# Patient Record
Sex: MALE | Race: WHITE | URBAN - METROPOLITAN AREA
[De-identification: names, ages, dates, MRNs, and addresses within clinical notes are randomized per-mention and may not be internally consistent; named-entity substitution may affect disease eponyms.]

---

## 2019-12-26 ENCOUNTER — EMERGENCY (EMERGENCY)
Facility: HOSPITAL | Age: 84
LOS: 0 days | Discharge: HOME | End: 2019-12-27
Attending: EMERGENCY MEDICINE
Payer: MEDICARE

## 2019-12-26 VITALS
HEART RATE: 90 BPM | RESPIRATION RATE: 17 BRPM | HEIGHT: 69 IN | TEMPERATURE: 98 F | OXYGEN SATURATION: 98 % | SYSTOLIC BLOOD PRESSURE: 170 MMHG | DIASTOLIC BLOOD PRESSURE: 91 MMHG | WEIGHT: 134.92 LBS

## 2019-12-26 DIAGNOSIS — I25.2 OLD MYOCARDIAL INFARCTION: ICD-10-CM

## 2019-12-26 DIAGNOSIS — R73.03 PREDIABETES: ICD-10-CM

## 2019-12-26 DIAGNOSIS — I25.10 ATHEROSCLEROTIC HEART DISEASE OF NATIVE CORONARY ARTERY WITHOUT ANGINA PECTORIS: ICD-10-CM

## 2019-12-26 DIAGNOSIS — I10 ESSENTIAL (PRIMARY) HYPERTENSION: ICD-10-CM

## 2019-12-26 DIAGNOSIS — N17.9 ACUTE KIDNEY FAILURE, UNSPECIFIED: ICD-10-CM

## 2019-12-26 DIAGNOSIS — E86.0 DEHYDRATION: ICD-10-CM

## 2019-12-26 DIAGNOSIS — R90.82 WHITE MATTER DISEASE, UNSPECIFIED: ICD-10-CM

## 2019-12-26 DIAGNOSIS — I11.9 HYPERTENSIVE HEART DISEASE WITHOUT HEART FAILURE: ICD-10-CM

## 2019-12-26 DIAGNOSIS — R07.89 OTHER CHEST PAIN: ICD-10-CM

## 2019-12-26 DIAGNOSIS — Z95.5 PRESENCE OF CORONARY ANGIOPLASTY IMPLANT AND GRAFT: ICD-10-CM

## 2019-12-26 DIAGNOSIS — K21.9 GASTRO-ESOPHAGEAL REFLUX DISEASE WITHOUT ESOPHAGITIS: ICD-10-CM

## 2019-12-26 DIAGNOSIS — E78.5 HYPERLIPIDEMIA, UNSPECIFIED: ICD-10-CM

## 2019-12-26 DIAGNOSIS — I44.0 ATRIOVENTRICULAR BLOCK, FIRST DEGREE: ICD-10-CM

## 2019-12-26 DIAGNOSIS — N40.0 BENIGN PROSTATIC HYPERPLASIA WITHOUT LOWER URINARY TRACT SYMPTOMS: ICD-10-CM

## 2019-12-26 DIAGNOSIS — R07.9 CHEST PAIN, UNSPECIFIED: ICD-10-CM

## 2019-12-26 DIAGNOSIS — Z79.82 LONG TERM (CURRENT) USE OF ASPIRIN: ICD-10-CM

## 2019-12-26 DIAGNOSIS — I45.10 UNSPECIFIED RIGHT BUNDLE-BRANCH BLOCK: ICD-10-CM

## 2019-12-26 LAB
ALBUMIN SERPL ELPH-MCNC: 3.2 G/DL — LOW (ref 3.3–5)
ALP SERPL-CCNC: 115 U/L — SIGNIFICANT CHANGE UP (ref 40–120)
ALT FLD-CCNC: 20 U/L — SIGNIFICANT CHANGE UP (ref 12–78)
ANION GAP SERPL CALC-SCNC: 9 MMOL/L — SIGNIFICANT CHANGE UP (ref 5–17)
APPEARANCE UR: CLEAR — SIGNIFICANT CHANGE UP
APTT BLD: 28 SEC — SIGNIFICANT CHANGE UP (ref 27.5–36.3)
AST SERPL-CCNC: 27 U/L — SIGNIFICANT CHANGE UP (ref 15–37)
BASOPHILS # BLD AUTO: 0.05 K/UL — SIGNIFICANT CHANGE UP (ref 0–0.2)
BASOPHILS NFR BLD AUTO: 0.4 % — SIGNIFICANT CHANGE UP (ref 0–2)
BILIRUB SERPL-MCNC: 1.4 MG/DL — HIGH (ref 0.2–1.2)
BILIRUB UR-MCNC: NEGATIVE — SIGNIFICANT CHANGE UP
BUN SERPL-MCNC: 15 MG/DL — SIGNIFICANT CHANGE UP (ref 7–23)
CALCIUM SERPL-MCNC: 8.4 MG/DL — LOW (ref 8.5–10.1)
CHLORIDE SERPL-SCNC: 108 MMOL/L — SIGNIFICANT CHANGE UP (ref 96–108)
CO2 SERPL-SCNC: 25 MMOL/L — SIGNIFICANT CHANGE UP (ref 22–31)
COLOR SPEC: YELLOW — SIGNIFICANT CHANGE UP
CREAT SERPL-MCNC: 1.09 MG/DL — SIGNIFICANT CHANGE UP (ref 0.5–1.3)
DIFF PNL FLD: ABNORMAL
EOSINOPHIL # BLD AUTO: 0.16 K/UL — SIGNIFICANT CHANGE UP (ref 0–0.5)
EOSINOPHIL NFR BLD AUTO: 1.4 % — SIGNIFICANT CHANGE UP (ref 0–6)
GLUCOSE SERPL-MCNC: 133 MG/DL — HIGH (ref 70–99)
GLUCOSE UR QL: NEGATIVE MG/DL — SIGNIFICANT CHANGE UP
HCT VFR BLD CALC: 38.6 % — LOW (ref 39–50)
HGB BLD-MCNC: 12.9 G/DL — LOW (ref 13–17)
IMM GRANULOCYTES NFR BLD AUTO: 0.5 % — SIGNIFICANT CHANGE UP (ref 0–1.5)
INR BLD: 1.03 RATIO — SIGNIFICANT CHANGE UP (ref 0.88–1.16)
KETONES UR-MCNC: ABNORMAL
LEUKOCYTE ESTERASE UR-ACNC: NEGATIVE — SIGNIFICANT CHANGE UP
LIDOCAIN IGE QN: 107 U/L — SIGNIFICANT CHANGE UP (ref 73–393)
LYMPHOCYTES # BLD AUTO: 1.35 K/UL — SIGNIFICANT CHANGE UP (ref 1–3.3)
LYMPHOCYTES # BLD AUTO: 12 % — LOW (ref 13–44)
MAGNESIUM SERPL-MCNC: 1.5 MG/DL — LOW (ref 1.6–2.6)
MCHC RBC-ENTMCNC: 31.6 PG — SIGNIFICANT CHANGE UP (ref 27–34)
MCHC RBC-ENTMCNC: 33.4 GM/DL — SIGNIFICANT CHANGE UP (ref 32–36)
MCV RBC AUTO: 94.6 FL — SIGNIFICANT CHANGE UP (ref 80–100)
MONOCYTES # BLD AUTO: 0.88 K/UL — SIGNIFICANT CHANGE UP (ref 0–0.9)
MONOCYTES NFR BLD AUTO: 7.8 % — SIGNIFICANT CHANGE UP (ref 2–14)
NEUTROPHILS # BLD AUTO: 8.72 K/UL — HIGH (ref 1.8–7.4)
NEUTROPHILS NFR BLD AUTO: 77.9 % — HIGH (ref 43–77)
NITRITE UR-MCNC: NEGATIVE — SIGNIFICANT CHANGE UP
NT-PROBNP SERPL-SCNC: 1139 PG/ML — HIGH (ref 0–450)
PH UR: 8 — SIGNIFICANT CHANGE UP (ref 5–8)
PLATELET # BLD AUTO: 210 K/UL — SIGNIFICANT CHANGE UP (ref 150–400)
POTASSIUM SERPL-MCNC: 4.2 MMOL/L — SIGNIFICANT CHANGE UP (ref 3.5–5.3)
POTASSIUM SERPL-SCNC: 4.2 MMOL/L — SIGNIFICANT CHANGE UP (ref 3.5–5.3)
PROT SERPL-MCNC: 6.7 GM/DL — SIGNIFICANT CHANGE UP (ref 6–8.3)
PROT UR-MCNC: NEGATIVE MG/DL — SIGNIFICANT CHANGE UP
PROTHROM AB SERPL-ACNC: 11.5 SEC — SIGNIFICANT CHANGE UP (ref 10–12.9)
RBC # BLD: 4.08 M/UL — LOW (ref 4.2–5.8)
RBC # FLD: 13.7 % — SIGNIFICANT CHANGE UP (ref 10.3–14.5)
SODIUM SERPL-SCNC: 142 MMOL/L — SIGNIFICANT CHANGE UP (ref 135–145)
SP GR SPEC: 1.01 — SIGNIFICANT CHANGE UP (ref 1.01–1.02)
TROPONIN I SERPL-MCNC: <0.015 NG/ML — SIGNIFICANT CHANGE UP (ref 0.01–0.04)
UROBILINOGEN FLD QL: NEGATIVE MG/DL — SIGNIFICANT CHANGE UP
WBC # BLD: 11.22 K/UL — HIGH (ref 3.8–10.5)
WBC # FLD AUTO: 11.22 K/UL — HIGH (ref 3.8–10.5)

## 2019-12-26 PROCEDURE — 80048 BASIC METABOLIC PNL TOTAL CA: CPT

## 2019-12-26 PROCEDURE — 71045 X-RAY EXAM CHEST 1 VIEW: CPT

## 2019-12-26 PROCEDURE — 96372 THER/PROPH/DIAG INJ SC/IM: CPT | Mod: XU

## 2019-12-26 PROCEDURE — 87086 URINE CULTURE/COLONY COUNT: CPT

## 2019-12-26 PROCEDURE — 96374 THER/PROPH/DIAG INJ IV PUSH: CPT

## 2019-12-26 PROCEDURE — 93306 TTE W/DOPPLER COMPLETE: CPT

## 2019-12-26 PROCEDURE — 85610 PROTHROMBIN TIME: CPT

## 2019-12-26 PROCEDURE — 93005 ELECTROCARDIOGRAM TRACING: CPT

## 2019-12-26 PROCEDURE — 99285 EMERGENCY DEPT VISIT HI MDM: CPT

## 2019-12-26 PROCEDURE — 80053 COMPREHEN METABOLIC PANEL: CPT

## 2019-12-26 PROCEDURE — 84484 ASSAY OF TROPONIN QUANT: CPT | Mod: 91

## 2019-12-26 PROCEDURE — G0378: CPT

## 2019-12-26 PROCEDURE — 97116 GAIT TRAINING THERAPY: CPT | Mod: GP

## 2019-12-26 PROCEDURE — 83690 ASSAY OF LIPASE: CPT

## 2019-12-26 PROCEDURE — 97163 PT EVAL HIGH COMPLEX 45 MIN: CPT | Mod: GP

## 2019-12-26 PROCEDURE — 81001 URINALYSIS AUTO W/SCOPE: CPT

## 2019-12-26 PROCEDURE — 36415 COLL VENOUS BLD VENIPUNCTURE: CPT

## 2019-12-26 PROCEDURE — 83036 HEMOGLOBIN GLYCOSYLATED A1C: CPT

## 2019-12-26 PROCEDURE — 83735 ASSAY OF MAGNESIUM: CPT

## 2019-12-26 PROCEDURE — 80061 LIPID PANEL: CPT

## 2019-12-26 PROCEDURE — 85025 COMPLETE CBC W/AUTO DIFF WBC: CPT

## 2019-12-26 PROCEDURE — 70450 CT HEAD/BRAIN W/O DYE: CPT

## 2019-12-26 PROCEDURE — 71045 X-RAY EXAM CHEST 1 VIEW: CPT | Mod: 26

## 2019-12-26 PROCEDURE — 99285 EMERGENCY DEPT VISIT HI MDM: CPT | Mod: 25

## 2019-12-26 PROCEDURE — 70450 CT HEAD/BRAIN W/O DYE: CPT | Mod: 26

## 2019-12-26 PROCEDURE — 93010 ELECTROCARDIOGRAM REPORT: CPT

## 2019-12-26 PROCEDURE — 85730 THROMBOPLASTIN TIME PARTIAL: CPT

## 2019-12-26 PROCEDURE — 83880 ASSAY OF NATRIURETIC PEPTIDE: CPT

## 2019-12-26 RX ORDER — MORPHINE SULFATE 50 MG/1
2 CAPSULE, EXTENDED RELEASE ORAL EVERY 4 HOURS
Refills: 0 | Status: DISCONTINUED | OUTPATIENT
Start: 2019-12-26 | End: 2019-12-27

## 2019-12-26 RX ORDER — DEXTROSE 50 % IN WATER 50 %
12.5 SYRINGE (ML) INTRAVENOUS ONCE
Refills: 0 | Status: DISCONTINUED | OUTPATIENT
Start: 2019-12-26 | End: 2019-12-27

## 2019-12-26 RX ORDER — ACETAMINOPHEN 500 MG
650 TABLET ORAL EVERY 4 HOURS
Refills: 0 | Status: DISCONTINUED | OUTPATIENT
Start: 2019-12-26 | End: 2019-12-27

## 2019-12-26 RX ORDER — ONDANSETRON 8 MG/1
4 TABLET, FILM COATED ORAL EVERY 6 HOURS
Refills: 0 | Status: DISCONTINUED | OUTPATIENT
Start: 2019-12-26 | End: 2019-12-27

## 2019-12-26 RX ORDER — METOPROLOL TARTRATE 50 MG
25 TABLET ORAL
Refills: 0 | Status: DISCONTINUED | OUTPATIENT
Start: 2019-12-26 | End: 2019-12-27

## 2019-12-26 RX ORDER — TEMAZEPAM 15 MG/1
15 CAPSULE ORAL AT BEDTIME
Refills: 0 | Status: DISCONTINUED | OUTPATIENT
Start: 2019-12-26 | End: 2019-12-27

## 2019-12-26 RX ORDER — DEXTROSE 50 % IN WATER 50 %
15 SYRINGE (ML) INTRAVENOUS ONCE
Refills: 0 | Status: DISCONTINUED | OUTPATIENT
Start: 2019-12-26 | End: 2019-12-27

## 2019-12-26 RX ORDER — TAMSULOSIN HYDROCHLORIDE 0.4 MG/1
0.8 CAPSULE ORAL AT BEDTIME
Refills: 0 | Status: DISCONTINUED | OUTPATIENT
Start: 2019-12-26 | End: 2019-12-27

## 2019-12-26 RX ORDER — SODIUM CHLORIDE 9 MG/ML
1000 INJECTION, SOLUTION INTRAVENOUS
Refills: 0 | Status: DISCONTINUED | OUTPATIENT
Start: 2019-12-26 | End: 2019-12-27

## 2019-12-26 RX ORDER — SIMVASTATIN 20 MG/1
40 TABLET, FILM COATED ORAL AT BEDTIME
Refills: 0 | Status: DISCONTINUED | OUTPATIENT
Start: 2019-12-26 | End: 2019-12-27

## 2019-12-26 RX ORDER — FINASTERIDE 5 MG/1
5 TABLET, FILM COATED ORAL DAILY
Refills: 0 | Status: DISCONTINUED | OUTPATIENT
Start: 2019-12-26 | End: 2019-12-27

## 2019-12-26 RX ORDER — AMLODIPINE BESYLATE 2.5 MG/1
5 TABLET ORAL ONCE
Refills: 0 | Status: COMPLETED | OUTPATIENT
Start: 2019-12-26 | End: 2019-12-26

## 2019-12-26 RX ORDER — GLUCAGON INJECTION, SOLUTION 0.5 MG/.1ML
1 INJECTION, SOLUTION SUBCUTANEOUS ONCE
Refills: 0 | Status: DISCONTINUED | OUTPATIENT
Start: 2019-12-26 | End: 2019-12-27

## 2019-12-26 RX ORDER — MAGNESIUM SULFATE 500 MG/ML
1 VIAL (ML) INJECTION ONCE
Refills: 0 | Status: COMPLETED | OUTPATIENT
Start: 2019-12-26 | End: 2019-12-26

## 2019-12-26 RX ORDER — DEXTROSE 50 % IN WATER 50 %
25 SYRINGE (ML) INTRAVENOUS ONCE
Refills: 0 | Status: DISCONTINUED | OUTPATIENT
Start: 2019-12-26 | End: 2019-12-27

## 2019-12-26 RX ORDER — QUETIAPINE FUMARATE 200 MG/1
50 TABLET, FILM COATED ORAL AT BEDTIME
Refills: 0 | Status: DISCONTINUED | OUTPATIENT
Start: 2019-12-26 | End: 2019-12-27

## 2019-12-26 RX ORDER — HYDROXYZINE HCL 10 MG
25 TABLET ORAL
Refills: 0 | Status: DISCONTINUED | OUTPATIENT
Start: 2019-12-26 | End: 2019-12-27

## 2019-12-26 RX ORDER — HEPARIN SODIUM 5000 [USP'U]/ML
5000 INJECTION INTRAVENOUS; SUBCUTANEOUS
Refills: 0 | Status: DISCONTINUED | OUTPATIENT
Start: 2019-12-26 | End: 2019-12-27

## 2019-12-26 RX ORDER — ASPIRIN/CALCIUM CARB/MAGNESIUM 324 MG
81 TABLET ORAL DAILY
Refills: 0 | Status: DISCONTINUED | OUTPATIENT
Start: 2019-12-26 | End: 2019-12-27

## 2019-12-26 RX ORDER — PANTOPRAZOLE SODIUM 20 MG/1
40 TABLET, DELAYED RELEASE ORAL
Refills: 0 | Status: DISCONTINUED | OUTPATIENT
Start: 2019-12-26 | End: 2019-12-27

## 2019-12-26 RX ORDER — GABAPENTIN 400 MG/1
800 CAPSULE ORAL THREE TIMES A DAY
Refills: 0 | Status: DISCONTINUED | OUTPATIENT
Start: 2019-12-26 | End: 2019-12-27

## 2019-12-26 RX ORDER — AMLODIPINE BESYLATE 2.5 MG/1
5 TABLET ORAL DAILY
Refills: 0 | Status: DISCONTINUED | OUTPATIENT
Start: 2019-12-26 | End: 2019-12-27

## 2019-12-26 RX ADMIN — SIMVASTATIN 40 MILLIGRAM(S): 20 TABLET, FILM COATED ORAL at 22:39

## 2019-12-26 RX ADMIN — TEMAZEPAM 15 MILLIGRAM(S): 15 CAPSULE ORAL at 22:39

## 2019-12-26 RX ADMIN — QUETIAPINE FUMARATE 50 MILLIGRAM(S): 200 TABLET, FILM COATED ORAL at 21:42

## 2019-12-26 RX ADMIN — Medication 1 TABLET(S): at 17:44

## 2019-12-26 RX ADMIN — FINASTERIDE 5 MILLIGRAM(S): 5 TABLET, FILM COATED ORAL at 17:44

## 2019-12-26 RX ADMIN — AMLODIPINE BESYLATE 5 MILLIGRAM(S): 2.5 TABLET ORAL at 17:44

## 2019-12-26 RX ADMIN — GABAPENTIN 800 MILLIGRAM(S): 400 CAPSULE ORAL at 14:02

## 2019-12-26 RX ADMIN — GABAPENTIN 800 MILLIGRAM(S): 400 CAPSULE ORAL at 21:43

## 2019-12-26 RX ADMIN — Medication 25 MILLIGRAM(S): at 14:03

## 2019-12-26 RX ADMIN — HEPARIN SODIUM 5000 UNIT(S): 5000 INJECTION INTRAVENOUS; SUBCUTANEOUS at 17:44

## 2019-12-26 RX ADMIN — Medication 100 GRAM(S): at 16:04

## 2019-12-26 RX ADMIN — TAMSULOSIN HYDROCHLORIDE 0.8 MILLIGRAM(S): 0.4 CAPSULE ORAL at 21:43

## 2019-12-26 NOTE — ED PROVIDER NOTE - PROGRESS NOTE DETAILS
javed: Discussed need to admit with patient & discussed risk and benefits.  Patient agreed to admission.  Discussed case w/ admitting doctor - agreed to admit to their service. will place bridge orders. Accepting physician APPROVED PT TO MOVE   prior to inpatient team evaluation

## 2019-12-26 NOTE — H&P ADULT - HISTORY OF PRESENT ILLNESS
89 y/o male with PMHx of CAD s/p stenting in the past, HLD, BPH, DM, HTN, and GERD who presents with a chief complaint of chest pain.  Patient notes sx started when he woke up this morning.  He got OOB, went to the bathroom, and CP started.  CP felt like pressure 3-4/10.  Patient also had an episode of nausea with vomiting at home with the CP this morning.  He denies SOB or palpitations.  Chest pressure was substernal and did not radiate.  Patient presented to  ER due to pressure like pain and hx of cardiac disease in the past.  Patient denies episodes of CP in the past.  Patient given nitro and ASA and CP improved.  Still has some discomfort now but improved.  In ER, EKG with new RBBB compared to 2013, 1st trop negative.      PAST MEDICAL & SURGICAL HISTORY:    as above    FAMILY HISTORY:  noncontributory to this hospitalization    Social History:  lives at home alone.  no tobacco/ etoh/ drug use.  .      Allergies:  unknown    Home Medications:  alfuzosin 10 mg oral tablet, extended release: 1 tab(s) orally once a day with food (26 Dec 2019 12:01)  Aspir 81 oral delayed release tablet: 1 tab(s) orally once a day (26 Dec 2019 12:01)  finasteride 5 mg oral tablet: 1 tab(s) orally once a day (26 Dec 2019 12:01)  gabapentin 800 mg oral tablet: 1 tab(s) orally 3 times a day (26 Dec 2019 12:01)  hydrOXYzine hydrochloride 25 mg oral tablet: 1 tab(s) orally 2 times a day, As Needed for itch (26 Dec 2019 12:01)  meloxicam 15 mg oral tablet: 1 tab(s) orally once a day (26 Dec 2019 12:01)  Multiple Vitamins oral tablet: 1 tab(s) orally once a day (26 Dec 2019 12:01)  omeprazole 20 mg oral delayed release capsule: 1 cap(s) orally once a day (26 Dec 2019 12:01)  QUEtiapine 50 mg oral tablet: 1 tab(s) orally once a day (at bedtime) (26 Dec 2019 12:01)  simvastatin 40 mg oral tablet: 1 tab(s) orally once a day (at bedtime) (26 Dec 2019 12:01)  temazepam 15 mg oral capsule: 1-2 cap(s) orally once a day (at bedtime) as needed for sleep (26 Dec 2019 12:01)

## 2019-12-26 NOTE — ED ADULT NURSE REASSESSMENT NOTE - NS ED NURSE REASSESS COMMENT FT1
Pt IV infiltrated in left AC Dr. Estrada made aware. Pt denies any pain or discomfort at site. MD stated to monitor site. MD stated to hold norvasc at this time due to pt's blood pressure. Safety and comfort measures in place. Hourly rounding will be done on my time. Will continue to monitor.

## 2019-12-26 NOTE — ED PROVIDER NOTE - PHYSICAL EXAMINATION
*GEN: No acute distress, well appearing; A+O x3   *HEAD: Normocephalic, Atraumatic  *EYES/NOSE: PERRL & EOMI b/l  *THROAT: airway patent, moist mucous membranes  *NECK: Neck supple, no masses  *PULMONARY: CTA b/l, symmetric breath sounds.   *CARDIAC: s1s2, regular rhythm, no Murmur  *ABDOMEN:  Non Tender, Non Distended, soft, no guarding, no rebound, no masses   *BACK: no CVA tenderness, Normal  spine   *EXTREMITIES: symmetric pulses, 2+ dp & radial pulses, capillary refill < 2 seconds, no cyanosis, no edema   *SKIN: no rash or bruising   *NEUROLOGIC: alert, CN 2-12 intact, moves all 4 extremities, full active & passive ROM in all extremities,   *PSYCH: insight and judgment nl, memory nl, affect nl, thought nl

## 2019-12-26 NOTE — ED ADULT NURSE NOTE - GASTROINTESTINAL WDL
Abdomen soft, nontender, nondistended, bowel sounds present in all 4 quadrants.
bilateral upper extremity ROM was WFL (within functional limits)/bilateral lower extremity ROM was WFL (within functional limits)

## 2019-12-26 NOTE — H&P ADULT - NSHPLABSRESULTS_GEN_ALL_CORE
12.9   1122 )-----------( 210      ( 26 Dec 2019 08:28 )             38.6         142  |  108  |  15  ----------------------------<  133<H>  4.2   |  25  |  1.09    Ca    8.4<L>      26 Dec 2019 08:28  Mg     1.5         TPro  6.7  /  Alb  3.2<L>  /  TBili  1.4<H>  /  DBili  x   /  AST  27  /  ALT  20  /  AlkPhos  115      CAPILLARY BLOOD GLUCOSE        PT/INR - ( 26 Dec 2019 08:28 )   PT: 11.5 sec;   INR: 1.03 ratio         PTT - ( 26 Dec 2019 08:28 )  PTT:28.0 sec  Urinalysis Basic - ( 26 Dec 2019 13:49 )    Color: Yellow / Appearance: Clear / S.010 / pH: x  Gluc: x / Ketone: Small  / Bili: Negative / Urobili: Negative mg/dL   Blood: x / Protein: Negative mg/dL / Nitrite: Negative   Leuk Esterase: Negative / RBC: x / WBC x   Sq Epi: x / Non Sq Epi: x / Bacteria: x

## 2019-12-26 NOTE — H&P ADULT - ASSESSMENT
89 y/o male with PMHx of CAD s/p stenting in the past, HLD, BPH, DM, HTN, and GERD who presents with a chief complaint of chest pain.  In ER, EKG with new RBBB compared to 2013, 1st trop negative.      #CP r/o ACS:    Admit to OBS/ Tele.    Follow enzymes x3.    Cont asa/ statin.    Start BB with elevated BP.    Morphine PRN pain.    Cardio consult-- Dr. Basurto.    Check ECHO-- none on file.      #CAD/ PCI:    Cont home meds.  ASA/ statin.    As above.    Cardio eval.    Check Echo.      #HTN:    BP elevated in 's systolic.  Does not appear to be on BP meds outpatient.    Start metoprolol 25 BID.    Cardio f/u.    Check ECHO.      #HLD:    Check labs.  Cont statin.      #? DM:    DM as per notes.  Glucose 133.    Check BGMs/ A1C.      #GERD:    Cont PPI.      #BPH with urinary retention:    Pt with distention on exam.  Bladder scan 275.    Cont to monitor and straight cath for >400.    Cont home meds.      #Leukocytosis:    Check UA/ Urine cx to r/o infection.    CXR negative.      #Unsteady Gait:    PT eval.  Patient lives alone.      #DVT Proph:  Heparin SQ  IMPROVE VTE Individual Risk Assessment    RISK                                                                Points    [  ] Previous VTE                                                  3    [  ] Thrombophilia                                               2    [  ] Lower limb paralysis                                      2        (unable to hold up >15 seconds)      [  ] Current Cancer                                              2         (within 6 months)    [  ] Immobilization > 24 hrs                                1    [  ] ICU/CCU stay > 24 hours                              1    [  ] Age > 60                                                      1    IMPROVE VTE Score ____1__

## 2019-12-26 NOTE — ED ADULT NURSE REASSESSMENT NOTE - NS ED NURSE REASSESS COMMENT FT1
Updated Dr. Estrada regarding patient's blood pressure. MD stated that he is allowed to move to assigned unit and that she will place new orders. Will follow orders as prescribed.

## 2019-12-26 NOTE — ED ADULT TRIAGE NOTE - CHIEF COMPLAINT QUOTE
PT BIBEMS for eval of chest pain, pt states he woke up with non radiating SSCP. EMS initiated IVL, gave NTG SL and patient states he took 4 baby ASA PTA.

## 2019-12-26 NOTE — ED PROVIDER NOTE - OBJECTIVE STATEMENT
Pertinent HPI/PMH/PSH/FHx/SHx and Review of Systems contained within  HPI: 87 yo M p/w CC of CP x this morning. Patient reports that he  was walking to the bathroom this morning when he suddenly started to have non radiating CP, and shortly after had one episode of nausea, vomiting. Patient hs not had CP like this in the past, and came to the ED for further evaluation. EMS initiated IVL, gave NTG SL and patient states he took 4 baby ASA PTA. Pt reports the CP self resolved since coming to the ED. Reports these symptoms do not feel like his GERD.   PMH/PSH relevant for: CAD, s/p stents with Dr. Basurto , HTN, HLD, DM, GERD     ROS negative for: fever, SOB, Nausea, vomiting, diarrhea, abdominal pain, dysuria, diaphoresis  FamilyHx and SocialHx not otherwise contributory

## 2019-12-26 NOTE — ED PROVIDER NOTE - NS ED ROS FT
Review of Systems:  	•	CONSTITUTIONAL: no fever  	•	SKIN: no rash  	•	RESPIRATORY: no shortness of breath  	•	CARDIAC: chest pain, no palpitations  	•	GI:  no abd pain,  nausea,  vomiting, no diarrhea  	•	GENITO-URINARY:  no dysuria; no hematuria    	•	MUSCULOSKELETAL:  no back pain  	•	NEUROLOGIC: no weakness  	•	ALLERGY: no rhinitis  	•	PSYSCHIATRIC: no anxiety

## 2019-12-26 NOTE — ED PROVIDER NOTE - CLINICAL SUMMARY MEDICAL DECISION MAKING FREE TEXT BOX
Patient presents to the ED with exertional CP with associated N/V. Patient presents to the ED with exertional CP with associated N/V. Symptoms got better with ASA and NTG. EKG with new RBBB. Heart score of 6, needs admission.

## 2019-12-26 NOTE — ED ADULT NURSE NOTE - OBJECTIVE STATEMENT
pt presents to Ed via EMS, pt alert and orientedx4, VSS afebrule, pt c/o chest pain,  nausea and vomitingx1, pt deneis SOB dizziness or weakness, neuros itnact, pt denies abdominal pain and diarrhea, pt deneis fevers. will continue to monitor

## 2019-12-26 NOTE — H&P ADULT - NSHPREVIEWOFSYSTEMS_GEN_ALL_CORE
ROS:  General:  No fevers, chills, or unexplained weight loss  Skin: No rash or bothersome skin lesions  Musculoskeletal: No arthalgias, myalgias or joint swelling  Eyes: No visual changes or eye pain  Ears: No hearing loss , otorrhea or ear pain  Nose, Mouth, Throat: No nasal congestion, rhinorrhea, oral lesions, postnasal drip or sore throat  Cardio: CP as per HPI.    Respiratory: No cough, shortness of breath or wheezing   GI: No diarrhea, constipation, blood in stools, abdominal pain, vomiting or heartburn  : urinary frequency.    Neurologic: No headaches, parasthesias, confusion, dysarthria or gait instability  Psychiatric:  No anxiety or depression  Lymphatic:  No easy bruising, easy bleeding or swollen glands  Allergic: No itching, sneezing , watery eyes, clear rhinorrhea or recurrent infections

## 2019-12-26 NOTE — ED ADULT NURSE NOTE - NSIMPLEMENTINTERV_GEN_ALL_ED
Implemented All Fall Risk Interventions:  [place] to call system. Call bell, personal items and telephone within reach. Instruct patient to call for assistance. Room bathroom lighting operational. Non-slip footwear when patient is off stretcher. Physically safe environment: no spills, clutter or unnecessary equipment. Stretcher in lowest position, wheels locked, appropriate side rails in place. Provide visual cue, wrist band, yellow gown, etc. Monitor gait and stability. Monitor for mental status changes and reorient to person, place, and time. Review medications for side effects contributing to fall risk. Reinforce activity limits and safety measures with patient and family.

## 2019-12-27 ENCOUNTER — TRANSCRIPTION ENCOUNTER (OUTPATIENT)
Age: 84
End: 2019-12-27

## 2019-12-27 VITALS — DIASTOLIC BLOOD PRESSURE: 57 MMHG | HEART RATE: 63 BPM | SYSTOLIC BLOOD PRESSURE: 132 MMHG

## 2019-12-27 LAB
ADD ON TEST-SPECIMEN IN LAB: SIGNIFICANT CHANGE UP
ANION GAP SERPL CALC-SCNC: 6 MMOL/L — SIGNIFICANT CHANGE UP (ref 5–17)
BASOPHILS # BLD AUTO: 0.03 K/UL — SIGNIFICANT CHANGE UP (ref 0–0.2)
BASOPHILS NFR BLD AUTO: 0.3 % — SIGNIFICANT CHANGE UP (ref 0–2)
BUN SERPL-MCNC: 26 MG/DL — HIGH (ref 7–23)
CALCIUM SERPL-MCNC: 8.6 MG/DL — SIGNIFICANT CHANGE UP (ref 8.5–10.1)
CHLORIDE SERPL-SCNC: 104 MMOL/L — SIGNIFICANT CHANGE UP (ref 96–108)
CO2 SERPL-SCNC: 28 MMOL/L — SIGNIFICANT CHANGE UP (ref 22–31)
CREAT SERPL-MCNC: 1.31 MG/DL — HIGH (ref 0.5–1.3)
CULTURE RESULTS: NO GROWTH — SIGNIFICANT CHANGE UP
EOSINOPHIL # BLD AUTO: 0.15 K/UL — SIGNIFICANT CHANGE UP (ref 0–0.5)
EOSINOPHIL NFR BLD AUTO: 1.7 % — SIGNIFICANT CHANGE UP (ref 0–6)
GLUCOSE SERPL-MCNC: 121 MG/DL — HIGH (ref 70–99)
HBA1C BLD-MCNC: 6.3 % — HIGH (ref 4–5.6)
HCT VFR BLD CALC: 38.2 % — LOW (ref 39–50)
HGB BLD-MCNC: 12.4 G/DL — LOW (ref 13–17)
IMM GRANULOCYTES NFR BLD AUTO: 0.3 % — SIGNIFICANT CHANGE UP (ref 0–1.5)
LYMPHOCYTES # BLD AUTO: 2.19 K/UL — SIGNIFICANT CHANGE UP (ref 1–3.3)
LYMPHOCYTES # BLD AUTO: 24.3 % — SIGNIFICANT CHANGE UP (ref 13–44)
MCHC RBC-ENTMCNC: 30.8 PG — SIGNIFICANT CHANGE UP (ref 27–34)
MCHC RBC-ENTMCNC: 32.5 GM/DL — SIGNIFICANT CHANGE UP (ref 32–36)
MCV RBC AUTO: 95 FL — SIGNIFICANT CHANGE UP (ref 80–100)
MONOCYTES # BLD AUTO: 0.95 K/UL — HIGH (ref 0–0.9)
MONOCYTES NFR BLD AUTO: 10.5 % — SIGNIFICANT CHANGE UP (ref 2–14)
NEUTROPHILS # BLD AUTO: 5.68 K/UL — SIGNIFICANT CHANGE UP (ref 1.8–7.4)
NEUTROPHILS NFR BLD AUTO: 62.9 % — SIGNIFICANT CHANGE UP (ref 43–77)
PLATELET # BLD AUTO: 214 K/UL — SIGNIFICANT CHANGE UP (ref 150–400)
POTASSIUM SERPL-MCNC: 3.7 MMOL/L — SIGNIFICANT CHANGE UP (ref 3.5–5.3)
POTASSIUM SERPL-SCNC: 3.7 MMOL/L — SIGNIFICANT CHANGE UP (ref 3.5–5.3)
RBC # BLD: 4.02 M/UL — LOW (ref 4.2–5.8)
RBC # FLD: 14 % — SIGNIFICANT CHANGE UP (ref 10.3–14.5)
SODIUM SERPL-SCNC: 138 MMOL/L — SIGNIFICANT CHANGE UP (ref 135–145)
SPECIMEN SOURCE: SIGNIFICANT CHANGE UP
WBC # BLD: 9.03 K/UL — SIGNIFICANT CHANGE UP (ref 3.8–10.5)
WBC # FLD AUTO: 9.03 K/UL — SIGNIFICANT CHANGE UP (ref 3.8–10.5)

## 2019-12-27 PROCEDURE — 93306 TTE W/DOPPLER COMPLETE: CPT | Mod: 26

## 2019-12-27 RX ORDER — ACETAMINOPHEN 500 MG
2 TABLET ORAL
Qty: 40 | Refills: 0
Start: 2019-12-27 | End: 2019-12-31

## 2019-12-27 RX ORDER — SODIUM CHLORIDE 9 MG/ML
1000 INJECTION INTRAMUSCULAR; INTRAVENOUS; SUBCUTANEOUS
Refills: 0 | Status: DISCONTINUED | OUTPATIENT
Start: 2019-12-27 | End: 2019-12-27

## 2019-12-27 RX ORDER — AMLODIPINE BESYLATE 2.5 MG/1
1 TABLET ORAL
Qty: 30 | Refills: 0
Start: 2019-12-27 | End: 2020-01-25

## 2019-12-27 RX ORDER — MELOXICAM 15 MG/1
1 TABLET ORAL
Qty: 0 | Refills: 0 | DISCHARGE

## 2019-12-27 RX ORDER — METOPROLOL TARTRATE 50 MG
1 TABLET ORAL
Qty: 60 | Refills: 0
Start: 2019-12-27 | End: 2020-01-25

## 2019-12-27 RX ORDER — AMLODIPINE BESYLATE 2.5 MG/1
1 TABLET ORAL
Qty: 0 | Refills: 0 | DISCHARGE
Start: 2019-12-27

## 2019-12-27 RX ADMIN — Medication 25 MILLIGRAM(S): at 05:44

## 2019-12-27 RX ADMIN — AMLODIPINE BESYLATE 5 MILLIGRAM(S): 2.5 TABLET ORAL at 05:43

## 2019-12-27 RX ADMIN — Medication 25 MILLIGRAM(S): at 17:28

## 2019-12-27 RX ADMIN — PANTOPRAZOLE SODIUM 40 MILLIGRAM(S): 20 TABLET, DELAYED RELEASE ORAL at 05:44

## 2019-12-27 RX ADMIN — Medication 81 MILLIGRAM(S): at 11:53

## 2019-12-27 RX ADMIN — GABAPENTIN 800 MILLIGRAM(S): 400 CAPSULE ORAL at 05:43

## 2019-12-27 RX ADMIN — GABAPENTIN 800 MILLIGRAM(S): 400 CAPSULE ORAL at 14:41

## 2019-12-27 RX ADMIN — FINASTERIDE 5 MILLIGRAM(S): 5 TABLET, FILM COATED ORAL at 11:52

## 2019-12-27 RX ADMIN — SODIUM CHLORIDE 75 MILLILITER(S): 9 INJECTION INTRAMUSCULAR; INTRAVENOUS; SUBCUTANEOUS at 14:33

## 2019-12-27 RX ADMIN — HEPARIN SODIUM 5000 UNIT(S): 5000 INJECTION INTRAVENOUS; SUBCUTANEOUS at 05:44

## 2019-12-27 RX ADMIN — Medication 1 TABLET(S): at 11:53

## 2019-12-27 NOTE — DISCHARGE NOTE PROVIDER - NSDCCPCAREPLAN_GEN_ALL_CORE_FT
PRINCIPAL DISCHARGE DIAGNOSIS  Diagnosis: Chest pain with moderate risk for cardiac etiology  Assessment and Plan of Treatment: PLEASE FOLLOW UP WITH DR ALMARAZ IN 1 WEEK FOR OUTPATIENT STRESS TESTING.YOU WERE STARTED ON 2 NEW BLOOD PRESSURE  MEDICATIONS AS YOUR BLOO PRESSURE WAS HIGH NOW OPTIMISED  .MEDICATIONS  HAVE BEEN PRESCRIBED TO YOUR PHARMACY      SECONDARY DISCHARGE DIAGNOSES  Diagnosis: Acute kidney injury  Assessment and Plan of Treatment: PLEASE FOLLOW UP WITH DR PEREZ 12/30  TO CHECK KIDNEY FUNCTION STABILITY .YOUR SERUM CR AT TIME OF DISCHARGE WAS 1.3 , AVOID MELOXICAM OR ANY OTHER NONSTEROIDAL ANTIINFLAMMATORY MEDICATIONS   MAY USE TYLENOL INSTEAD FOR MUSCLE PAIN

## 2019-12-27 NOTE — DISCHARGE NOTE NURSING/CASE MANAGEMENT/SOCIAL WORK - PATIENT PORTAL LINK FT
You can access the FollowMyHealth Patient Portal offered by [facility] by registering at the following website: [email or web address]. By joining [facility]’s FollowMyHealth portal, you will also be able to view your health information using other applications (apps) compatible with our system.

## 2019-12-27 NOTE — DISCHARGE NOTE PROVIDER - HOSPITAL COURSE
87 y/o male with PMHx of CAD s/p stenting in the past, HLD, BPH, DM, HTN, and GERD who was admittedwith a chief complaint of nonradiating substernal chest pain with 1 epsiode of nausea and nonbloody vomiting  AND ELEVATED 'S.    Patient given nitro and ASA and CP resolved .  In ER, EKG with new RBBB compared to 2013, 1st trop negative.             #ATYPICAL CHEST PAIN/N/V RESOLVED    #HTN WITH ELEVATED 'S NOW OPTIMISED      TROPONIN X3 NEGATIVE    ACS RULED OUT    ECHO NORMAL LVEF, WITH MODERATE TO SEVERE LVH    TELE NO ARRHYTHMIA    EVALUATED BY CARDIOLOGY WHO CLEARED PATIENT FOR DISCHARGE FROM CARDIAC STANDPOINT FOR OUTPATIENT STRESS TESTING     Cont asa/ statin.      STARTED ON METOPROLOL AND NORVASC FOR BP OPTIMISATION     PLAN FOR OUTPATIENT STRESS TESTING , F/U CARDIOLOGY IN 1 WEEK OR AS SCHEDULED         #M[place] SERUM CR INCREASED FROM 1 TO 1.3     SUSPECT DUE TO NSAID USE AND PRERENAL AZOTEMIA FROM MILD DEHYDRATION     RECEIVED IVF , BUT INSISTS THAT HE DOES NOT WANT TO HAVE REPEAT BLOOD CHECK IN PATIENT  AFTER COMPLETING IVF     REPEAT BLADDER SCAN -47CC, NO EVIDENCE OF ACUTE URINARY RETENTION    PLAN IS TO F/U WITH DR DEJA PEREZ 12/30 ,PATIENT HAS APPOINTMENT    I CALLED OFFICE AND SPOKE WITH  WHO CONFIRMED THAT PATIENT HAS APPOINTMENT AND LEFT MESSAGE FOR DR PEREZ FOR REPEAT BMP12/30  CHECK TO RESOLUTION OF MÓNICA    I ADVISED PATIENT TO AVOID HIS NASAIDS AND ADVISED TYLENOL PRN INSTEAD FOR MUSCULOSKELETAL PAIN            #HX CAD/ PCI:      Cont home meds.  ASA/ statin.    ADDED BB AND NORVASC                #PRE DIABETES HBA1C 6.3    FOLLOW UP WITH DR DEJA PEREZ FOR FOLLOW UP     ADVISED CARB CONSISTENT DIET         #HX GERD:  STABLE     Cont PPI.          #BPH HX     ACUTE URINARY RETENTION RULED OUT    Cont home meds.          #Leukocytosis 11 AFEBRILE :  LIKELY STRESS RELATED, REPEAT WBC 9 NORMALISED     UA NEGATIVE FOR UTI,     CXR negative FOR ACUTE PATHOLOGY         #Unsteady Gait:          PT WAS EVALUATED BY PT  AND SW WHO RECOMMENDED HOME CARE        #DVT Proph:  Heparin SQ    IMPROVE VTE Individual Risk Assessment         General:     NAD    	Skin: no rash or prominent lesions    	Neck: Supple    	Heart: RRR,    	Lungs: CTA bilaterally, no wheezes, rhonchi, rales.  Breathing unlabored.     	Chest wall: Normal insp     	Abdomen:  SOFT NT    	Extremities: No edema.     Neurologic: CHRONICALLY UNSTEADY GAIT, NO ACUTE FOCAL NEURO DEFICITS          DISCHARGE TIME SPENT 55MINS    I DISCUSSED WITH PATIENT ,HE WOULD NOT LIKE ME TO INFORM HIS SON AS HIS SON IS NOT INVOLVED IN HIS CARE AS PER  PATIENT     I DISCUSSED WITH CARDIO AND SOCIAL WORK AND RN TEAM     I CALLED OFFICE AND SPOKE WITH  WHO CONFIRMED THAT PATIENT HAS APPOINTMENT AND LEFT MESSAGE FOR DR PEREZ FOR REPEAT BMP12/30  CHECK TO RESOLUTION OF MÓNICA

## 2019-12-27 NOTE — CONSULT NOTE ADULT - SUBJECTIVE AND OBJECTIVE BOX
This is an elderly male with a known history of CAD status post PCI, who presents for chest discomfort which appears to be atypical. Symptoms lasted minutes without any recurrence.    Cardiac enzymes have been negative. Echocardiogram showed preserved LVEF with moderate to severe LVH. Valvular function was grossly preserved.   IVF for Acute Kidney Injury  From a  cardiac standpoint, he can be discharged with outpatient stress test.     Full note to follow

## 2019-12-27 NOTE — DISCHARGE NOTE PROVIDER - NSDCMRMEDTOKEN_GEN_ALL_CORE_FT
acetaminophen 325 mg oral tablet: 2 tab(s) orally every 6 hours, As Needed -Mild Pain (1 - 3)   alfuzosin 10 mg oral tablet, extended release: 1 tab(s) orally once a day with food  amLODIPine 5 mg oral tablet: 1 tab(s) orally once a day  Aspir 81 oral delayed release tablet: 1 tab(s) orally once a day  finasteride 5 mg oral tablet: 1 tab(s) orally once a day  gabapentin 800 mg oral tablet: 1 tab(s) orally 3 times a day  hydrOXYzine hydrochloride 25 mg oral tablet: 1 tab(s) orally 2 times a day, As Needed for itch  metoprolol tartrate 25 mg oral tablet: 1 tab(s) orally 2 times a day  Multiple Vitamins oral tablet: 1 tab(s) orally once a day  omeprazole 20 mg oral delayed release capsule: 1 cap(s) orally once a day  QUEtiapine 50 mg oral tablet: 1 tab(s) orally once a day (at bedtime)  simvastatin 40 mg oral tablet: 1 tab(s) orally once a day (at bedtime)  temazepam 15 mg oral capsule: 1-2 cap(s) orally once a day (at bedtime) as needed for sleep

## 2019-12-27 NOTE — CONSULT NOTE ADULT - SUBJECTIVE AND OBJECTIVE BOX
Patient is a 88y old  Male who presents with a chief complaint of chest pain (26 Dec 2019 13:59)    ________________________________  AZion LEVY is a 88y year old Male with a past medical history of CAD s/p stenting in the past, HLD, BPH, DM, HTN, and GERD who presents with a chief complaint of chest pain.  Patient notes sx started when he woke up this morning.  He got OOB, went to the bathroom, and CP started.  CP felt like pressure 3-4/10.  Patient also had an episode of nausea with vomiting at home with the CP this morning.  He denies SOB or palpitations.  Chest pressure was substernal and did not radiate.  Patient presented to  ER due to pressure like pain and hx of cardiac disease in the past.  Patient denies episodes of CP in the past.  Patient given nitro and ASA and CP improved.  Still has some discomfort now but improved.  In ER, EKG with new RBBB compared to  trop negative.      PAST MEDICAL & SURGICAL HISTORY:    as above    FAMILY HISTORY:  noncontributory to this hospitalization    Social History:  lives at home alone.  no tobacco/ etoh/ drug use.  .      Allergies:  unknown    Home Medications:  alfuzosin 10 mg oral tablet, extended release: 1 tab(s) orally once a day with food (26 Dec 2019 12:01)  Aspir 81 oral delayed release tablet: 1 tab(s) orally once a day (26 Dec 2019 12:01)  finasteride 5 mg oral tablet: 1 tab(s) orally once a day (26 Dec 2019 12:01)  gabapentin 800 mg oral tablet: 1 tab(s) orally 3 times a day (26 Dec 2019 12:01)  hydrOXYzine hydrochloride 25 mg oral tablet: 1 tab(s) orally 2 times a day, As Needed for itch (26 Dec 2019 12:01)  meloxicam 15 mg oral tablet: 1 tab(s) orally once a day (26 Dec 2019 12:01)  Multiple Vitamins oral tablet: 1 tab(s) orally once a day (26 Dec 2019 12:01)  omeprazole 20 mg oral delayed release capsule: 1 cap(s) orally once a day (26 Dec 2019 12:01)  QUEtiapine 50 mg oral tablet: 1 tab(s) orally once a day (at bedtime) (26 Dec 2019 12:01)  simvastatin 40 mg oral tablet: 1 tab(s) orally once a day (at bedtime) (26 Dec 2019 12:01)  temazepam 15 mg oral capsule: 1-2 cap(s) orally once a day (at bedtime) as needed for sleep (26 Dec 2019 12:01) (26 Dec 2019 13:59)      PREVIOUS CARDIAC WORKUP:    Echocardiogram:  Stress Test:  Cardiac Catheterization:  ________________________________  Review of systems: A 10 point review of system has been performed, and is negative except for what has been mentioned in the above history of present illness.     PAST MEDICAL & SURGICAL HISTORY:    FAMILY HISTORY:     The patient denies any history of premature CAD or sudden cardiac death.    SOCIAL HISTORY: The patient denies any history of tobacco abuse, alcohol abuse or illicit drug use.     Home Medications:  alfuzosin 10 mg oral tablet, extended release: 1 tab(s) orally once a day with food (26 Dec 2019 12:01)  Aspir 81 oral delayed release tablet: 1 tab(s) orally once a day (26 Dec 2019 12:01)  finasteride 5 mg oral tablet: 1 tab(s) orally once a day (26 Dec 2019 12:01)  gabapentin 800 mg oral tablet: 1 tab(s) orally 3 times a day (26 Dec 2019 12:01)  hydrOXYzine hydrochloride 25 mg oral tablet: 1 tab(s) orally 2 times a day, As Needed for itch (26 Dec 2019 12:01)  meloxicam 15 mg oral tablet: 1 tab(s) orally once a day (26 Dec 2019 12:01)  Multiple Vitamins oral tablet: 1 tab(s) orally once a day (26 Dec 2019 12:01)  omeprazole 20 mg oral delayed release capsule: 1 cap(s) orally once a day (26 Dec 2019 12:01)  QUEtiapine 50 mg oral tablet: 1 tab(s) orally once a day (at bedtime) (26 Dec 2019 12:01)  simvastatin 40 mg oral tablet: 1 tab(s) orally once a day (at bedtime) (26 Dec 2019 12:01)  temazepam 15 mg oral capsule: 1-2 cap(s) orally once a day (at bedtime) as needed for sleep (26 Dec 2019 12:01)    MEDICATIONS  (STANDING):  amLODIPine   Tablet 5 milliGRAM(s) Oral daily  aspirin enteric coated 81 milliGRAM(s) Oral daily  dextrose 5%. 1000 milliLiter(s) (50 mL/Hr) IV Continuous <Continuous>  dextrose 50% Injectable 12.5 Gram(s) IV Push once  dextrose 50% Injectable 25 Gram(s) IV Push once  dextrose 50% Injectable 25 Gram(s) IV Push once  finasteride 5 milliGRAM(s) Oral daily  gabapentin 800 milliGRAM(s) Oral three times a day  heparin  Injectable 5000 Unit(s) SubCutaneous two times a day  metoprolol tartrate 25 milliGRAM(s) Oral two times a day  multivitamin 1 Tablet(s) Oral daily  pantoprazole    Tablet 40 milliGRAM(s) Oral before breakfast  QUEtiapine 50 milliGRAM(s) Oral at bedtime  simvastatin 40 milliGRAM(s) Oral at bedtime  tamsulosin 0.8 milliGRAM(s) Oral at bedtime    MEDICATIONS  (PRN):  acetaminophen   Tablet .. 650 milliGRAM(s) Oral every 4 hours PRN Mild Pain (1 - 3)  aluminum hydroxide/magnesium hydroxide/simethicone Suspension 30 milliLiter(s) Oral every 4 hours PRN Dyspepsia  dextrose 40% Gel 15 Gram(s) Oral once PRN Blood Glucose LESS THAN 70 milliGRAM(s)/deciliter  glucagon  Injectable 1 milliGRAM(s) IntraMuscular once PRN Glucose LESS THAN 70 milligrams/deciliter  hydrOXYzine  Oral Tab/Cap - Peds 25 milliGRAM(s) Oral two times a day PRN Itching  morphine  - Injectable 2 milliGRAM(s) IV Push every 4 hours PRN Moderate Pain (4 - 6)  ondansetron Injectable 4 milliGRAM(s) IV Push every 6 hours PRN Nausea  temazepam 15 milliGRAM(s) Oral at bedtime PRN Insomnia    Vital Signs Last 24 Hrs  T(C): 36.8 (27 Dec 2019 05:42), Max: 37.4 (26 Dec 2019 17:20)  T(F): 98.3 (27 Dec 2019 05:42), Max: 99.3 (26 Dec 2019 17:20)  HR: 64 (27 Dec 2019 05:42) (62 - 98)  BP: 143/68 (27 Dec 2019 05:42) (143/68 - 205/73)  BP(mean): 112 (26 Dec 2019 11:49) (112 - 112)  RR: 16 (27 Dec 2019 05:42) (16 - 17)  SpO2: 95% (27 Dec 2019 05:42) (95% - 100%)  I&O's Summary    26 Dec 2019 07:01  -  27 Dec 2019 07:00  --------------------------------------------------------  IN: 0 mL / OUT: 465 mL / NET: -465 mL      ________________________________  .pex  ________________________________  TELEMETRY:    ECG:    LABS:                        12.9   11. )-----------( 210      ( 26 Dec 2019 08:28 )             38.6                 142  |  108  |  15  ----------------------------<  133<H>  4.2   |  25  |  1.09    Ca    8.4<L>      26 Dec 2019 08:28  Mg     1.5         TPro  6.7  /  Alb  3.2<L>  /  TBili  1.4<H>  /  DBili  x   /  AST  27  /  ALT  20  /  AlkPhos  115        LIVER FUNCTIONS - ( 26 Dec 2019 08:28 )  Alb: 3.2 g/dL / Pro: 6.7 gm/dL / ALK PHOS: 115 U/L / ALT: 20 U/L / AST: 27 U/L / GGT: x         PT/INR - ( 26 Dec 2019 08:28 )   PT: 11.5 sec;   INR: 1.03 ratio         PTT - ( 26 Dec 2019 08:28 )  PTT:28.0 sec     @ 17:28  Trop-I  <0.015  CK      --  CK-MB   --     @ 14:31  Trop-I  <0.015  CK      --  CK-MB   --     @ 08:28  Trop-I  <0.015  CK      --  CK-MB   --  Urinalysis Basic - ( 26 Dec 2019 13:49 )    Color: Yellow / Appearance: Clear / S.010 / pH: x  Gluc: x / Ketone: Small  / Bili: Negative / Urobili: Negative mg/dL   Blood: x / Protein: Negative mg/dL / Nitrite: Negative   Leuk Esterase: Negative / RBC: 3-5 /HPF / WBC 0-2   Sq Epi: x / Non Sq Epi: Occasional / Bacteria: Few      Pro BNP  1139  @ 08:28  D Dimer  --  @ 08:28    PT/INR - ( 26 Dec 2019 08:28 )   PT: 11.5 sec;   INR: 1.03 ratio         PTT - ( 26 Dec 2019 08:28 )  PTT:28.0 sec  Urinalysis Basic - ( 26 Dec 2019 13:49 )    Color: Yellow / Appearance: Clear / S.010 / pH: x  Gluc: x / Ketone: Small  / Bili: Negative / Urobili: Negative mg/dL   Blood: x / Protein: Negative mg/dL / Nitrite: Negative   Leuk Esterase: Negative / RBC: 3-5 /HPF / WBC 0-2   Sq Epi: x / Non Sq Epi: Occasional / Bacteria: Few        ________________________________    RADIOLOGY & ADDITIONAL STUDIES:  CT HEAD  Mild periventricular white matter ischemia. Old lacunar   infarctions are seen within the RIGHT corona radiata. Atrophy most   prominent within the temporal lobes and cerebellum.     CXR:   No radiologically active cardiopulmonary process seen.         ________________________________    ASSESSMENT:  Chest pain    PLAN:      __________________________________________________________________________  Thank you for allowing me to participate in the care of your patient. Please contact me should any questions arise.    RICHIE Cleary DO, FACC   Patient is a 88y old  Male who presents with a chief complaint of chest pain (26 Dec 2019 13:59)    ________________________________  AZion LEVY is a 88y year old Male with a past medical history of CAD s/p stenting in the past, HLD, BPH, DM, HTN, and GERD who presented for chest pain, that occurred after having benefits. Pain was described as pressure-like in nature, mild severity with 3 out of 10. He did have some nausea with vomiting. No diaphoresis. No shortness of breath. No exertional chest pain. Symptoms lasted only minutes.     Cardiac enzymes have been negative.   Prelim read of echo cardiac showed preserved LVEF with left ventricular hypertrophy.      He has not had any recurrent chest pain.    PAST MEDICAL & SURGICAL HISTORY:    as above    FAMILY HISTORY:  noncontributory to this hospitalization    Social History:  lives at home alone.  no tobacco/ etoh/ drug use.  .      Allergies:  unknown    Home Medications:  alfuzosin 10 mg oral tablet, extended release: 1 tab(s) orally once a day with food (26 Dec 2019 12:01)  Aspir 81 oral delayed release tablet: 1 tab(s) orally once a day (26 Dec 2019 12:01)  finasteride 5 mg oral tablet: 1 tab(s) orally once a day (26 Dec 2019 12:01)  gabapentin 800 mg oral tablet: 1 tab(s) orally 3 times a day (26 Dec 2019 12:01)  hydrOXYzine hydrochloride 25 mg oral tablet: 1 tab(s) orally 2 times a day, As Needed for itch (26 Dec 2019 12:01)  meloxicam 15 mg oral tablet: 1 tab(s) orally once a day (26 Dec 2019 12:01)  Multiple Vitamins oral tablet: 1 tab(s) orally once a day (26 Dec 2019 12:01)  omeprazole 20 mg oral delayed release capsule: 1 cap(s) orally once a day (26 Dec 2019 12:01)  QUEtiapine 50 mg oral tablet: 1 tab(s) orally once a day (at bedtime) (26 Dec 2019 12:01)  simvastatin 40 mg oral tablet: 1 tab(s) orally once a day (at bedtime) (26 Dec 2019 12:01)  temazepam 15 mg oral capsule: 1-2 cap(s) orally once a day (at bedtime) as needed for sleep (26 Dec 2019 12:01) (26 Dec 2019 13:59)      PREVIOUS CARDIAC WORKUP:    Echocardiogram: Prelim  - LVH - w/ preserved LVEF     ________________________________  Review of systems: A 10 point review of system has been performed, and is negative except for what has been mentioned in the above history of present illness.           Home Medications:  alfuzosin 10 mg oral tablet, extended release: 1 tab(s) orally once a day with food (26 Dec 2019 12:01)  Aspir 81 oral delayed release tablet: 1 tab(s) orally once a day (26 Dec 2019 12:01)  finasteride 5 mg oral tablet: 1 tab(s) orally once a day (26 Dec 2019 12:01)  gabapentin 800 mg oral tablet: 1 tab(s) orally 3 times a day (26 Dec 2019 12:01)  hydrOXYzine hydrochloride 25 mg oral tablet: 1 tab(s) orally 2 times a day, As Needed for itch (26 Dec 2019 12:01)  meloxicam 15 mg oral tablet: 1 tab(s) orally once a day (26 Dec 2019 12:01)  Multiple Vitamins oral tablet: 1 tab(s) orally once a day (26 Dec 2019 12:01)  omeprazole 20 mg oral delayed release capsule: 1 cap(s) orally once a day (26 Dec 2019 12:01)  QUEtiapine 50 mg oral tablet: 1 tab(s) orally once a day (at bedtime) (26 Dec 2019 12:01)  simvastatin 40 mg oral tablet: 1 tab(s) orally once a day (at bedtime) (26 Dec 2019 12:01)  temazepam 15 mg oral capsule: 1-2 cap(s) orally once a day (at bedtime) as needed for sleep (26 Dec 2019 12:01)    MEDICATIONS  (STANDING):  amLODIPine   Tablet 5 milliGRAM(s) Oral daily  aspirin enteric coated 81 milliGRAM(s) Oral daily  dextrose 5%. 1000 milliLiter(s) (50 mL/Hr) IV Continuous <Continuous>  dextrose 50% Injectable 12.5 Gram(s) IV Push once  dextrose 50% Injectable 25 Gram(s) IV Push once  dextrose 50% Injectable 25 Gram(s) IV Push once  finasteride 5 milliGRAM(s) Oral daily  gabapentin 800 milliGRAM(s) Oral three times a day  heparin  Injectable 5000 Unit(s) SubCutaneous two times a day  metoprolol tartrate 25 milliGRAM(s) Oral two times a day  multivitamin 1 Tablet(s) Oral daily  pantoprazole    Tablet 40 milliGRAM(s) Oral before breakfast  QUEtiapine 50 milliGRAM(s) Oral at bedtime  simvastatin 40 milliGRAM(s) Oral at bedtime  tamsulosin 0.8 milliGRAM(s) Oral at bedtime    MEDICATIONS  (PRN):  acetaminophen   Tablet .. 650 milliGRAM(s) Oral every 4 hours PRN Mild Pain (1 - 3)  aluminum hydroxide/magnesium hydroxide/simethicone Suspension 30 milliLiter(s) Oral every 4 hours PRN Dyspepsia  dextrose 40% Gel 15 Gram(s) Oral once PRN Blood Glucose LESS THAN 70 milliGRAM(s)/deciliter  glucagon  Injectable 1 milliGRAM(s) IntraMuscular once PRN Glucose LESS THAN 70 milligrams/deciliter  hydrOXYzine  Oral Tab/Cap - Peds 25 milliGRAM(s) Oral two times a day PRN Itching  morphine  - Injectable 2 milliGRAM(s) IV Push every 4 hours PRN Moderate Pain (4 - 6)  ondansetron Injectable 4 milliGRAM(s) IV Push every 6 hours PRN Nausea  temazepam 15 milliGRAM(s) Oral at bedtime PRN Insomnia    Vital Signs Last 24 Hrs  T(C): 36.8 (27 Dec 2019 05:42), Max: 37.4 (26 Dec 2019 17:20)  T(F): 98.3 (27 Dec 2019 05:42), Max: 99.3 (26 Dec 2019 17:20)  HR: 64 (27 Dec 2019 05:42) (62 - 98)  BP: 143/68 (27 Dec 2019 05:42) (143/68 - 205/73)  BP(mean): 112 (26 Dec 2019 11:49) (112 - 112)  RR: 16 (27 Dec 2019 05:42) (16 - 17)  SpO2: 95% (27 Dec 2019 05:42) (95% - 100%)  I&O's Summary    26 Dec 2019 07:01  -  27 Dec 2019 07:00  --------------------------------------------------------  IN: 0 mL / OUT: 465 mL / NET: -465 mL      ________________________________  PHYSICAL EXAM:  GENERAL APPEARANCE:  No acute distress  HEAD: normocephalic, atraumatic  NECK: supple, no jugular venous distention, no carotid bruit    HEART: regular rate and rhythm, S1, S2 normal, 1/6 systolic murmur    CHEST:  No anterior chest wall tenderness    LUNGS:  Clear to auscultation, without any wheezing, rhonchi or rales    ABDOMEN soft, nontender, nondistended, with positive bowel sounds appreciated  EXTREMITIES: no clubbing, cyanosis, or edema.   NEURO:  Alert and oriented x3  PSYC:  Normal affect  SKIN:  Dry   ________________________________  TELEMETRY: Sinus Rhythm w/ PVC    ECG: Sinus Rhythm w/ 1st degree AV block at 264 ms, old septal infarct and RBBB at 136 ms    LABS:                        12.9   11.22 )-----------( 210      ( 26 Dec 2019 08:28 )             38.6                 142  |  108  |  15  ----------------------------<  133<H>  4.2   |  25  |  1.09    Ca    8.4<L>      26 Dec 2019 08:28  Mg     1.5         TPro  6.7  /  Alb  3.2<L>  /  TBili  1.4<H>  /  DBili  x   /  AST  27  /  ALT  20  /  AlkPhos  115        LIVER FUNCTIONS - ( 26 Dec 2019 08:28 )  Alb: 3.2 g/dL / Pro: 6.7 gm/dL / ALK PHOS: 115 U/L / ALT: 20 U/L / AST: 27 U/L / GGT: x         PT/INR - ( 26 Dec 2019 08:28 )   PT: 11.5 sec;   INR: 1.03 ratio         PTT - ( 26 Dec 2019 08:28 )  PTT:28.0 sec     @ 17:28  Trop-I  <0.015  CK      --  CK-MB   --     @ 14:31  Trop-I  <0.015  CK      --  CK-MB   --     @ 08:28  Trop-I  <0.015  CK      --  CK-MB   --  Urinalysis Basic - ( 26 Dec 2019 13:49 )    Color: Yellow / Appearance: Clear / S.010 / pH: x  Gluc: x / Ketone: Small  / Bili: Negative / Urobili: Negative mg/dL   Blood: x / Protein: Negative mg/dL / Nitrite: Negative   Leuk Esterase: Negative / RBC: 3-5 /HPF / WBC 0-2   Sq Epi: x / Non Sq Epi: Occasional / Bacteria: Few      Pro BNP  1139  @ 08:28  D Dimer  --  @ 08:28    PT/INR - ( 26 Dec 2019 08:28 )   PT: 11.5 sec;   INR: 1.03 ratio         PTT - ( 26 Dec 2019 08:28 )  PTT:28.0 sec  Urinalysis Basic - ( 26 Dec 2019 13:49 )    Color: Yellow / Appearance: Clear / S.010 / pH: x  Gluc: x / Ketone: Small  / Bili: Negative / Urobili: Negative mg/dL   Blood: x / Protein: Negative mg/dL / Nitrite: Negative   Leuk Esterase: Negative / RBC: 3-5 /HPF / WBC 0-2   Sq Epi: x / Non Sq Epi: Occasional / Bacteria: Few        ________________________________    RADIOLOGY & ADDITIONAL STUDIES:  CT HEAD  Mild periventricular white matter ischemia. Old lacunar   infarctions are seen within the RIGHT corona radiata. Atrophy most   prominent within the temporal lobes and cerebellum.     CXR:   No radiologically active cardiopulmonary process seen.         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  He presents today for chest discomfort which appears to be atypical. Left ventricular ejection fraction is preserved without wall motion abnormalities on echocardiogram today. Given no evidence of acute coronary syndrome, with resolved chest pain, recommend outpatient ischemic evaluation.  Continue antiplatelet therapy. Continue with statin.  IV hydration for renal insufficiency.  __________________________________________________________________________  Thank you for allowing me to participate in the care of your patient. Please contact me should any questions arise.    RICHIE Cleary DO, FACC  [phone]

## 2019-12-27 NOTE — PHYSICAL THERAPY INITIAL EVALUATION ADULT - MODALITIES TREATMENT COMMENTS
pt left seated in chair post Eval; chair alarm donned; HM in place; callbell in reach; pt instructed not to get up alone; call nursing for assist; carito well; denied pain; RN [place] made aware pt OOB

## 2019-12-27 NOTE — DISCHARGE NOTE PROVIDER - CARE PROVIDER_API CALL
Razia Basurto)  Cardiology; Interventional Cardiology  [address] [facility], [place] [address]  Phone: [phone]  Fax: [phone]  Follow Up Time:     Chris Berger)  Medicine  [address] [place] [address]  Phone: [phone]  Fax: [phone]  Follow Up Time:

## 2020-05-01 ENCOUNTER — EMERGENCY (EMERGENCY)
Facility: HOSPITAL | Age: 85
LOS: 0 days | Discharge: HOME | End: 2020-05-02
Attending: EMERGENCY MEDICINE
Payer: COMMERCIAL

## 2020-05-01 VITALS
DIASTOLIC BLOOD PRESSURE: 86 MMHG | RESPIRATION RATE: 16 BRPM | SYSTOLIC BLOOD PRESSURE: 168 MMHG | HEART RATE: 103 BPM | WEIGHT: 134.92 LBS | HEIGHT: 69 IN | OXYGEN SATURATION: 97 % | TEMPERATURE: 100 F

## 2020-05-01 DIAGNOSIS — M25.561 PAIN IN RIGHT KNEE: ICD-10-CM

## 2020-05-01 DIAGNOSIS — M19.90 UNSPECIFIED OSTEOARTHRITIS, UNSPECIFIED SITE: ICD-10-CM

## 2020-05-01 LAB
ALBUMIN SERPL ELPH-MCNC: 3.6 G/DL — SIGNIFICANT CHANGE UP (ref 3.3–5)
ALP SERPL-CCNC: 118 U/L — SIGNIFICANT CHANGE UP (ref 40–120)
ALT FLD-CCNC: 20 U/L — SIGNIFICANT CHANGE UP (ref 12–78)
ANION GAP SERPL CALC-SCNC: 6 MMOL/L — SIGNIFICANT CHANGE UP (ref 5–17)
AST SERPL-CCNC: 22 U/L — SIGNIFICANT CHANGE UP (ref 15–37)
BASOPHILS # BLD AUTO: 0.04 K/UL — SIGNIFICANT CHANGE UP (ref 0–0.2)
BASOPHILS NFR BLD AUTO: 0.4 % — SIGNIFICANT CHANGE UP (ref 0–2)
BILIRUB SERPL-MCNC: 1 MG/DL — SIGNIFICANT CHANGE UP (ref 0.2–1.2)
BUN SERPL-MCNC: 14 MG/DL — SIGNIFICANT CHANGE UP (ref 7–23)
CALCIUM SERPL-MCNC: 8.8 MG/DL — SIGNIFICANT CHANGE UP (ref 8.5–10.1)
CHLORIDE SERPL-SCNC: 108 MMOL/L — SIGNIFICANT CHANGE UP (ref 96–108)
CO2 SERPL-SCNC: 27 MMOL/L — SIGNIFICANT CHANGE UP (ref 22–31)
CREAT SERPL-MCNC: 1.11 MG/DL — SIGNIFICANT CHANGE UP (ref 0.5–1.3)
EOSINOPHIL # BLD AUTO: 0.08 K/UL — SIGNIFICANT CHANGE UP (ref 0–0.5)
EOSINOPHIL NFR BLD AUTO: 0.7 % — SIGNIFICANT CHANGE UP (ref 0–6)
GLUCOSE SERPL-MCNC: 127 MG/DL — HIGH (ref 70–99)
HCT VFR BLD CALC: 38.8 % — LOW (ref 39–50)
HGB BLD-MCNC: 13 G/DL — SIGNIFICANT CHANGE UP (ref 13–17)
IMM GRANULOCYTES NFR BLD AUTO: 0.3 % — SIGNIFICANT CHANGE UP (ref 0–1.5)
LACTATE SERPL-SCNC: 1.2 MMOL/L — SIGNIFICANT CHANGE UP (ref 0.7–2)
LYMPHOCYTES # BLD AUTO: 1.11 K/UL — SIGNIFICANT CHANGE UP (ref 1–3.3)
LYMPHOCYTES # BLD AUTO: 9.8 % — LOW (ref 13–44)
MCHC RBC-ENTMCNC: 31.5 PG — SIGNIFICANT CHANGE UP (ref 27–34)
MCHC RBC-ENTMCNC: 33.5 GM/DL — SIGNIFICANT CHANGE UP (ref 32–36)
MCV RBC AUTO: 93.9 FL — SIGNIFICANT CHANGE UP (ref 80–100)
MONOCYTES # BLD AUTO: 1.06 K/UL — HIGH (ref 0–0.9)
MONOCYTES NFR BLD AUTO: 9.4 % — SIGNIFICANT CHANGE UP (ref 2–14)
NEUTROPHILS # BLD AUTO: 9.01 K/UL — HIGH (ref 1.8–7.4)
NEUTROPHILS NFR BLD AUTO: 79.4 % — HIGH (ref 43–77)
PLATELET # BLD AUTO: 235 K/UL — SIGNIFICANT CHANGE UP (ref 150–400)
POTASSIUM SERPL-MCNC: 3.9 MMOL/L — SIGNIFICANT CHANGE UP (ref 3.5–5.3)
POTASSIUM SERPL-SCNC: 3.9 MMOL/L — SIGNIFICANT CHANGE UP (ref 3.5–5.3)
PROT SERPL-MCNC: 7.5 GM/DL — SIGNIFICANT CHANGE UP (ref 6–8.3)
RBC # BLD: 4.13 M/UL — LOW (ref 4.2–5.8)
RBC # FLD: 13.2 % — SIGNIFICANT CHANGE UP (ref 10.3–14.5)
SARS-COV-2 RNA SPEC QL NAA+PROBE: SIGNIFICANT CHANGE UP
SODIUM SERPL-SCNC: 141 MMOL/L — SIGNIFICANT CHANGE UP (ref 135–145)
WBC # BLD: 11.33 K/UL — HIGH (ref 3.8–10.5)
WBC # FLD AUTO: 11.33 K/UL — HIGH (ref 3.8–10.5)

## 2020-05-01 PROCEDURE — 93971 EXTREMITY STUDY: CPT | Mod: 26,RT

## 2020-05-01 PROCEDURE — 73562 X-RAY EXAM OF KNEE 3: CPT | Mod: 26,RT

## 2020-05-01 PROCEDURE — 80053 COMPREHEN METABOLIC PANEL: CPT

## 2020-05-01 PROCEDURE — 81003 URINALYSIS AUTO W/O SCOPE: CPT

## 2020-05-01 PROCEDURE — 73562 X-RAY EXAM OF KNEE 3: CPT | Mod: RT

## 2020-05-01 PROCEDURE — 93005 ELECTROCARDIOGRAM TRACING: CPT

## 2020-05-01 PROCEDURE — 87086 URINE CULTURE/COLONY COUNT: CPT

## 2020-05-01 PROCEDURE — 99284 EMERGENCY DEPT VISIT MOD MDM: CPT | Mod: 25

## 2020-05-01 PROCEDURE — 93010 ELECTROCARDIOGRAM REPORT: CPT

## 2020-05-01 PROCEDURE — 71045 X-RAY EXAM CHEST 1 VIEW: CPT | Mod: 26

## 2020-05-01 PROCEDURE — 99284 EMERGENCY DEPT VISIT MOD MDM: CPT

## 2020-05-01 PROCEDURE — 71045 X-RAY EXAM CHEST 1 VIEW: CPT

## 2020-05-01 PROCEDURE — 87635 SARS-COV-2 COVID-19 AMP PRB: CPT

## 2020-05-01 PROCEDURE — 87040 BLOOD CULTURE FOR BACTERIA: CPT

## 2020-05-01 PROCEDURE — 83605 ASSAY OF LACTIC ACID: CPT

## 2020-05-01 PROCEDURE — 93971 EXTREMITY STUDY: CPT | Mod: RT

## 2020-05-01 PROCEDURE — 36415 COLL VENOUS BLD VENIPUNCTURE: CPT

## 2020-05-01 PROCEDURE — 85025 COMPLETE CBC W/AUTO DIFF WBC: CPT

## 2020-05-01 RX ORDER — ACETAMINOPHEN 500 MG
650 TABLET ORAL ONCE
Refills: 0 | Status: COMPLETED | OUTPATIENT
Start: 2020-05-01 | End: 2020-05-01

## 2020-05-01 RX ADMIN — Medication 650 MILLIGRAM(S): at 22:30

## 2020-05-01 NOTE — ED PROVIDER NOTE - NSFOLLOWUPINSTRUCTIONS_ED_ALL_ED_FT
1. return for worsening symptoms or anything concerning to you  2. take all home meds as prescribed  3. follow up with your pmd call to make an appointment  4. Take Tylenol 650 mg every 6 hours as needed for pain.  5. follow up with ortho see attached    Sprain    WHAT YOU NEED TO KNOW:    A sprain happens when a ligament is stretched or torn. Ligaments are tough tissues that connect bones. Ligaments support your joints and keep your bones in place. They allow you to lift, lower, or rotate your arms and legs. A sprain may involve one or more ligaments.     DISCHARGE INSTRUCTIONS:    Return to the emergency department if:     You have numbness or tingling below the injury, such as in your fingers or toes.      The skin over your sprained area is blue or pale.       Your pain has increased or returned, even after you take pain medicine.    Contact your healthcare provider if:     Your symptoms do not better.      Your swelling has increased or returned.      Your joint becomes more weak or unstable.      You have questions or concerns about your condition or care.    Medicines:     Prescription pain medicine may be given. Ask how to take this medicine safely.      Acetaminophen decreases pain and fever. It is available without a doctor's order. Ask how much to take and how often to take it. Follow directions. Acetaminophen can cause liver damage if not taken correctly.      NSAIDs, such as ibuprofen, help decrease swelling, pain, and fever. This medicine is available with or without a doctor's order. NSAIDs can cause stomach bleeding or kidney problems in certain people. If you take blood thinner medicine, always ask your healthcare provider if NSAIDs are safe for you. Always read the medicine label and follow directions.      Take your medicine as directed. Contact your healthcare provider if you think your medicine is not helping or if you have side effects. Tell him or her if you are allergic to any medicine. Keep a list of the medicines, vitamins, and herbs you take. Include the amounts, and when and why you take them. Bring the list or the pill bottles to follow-up visits. Carry your medicine list with you in case of an emergency.    Support devices: Support services, such as an elastic bandage, splint, brace, or cast may be needed. These devices limit your movement and protect your joint. You may need to use crutches if the sprain is in your leg. This will help decrease your pain as you move around.     Self-care:     Rest your joint so that it can heal. Return to normal activities as directed.      Apply ice on your injury for 15 to 20 minutes every hour or as directed. Use an ice pack, or put crushed ice in a plastic bag. Cover it with a towel. Ice helps prevent tissue damage and decreases swelling and pain.      Compress the injured area as directed. Ask your healthcare provider if you should wrap an elastic bandage around your injured ligament. An elastic bandage provides support and helps decrease swelling and movement so your joint can heal.       Elevate the injured area above the level of your heart as often as you can. This will help decrease swelling and pain. Prop the injured area on pillows or blankets to keep it elevated comfortably.     Physical therapy: A physical therapist teaches you exercises to help improve movement and strength, and to decrease pain.     Prevent another sprain: Regular exercise can strengthen your muscles and help prevent another injury. Do the following before you begin or return to regular exercise or sports training:     Ask your healthcare provider about the activities you can do. Find out how long your ligament needs to heal. Do not do any physical activity until your healthcare provider says it is okay. If you start activity too soon, you may develop a more serious injury.       Always warm up and stretch before your regular exercise, sport, or physical activity.       Take it slow. Slowly increase how often and how long you exercise or train. Sudden increases in how often you train may cause you to overstretch or tear your ligament.       Use the right equipment. Always wear shoes that fit well and are made for the activity that you are doing. You may also use ankle supports, elbow and knee pads, or braces.     Follow up with your healthcare provider as directed: Write down your questions so you remember to ask them during your visits.

## 2020-05-01 NOTE — ED PROVIDER NOTE - OBJECTIVE STATEMENT
88 y/o male with a PMHx of arthritis presents to the ED c/o sudden onset of R knee pain tonight. Pt walks with a cane. No medications taken at home. Denies any trauma. 89 y/o male with a PMHx of arthritis presents to the ED c/o sudden onset of R knee pain tonight.  Denies redness, or swelling.  Denies fever, chills, nausea, vomiting, abdominal pain, CP, SOB, cough, or any other symptoms.  Denies previous occurrence.  Pain is achy, nonradiating. Pt walks with a cane. No medications taken at home. Denies any trauma.
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
## 2020-05-01 NOTE — ED PROVIDER NOTE - MUSCULOSKELETAL, MLM
Spine appears normal, range of motion is not limited. +Mild right medial knee TTP, no erythema, no swelling, no deformity.

## 2020-05-01 NOTE — ED PROVIDER NOTE - PATIENT PORTAL LINK FT
You can access the FollowMyHealth Patient Portal offered by [facility] by registering at the following website: [email or web address]. By joining [employer]’s FollowMyHealth portal, you will also be able to view your health information using other applications (apps) compatible with our system. You can access the FollowMyHealth Patient Portal offered by [facility] by registering at the following website: [email or web address]. By joining "[employer]’s FollowMyHealth portal, you will also be able to view your health information using other applications (apps) compatible with our system.

## 2020-05-01 NOTE — ED PROVIDER NOTE - CARE PROVIDER_API CALL
Jeancarlos Krishnan (MD)  Orthopaedic Surgery  [address] [place] [address] [place] [address]  Phone: [phone]  Fax: [phone]  Follow Up Time: 4-6 Days

## 2020-05-01 NOTE — ED ADULT NURSE NOTE - OBJECTIVE STATEMENT
Pt presents to ED with sudden persistent right knee pain.  PMH of arthritis.  Pt states he lives alone.  Denies and fall or trauma to knee.

## 2020-05-01 NOTE — ED ADULT NURSE NOTE - NSIMPLEMENTINTERV_GEN_ALL_ED
Implemented All Universal Safety Interventions:  [place] to call system. Call bell, personal items and telephone within reach. Instruct patient to call for assistance. Room bathroom lighting operational. Non-slip footwear when patient is off stretcher. Physically safe environment: no spills, clutter or unnecessary equipment. Stretcher in lowest position, wheels locked, appropriate side rails in place. Implemented All Fall with Harm Risk Interventions:  [place] to call system. Call bell, personal items and telephone within reach. Instruct patient to call for assistance. Room bathroom lighting operational. Non-slip footwear when patient is off stretcher. Physically safe environment: no spills, clutter or unnecessary equipment. Stretcher in lowest position, wheels locked, appropriate side rails in place. Provide visual cue, wrist band, yellow gown, etc. Monitor gait and stability. Monitor for mental status changes and reorient to person, place, and time. Review medications for side effects contributing to fall risk. Reinforce activity limits and safety measures with patient and family. Provide visual clues: red socks.

## 2020-05-02 VITALS
OXYGEN SATURATION: 98 % | SYSTOLIC BLOOD PRESSURE: 122 MMHG | HEART RATE: 82 BPM | RESPIRATION RATE: 18 BRPM | DIASTOLIC BLOOD PRESSURE: 71 MMHG | TEMPERATURE: 98 F

## 2020-05-02 LAB
APPEARANCE UR: CLEAR — SIGNIFICANT CHANGE UP
BILIRUB UR-MCNC: NEGATIVE — SIGNIFICANT CHANGE UP
COLOR SPEC: YELLOW — SIGNIFICANT CHANGE UP
DIFF PNL FLD: NEGATIVE — SIGNIFICANT CHANGE UP
GLUCOSE UR QL: NEGATIVE MG/DL — SIGNIFICANT CHANGE UP
KETONES UR-MCNC: NEGATIVE — SIGNIFICANT CHANGE UP
LEUKOCYTE ESTERASE UR-ACNC: NEGATIVE — SIGNIFICANT CHANGE UP
NITRITE UR-MCNC: NEGATIVE — SIGNIFICANT CHANGE UP
PH UR: 5 — SIGNIFICANT CHANGE UP (ref 5–8)
PROT UR-MCNC: NEGATIVE MG/DL — SIGNIFICANT CHANGE UP
SP GR SPEC: 1.02 — SIGNIFICANT CHANGE UP (ref 1.01–1.02)
UROBILINOGEN FLD QL: NEGATIVE MG/DL — SIGNIFICANT CHANGE UP

## 2020-05-02 RX ORDER — QUETIAPINE FUMARATE 200 MG/1
50 TABLET, FILM COATED ORAL ONCE
Refills: 0 | Status: COMPLETED | OUTPATIENT
Start: 2020-05-02 | End: 2020-05-02

## 2020-05-02 RX ORDER — ACETAMINOPHEN 500 MG
650 TABLET ORAL ONCE
Refills: 0 | Status: COMPLETED | OUTPATIENT
Start: 2020-05-02 | End: 2020-05-02

## 2020-05-02 RX ORDER — LIDOCAINE 4 G/100G
1 CREAM TOPICAL ONCE
Refills: 0 | Status: COMPLETED | OUTPATIENT
Start: 2020-05-02 | End: 2020-05-02

## 2020-05-02 RX ADMIN — QUETIAPINE FUMARATE 50 MILLIGRAM(S): 200 TABLET, FILM COATED ORAL at 02:41

## 2020-05-02 RX ADMIN — LIDOCAINE 1 PATCH: 4 CREAM TOPICAL at 07:38

## 2020-05-02 RX ADMIN — Medication 650 MILLIGRAM(S): at 05:56

## 2020-05-02 NOTE — ED ADULT NURSE REASSESSMENT NOTE - NS ED NURSE REASSESS COMMENT FT1
Patient still presenting with unsteady gait.  Patient lives alone and has a fall within the last 6 months.  Awaiting Social work consult in AM

## 2020-05-02 NOTE — DISCHARGE NOTE NURSING/CASE MANAGEMENT/SOCIAL WORK - PATIENT PORTAL LINK FT
You can access the FollowMyHealth Patient Portal offered by [facility] by registering at the following website: [email or web address]. By joining "[employer]’s FollowMyHealth portal, you will also be able to view your health information using other applications (apps) compatible with our system.

## 2020-05-02 NOTE — ED ADULT NURSE REASSESSMENT NOTE - NS ED NURSE REASSESS COMMENT FT1
Received report from JOIE Dawson RN @ 1837. Patient A&0x3, complaining of lower back pain, MD Gaytan made aware, Lido patch ordered and placed. SW at bedside, patient TBD, will continue to monitor

## 2020-05-03 LAB
CULTURE RESULTS: SIGNIFICANT CHANGE UP
SPECIMEN SOURCE: SIGNIFICANT CHANGE UP

## 2020-05-07 LAB
CULTURE RESULTS: SIGNIFICANT CHANGE UP
SPECIMEN SOURCE: SIGNIFICANT CHANGE UP

## 2020-06-13 ENCOUNTER — EMERGENCY (EMERGENCY)
Facility: HOSPITAL | Age: 85
LOS: 0 days | Discharge: HOME | End: 2020-06-13
Attending: EMERGENCY MEDICINE
Payer: MEDICARE

## 2020-06-13 VITALS
OXYGEN SATURATION: 96 % | RESPIRATION RATE: 16 BRPM | SYSTOLIC BLOOD PRESSURE: 157 MMHG | DIASTOLIC BLOOD PRESSURE: 82 MMHG | HEART RATE: 73 BPM

## 2020-06-13 VITALS
OXYGEN SATURATION: 97 % | DIASTOLIC BLOOD PRESSURE: 86 MMHG | SYSTOLIC BLOOD PRESSURE: 135 MMHG | WEIGHT: 132.06 LBS | HEIGHT: 69 IN | TEMPERATURE: 99 F | RESPIRATION RATE: 16 BRPM | HEART RATE: 95 BPM

## 2020-06-13 DIAGNOSIS — Z79.2 LONG TERM (CURRENT) USE OF ANTIBIOTICS: ICD-10-CM
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
## 2020-06-13 DIAGNOSIS — Z23 ENCOUNTER FOR IMMUNIZATION: ICD-10-CM

## 2020-06-13 DIAGNOSIS — Z79.1 LONG TERM (CURRENT) USE OF NON-STEROIDAL ANTI-INFLAMMATORIES (NSAID): ICD-10-CM

## 2020-06-13 DIAGNOSIS — Z79.891 LONG TERM (CURRENT) USE OF OPIATE ANALGESIC: ICD-10-CM

## 2020-06-13 DIAGNOSIS — Z79.82 LONG TERM (CURRENT) USE OF ASPIRIN: ICD-10-CM

## 2020-06-13 DIAGNOSIS — E78.5 HYPERLIPIDEMIA, UNSPECIFIED: ICD-10-CM

## 2020-06-13 DIAGNOSIS — Z79.84 LONG TERM (CURRENT) USE OF ORAL HYPOGLYCEMIC DRUGS: ICD-10-CM
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
## 2020-06-13 DIAGNOSIS — I25.10 ATHEROSCLEROTIC HEART DISEASE OF NATIVE CORONARY ARTERY WITHOUT ANGINA PECTORIS: ICD-10-CM

## 2020-06-13 DIAGNOSIS — I44.0 ATRIOVENTRICULAR BLOCK, FIRST DEGREE: ICD-10-CM
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
## 2020-06-13 DIAGNOSIS — Z95.5 PRESENCE OF CORONARY ANGIOPLASTY IMPLANT AND GRAFT: ICD-10-CM

## 2020-06-13 DIAGNOSIS — E11.9 TYPE 2 DIABETES MELLITUS WITHOUT COMPLICATIONS: ICD-10-CM

## 2020-06-13 DIAGNOSIS — S20.219A CONTUSION OF UNSPECIFIED FRONT WALL OF THORAX, INITIAL ENCOUNTER: ICD-10-CM

## 2020-06-13 DIAGNOSIS — I51.7 CARDIOMEGALY: ICD-10-CM

## 2020-06-13 DIAGNOSIS — S22.22XA FRACTURE OF BODY OF STERNUM, INITIAL ENCOUNTER FOR CLOSED FRACTURE: ICD-10-CM
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
## 2020-06-13 DIAGNOSIS — Z79.899 OTHER LONG TERM (CURRENT) DRUG THERAPY: ICD-10-CM

## 2020-06-13 DIAGNOSIS — K21.9 GASTRO-ESOPHAGEAL REFLUX DISEASE WITHOUT ESOPHAGITIS: ICD-10-CM

## 2020-06-13 DIAGNOSIS — I10 ESSENTIAL (PRIMARY) HYPERTENSION: ICD-10-CM

## 2020-06-13 DIAGNOSIS — Z79.02 LONG TERM (CURRENT) USE OF ANTITHROMBOTICS/ANTIPLATELETS: ICD-10-CM

## 2020-06-13 DIAGNOSIS — R07.9 CHEST PAIN, UNSPECIFIED: ICD-10-CM

## 2020-06-13 DIAGNOSIS — I45.10 UNSPECIFIED RIGHT BUNDLE-BRANCH BLOCK: ICD-10-CM

## 2020-06-13 DIAGNOSIS — N40.0 BENIGN PROSTATIC HYPERPLASIA WITHOUT LOWER URINARY TRACT SYMPTOMS: ICD-10-CM

## 2020-06-13 DIAGNOSIS — Z87.81 PERSONAL HISTORY OF (HEALED) TRAUMATIC FRACTURE: ICD-10-CM

## 2020-06-13 DIAGNOSIS — Z79.83 LONG TERM (CURRENT) USE OF BISPHOSPHONATES: ICD-10-CM

## 2020-06-13 LAB
ALBUMIN SERPL ELPH-MCNC: 3.2 G/DL — LOW (ref 3.3–5)
ALP SERPL-CCNC: 105 U/L — SIGNIFICANT CHANGE UP (ref 40–120)
ALT FLD-CCNC: 19 U/L — SIGNIFICANT CHANGE UP (ref 12–78)
ANION GAP SERPL CALC-SCNC: 6 MMOL/L — SIGNIFICANT CHANGE UP (ref 5–17)
AST SERPL-CCNC: 24 U/L — SIGNIFICANT CHANGE UP (ref 15–37)
BASOPHILS # BLD AUTO: 0.02 K/UL — SIGNIFICANT CHANGE UP (ref 0–0.2)
BASOPHILS NFR BLD AUTO: 0.2 % — SIGNIFICANT CHANGE UP (ref 0–2)
BILIRUB SERPL-MCNC: 1.8 MG/DL — HIGH (ref 0.2–1.2)
BUN SERPL-MCNC: 12 MG/DL — SIGNIFICANT CHANGE UP (ref 7–23)
CALCIUM SERPL-MCNC: 8.3 MG/DL — LOW (ref 8.5–10.1)
CHLORIDE SERPL-SCNC: 107 MMOL/L — SIGNIFICANT CHANGE UP (ref 96–108)
CO2 SERPL-SCNC: 26 MMOL/L — SIGNIFICANT CHANGE UP (ref 22–31)
CREAT SERPL-MCNC: 0.95 MG/DL — SIGNIFICANT CHANGE UP (ref 0.5–1.3)
EOSINOPHIL # BLD AUTO: 0.01 K/UL — SIGNIFICANT CHANGE UP (ref 0–0.5)
EOSINOPHIL NFR BLD AUTO: 0.1 % — SIGNIFICANT CHANGE UP (ref 0–6)
GLUCOSE SERPL-MCNC: 120 MG/DL — HIGH (ref 70–99)
HCT VFR BLD CALC: 36.3 % — LOW (ref 39–50)
HGB BLD-MCNC: 12.6 G/DL — LOW (ref 13–17)
IMM GRANULOCYTES NFR BLD AUTO: 0.3 % — SIGNIFICANT CHANGE UP (ref 0–1.5)
LYMPHOCYTES # BLD AUTO: 0.95 K/UL — LOW (ref 1–3.3)
LYMPHOCYTES # BLD AUTO: 10 % — LOW (ref 13–44)
MCHC RBC-ENTMCNC: 32.4 PG — SIGNIFICANT CHANGE UP (ref 27–34)
MCHC RBC-ENTMCNC: 34.7 GM/DL — SIGNIFICANT CHANGE UP (ref 32–36)
MCV RBC AUTO: 93.3 FL — SIGNIFICANT CHANGE UP (ref 80–100)
MONOCYTES # BLD AUTO: 0.94 K/UL — HIGH (ref 0–0.9)
MONOCYTES NFR BLD AUTO: 9.9 % — SIGNIFICANT CHANGE UP (ref 2–14)
NEUTROPHILS # BLD AUTO: 7.54 K/UL — HIGH (ref 1.8–7.4)
NEUTROPHILS NFR BLD AUTO: 79.5 % — HIGH (ref 43–77)
PLATELET # BLD AUTO: 207 K/UL — SIGNIFICANT CHANGE UP (ref 150–400)
POTASSIUM SERPL-MCNC: 3.8 MMOL/L — SIGNIFICANT CHANGE UP (ref 3.5–5.3)
POTASSIUM SERPL-SCNC: 3.8 MMOL/L — SIGNIFICANT CHANGE UP (ref 3.5–5.3)
PROT SERPL-MCNC: 6.6 GM/DL — SIGNIFICANT CHANGE UP (ref 6–8.3)
RBC # BLD: 3.89 M/UL — LOW (ref 4.2–5.8)
RBC # FLD: 13.5 % — SIGNIFICANT CHANGE UP (ref 10.3–14.5)
SODIUM SERPL-SCNC: 139 MMOL/L — SIGNIFICANT CHANGE UP (ref 135–145)
TROPONIN I SERPL-MCNC: <0.015 NG/ML — SIGNIFICANT CHANGE UP (ref 0.01–0.04)
WBC # BLD: 9.49 K/UL — SIGNIFICANT CHANGE UP (ref 3.8–10.5)
WBC # FLD AUTO: 9.49 K/UL — SIGNIFICANT CHANGE UP (ref 3.8–10.5)

## 2020-06-13 PROCEDURE — 71045 X-RAY EXAM CHEST 1 VIEW: CPT

## 2020-06-13 PROCEDURE — 84484 ASSAY OF TROPONIN QUANT: CPT

## 2020-06-13 PROCEDURE — 71250 CT THORAX DX C-: CPT | Mod: 26

## 2020-06-13 PROCEDURE — 93010 ELECTROCARDIOGRAM REPORT: CPT

## 2020-06-13 PROCEDURE — 99285 EMERGENCY DEPT VISIT HI MDM: CPT

## 2020-06-13 PROCEDURE — 90715 TDAP VACCINE 7 YRS/> IM: CPT

## 2020-06-13 PROCEDURE — 80053 COMPREHEN METABOLIC PANEL: CPT

## 2020-06-13 PROCEDURE — 90471 IMMUNIZATION ADMIN: CPT | Mod: XU

## 2020-06-13 PROCEDURE — 71045 X-RAY EXAM CHEST 1 VIEW: CPT | Mod: 26

## 2020-06-13 PROCEDURE — 85025 COMPLETE CBC W/AUTO DIFF WBC: CPT

## 2020-06-13 PROCEDURE — 99284 EMERGENCY DEPT VISIT MOD MDM: CPT | Mod: 25

## 2020-06-13 PROCEDURE — 93005 ELECTROCARDIOGRAM TRACING: CPT

## 2020-06-13 PROCEDURE — 71250 CT THORAX DX C-: CPT

## 2020-06-13 PROCEDURE — 36415 COLL VENOUS BLD VENIPUNCTURE: CPT

## 2020-06-13 RX ORDER — ACETAMINOPHEN 500 MG
650 TABLET ORAL ONCE
Refills: 0 | Status: COMPLETED | OUTPATIENT
Start: 2020-06-13 | End: 2020-06-13

## 2020-06-13 RX ORDER — TETANUS TOXOID, REDUCED DIPHTHERIA TOXOID AND ACELLULAR PERTUSSIS VACCINE, ADSORBED 5; 2.5; 8; 8; 2.5 [IU]/.5ML; [IU]/.5ML; UG/.5ML; UG/.5ML; UG/.5ML
0.5 SUSPENSION INTRAMUSCULAR ONCE
Refills: 0 | Status: COMPLETED | OUTPATIENT
Start: 2020-06-13 | End: 2020-06-13

## 2020-06-13 RX ADMIN — TETANUS TOXOID, REDUCED DIPHTHERIA TOXOID AND ACELLULAR PERTUSSIS VACCINE, ADSORBED 0.5 MILLILITER(S): 5; 2.5; 8; 8; 2.5 SUSPENSION INTRAMUSCULAR at 12:12

## 2020-06-13 RX ADMIN — Medication 650 MILLIGRAM(S): at 12:12
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
## 2020-06-13 NOTE — ED PROVIDER NOTE - PMH
BPH (benign prostatic hyperplasia)    CAD (coronary artery disease)    DM (diabetes mellitus)    GERD (gastroesophageal reflux disease)    HLD (hyperlipidemia)    HTN (hypertension)

## 2020-06-13 NOTE — ED PROVIDER NOTE - NS_ ATTENDINGSCRIBEDETAILS _ED_A_ED_FT
I, Fermin Matihs MD,  performed the initial face to face bedside interview with this patient regarding history of present illness, review of symptoms and relevant past medical, social and family history.  I completed an independent physical examination.  I was the initial provider who evaluated this patient.  The history, relevant review of systems, past medical and surgical history, medical decision making, and physical examination was documented by the scribe in my presence and I attest to the accuracy of the documentation.
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
## 2020-06-13 NOTE — ED ADULT NURSE NOTE - NSIMPLEMENTINTERV_GEN_ALL_ED
Implemented All Universal Safety Interventions:  [place] to call system. Call bell, personal items and telephone within reach. Instruct patient to call for assistance. Room bathroom lighting operational. Non-slip footwear when patient is off stretcher. Physically safe environment: no spills, clutter or unnecessary equipment. Stretcher in lowest position, wheels locked, appropriate side rails in place.
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
## 2020-06-13 NOTE — ED PROVIDER NOTE - NSFOLLOWUPINSTRUCTIONS_ED_ALL_ED_FT
1. return for worsening symptoms or anything concerning to you  2. take all home meds as prescribed  3. follow up with your pmd call to make an appointment  4. Take Tylenol 650 mg every 6 hours as needed for pain.  5. avoid NSAIDS  6. follow up with your PMD    Fracture    A fracture is a break in one of your bones. This can occur from a variety of injuries, especially traumatic ones. Symptoms include pain, bruising, or swelling. Do not use the injured limb. If a fracture is in one of the bones below your waist, do not put weight on that limb unless instructed to do so by your healthcare provider. Crutches or a cane may have been provided. A splint or cast may have been applied by your health care provider. Make sure to keep it dry and follow up with an orthopedist as instructed.    SEEK IMMEDIATE MEDICAL CARE IF YOU HAVE ANY OF THE FOLLOWING SYMPTOMS: numbness, tingling, increasing pain, or weakness in any part of the injured limb.
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
## 2020-10-04 ENCOUNTER — INPATIENT (INPATIENT)
Facility: HOSPITAL | Age: 85
LOS: 11 days | Discharge: NURSING, REHAB OR LONG-TERM CARE | DRG: 25 | End: 2020-10-16
Attending: PHYSICIAN ASSISTANT | Admitting: SURGERY
Payer: MEDICARE

## 2020-10-04 VITALS
HEIGHT: 69 IN | OXYGEN SATURATION: 98 % | RESPIRATION RATE: 18 BRPM | DIASTOLIC BLOOD PRESSURE: 58 MMHG | TEMPERATURE: 98 F | SYSTOLIC BLOOD PRESSURE: 124 MMHG | WEIGHT: 119.93 LBS | HEART RATE: 75 BPM

## 2020-10-04 DIAGNOSIS — S06.5X9A TRAUMATIC SUBDURAL HEMORRHAGE WITH LOSS OF CONSCIOUSNESS OF UNSPECIFIED DURATION, INITIAL ENCOUNTER: ICD-10-CM

## 2020-10-04 LAB
ALBUMIN SERPL ELPH-MCNC: 3 G/DL — LOW (ref 3.3–5)
ALP SERPL-CCNC: 96 U/L — SIGNIFICANT CHANGE UP (ref 40–120)
ALT FLD-CCNC: 22 U/L — SIGNIFICANT CHANGE UP (ref 12–78)
ANION GAP SERPL CALC-SCNC: 5 MMOL/L — SIGNIFICANT CHANGE UP (ref 5–17)
AST SERPL-CCNC: 27 U/L — SIGNIFICANT CHANGE UP (ref 15–37)
BASOPHILS # BLD AUTO: 0.02 K/UL — SIGNIFICANT CHANGE UP (ref 0–0.2)
BASOPHILS NFR BLD AUTO: 0.2 % — SIGNIFICANT CHANGE UP (ref 0–2)
BILIRUB SERPL-MCNC: 0.7 MG/DL — SIGNIFICANT CHANGE UP (ref 0.2–1.2)
BUN SERPL-MCNC: 16 MG/DL — SIGNIFICANT CHANGE UP (ref 7–23)
CALCIUM SERPL-MCNC: 8.7 MG/DL — SIGNIFICANT CHANGE UP (ref 8.5–10.1)
CHLORIDE SERPL-SCNC: 108 MMOL/L — SIGNIFICANT CHANGE UP (ref 96–108)
CK SERPL-CCNC: 135 U/L — SIGNIFICANT CHANGE UP (ref 26–308)
CO2 SERPL-SCNC: 26 MMOL/L — SIGNIFICANT CHANGE UP (ref 22–31)
CREAT SERPL-MCNC: 0.91 MG/DL — SIGNIFICANT CHANGE UP (ref 0.5–1.3)
EOSINOPHIL # BLD AUTO: 0 K/UL — SIGNIFICANT CHANGE UP (ref 0–0.5)
EOSINOPHIL NFR BLD AUTO: 0 % — SIGNIFICANT CHANGE UP (ref 0–6)
GLUCOSE SERPL-MCNC: 118 MG/DL — HIGH (ref 70–99)
HCT VFR BLD CALC: 29.8 % — LOW (ref 39–50)
HGB BLD-MCNC: 10.1 G/DL — LOW (ref 13–17)
IMM GRANULOCYTES NFR BLD AUTO: 0.4 % — SIGNIFICANT CHANGE UP (ref 0–1.5)
LACTATE SERPL-SCNC: 1 MMOL/L — SIGNIFICANT CHANGE UP (ref 0.7–2)
LYMPHOCYTES # BLD AUTO: 0.88 K/UL — LOW (ref 1–3.3)
LYMPHOCYTES # BLD AUTO: 10.3 % — LOW (ref 13–44)
MAGNESIUM SERPL-MCNC: 1.5 MG/DL — LOW (ref 1.6–2.6)
MCHC RBC-ENTMCNC: 32.4 PG — SIGNIFICANT CHANGE UP (ref 27–34)
MCHC RBC-ENTMCNC: 33.9 GM/DL — SIGNIFICANT CHANGE UP (ref 32–36)
MCV RBC AUTO: 95.5 FL — SIGNIFICANT CHANGE UP (ref 80–100)
MONOCYTES # BLD AUTO: 0.57 K/UL — SIGNIFICANT CHANGE UP (ref 0–0.9)
MONOCYTES NFR BLD AUTO: 6.7 % — SIGNIFICANT CHANGE UP (ref 2–14)
NEUTROPHILS # BLD AUTO: 7.06 K/UL — SIGNIFICANT CHANGE UP (ref 1.8–7.4)
NEUTROPHILS NFR BLD AUTO: 82.4 % — HIGH (ref 43–77)
PLATELET # BLD AUTO: 275 K/UL — SIGNIFICANT CHANGE UP (ref 150–400)
POTASSIUM SERPL-MCNC: 4 MMOL/L — SIGNIFICANT CHANGE UP (ref 3.5–5.3)
POTASSIUM SERPL-SCNC: 4 MMOL/L — SIGNIFICANT CHANGE UP (ref 3.5–5.3)
PROT SERPL-MCNC: 6 GM/DL — SIGNIFICANT CHANGE UP (ref 6–8.3)
RBC # BLD: 3.12 M/UL — LOW (ref 4.2–5.8)
RBC # FLD: 13.4 % — SIGNIFICANT CHANGE UP (ref 10.3–14.5)
SARS-COV-2 RNA SPEC QL NAA+PROBE: SIGNIFICANT CHANGE UP
SODIUM SERPL-SCNC: 139 MMOL/L — SIGNIFICANT CHANGE UP (ref 135–145)
TROPONIN I SERPL-MCNC: 0.08 NG/ML — HIGH (ref 0.01–0.04)
TSH SERPL-MCNC: 0.89 UU/ML — SIGNIFICANT CHANGE UP (ref 0.34–4.82)
WBC # BLD: 8.56 K/UL — SIGNIFICANT CHANGE UP (ref 3.8–10.5)
WBC # FLD AUTO: 8.56 K/UL — SIGNIFICANT CHANGE UP (ref 3.8–10.5)

## 2020-10-04 PROCEDURE — 97162 PT EVAL MOD COMPLEX 30 MIN: CPT | Mod: GP

## 2020-10-04 PROCEDURE — 93010 ELECTROCARDIOGRAM REPORT: CPT

## 2020-10-04 PROCEDURE — 92610 EVALUATE SWALLOWING FUNCTION: CPT | Mod: GN

## 2020-10-04 PROCEDURE — 83036 HEMOGLOBIN GLYCOSYLATED A1C: CPT

## 2020-10-04 PROCEDURE — P9037: CPT

## 2020-10-04 PROCEDURE — 81001 URINALYSIS AUTO W/SCOPE: CPT

## 2020-10-04 PROCEDURE — 87040 BLOOD CULTURE FOR BACTERIA: CPT

## 2020-10-04 PROCEDURE — 84484 ASSAY OF TROPONIN QUANT: CPT

## 2020-10-04 PROCEDURE — 72125 CT NECK SPINE W/O DYE: CPT | Mod: 26

## 2020-10-04 PROCEDURE — C1889: CPT

## 2020-10-04 PROCEDURE — P9016: CPT

## 2020-10-04 PROCEDURE — 70450 CT HEAD/BRAIN W/O DYE: CPT | Mod: 26

## 2020-10-04 PROCEDURE — 99223 1ST HOSP IP/OBS HIGH 75: CPT

## 2020-10-04 PROCEDURE — 99222 1ST HOSP IP/OBS MODERATE 55: CPT

## 2020-10-04 PROCEDURE — 36415 COLL VENOUS BLD VENIPUNCTURE: CPT

## 2020-10-04 PROCEDURE — C1729: CPT

## 2020-10-04 PROCEDURE — 85027 COMPLETE CBC AUTOMATED: CPT

## 2020-10-04 PROCEDURE — 83605 ASSAY OF LACTIC ACID: CPT

## 2020-10-04 PROCEDURE — 88305 TISSUE EXAM BY PATHOLOGIST: CPT

## 2020-10-04 PROCEDURE — 84100 ASSAY OF PHOSPHORUS: CPT

## 2020-10-04 PROCEDURE — 92526 ORAL FUNCTION THERAPY: CPT | Mod: GN

## 2020-10-04 PROCEDURE — 83735 ASSAY OF MAGNESIUM: CPT

## 2020-10-04 PROCEDURE — 87086 URINE CULTURE/COLONY COUNT: CPT

## 2020-10-04 PROCEDURE — 97530 THERAPEUTIC ACTIVITIES: CPT | Mod: GP

## 2020-10-04 PROCEDURE — 76376 3D RENDER W/INTRP POSTPROCES: CPT | Mod: 26

## 2020-10-04 PROCEDURE — 85610 PROTHROMBIN TIME: CPT

## 2020-10-04 PROCEDURE — 97116 GAIT TRAINING THERAPY: CPT | Mod: GP

## 2020-10-04 PROCEDURE — 80048 BASIC METABOLIC PNL TOTAL CA: CPT

## 2020-10-04 PROCEDURE — 80053 COMPREHEN METABOLIC PANEL: CPT

## 2020-10-04 PROCEDURE — 70486 CT MAXILLOFACIAL W/O DYE: CPT | Mod: 26

## 2020-10-04 PROCEDURE — 82962 GLUCOSE BLOOD TEST: CPT

## 2020-10-04 PROCEDURE — U0003: CPT

## 2020-10-04 PROCEDURE — 90662 IIV NO PRSV INCREASED AG IM: CPT

## 2020-10-04 PROCEDURE — 86923 COMPATIBILITY TEST ELECTRIC: CPT

## 2020-10-04 PROCEDURE — 85730 THROMBOPLASTIN TIME PARTIAL: CPT

## 2020-10-04 PROCEDURE — 71250 CT THORAX DX C-: CPT | Mod: 26

## 2020-10-04 PROCEDURE — 85025 COMPLETE CBC W/AUTO DIFF WBC: CPT

## 2020-10-04 PROCEDURE — C9113: CPT

## 2020-10-04 PROCEDURE — 70450 CT HEAD/BRAIN W/O DYE: CPT

## 2020-10-04 PROCEDURE — 36430 TRANSFUSION BLD/BLD COMPNT: CPT

## 2020-10-04 PROCEDURE — C1713: CPT

## 2020-10-04 PROCEDURE — 93970 EXTREMITY STUDY: CPT

## 2020-10-04 PROCEDURE — 74176 CT ABD & PELVIS W/O CONTRAST: CPT | Mod: 26

## 2020-10-04 PROCEDURE — 71045 X-RAY EXAM CHEST 1 VIEW: CPT

## 2020-10-04 RX ORDER — SODIUM CHLORIDE 9 MG/ML
1500 INJECTION INTRAMUSCULAR; INTRAVENOUS; SUBCUTANEOUS ONCE
Refills: 0 | Status: COMPLETED | OUTPATIENT
Start: 2020-10-04 | End: 2020-10-04

## 2020-10-04 RX ORDER — DEXTROSE 50 % IN WATER 50 %
15 SYRINGE (ML) INTRAVENOUS ONCE
Refills: 0 | Status: DISCONTINUED | OUTPATIENT
Start: 2020-10-04 | End: 2020-10-16

## 2020-10-04 RX ORDER — METOPROLOL TARTRATE 50 MG
2.5 TABLET ORAL EVERY 6 HOURS
Refills: 0 | Status: DISCONTINUED | OUTPATIENT
Start: 2020-10-04 | End: 2020-10-05

## 2020-10-04 RX ORDER — DEXTROSE 50 % IN WATER 50 %
25 SYRINGE (ML) INTRAVENOUS ONCE
Refills: 0 | Status: DISCONTINUED | OUTPATIENT
Start: 2020-10-04 | End: 2020-10-16

## 2020-10-04 RX ORDER — AZITHROMYCIN 500 MG/1
500 TABLET, FILM COATED ORAL ONCE
Refills: 0 | Status: COMPLETED | OUTPATIENT
Start: 2020-10-04 | End: 2020-10-04

## 2020-10-04 RX ORDER — SODIUM CHLORIDE 9 MG/ML
1000 INJECTION, SOLUTION INTRAVENOUS
Refills: 0 | Status: DISCONTINUED | OUTPATIENT
Start: 2020-10-04 | End: 2020-10-16

## 2020-10-04 RX ORDER — AMLODIPINE BESYLATE 2.5 MG/1
5 TABLET ORAL DAILY
Refills: 0 | Status: DISCONTINUED | OUTPATIENT
Start: 2020-10-04 | End: 2020-10-08

## 2020-10-04 RX ORDER — QUETIAPINE FUMARATE 200 MG/1
50 TABLET, FILM COATED ORAL AT BEDTIME
Refills: 0 | Status: DISCONTINUED | OUTPATIENT
Start: 2020-10-04 | End: 2020-10-16

## 2020-10-04 RX ORDER — CEFTRIAXONE 500 MG/1
1000 INJECTION, POWDER, FOR SOLUTION INTRAMUSCULAR; INTRAVENOUS ONCE
Refills: 0 | Status: COMPLETED | OUTPATIENT
Start: 2020-10-04 | End: 2020-10-04

## 2020-10-04 RX ORDER — FINASTERIDE 5 MG/1
5 TABLET, FILM COATED ORAL DAILY
Refills: 0 | Status: DISCONTINUED | OUTPATIENT
Start: 2020-10-04 | End: 2020-10-16

## 2020-10-04 RX ORDER — LEVETIRACETAM 250 MG/1
1000 TABLET, FILM COATED ORAL ONCE
Refills: 0 | Status: COMPLETED | OUTPATIENT
Start: 2020-10-04 | End: 2020-10-04

## 2020-10-04 RX ORDER — GLUCAGON INJECTION, SOLUTION 0.5 MG/.1ML
1 INJECTION, SOLUTION SUBCUTANEOUS ONCE
Refills: 0 | Status: DISCONTINUED | OUTPATIENT
Start: 2020-10-04 | End: 2020-10-16

## 2020-10-04 RX ORDER — INSULIN LISPRO 100/ML
VIAL (ML) SUBCUTANEOUS EVERY 6 HOURS
Refills: 0 | Status: DISCONTINUED | OUTPATIENT
Start: 2020-10-04 | End: 2020-10-05

## 2020-10-04 RX ORDER — DEXTROSE 50 % IN WATER 50 %
12.5 SYRINGE (ML) INTRAVENOUS ONCE
Refills: 0 | Status: DISCONTINUED | OUTPATIENT
Start: 2020-10-04 | End: 2020-10-16

## 2020-10-04 RX ORDER — ONDANSETRON 8 MG/1
4 TABLET, FILM COATED ORAL EVERY 6 HOURS
Refills: 0 | Status: DISCONTINUED | OUTPATIENT
Start: 2020-10-04 | End: 2020-10-16

## 2020-10-04 RX ORDER — SIMVASTATIN 20 MG/1
40 TABLET, FILM COATED ORAL AT BEDTIME
Refills: 0 | Status: DISCONTINUED | OUTPATIENT
Start: 2020-10-04 | End: 2020-10-16

## 2020-10-04 RX ORDER — ACETAMINOPHEN 500 MG
650 TABLET ORAL EVERY 6 HOURS
Refills: 0 | Status: DISCONTINUED | OUTPATIENT
Start: 2020-10-04 | End: 2020-10-14

## 2020-10-04 RX ORDER — PANTOPRAZOLE SODIUM 20 MG/1
40 TABLET, DELAYED RELEASE ORAL DAILY
Refills: 0 | Status: DISCONTINUED | OUTPATIENT
Start: 2020-10-04 | End: 2020-10-07

## 2020-10-04 RX ORDER — GABAPENTIN 400 MG/1
300 CAPSULE ORAL THREE TIMES A DAY
Refills: 0 | Status: DISCONTINUED | OUTPATIENT
Start: 2020-10-04 | End: 2020-10-16

## 2020-10-04 RX ORDER — HYDROMORPHONE HYDROCHLORIDE 2 MG/ML
1 INJECTION INTRAMUSCULAR; INTRAVENOUS; SUBCUTANEOUS EVERY 4 HOURS
Refills: 0 | Status: DISCONTINUED | OUTPATIENT
Start: 2020-10-04 | End: 2020-10-04

## 2020-10-04 RX ORDER — DESMOPRESSIN ACETATE 0.1 MG/1
21 TABLET ORAL ONCE
Refills: 0 | Status: COMPLETED | OUTPATIENT
Start: 2020-10-04 | End: 2020-10-04

## 2020-10-04 RX ORDER — MAGNESIUM SULFATE 500 MG/ML
2 VIAL (ML) INJECTION ONCE
Refills: 0 | Status: COMPLETED | OUTPATIENT
Start: 2020-10-04 | End: 2020-10-04

## 2020-10-04 RX ADMIN — DESMOPRESSIN ACETATE 21 MICROGRAM(S): 0.1 TABLET ORAL at 21:46

## 2020-10-04 RX ADMIN — DESMOPRESSIN ACETATE 221 MICROGRAM(S): 0.1 TABLET ORAL at 21:26

## 2020-10-04 RX ADMIN — LEVETIRACETAM 400 MILLIGRAM(S): 250 TABLET, FILM COATED ORAL at 20:46

## 2020-10-04 RX ADMIN — CEFTRIAXONE 1000 MILLIGRAM(S): 500 INJECTION, POWDER, FOR SOLUTION INTRAMUSCULAR; INTRAVENOUS at 20:46

## 2020-10-04 RX ADMIN — LEVETIRACETAM 1000 MILLIGRAM(S): 250 TABLET, FILM COATED ORAL at 21:00

## 2020-10-04 RX ADMIN — SODIUM CHLORIDE 1500 MILLILITER(S): 9 INJECTION INTRAMUSCULAR; INTRAVENOUS; SUBCUTANEOUS at 20:47

## 2020-10-04 RX ADMIN — AZITHROMYCIN 500 MILLIGRAM(S): 500 TABLET, FILM COATED ORAL at 21:52

## 2020-10-04 RX ADMIN — AZITHROMYCIN 255 MILLIGRAM(S): 500 TABLET, FILM COATED ORAL at 20:46

## 2020-10-04 RX ADMIN — Medication 50 GRAM(S): at 23:19

## 2020-10-04 RX ADMIN — SODIUM CHLORIDE 1500 MILLILITER(S): 9 INJECTION INTRAMUSCULAR; INTRAVENOUS; SUBCUTANEOUS at 21:52
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
## 2020-10-04 NOTE — H&P ADULT - NSHPLABSRESULTS_GEN_ALL_CORE
Labs:    CARDIAC MARKERS ( 04 Oct 2020 19:25 )  0.078 ng/mL / x     / 135 U/L / x     / x                       10.1   8.56  )-----------( 275      ( 04 Oct 2020 19:25 )             29.8     CBC Full  -  ( 04 Oct 2020 19:25 )  WBC Count : 8.56 K/uL  RBC Count : 3.12 M/uL  Hemoglobin : 10.1 g/dL  Hematocrit : 29.8 %  Platelet Count - Automated : 275 K/uL  Mean Cell Volume : 95.5 fl  Mean Cell Hemoglobin : 32.4 pg  Mean Cell Hemoglobin Concentration : 33.9 gm/dL  Auto Neutrophil # : 7.06 K/uL  Auto Lymphocyte # : 0.88 K/uL  Auto Monocyte # : 0.57 K/uL  Auto Eosinophil # : 0.00 K/uL  Auto Basophil # : 0.02 K/uL  Auto Neutrophil % : 82.4 %  Auto Lymphocyte % : 10.3 %  Auto Monocyte % : 6.7 %  Auto Eosinophil % : 0.0 %  Auto Basophil % : 0.2 %  139  |  108  |  16  ----------------------------<  118<H>  4.0   |  26  |  0.91  Ca    8.7      04 Oct 2020 19:25  Mg     1.5     10-04  TPro  6.0  /  Alb  3.0<L>  /  TBili  0.7  /  DBili  x   /  AST  27  /  ALT  22  /  AlkPhos  96  10-04    LIVER FUNCTIONS - ( 04 Oct 2020 19:25 )  Alb: 3.0 g/dL / Pro: 6.0 gm/dL / ALK PHOS: 96 U/L / ALT: 22 U/L / AST: 27 U/L / GGT: x           Radiology:    EXAM:  CT MAXILLOFACIAL                        EXAM:  CT 3D RECONSTRUCT HELDER CALLAHANJUHI                        EXAM:  CT BRAIN                        EXAM:  CT CERVICAL SPINE                      PROCEDURE DATE:  10/04/2020    IMPRESSION:  1. Acute subdural hematoma over the right convexity.  2. No cervical fracture or subluxation. Degenerative changes.  3. No facial fracture or dislocation.    EXAM:  CT ABDOMEN AND PELVIS                        EXAM:  CT CHEST                        PROCEDURE DATE:  10/04/2020    INTERPRETATION:  CLINICAL INFORMATION: Status post fall 3 times today.  IMPRESSION:  1. No acute intrathoracic, intra-abdominal or intrapelvic trauma.  2. Lateral right fifth rib fracture deformity possibly subacute in age.  3. Patchy airspace opacities in the left lower lobe likely infectious in etiology.

## 2020-10-04 NOTE — CONSULT NOTE ADULT - SUBJECTIVE AND OBJECTIVE BOX
Patient is a 89y old  Male who presents with a chief complaint of mechanical fall earlier today. States he was on his way to the bathroom when he fell and after he fell was unsteady and fell 2 more times. Sustained right SDH. Denies LOC, vomiting or headache. Denies focal weakness or numbness. Troponin slightly elevated and CT C/A/P shows questionable right rib fracture as well. Lives alone has a neighbor that checks on him regularly. Takes asa 81mg daily.     PAST MEDICAL & SURGICAL HISTORY:  GERD (gastroesophageal reflux disease)    BPH (benign prostatic hyperplasia)    DM (diabetes mellitus)    HLD (hyperlipidemia)    HTN (hypertension)    CAD (coronary artery disease)    No significant past surgical history    SOCIAL HISTORY: lives alone  Tobacco Use: denies   EtOH use: denies   Substance: denies     Allergies    No Known Allergies    REVIEW OF SYSTEMS: negative except for as above.     ALLERY AND IMMUNOLOGIC: No hives or eczema    HOME MEDICATIONS:  alfuzosin 10 mg oral tablet, extended release: 1 tab(s) orally once a day with food (26 Dec 2019 12:01)  Aspirin 81 oral delayed release tablet: 1 tab(s) orally once a day (26 Dec 2019 12:01)  finasteride 5 mg oral tablet: 1 tab(s) orally once a day (26 Dec 2019 12:01)  gabapentin 800 mg oral tablet: 1 tab(s) orally 3 times a day (26 Dec 2019 12:01)  hydrOXYzine hydrochloride 25 mg oral tablet: 1 tab(s) orally 2 times a day, As Needed for itch (26 Dec 2019 12:01)  Multiple Vitamins oral tablet: 1 tab(s) orally once a day (26 Dec 2019 12:01)  omeprazole 20 mg oral delayed release capsule: 1 cap(s) orally once a day (26 Dec 2019 12:01)  QUEtiapine 50 mg oral tablet: 1 tab(s) orally once a day (at bedtime) (26 Dec 2019 12:01)  simvastatin 40 mg oral tablet: 1 tab(s) orally once a day (at bedtime) (26 Dec 2019 12:01)  temazepam 15 mg oral capsule: 1-2 cap(s) orally once a day (at bedtime) as needed for sleep (26 Dec 2019 12:01)      Neuro:  acetaminophen   Tablet .. 650 milliGRAM(s) Oral every 6 hours PRN  HYDROmorphone  Injectable 1 milliGRAM(s) IV Push every 4 hours PRN  ondansetron Injectable 4 milliGRAM(s) IV Push every 6 hours PRN    OTHER:  desmopressin IVPB 21 MICROGram(s) IV Intermittent Once    IVF:      Vital Signs Last 24 Hrs  T(C): 36.9 (04 Oct 2020 19:06), Max: 36.9 (04 Oct 2020 19:06)  T(F): 98.4 (04 Oct 2020 19:06), Max: 98.4 (04 Oct 2020 19:06)  HR: 69 (04 Oct 2020 20:54) (69 - 75)  BP: 141/66 (04 Oct 2020 20:54) (124/58 - 141/66)  BP(mean): --  RR: 18 (04 Oct 2020 20:54) (16 - 18)  SpO2: 98% (04 Oct 2020 20:54) (98% - 100%)      PHYSICAL EXAM:  GENERAL: NAD, well-groomed, frail appearing   HEAD:  bruising to right face. normocephalic   EYES: Conjunctiva and sclera clear; corneal reflex intact  ENMT: No tonsillar erythema, exudates, or enlargement; moist mucous membranes, + dentures, no lesions  NECK: Supple, no JVD, dormal thyroid, no pain on ROM   DELVIS COMA SCORE: E- 4V-5 M-6 = 15       E: 4= opens eyes spontaneously 3= to voice 2= to noxious 1= no opening       V: 5= oriented 4= confused 3= inappropriate words 2= incomprehensible sounds 1= nonverbal 1T= intubated       M: 6= follows commands 5= localizes 4= withdraws 3= flexor posturing 2= extensor posturing 1= no movement    Neuro: A&Ox3, PERRL, EOMI speech clear and fluent. follows commands. tongue midline. Face symmetrical.   B/L UE 4+/5 B/L LE 4/5. sensation grossly intact to light touch. no clonus no lopez's.   Gait not assessed.     CHEST/LUNG: Clear to auscultation bilaterally; no rales, rhonchi, wheezing, or rubs  HEART: +S1/+S2; Regular rate and rhythm; no murmurs, rubs, or gallops  ABDOMEN: Soft, nontender, nondistended; bowel sounds present all four quadrants  EXTREMITIES:  2+ peripheral pulses, no clubbing, cyanosis, or edema  LYMPH: No lymphadenopathy noted  SKIN: Warm, dry; no rashes or lesions    LABS:                        10.1   8.56  )-----------( 275      ( 04 Oct 2020 19:25 )             29.8     10-04    139  |  108  |  16  ----------------------------<  118<H>  4.0   |  26  |  0.91    Ca    8.7      04 Oct 2020 19:25  Mg     1.5     10-04    TPro  6.0  /  Alb  3.0<L>  /  TBili  0.7  /  DBili  x   /  AST  27  /  ALT  22  /  AlkPhos  96  10-04  Troponin I, Serum: 0.078: Test Name:trop Called by:magnolia Called to:rn s cilus   Read back 2 Pt IDs:y Read back values:y Date/Tm:10/04/20 20:13   High Sensitivity Troponin and new reference range effective 7/6/2016 ng/mL (10.04.20 @ 19:25)         RADIOLOGY & ADDITIONAL STUDIES:  < from: CT Chest No Cont (10.04.20 @ 19:54) >  IMPRESSION:  1. No acute intrathoracic, intra-abdominal or intrapelvic trauma.  2. Lateral right fifth rib fracture deformity possibly subacute in age.  3. Patchy airspace opacities in the left lower lobe likely infectious in etiology.    < end of copied text >  < from: CT Head No Cont (10.04.20 @ 19:52) >  IMPRESSION:  1. Acute subdural hematoma over the right convexity.  2. No cervical fracture or subluxation. Degenerative changes.  3. No facial fracture or dislocation.    Findings discussed with Dr. Gaytan by myself at 10/4/2020 7:49 PM.      < end of copied text >        CAPRINI SCORE [CLOT]:  Patient has an estimated Caprini score of greater than 5.  However, the patient's unique clinical situation will be addressed in an individual manner to determine appropriate anticoagulation treatment, if any.

## 2020-10-04 NOTE — H&P ADULT - NSICDXPASTMEDICALHX_GEN_ALL_CORE_FT
PAST MEDICAL HISTORY:  BPH (benign prostatic hyperplasia)     CAD (coronary artery disease)     DM (diabetes mellitus)     GERD (gastroesophageal reflux disease)     HLD (hyperlipidemia)     HTN (hypertension)

## 2020-10-04 NOTE — H&P ADULT - NSHPPHYSICALEXAM_GEN_ALL_CORE
GCS of 15  Airway is patent  Breathing is symmetric and unlabored  Neuro: CNII-XII grossly intact  Psych: normal affect  HEENT: Normocephalic, + right periorbital ecchymosis, QASIM, EOM wnl, no otorrhea or hemotympanum b/l, no epistaxis or d/c b/l nares, no craniofacial bony pathology or tenderness b/l  Neck: Soft and supple, nontender to exam. No crepitus, no ecchymosis, no hematoma, to exam, no JVD, no tracheal deviation  Cspine/thoracolumbrosacral spine: no gross bony pathology or tenderness to exam  Cardiovascular: S1S2 Present  Chest: no gross left rib pathology or tenderness to exam. + right lateral 5th rib region tenderness to exam. No sternal pathology or tenderness to exam. No crepitus, no ecchymosis, no hematoma. No penetrating thorcoabdominal trauma  Respiratory: Rate is 18; Respiratory Effort normal; no wheezes, rales or rhonchi to exam  ABD: bowel sounds (+), soft, nontender, non distended, no rebound, no guarding, no rigidity, no skin changes to exam. No pelvic instability to exam, no skin changes  Genitourinary: No scrotal/perineal/perirectal hematoma/ecchymosis/tenderness to exam  External genitalia: normal, no blood at urethral meatus  Musculoskeletal: Pt has palpable b/l radial, femoral, dorsalis pedis pulses. All digits are warm and well perfused. No gross long bone pathology or tenderness to exam. Pt demonstrates grossly intact sensoromotor function. Pt has good capillary refill to digits, no calf edema or tenderness to exam.  Skin: no lesions or rashes to exam  Vital Signs Last 24 Hrs  T(C): 37 (04 Oct 2020 21:28), Max: 37 (04 Oct 2020 21:28)  T(F): 98.6 (04 Oct 2020 21:28), Max: 98.6 (04 Oct 2020 21:28)  HR: 85 (04 Oct 2020 22:45) (69 - 85)  BP: 119/60 (04 Oct 2020 22:45) (103/61 - 141/66)  BP(mean): 74 (04 Oct 2020 22:45) (74 - 74)  RR: 12 (04 Oct 2020 22:45) (12 - 18)  SpO2: 100% (04 Oct 2020 22:45) (98% - 100%)

## 2020-10-04 NOTE — ED PROVIDER NOTE - OBJECTIVE STATEMENT
89 y/o male with pmhx of GERD, BPH, DM, HLD, HTN, CAD presents to the ED for AMS. Pt lives at home alone. Pts neighbor takes care of him and called the ambulance due to him not being the same. Pt states that he fell three times this morning. Denies any pain at this time. 89 y/o male with pmhx of GERD, BPH, DM, HLD, HTN, CAD presents to the ED for AMS. Pt lives at home alone. Pts neighbor who delivers food to the patient and called the ambulance due to him not being "himself" this evening; Pt states that he fell three times this morning. Denies any pain at this time.

## 2020-10-04 NOTE — CONSULT NOTE ADULT - ASSESSMENT
89 year old male s/p mechanical fall with mildly elevated troponins, possible right 5th rib fracture, acute Right subdural hematoma, and possible left LL PNA.     discussed case and radiographic findings with Dr. Arcos  Patient does not have shift at this time and does not require emergency neurosurgical intervention  Recommend reversal of ASA if ok with cardiology or critical care- DDAVP 0.4mcg/kg x1 and 1 unit of platelets.   recommend Keppra load with 1000mg x1 and then 500mg bid.   recommend repeat CT head in am may be done sooner if any neurological decline  recommend ICU/trauma eval for management of patient in the ICU  recommend serial troponin   recommend neuro check q1h.   will follow with you.   discussed with patient who understands plan at this time.

## 2020-10-04 NOTE — ED PROVIDER NOTE - PROGRESS NOTE DETAILS
Scribe Jaclyn Casale for attending Dr Gaytan: spoke to neurosx, team will come to see pt. Scribe Jaclyn Casale for attending Dr Gaytan: pt with mildly elevated troponin, asa held in setting of subdural hematoma. Scribe Jaclyn Casale for attending Dr Gaytan: neurosx team recommends DDAVP, Keppra, platelets, admission to ICU. Scribe Jaclyn Casale for attending Dr Gaytan: contacted trauma team, will consult pt. Scribe Jaclyn Casale for attending Dr Gaytan: spoke to teleICU, will come to lenore page. Scribe Jaclyn Casale for attending Dr Gaytan: contacted trauma team, will admit pt

## 2020-10-04 NOTE — CONSULT NOTE ADULT - ASSESSMENT
Impression:  1. acute R SDH  2. hypomagnesemia  3. elevated troponin  4. CAD sp PCI  5. BPH  6. diabetes mellitus  7. GERD    Plan:  Neuro - avoid neurosuppressants, [id number] neuro checks, repeat CT in am or sooner if changes in neuro status, DDAVP/plts x 1 given for ASA use, neurosx following, no acute surgical                intervention at this time.    CV -  hemodynamics stable, no CP or anginal equivalents, EKG without ischemic changes, Echo 12/19 LVH, EF nml - does not appear trop elevation is ACS related          no APT given acute bleed          cont outpt statin and BB          check Lipid panel          serial trop          repeat 2D echo    Pulm - stable, on RA, sats >90%    GI -  cont outpt PPI, diet in am following repeat CT head, carb/cardiac diet    Renal - Cr normal, strict I/Os, trend BMP, replete Mg with 2gm IV x 1 now    Heme -  no pharmacologic DVT PPx in face of acute SDCH,  SCD's only    ID - afebrile, no signs of active infection, monitor off abx    Endo -  Aggressive glycemic control to maintain euglycemia 120- 180mg/dl, start ISS q 6hours while NPO, check A1c.    Case and plan discussed with eICU attending. Impression:  1. acute R SDH  2. hypomagnesemia  3. elevated troponin  4. CAD sp PCI  5. BPH  6. diabetes mellitus  7. GERD    Plan:  Neuro - avoid neurosuppressants, [id number] neuro checks, repeat CT in am or sooner if changes in neuro status, DDAVP/plts x 1 given for ASA use, neurosx following, no acute surgical                intervention at this time.    CV -  (hemodynamics stable, no CP or anginal equivalents, EKG without new changes, Echo 12/19 LVH, EF nml )- does not appear trop elevation is ACS related          no APT given acute bleed          cont outpt statin and BB          check Lipid panel          serial trop          repeat 2D echo    Pulm - stable, on RA, sats >90%    GI -  cont outpt PPI, diet in am following repeat CT head, carb/cardiac diet    Renal - Cr normal, strict I/Os, trend BMP, replete Mg with 2gm IV x 1 now    Heme -  no pharmacologic DVT PPx in face of acute SDCH,  SCD's only    ID - afebrile, no signs of active infection, monitor off abx    Endo -  Aggressive glycemic control to maintain euglycemia 120- 180mg/dl, start ISS q 6hours while NPO, check A1c.    Case and plan discussed with eICU attending.

## 2020-10-04 NOTE — CONSULT NOTE ADULT - ATTENDING COMMENTS
NS attending:    Patient seen and evaluated at bedside  Agree with PA note  89 year old male found down with rib fractures and R acute 1.3 cm SDH without evidence of midline shift  Patient is currently GCS 15 with spontaneous eye opening, fluent speech and does not have pronator drift  No indication for NS intervention at this time  Will followup repeat CTH  Keppra for seizure prophylaxis  Understand if ASA cannot be reversed due to elevated troponins, this unfortunately slightly increases bleed chance of progression

## 2020-10-04 NOTE — H&P ADULT - HISTORY OF PRESENT ILLNESS
Trauma Consult    Pt s/p multiple falls 10/4/20. Pt initially presented with AMS. At time of exam pt is AAO x 3    Pt seen and examined at bedside Pt in no acute distress. Pt denied c/o fever, chills, chest pain, SOB, abd pain, N/V/D, extremity pain or dysfunction, hemoptysis, hematemesis, hematuria, hematochexia, headache, diplopia, vertigo, dizzyness.

## 2020-10-04 NOTE — ED PROVIDER NOTE - SKIN, MLM
Skin normal color for race, warm, dry and intact. No evidence of rash. +old ecchymosis below the R eye.
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
## 2020-10-04 NOTE — ED PROVIDER NOTE - CONSTITUTIONAL, MLM
normal... Well appearing, awake, not alert, oriented to person, place, time/situation and in no apparent distress.

## 2020-10-05 PROBLEM — I25.10 ATHEROSCLEROTIC HEART DISEASE OF NATIVE CORONARY ARTERY WITHOUT ANGINA PECTORIS: Chronic | Status: ACTIVE | Noted: 2020-06-13

## 2020-10-05 PROBLEM — E11.9 TYPE 2 DIABETES MELLITUS WITHOUT COMPLICATIONS: Chronic | Status: ACTIVE | Noted: 2020-06-13

## 2020-10-05 PROBLEM — E78.5 HYPERLIPIDEMIA, UNSPECIFIED: Chronic | Status: ACTIVE | Noted: 2020-06-13

## 2020-10-05 PROBLEM — I10 ESSENTIAL (PRIMARY) HYPERTENSION: Chronic | Status: ACTIVE | Noted: 2020-06-13

## 2020-10-05 PROBLEM — N40.0 BENIGN PROSTATIC HYPERPLASIA WITHOUT LOWER URINARY TRACT SYMPTOMS: Chronic | Status: ACTIVE | Noted: 2020-06-13

## 2020-10-05 PROBLEM — K21.9 GASTRO-ESOPHAGEAL REFLUX DISEASE WITHOUT ESOPHAGITIS: Chronic | Status: ACTIVE | Noted: 2020-06-13

## 2020-10-05 LAB
A1C WITH ESTIMATED AVERAGE GLUCOSE RESULT: 5.7 % — HIGH (ref 4–5.6)
ANION GAP SERPL CALC-SCNC: 5 MMOL/L — SIGNIFICANT CHANGE UP (ref 5–17)
APPEARANCE UR: CLEAR — SIGNIFICANT CHANGE UP
BILIRUB UR-MCNC: NEGATIVE — SIGNIFICANT CHANGE UP
BUN SERPL-MCNC: 11 MG/DL — SIGNIFICANT CHANGE UP (ref 7–23)
CALCIUM SERPL-MCNC: 8.2 MG/DL — LOW (ref 8.5–10.1)
CHLORIDE SERPL-SCNC: 107 MMOL/L — SIGNIFICANT CHANGE UP (ref 96–108)
CO2 SERPL-SCNC: 28 MMOL/L — SIGNIFICANT CHANGE UP (ref 22–31)
COLOR SPEC: YELLOW — SIGNIFICANT CHANGE UP
CREAT SERPL-MCNC: 0.65 MG/DL — SIGNIFICANT CHANGE UP (ref 0.5–1.3)
DIFF PNL FLD: NEGATIVE — SIGNIFICANT CHANGE UP
ESTIMATED AVERAGE GLUCOSE: 117 MG/DL — HIGH (ref 68–114)
GLUCOSE SERPL-MCNC: 84 MG/DL — SIGNIFICANT CHANGE UP (ref 70–99)
GLUCOSE UR QL: NEGATIVE MG/DL — SIGNIFICANT CHANGE UP
HCT VFR BLD CALC: 28 % — LOW (ref 39–50)
HGB BLD-MCNC: 9 G/DL — LOW (ref 13–17)
KETONES UR-MCNC: ABNORMAL
LEUKOCYTE ESTERASE UR-ACNC: ABNORMAL
MCHC RBC-ENTMCNC: 31.5 PG — SIGNIFICANT CHANGE UP (ref 27–34)
MCHC RBC-ENTMCNC: 32.1 GM/DL — SIGNIFICANT CHANGE UP (ref 32–36)
MCV RBC AUTO: 97.9 FL — SIGNIFICANT CHANGE UP (ref 80–100)
NITRITE UR-MCNC: NEGATIVE — SIGNIFICANT CHANGE UP
PH UR: 5 — SIGNIFICANT CHANGE UP (ref 5–8)
PLATELET # BLD AUTO: 242 K/UL — SIGNIFICANT CHANGE UP (ref 150–400)
POTASSIUM SERPL-MCNC: 3.8 MMOL/L — SIGNIFICANT CHANGE UP (ref 3.5–5.3)
POTASSIUM SERPL-SCNC: 3.8 MMOL/L — SIGNIFICANT CHANGE UP (ref 3.5–5.3)
PROT UR-MCNC: NEGATIVE MG/DL — SIGNIFICANT CHANGE UP
RBC # BLD: 2.86 M/UL — LOW (ref 4.2–5.8)
RBC # FLD: 13.4 % — SIGNIFICANT CHANGE UP (ref 10.3–14.5)
SARS-COV-2 IGG SERPL QL IA: NEGATIVE — SIGNIFICANT CHANGE UP
SARS-COV-2 IGM SERPL IA-ACNC: <0.1 INDEX — SIGNIFICANT CHANGE UP
SODIUM SERPL-SCNC: 140 MMOL/L — SIGNIFICANT CHANGE UP (ref 135–145)
SP GR SPEC: 1.02 — SIGNIFICANT CHANGE UP (ref 1.01–1.02)
TROPONIN I SERPL-MCNC: 0.03 NG/ML — SIGNIFICANT CHANGE UP (ref 0.01–0.04)
TROPONIN I SERPL-MCNC: 0.04 NG/ML — SIGNIFICANT CHANGE UP (ref 0.01–0.04)
TROPONIN I SERPL-MCNC: 0.06 NG/ML — HIGH (ref 0.01–0.04)
UROBILINOGEN FLD QL: NEGATIVE MG/DL — SIGNIFICANT CHANGE UP
WBC # BLD: 7.01 K/UL — SIGNIFICANT CHANGE UP (ref 3.8–10.5)
WBC # FLD AUTO: 7.01 K/UL — SIGNIFICANT CHANGE UP (ref 3.8–10.5)

## 2020-10-05 PROCEDURE — 71045 X-RAY EXAM CHEST 1 VIEW: CPT | Mod: 26

## 2020-10-05 PROCEDURE — 99233 SBSQ HOSP IP/OBS HIGH 50: CPT

## 2020-10-05 PROCEDURE — 70450 CT HEAD/BRAIN W/O DYE: CPT | Mod: 26

## 2020-10-05 RX ORDER — METOPROLOL TARTRATE 50 MG
25 TABLET ORAL
Refills: 0 | Status: DISCONTINUED | OUTPATIENT
Start: 2020-10-05 | End: 2020-10-07

## 2020-10-05 RX ORDER — INSULIN LISPRO 100/ML
VIAL (ML) SUBCUTANEOUS
Refills: 0 | Status: DISCONTINUED | OUTPATIENT
Start: 2020-10-05 | End: 2020-10-16

## 2020-10-05 RX ORDER — INFLUENZA VIRUS VACCINE 15; 15; 15; 15 UG/.5ML; UG/.5ML; UG/.5ML; UG/.5ML
0.5 SUSPENSION INTRAMUSCULAR ONCE
Refills: 0 | Status: COMPLETED | OUTPATIENT
Start: 2020-10-05 | End: 2020-10-05

## 2020-10-05 RX ADMIN — GABAPENTIN 300 MILLIGRAM(S): 400 CAPSULE ORAL at 06:34

## 2020-10-05 RX ADMIN — GABAPENTIN 300 MILLIGRAM(S): 400 CAPSULE ORAL at 13:54

## 2020-10-05 RX ADMIN — SIMVASTATIN 40 MILLIGRAM(S): 20 TABLET, FILM COATED ORAL at 21:18

## 2020-10-05 RX ADMIN — GABAPENTIN 300 MILLIGRAM(S): 400 CAPSULE ORAL at 21:18

## 2020-10-05 RX ADMIN — Medication 650 MILLIGRAM(S): at 21:18

## 2020-10-05 RX ADMIN — FINASTERIDE 5 MILLIGRAM(S): 5 TABLET, FILM COATED ORAL at 09:46

## 2020-10-05 RX ADMIN — Medication 25 MILLIGRAM(S): at 21:18

## 2020-10-05 RX ADMIN — AMLODIPINE BESYLATE 5 MILLIGRAM(S): 2.5 TABLET ORAL at 09:46

## 2020-10-05 RX ADMIN — Medication 1 TABLET(S): at 09:46

## 2020-10-05 RX ADMIN — Medication 2.5 MILLIGRAM(S): at 17:32

## 2020-10-05 RX ADMIN — PANTOPRAZOLE SODIUM 40 MILLIGRAM(S): 20 TABLET, DELAYED RELEASE ORAL at 11:53

## 2020-10-05 RX ADMIN — QUETIAPINE FUMARATE 50 MILLIGRAM(S): 200 TABLET, FILM COATED ORAL at 21:18

## 2020-10-05 NOTE — PROVIDER CONTACT NOTE (CHANGE IN STATUS NOTIFICATION) - SITUATION
Patient with right side subdural; no motor or speech deficits. Patient recently downgraded to med/surg floor.

## 2020-10-05 NOTE — PROGRESS NOTE ADULT - ASSESSMENT
89 year old man s/p fall at home, Right sided SDH stable on repeat CT head    Neurosurgery recommendations appreciated    Change neuro checks to Q4 hours  Venodynes for DVT PPX  SQ can be started 48 hours post trauma - as per NSGY  Physical Therapy  Advance diet and activity as tolerated     Will discuss with Zino Castillo    89 year old man s/p fall at home, Right sided SDH stable on repeat CT head    Neurosurgery recommendations appreciated    Change neuro checks to Q4 hours  Venodynes for DVT PPX  SQ can be started 48 hours post trauma - as per NSGY  Physical Therapy  Advance diet and activity as tolerated   Transfer to floor  PT

## 2020-10-05 NOTE — PROGRESS NOTE ADULT - ASSESSMENT
89 year old man s/p fall at home, Right sided SDH stable on repeat CT head    no acute neurosurgical intervention   Change neuro checks to Q4 hours  Venodynes for DVT PPX  SQ can be started 48 hours post trauma  Physical Therapy  Advance diet and activity as tolerated     Discussed with Dr. Arcos

## 2020-10-05 NOTE — PHYSICAL THERAPY INITIAL EVALUATION ADULT - AMBULATION SKILLS, REHAB EVAL
independent/has cane/standard walker at home but was not using it, says he looked too old if he use AD

## 2020-10-05 NOTE — PROVIDER CONTACT NOTE (CHANGE IN STATUS NOTIFICATION) - ASSESSMENT
Patient complaining of new onset of right sided occiptal/parietal headache (3-5 out of 10) without any other accompanying symptoms.

## 2020-10-05 NOTE — PROGRESS NOTE ADULT - SUBJECTIVE AND OBJECTIVE BOX
Pt seen and examined, no events overnight, pt awake alert, oriented X3, denies fever chills h/a, chest pain, sob, abdominal pain, n/v/d    Vital Signs Last 24 Hrs  T(C): 36.7 (05 Oct 2020 14:00), Max: 37.3 (04 Oct 2020 22:45)  T(F): 98 (05 Oct 2020 14:00), Max: 99.1 (04 Oct 2020 22:45)  HR: 60 (05 Oct 2020 17:00) (51 - 85)  BP: 120/51 (05 Oct 2020 17:00) (99/43 - 141/66)  BP(mean): 68 (05 Oct 2020 17:00) (56 - 74)  RR: 13 (05 Oct 2020 17:00) (6 - 19)  SpO2: 99% (05 Oct 2020 17:00) (95% - 100%)    PHYSICAL EXAM:  Constitutional: awake and alert, resting comfortably in bed   HEENT: PERRLA, EOMI  Neck: Supple  Respiratory: Breath sounds are clear bilaterally  Cardiovascular: S1 and S2, regular rhythm  Gastrointestinal: soft, nontender  Extremities:  no edema  Vascular: Carotid Bruit - no  Musculoskeletal: no joint swelling/tenderness, no abnormal movements  Skin: ecchymosis to right side of face                             9.0    7.01  )-----------( 242      ( 05 Oct 2020 06:20 )             28.0   10-05    140  |  107  |  11  ----------------------------<  84  3.8   |  28  |  0.65    Ca    8.2<L>      05 Oct 2020 06:20  Mg     1.5     10-04    TPro  6.0  /  Alb  3.0<L>  /  TBili  0.7  /  DBili  x   /  AST  27  /  ALT  22  /  AlkPhos  96  10-04

## 2020-10-06 ENCOUNTER — RESULT REVIEW (OUTPATIENT)
Age: 85
End: 2020-10-06

## 2020-10-06 DIAGNOSIS — S06.5X9A TRAUMATIC SUBDURAL HEMORRHAGE WITH LOSS OF CONSCIOUSNESS OF UNSPECIFIED DURATION, INITIAL ENCOUNTER: ICD-10-CM

## 2020-10-06 LAB
ANION GAP SERPL CALC-SCNC: 9 MMOL/L — SIGNIFICANT CHANGE UP (ref 5–17)
BASOPHILS # BLD AUTO: 0.02 K/UL — SIGNIFICANT CHANGE UP (ref 0–0.2)
BASOPHILS NFR BLD AUTO: 0.2 % — SIGNIFICANT CHANGE UP (ref 0–2)
BUN SERPL-MCNC: 13 MG/DL — SIGNIFICANT CHANGE UP (ref 7–23)
CALCIUM SERPL-MCNC: 8 MG/DL — LOW (ref 8.5–10.1)
CHLORIDE SERPL-SCNC: 104 MMOL/L — SIGNIFICANT CHANGE UP (ref 96–108)
CO2 SERPL-SCNC: 24 MMOL/L — SIGNIFICANT CHANGE UP (ref 22–31)
CREAT SERPL-MCNC: 0.74 MG/DL — SIGNIFICANT CHANGE UP (ref 0.5–1.3)
CULTURE RESULTS: NO GROWTH — SIGNIFICANT CHANGE UP
EOSINOPHIL # BLD AUTO: 0 K/UL — SIGNIFICANT CHANGE UP (ref 0–0.5)
EOSINOPHIL NFR BLD AUTO: 0 % — SIGNIFICANT CHANGE UP (ref 0–6)
GLUCOSE SERPL-MCNC: 146 MG/DL — HIGH (ref 70–99)
HCT VFR BLD CALC: 30.5 % — LOW (ref 39–50)
HGB BLD-MCNC: 10.4 G/DL — LOW (ref 13–17)
IMM GRANULOCYTES NFR BLD AUTO: 0.6 % — SIGNIFICANT CHANGE UP (ref 0–1.5)
INR BLD: 1.19 RATIO — HIGH (ref 0.88–1.16)
INR BLD: 1.19 RATIO — HIGH (ref 0.88–1.16)
LYMPHOCYTES # BLD AUTO: 0.76 K/UL — LOW (ref 1–3.3)
LYMPHOCYTES # BLD AUTO: 7 % — LOW (ref 13–44)
MCHC RBC-ENTMCNC: 32.2 PG — SIGNIFICANT CHANGE UP (ref 27–34)
MCHC RBC-ENTMCNC: 34.1 GM/DL — SIGNIFICANT CHANGE UP (ref 32–36)
MCV RBC AUTO: 94.4 FL — SIGNIFICANT CHANGE UP (ref 80–100)
MONOCYTES # BLD AUTO: 0.41 K/UL — SIGNIFICANT CHANGE UP (ref 0–0.9)
MONOCYTES NFR BLD AUTO: 3.8 % — SIGNIFICANT CHANGE UP (ref 2–14)
NEUTROPHILS # BLD AUTO: 9.59 K/UL — HIGH (ref 1.8–7.4)
NEUTROPHILS NFR BLD AUTO: 88.4 % — HIGH (ref 43–77)
PLATELET # BLD AUTO: 287 K/UL — SIGNIFICANT CHANGE UP (ref 150–400)
POTASSIUM SERPL-MCNC: 3.9 MMOL/L — SIGNIFICANT CHANGE UP (ref 3.5–5.3)
POTASSIUM SERPL-SCNC: 3.9 MMOL/L — SIGNIFICANT CHANGE UP (ref 3.5–5.3)
PROTHROM AB SERPL-ACNC: 13.8 SEC — HIGH (ref 10.6–13.6)
PROTHROM AB SERPL-ACNC: 13.8 SEC — HIGH (ref 10.6–13.6)
RBC # BLD: 3.23 M/UL — LOW (ref 4.2–5.8)
RBC # FLD: 13.7 % — SIGNIFICANT CHANGE UP (ref 10.3–14.5)
SODIUM SERPL-SCNC: 137 MMOL/L — SIGNIFICANT CHANGE UP (ref 135–145)
SPECIMEN SOURCE: SIGNIFICANT CHANGE UP
WBC # BLD: 10.85 K/UL — HIGH (ref 3.8–10.5)
WBC # FLD AUTO: 10.85 K/UL — HIGH (ref 3.8–10.5)

## 2020-10-06 PROCEDURE — 88305 TISSUE EXAM BY PATHOLOGIST: CPT | Mod: 26

## 2020-10-06 PROCEDURE — 70450 CT HEAD/BRAIN W/O DYE: CPT | Mod: 26,76

## 2020-10-06 PROCEDURE — 99291 CRITICAL CARE FIRST HOUR: CPT

## 2020-10-06 RX ORDER — LEVETIRACETAM 250 MG/1
750 TABLET, FILM COATED ORAL EVERY 12 HOURS
Refills: 0 | Status: DISCONTINUED | OUTPATIENT
Start: 2020-10-06 | End: 2020-10-06

## 2020-10-06 RX ORDER — LEVETIRACETAM 250 MG/1
750 TABLET, FILM COATED ORAL EVERY 12 HOURS
Refills: 0 | Status: DISCONTINUED | OUTPATIENT
Start: 2020-10-06 | End: 2020-10-07

## 2020-10-06 RX ORDER — NICARDIPINE HYDROCHLORIDE 30 MG/1
5 CAPSULE, EXTENDED RELEASE ORAL
Qty: 40 | Refills: 0 | Status: DISCONTINUED | OUTPATIENT
Start: 2020-10-06 | End: 2020-10-08

## 2020-10-06 RX ORDER — LABETALOL HCL 100 MG
10 TABLET ORAL ONCE
Refills: 0 | Status: COMPLETED | OUTPATIENT
Start: 2020-10-06 | End: 2020-10-06

## 2020-10-06 RX ORDER — ONDANSETRON 8 MG/1
4 TABLET, FILM COATED ORAL ONCE
Refills: 0 | Status: DISCONTINUED | OUTPATIENT
Start: 2020-10-06 | End: 2020-10-06

## 2020-10-06 RX ORDER — SODIUM CHLORIDE 9 MG/ML
1000 INJECTION, SOLUTION INTRAVENOUS
Refills: 0 | Status: DISCONTINUED | OUTPATIENT
Start: 2020-10-06 | End: 2020-10-06

## 2020-10-06 RX ORDER — FENTANYL CITRATE 50 UG/ML
50 INJECTION INTRAVENOUS
Refills: 0 | Status: DISCONTINUED | OUTPATIENT
Start: 2020-10-06 | End: 2020-10-06

## 2020-10-06 RX ORDER — LEVETIRACETAM 250 MG/1
500 TABLET, FILM COATED ORAL
Refills: 0 | Status: DISCONTINUED | OUTPATIENT
Start: 2020-10-06 | End: 2020-10-06

## 2020-10-06 RX ORDER — FENTANYL CITRATE 50 UG/ML
25 INJECTION INTRAVENOUS
Refills: 0 | Status: DISCONTINUED | OUTPATIENT
Start: 2020-10-06 | End: 2020-10-06

## 2020-10-06 RX ORDER — MEPERIDINE HYDROCHLORIDE 50 MG/ML
12.5 INJECTION INTRAMUSCULAR; INTRAVENOUS; SUBCUTANEOUS
Refills: 0 | Status: DISCONTINUED | OUTPATIENT
Start: 2020-10-06 | End: 2020-10-06

## 2020-10-06 RX ADMIN — SODIUM CHLORIDE 75 MILLILITER(S): 9 INJECTION, SOLUTION INTRAVENOUS at 14:13

## 2020-10-06 RX ADMIN — Medication 1: at 16:46

## 2020-10-06 RX ADMIN — FENTANYL CITRATE 25 MICROGRAM(S): 50 INJECTION INTRAVENOUS at 14:02

## 2020-10-06 RX ADMIN — FENTANYL CITRATE 25 MICROGRAM(S): 50 INJECTION INTRAVENOUS at 14:15

## 2020-10-06 RX ADMIN — Medication 10 MILLIGRAM(S): at 14:18

## 2020-10-06 RX ADMIN — LEVETIRACETAM 400 MILLIGRAM(S): 250 TABLET, FILM COATED ORAL at 19:49

## 2020-10-06 RX ADMIN — NICARDIPINE HYDROCHLORIDE 25 MG/HR: 30 CAPSULE, EXTENDED RELEASE ORAL at 15:34

## 2020-10-06 NOTE — PROGRESS NOTE ADULT - SUBJECTIVE AND OBJECTIVE BOX
Pt admitted under trauma service    Hospital [facility] 2  ICU # 1    CC:  Fall    HPI:    88 y/o male on ASA sustained mechanical fall found to have R SDH.  Treated conservatively.  Noted to have change in MS today.  HCT revealed expansion of R acute SDH--went to OR for evacuation.  Extubated.  Received 1u PC and plats.  Pt seen and examined in ICU.      10/6:  Case d/w NSGY attending.  Hypertensive.  Post op under anesthetic.   Son Caleb at bedside    PMH:  As above.     PSH:  As above.     FH: Non Contributory other than those listed in HPI    Social History:  Unobtainable due to clinical condition     MEDICATIONS  (STANDING):  amLODIPine   Tablet 5 milliGRAM(s) Oral daily  dextrose 5%. 1000 milliLiter(s) (50 mL/Hr) IV Continuous <Continuous>  dextrose 50% Injectable 12.5 Gram(s) IV Push once  dextrose 50% Injectable 25 Gram(s) IV Push once  dextrose 50% Injectable 25 Gram(s) IV Push once  finasteride 5 milliGRAM(s) Oral daily  gabapentin 300 milliGRAM(s) Oral three times a day  influenza   Vaccine 0.5 milliLiter(s) IntraMuscular once  insulin lispro (HumaLOG) corrective regimen sliding scale   SubCutaneous Before meals and at bedtime  levETIRAcetam 750 milliGRAM(s) Oral every 12 hours  metoprolol tartrate 25 milliGRAM(s) Oral two times a day  multivitamin 1 Tablet(s) Oral daily  niCARdipine Infusion 5 mG/Hr (25 mL/Hr) IV Continuous <Continuous>  pantoprazole  Injectable 40 milliGRAM(s) IV Push daily  QUEtiapine 50 milliGRAM(s) Oral at bedtime  simvastatin 40 milliGRAM(s) Oral at bedtime    MEDICATIONS  (PRN):  acetaminophen   Tablet .. 650 milliGRAM(s) Oral every 6 hours PRN Temp greater or equal to 38C (100.4F), Mild Pain (1 - 3)  dextrose 40% Gel 15 Gram(s) Oral once PRN Blood Glucose LESS THAN 70 milliGRAM(s)/deciliter  glucagon  Injectable 1 milliGRAM(s) IntraMuscular once PRN Glucose LESS THAN 70 milligrams/deciliter  HYDROmorphone  Injectable 1 milliGRAM(s) IV Push every 4 hours PRN Severe Pain (7 - 10)  ondansetron Injectable 4 milliGRAM(s) IV Push every 6 hours PRN Nausea      Allergies: NKDA    ROS:  SEE BELOW      Weight (kg): 52.6 (10- @ 08:00)    ICU Vital Signs Last 24 Hrs  T(C): 37.6 (06 Oct 2020 14:42), Max: 37.6 (06 Oct 2020 14:42)  T(F): 99.6 (06 Oct 2020 14:42), Max: 99.6 (06 Oct 2020 14:42)  HR: 63 (06 Oct 2020 14:42) (60 - 82)  BP: 132/68 (06 Oct 2020 14:42) (99/45 - 149/70)  BP(mean): 88 (05 Oct 2020 21:00) (58 - 88)  ABP: 163/61 (06 Oct 2020 14:42) (149/57 - 171/65)  ABP(mean): 91 (06 Oct 2020 13:45) (91 - 97)  RR: 12 (06 Oct 2020 14:42) (7 - 24)  SpO2: 100% (06 Oct 2020 14:42) (94% - 100%)          I&O's Summary    05 Oct 2020 07:01  -  06 Oct 2020 07:00  --------------------------------------------------------  IN: 450 mL / OUT: 700 mL / NET: -250 mL    06 Oct 2020 07:01  -  06 Oct 2020 15:24  --------------------------------------------------------  IN: 1908 mL / OUT: 710 mL / NET: 1198 mL        Physical Exam:  SEE BELOW                          10.4   10.85 )-----------( 287      ( 06 Oct 2020 13:15 )             30.5       10-06    137  |  104  |  13  ----------------------------<  146<H>  3.9   |  24  |  0.74    Ca    8.0<L>      06 Oct 2020 13:15  Mg     1.5     10-04    TPro  6.0  /  Alb  3.0<L>  /  TBili  0.7  /  DBili  x   /  AST  27  /  ALT  22  /  AlkPhos  96  10-04      CARDIAC MARKERS ( 05 Oct 2020 19:44 )  0.028 ng/mL / x     / x     / x     / x      CARDIAC MARKERS ( 05 Oct 2020 12:11 )  0.040 ng/mL / x     / x     / x     / x      CARDIAC MARKERS ( 05 Oct 2020 06:20 )  0.044 ng/mL / x     / x     / x     / x      CARDIAC MARKERS ( 05 Oct 2020 02:21 )  0.062 ng/mL / x     / x     / x     / x      CARDIAC MARKERS ( 04 Oct 2020 19:25 )  0.078 ng/mL / x     / 135 U/L / x     / x                Urinalysis Basic - ( 05 Oct 2020 01:40 )    Color: Yellow / Appearance: Clear / S.025 / pH: x  Gluc: x / Ketone: Trace  / Bili: Negative / Urobili: Negative mg/dL   Blood: x / Protein: Negative mg/dL / Nitrite: Negative   Leuk Esterase: Trace / RBC: 0-2 /HPF / WBC 3-5   Sq Epi: x / Non Sq Epi: Few / Bacteria: Occasional        DVT Prophylaxis:                                                            Contraindication:     Advanced Directives:    Discussed with:    Visit Information:  Time spent excluding procedure:      ** Time is exclusive of billed procedures and/or teaching and/or routine family updates.

## 2020-10-06 NOTE — PROGRESS NOTE ADULT - ASSESSMENT
IMP:    88 y/o male with traumatic acute RIGHT SDH underwent Crani for evacuation  Hypertensive emergency     High risk for acute decompensation and deterioration including death     Suggest:    Start Nicardipine gtt to keep SBP < 140  Neuro checks  Cont with AED (3) for total of 7 days  HOB > 30  Pain control  IS  Further imaging as per NSGY  DVT prophy--SCD

## 2020-10-06 NOTE — CDI QUERY NOTE - NSCDIOTHERTXTBX2_GEN_ALL_CORE_FT
Documentation on chart of Right subdural hematoma.     CT Brain 10-5-2020  IMPRESSION:   no interval change in the size of the small RIGHT acute subdural hemorrhage with mass effect on the RIGHT hemisphere but no midline shift. Tiny punctate focus of intracranial hemorrhage is seen at the inferior RIGHT frontal lobe. Mild periventricular white matter ischemia is noted with old lacunar infarctions in the BILATERAL basal ganglia. Global atrophy.    Please clarify a diagnosis based on the above clinical criteria:  A) Brain compression  B) No documentation of Brain compression  C) Unable to determine  D) other ( Please specify condition)

## 2020-10-06 NOTE — BRIEF OPERATIVE NOTE - NSICDXBRIEFPROCEDURE_GEN_ALL_CORE_FT
PROCEDURES:  Craniotomy, emergent, for subdural hematoma evacuation 06-Oct-2020 13:11:56  Eriberto Badillo

## 2020-10-06 NOTE — CDI QUERY NOTE - NSCDIOTHERTXTBX_GEN_ALL_CORE_HH
Documentation on chart of Right sided SDH.    States he was on his way to the bathroom when he fell and after he fell was unsteady and fell 2 more times    Please clarify of this was traumatic versus non traumatic:  A)  Traumatic Right Sided Subdural Hematoma present on admission   B) Non Traumatic Right Sided Subdural Hematoma present on admission  C) Unable to determine  D) other ( Please specify condition)

## 2020-10-07 LAB
ALBUMIN SERPL ELPH-MCNC: 2.8 G/DL — LOW (ref 3.3–5)
ALP SERPL-CCNC: 100 U/L — SIGNIFICANT CHANGE UP (ref 40–120)
ALT FLD-CCNC: 17 U/L — SIGNIFICANT CHANGE UP (ref 12–78)
ANION GAP SERPL CALC-SCNC: 4 MMOL/L — LOW (ref 5–17)
ANION GAP SERPL CALC-SCNC: 6 MMOL/L — SIGNIFICANT CHANGE UP (ref 5–17)
APTT BLD: 27.9 SEC — SIGNIFICANT CHANGE UP (ref 27.5–35.5)
AST SERPL-CCNC: 23 U/L — SIGNIFICANT CHANGE UP (ref 15–37)
BASOPHILS # BLD AUTO: 0 K/UL — SIGNIFICANT CHANGE UP (ref 0–0.2)
BASOPHILS NFR BLD AUTO: 0 % — SIGNIFICANT CHANGE UP (ref 0–2)
BILIRUB SERPL-MCNC: 1.3 MG/DL — HIGH (ref 0.2–1.2)
BUN SERPL-MCNC: 18 MG/DL — SIGNIFICANT CHANGE UP (ref 7–23)
BUN SERPL-MCNC: 19 MG/DL — SIGNIFICANT CHANGE UP (ref 7–23)
CALCIUM SERPL-MCNC: 8.2 MG/DL — LOW (ref 8.5–10.1)
CALCIUM SERPL-MCNC: 8.3 MG/DL — LOW (ref 8.5–10.1)
CHLORIDE SERPL-SCNC: 105 MMOL/L — SIGNIFICANT CHANGE UP (ref 96–108)
CHLORIDE SERPL-SCNC: 107 MMOL/L — SIGNIFICANT CHANGE UP (ref 96–108)
CO2 SERPL-SCNC: 25 MMOL/L — SIGNIFICANT CHANGE UP (ref 22–31)
CO2 SERPL-SCNC: 27 MMOL/L — SIGNIFICANT CHANGE UP (ref 22–31)
CREAT SERPL-MCNC: 0.64 MG/DL — SIGNIFICANT CHANGE UP (ref 0.5–1.3)
CREAT SERPL-MCNC: 0.69 MG/DL — SIGNIFICANT CHANGE UP (ref 0.5–1.3)
EOSINOPHIL # BLD AUTO: 0 K/UL — SIGNIFICANT CHANGE UP (ref 0–0.5)
EOSINOPHIL NFR BLD AUTO: 0 % — SIGNIFICANT CHANGE UP (ref 0–6)
GLUCOSE SERPL-MCNC: 142 MG/DL — HIGH (ref 70–99)
GLUCOSE SERPL-MCNC: 176 MG/DL — HIGH (ref 70–99)
HCT VFR BLD CALC: 31.2 % — LOW (ref 39–50)
HCT VFR BLD CALC: 32.1 % — LOW (ref 39–50)
HGB BLD-MCNC: 10.6 G/DL — LOW (ref 13–17)
HGB BLD-MCNC: 10.9 G/DL — LOW (ref 13–17)
INR BLD: 1.22 RATIO — HIGH (ref 0.88–1.16)
INR BLD: 1.25 RATIO — HIGH (ref 0.88–1.16)
LYMPHOCYTES # BLD AUTO: 0.51 K/UL — LOW (ref 1–3.3)
LYMPHOCYTES # BLD AUTO: 3 % — LOW (ref 13–44)
MCHC RBC-ENTMCNC: 31.8 PG — SIGNIFICANT CHANGE UP (ref 27–34)
MCHC RBC-ENTMCNC: 32 PG — SIGNIFICANT CHANGE UP (ref 27–34)
MCHC RBC-ENTMCNC: 34 GM/DL — SIGNIFICANT CHANGE UP (ref 32–36)
MCHC RBC-ENTMCNC: 34 GM/DL — SIGNIFICANT CHANGE UP (ref 32–36)
MCV RBC AUTO: 93.6 FL — SIGNIFICANT CHANGE UP (ref 80–100)
MCV RBC AUTO: 94.3 FL — SIGNIFICANT CHANGE UP (ref 80–100)
MONOCYTES # BLD AUTO: 1.54 K/UL — HIGH (ref 0–0.9)
MONOCYTES NFR BLD AUTO: 9 % — SIGNIFICANT CHANGE UP (ref 2–14)
NEUTROPHILS # BLD AUTO: 15.07 K/UL — HIGH (ref 1.8–7.4)
NEUTROPHILS NFR BLD AUTO: 88 % — HIGH (ref 43–77)
NRBC # BLD: SIGNIFICANT CHANGE UP /100 WBCS (ref 0–0)
PLATELET # BLD AUTO: 268 K/UL — SIGNIFICANT CHANGE UP (ref 150–400)
PLATELET # BLD AUTO: 314 K/UL — SIGNIFICANT CHANGE UP (ref 150–400)
POTASSIUM SERPL-MCNC: 3.8 MMOL/L — SIGNIFICANT CHANGE UP (ref 3.5–5.3)
POTASSIUM SERPL-MCNC: 3.8 MMOL/L — SIGNIFICANT CHANGE UP (ref 3.5–5.3)
POTASSIUM SERPL-SCNC: 3.8 MMOL/L — SIGNIFICANT CHANGE UP (ref 3.5–5.3)
POTASSIUM SERPL-SCNC: 3.8 MMOL/L — SIGNIFICANT CHANGE UP (ref 3.5–5.3)
PROT SERPL-MCNC: 6.3 GM/DL — SIGNIFICANT CHANGE UP (ref 6–8.3)
PROTHROM AB SERPL-ACNC: 14 SEC — HIGH (ref 10.6–13.6)
PROTHROM AB SERPL-ACNC: 14.3 SEC — HIGH (ref 10.6–13.6)
RBC # BLD: 3.31 M/UL — LOW (ref 4.2–5.8)
RBC # BLD: 3.43 M/UL — LOW (ref 4.2–5.8)
RBC # FLD: 13.9 % — SIGNIFICANT CHANGE UP (ref 10.3–14.5)
RBC # FLD: 14.1 % — SIGNIFICANT CHANGE UP (ref 10.3–14.5)
SODIUM SERPL-SCNC: 136 MMOL/L — SIGNIFICANT CHANGE UP (ref 135–145)
SODIUM SERPL-SCNC: 138 MMOL/L — SIGNIFICANT CHANGE UP (ref 135–145)
WBC # BLD: 14.81 K/UL — HIGH (ref 3.8–10.5)
WBC # BLD: 17.13 K/UL — HIGH (ref 3.8–10.5)
WBC # FLD AUTO: 14.81 K/UL — HIGH (ref 3.8–10.5)
WBC # FLD AUTO: 17.13 K/UL — HIGH (ref 3.8–10.5)

## 2020-10-07 PROCEDURE — 99291 CRITICAL CARE FIRST HOUR: CPT

## 2020-10-07 PROCEDURE — 99233 SBSQ HOSP IP/OBS HIGH 50: CPT

## 2020-10-07 PROCEDURE — 70450 CT HEAD/BRAIN W/O DYE: CPT | Mod: 26

## 2020-10-07 PROCEDURE — 70450 CT HEAD/BRAIN W/O DYE: CPT | Mod: 26,77

## 2020-10-07 RX ORDER — CEFAZOLIN SODIUM 1 G
VIAL (EA) INJECTION
Refills: 0 | Status: DISCONTINUED | OUTPATIENT
Start: 2020-10-07 | End: 2020-10-07

## 2020-10-07 RX ORDER — ACETAMINOPHEN 500 MG
1000 TABLET ORAL ONCE
Refills: 0 | Status: COMPLETED | OUTPATIENT
Start: 2020-10-07 | End: 2020-10-07

## 2020-10-07 RX ORDER — ACETAMINOPHEN 500 MG
1000 TABLET ORAL ONCE
Refills: 0 | Status: DISCONTINUED | OUTPATIENT
Start: 2020-10-07 | End: 2020-10-16

## 2020-10-07 RX ORDER — METOPROLOL TARTRATE 50 MG
5 TABLET ORAL EVERY 6 HOURS
Refills: 0 | Status: DISCONTINUED | OUTPATIENT
Start: 2020-10-07 | End: 2020-10-07

## 2020-10-07 RX ORDER — CEFAZOLIN SODIUM 1 G
VIAL (EA) INJECTION
Refills: 0 | Status: COMPLETED | OUTPATIENT
Start: 2020-10-07 | End: 2020-10-08

## 2020-10-07 RX ORDER — SODIUM CHLORIDE 9 MG/ML
1 INJECTION INTRAMUSCULAR; INTRAVENOUS; SUBCUTANEOUS EVERY 8 HOURS
Refills: 0 | Status: DISCONTINUED | OUTPATIENT
Start: 2020-10-07 | End: 2020-10-16

## 2020-10-07 RX ORDER — METOCLOPRAMIDE HCL 10 MG
5 TABLET ORAL EVERY 6 HOURS
Refills: 0 | Status: DISCONTINUED | OUTPATIENT
Start: 2020-10-07 | End: 2020-10-08

## 2020-10-07 RX ORDER — LEVETIRACETAM 250 MG/1
750 TABLET, FILM COATED ORAL
Refills: 0 | Status: DISCONTINUED | OUTPATIENT
Start: 2020-10-07 | End: 2020-10-10

## 2020-10-07 RX ORDER — CEFAZOLIN SODIUM 1 G
1000 VIAL (EA) INJECTION EVERY 8 HOURS
Refills: 0 | Status: COMPLETED | OUTPATIENT
Start: 2020-10-07 | End: 2020-10-08

## 2020-10-07 RX ORDER — SODIUM CHLORIDE 9 MG/ML
1000 INJECTION INTRAMUSCULAR; INTRAVENOUS; SUBCUTANEOUS
Refills: 0 | Status: DISCONTINUED | OUTPATIENT
Start: 2020-10-07 | End: 2020-10-08

## 2020-10-07 RX ORDER — METOPROLOL TARTRATE 50 MG
25 TABLET ORAL
Refills: 0 | Status: DISCONTINUED | OUTPATIENT
Start: 2020-10-07 | End: 2020-10-16

## 2020-10-07 RX ORDER — CEFAZOLIN SODIUM 1 G
1000 VIAL (EA) INJECTION ONCE
Refills: 0 | Status: COMPLETED | OUTPATIENT
Start: 2020-10-07 | End: 2020-10-07

## 2020-10-07 RX ADMIN — SODIUM CHLORIDE 1 GRAM(S): 9 INJECTION INTRAMUSCULAR; INTRAVENOUS; SUBCUTANEOUS at 21:01

## 2020-10-07 RX ADMIN — SODIUM CHLORIDE 75 MILLILITER(S): 9 INJECTION INTRAMUSCULAR; INTRAVENOUS; SUBCUTANEOUS at 10:38

## 2020-10-07 RX ADMIN — Medication 5 MILLIGRAM(S): at 13:04

## 2020-10-07 RX ADMIN — Medication 1 TABLET(S): at 10:38

## 2020-10-07 RX ADMIN — Medication 1000 MILLIGRAM(S): at 21:25

## 2020-10-07 RX ADMIN — LEVETIRACETAM 750 MILLIGRAM(S): 250 TABLET, FILM COATED ORAL at 21:00

## 2020-10-07 RX ADMIN — NICARDIPINE HYDROCHLORIDE 25 MG/HR: 30 CAPSULE, EXTENDED RELEASE ORAL at 03:41

## 2020-10-07 RX ADMIN — Medication 650 MILLIGRAM(S): at 05:15

## 2020-10-07 RX ADMIN — QUETIAPINE FUMARATE 50 MILLIGRAM(S): 200 TABLET, FILM COATED ORAL at 21:01

## 2020-10-07 RX ADMIN — SODIUM CHLORIDE 75 MILLILITER(S): 9 INJECTION INTRAMUSCULAR; INTRAVENOUS; SUBCUTANEOUS at 21:32

## 2020-10-07 RX ADMIN — GABAPENTIN 300 MILLIGRAM(S): 400 CAPSULE ORAL at 21:01

## 2020-10-07 RX ADMIN — Medication 1000 MILLIGRAM(S): at 13:04

## 2020-10-07 RX ADMIN — Medication 1: at 21:32

## 2020-10-07 RX ADMIN — Medication 5 MILLIGRAM(S): at 18:13

## 2020-10-07 RX ADMIN — Medication 650 MILLIGRAM(S): at 04:30

## 2020-10-07 RX ADMIN — SIMVASTATIN 40 MILLIGRAM(S): 20 TABLET, FILM COATED ORAL at 21:00

## 2020-10-07 RX ADMIN — Medication 1000 MILLIGRAM(S): at 07:56

## 2020-10-07 RX ADMIN — Medication 1: at 08:03

## 2020-10-07 RX ADMIN — PANTOPRAZOLE SODIUM 40 MILLIGRAM(S): 20 TABLET, DELAYED RELEASE ORAL at 10:37

## 2020-10-07 RX ADMIN — Medication 25 MILLIGRAM(S): at 10:37

## 2020-10-07 RX ADMIN — Medication 400 MILLIGRAM(S): at 12:01

## 2020-10-07 RX ADMIN — Medication 1000 MILLIGRAM(S): at 15:19

## 2020-10-07 RX ADMIN — Medication 25 MILLIGRAM(S): at 21:00

## 2020-10-07 RX ADMIN — LEVETIRACETAM 400 MILLIGRAM(S): 250 TABLET, FILM COATED ORAL at 10:37

## 2020-10-07 RX ADMIN — FINASTERIDE 5 MILLIGRAM(S): 5 TABLET, FILM COATED ORAL at 10:37

## 2020-10-07 NOTE — SWALLOW BEDSIDE ASSESSMENT ADULT - ORAL PHASE
minimal oral management observed, especially of puree and of soft solid.  Solft solid was manually removed form the lips sulci and from the anterior mouth.  Pt managed thin liquid better, likely because of its intrinsic flow

## 2020-10-07 NOTE — SWALLOW BEDSIDE ASSESSMENT ADULT - SWALLOW EVAL: RECOMMENDED DIET
puree and thin liquid (Dysphagia 1.) pt was constantly saying "bread" during the session, but nothing else. Possibly actually asking for bread, but possible language disruption cannot yet be determined.

## 2020-10-07 NOTE — PROGRESS NOTE ADULT - ASSESSMENT
89 year old man s/p fall at home had acute change in exam yesterday and was taken to the OR for evacuation of SDH     POD #1 s/p Craniotomy and evacuation of SDH  -Advance Diet and activity as tolerated  -Start NS at 75 cc/hr and salt table to avoid drop in sodium   -repeat labs and CT head later today   -Subdural drain pulled back 1cm, tubing flushed in to the bed, continues to have minimal output  -continue Keppra  -Venodynes for DVT PPX  -IV Tylenol for pain     Discussed with Dr. Dyson and Dr. Arcos

## 2020-10-07 NOTE — CDI QUERY NOTE - NSCDIOTHERTXTBX3_GEN_ALL_CORE_FT
Documentation on chart of Notably thin.    BMI 17.6    Albumin 3.0    Please clarify a diagnosis:  A) Severe protein Calorie Malnutrition  B) Moderate protein Calorie Malnutrition  C) Unable to determine  D) Other ( Please specify condition)

## 2020-10-07 NOTE — SWALLOW BEDSIDE ASSESSMENT ADULT - MUCOSAL QUALITY
oral cavity not visualized as pt did not open mouth to command, resisted passive observation, and mouth opening for oral accpetance of bolus was extremely limited.

## 2020-10-07 NOTE — SWALLOW BEDSIDE ASSESSMENT ADULT - NS SPL SWALLOW CLINIC TRIAL FT
pt has sufficient oral-pharyngeal function to start carefully on PUREE (which may need to be thinned down) and thin liquids.  He will be a challenge to feed because of his confused mental status at this time.  encourage cup holding and spoon holding with hand over hand presentation. tell pt what is presented.  feed from and approach from LEFT side (pt has left neglect).  Do not expect pt to follow oral motor commands at this time (e.g. "open your mouth).  use the spoon to facilitate mouth opening.   Service will follow for tolerance and upgrade and also for speech-language as feasible.  Disc with Nsg.

## 2020-10-07 NOTE — SWALLOW BEDSIDE ASSESSMENT ADULT - SWALLOW EVAL: RECOMMENDED FEEDING/EATING TECHNIQUES
maintain upright posture during/after eating for 30 mins/alternate food with liquid/feed from LEFT side to counteract left neglect as possible.   Se ebelow in Comments for further feeding techniques/allow for swallow between intakes/check mouth frequently for oral residue/pocketing/crush medication (when feasible)/position upright (90 degrees)

## 2020-10-07 NOTE — SWALLOW BEDSIDE ASSESSMENT ADULT - PHARYNGEAL PHASE
Latent onset of pharyngeal swallow, with reduced laryngeal lift.  No overt s/s aspiration on minimal intake of bolus consistencies.

## 2020-10-07 NOTE — SWALLOW BEDSIDE ASSESSMENT ADULT - MODE OF PRESENTATION
cup/pt was determined to hold cup and spoon, but unable to feed himself smoothly or without past-pointing or reduced acceptance.. mrined to hold cup and spoon/spoon/self fed/fed by clinician

## 2020-10-07 NOTE — PROGRESS NOTE ADULT - SUBJECTIVE AND OBJECTIVE BOX
Pt admitted under trauma service    Hospital # 3  ICU # 2    CC:  Fall    HPI:    89 y/o male on ASA sustained mechanical fall found to have R SDH.  Treated conservatively.  Noted to have change in MS today.  HCT revealed expansion of R acute SDH--went to OR for evacuation.  Extubated.  Received 1u PC and plats.  Pt seen and examined in ICU.      10/6:  Case d/w NSGY attending.  Hypertensive.  Post op under anesthetic.   Son Caleb at bedside  10/7:  Case d/w NSGY at bedside.  Awake.  non verbal.  Does not follow commands.  On Cardene gtt at 2.5      PMH:  As above.     PSH:  As above.     FH: Non Contributory other than those listed in HPI    Social History:  Unobtainable due to clinical condition     MEDICATIONS  (STANDING):  amLODIPine   Tablet 5 milliGRAM(s) Oral daily  ceFAZolin  Injectable.      ceFAZolin  Injectable. 1000 milliGRAM(s) IV Push every 8 hours  dextrose 5%. 1000 milliLiter(s) (50 mL/Hr) IV Continuous <Continuous>  dextrose 50% Injectable 12.5 Gram(s) IV Push once  dextrose 50% Injectable 25 Gram(s) IV Push once  dextrose 50% Injectable 25 Gram(s) IV Push once  finasteride 5 milliGRAM(s) Oral daily  gabapentin 300 milliGRAM(s) Oral three times a day  influenza   Vaccine 0.5 milliLiter(s) IntraMuscular once  insulin lispro (HumaLOG) corrective regimen sliding scale   SubCutaneous Before meals and at bedtime  levETIRAcetam  IVPB 750 milliGRAM(s) IV Intermittent every 12 hours  metoclopramide Injectable 5 milliGRAM(s) IV Push every 6 hours  metoprolol tartrate 25 milliGRAM(s) Oral two times a day  multivitamin 1 Tablet(s) Oral daily  niCARdipine Infusion 5 mG/Hr (25 mL/Hr) IV Continuous <Continuous>  pantoprazole  Injectable 40 milliGRAM(s) IV Push daily  QUEtiapine 50 milliGRAM(s) Oral at bedtime  simvastatin 40 milliGRAM(s) Oral at bedtime  sodium chloride 1 Gram(s) Oral every 8 hours  sodium chloride 0.9%. 1000 milliLiter(s) (75 mL/Hr) IV Continuous <Continuous>    MEDICATIONS  (PRN):  acetaminophen   Tablet .. 650 milliGRAM(s) Oral every 6 hours PRN Temp greater or equal to 38C (100.4F), Mild Pain (1 - 3)  dextrose 40% Gel 15 Gram(s) Oral once PRN Blood Glucose LESS THAN 70 milliGRAM(s)/deciliter  glucagon  Injectable 1 milliGRAM(s) IntraMuscular once PRN Glucose LESS THAN 70 milligrams/deciliter  HYDROmorphone  Injectable 1 milliGRAM(s) IV Push every 4 hours PRN Severe Pain (7 - 10)  ondansetron Injectable 4 milliGRAM(s) IV Push every 6 hours PRN Nausea      Allergies: NKDA    ROS:  SEE BELOW        ICU Vital Signs Last 24 Hrs  T(C): 37.8 (07 Oct 2020 08:00), Max: 38.3 (07 Oct 2020 04:30)  T(F): 100 (07 Oct 2020 08:00), Max: 100.9 (07 Oct 2020 04:30)  HR: 79 (07 Oct 2020 10:00) (62 - 123)  BP: 123/56 (07 Oct 2020 10:00) (116/51 - 147/66)  BP(mean): 71 (07 Oct 2020 10:00) (67 - 81)  ABP: 144/56 (07 Oct 2020 10:00) (117/53 - 171/65)  ABP(mean): 85 (07 Oct 2020 10:00) (72 - 105)  RR: 13 (07 Oct 2020 10:00) (7 - 20)  SpO2: 100% (07 Oct 2020 10:00) (96% - 100%)          I&O's Summary    06 Oct 2020 07:01  -  07 Oct 2020 07:00  --------------------------------------------------------  IN: 2214.1 mL / OUT: 1605 mL / NET: 609.1 mL    07 Oct 2020 07:01  -  07 Oct 2020 10:41  --------------------------------------------------------  IN: 0 mL / OUT: 130 mL / NET: -130 mL        Physical Exam:  SEE BELOW                          10.9   17.13 )-----------( 314      ( 07 Oct 2020 06:40 )             32.1       10-07    136  |  105  |  18  ----------------------------<  176<H>  3.8   |  25  |  0.69    Ca    8.3<L>      07 Oct 2020 06:40    TPro  6.3  /  Alb  2.8<L>  /  TBili  1.3<H>  /  DBili  x   /  AST  23  /  ALT  17  /  AlkPhos  100  10-07      CARDIAC MARKERS ( 05 Oct 2020 19:44 )  0.028 ng/mL / x     / x     / x     / x      CARDIAC MARKERS ( 05 Oct 2020 12:11 )  0.040 ng/mL / x     / x     / x     / x                    DVT Prophylaxis:                                                            Contraindication:     Advanced Directives:    Discussed with:    Visit Information:  Time spent excluding procedure:  30 cc mins    ** Time is exclusive of billed procedures and/or teaching and/or routine family updates.

## 2020-10-07 NOTE — SWALLOW BEDSIDE ASSESSMENT ADULT - ORAL PREPARATORY PHASE
No anticipatory mouth opening and poor awareness of spoon or cup at lip even when he was presenting the utensil  himself.  Minimal mouth opening with no true acceptance of spoon into his mouth, just sucking minimal bolus from tip of spoon, or minimal liquid form cup. NO mouth opening for bolus of soft solid (bread).

## 2020-10-07 NOTE — PROGRESS NOTE ADULT - ASSESSMENT
89 Y old  male S/P craniotomy for SDH, drain in place, neurologically stable    Multimodal pain control  Neuro checks Q  1  Cervical collar: cleared   Incentive spirometry  F/U CXR  Vitals, IS/OS Q 4  Diet:   (X ) as tolerated ( ) NPO after Speech and swallow.  DVT/GI prophylaxis  Local wound care  Activity:   Ambulate when cleared by neurosurgery   PT  Hold A/C  Resume other home med  Antibitoics: Per NSX  Neurosurgery following : CTH and SQ heparin per  NSX  Stable from traum stand point  ICU care appreciated   SW for eventual D/C planning

## 2020-10-07 NOTE — SWALLOW BEDSIDE ASSESSMENT ADULT - SWALLOW EVAL: DIAGNOSIS
moderate oral-pharyngeal dysphagia, pertly presently 2/2 to reduced mental status and confusion.  unclear level of po intake PTH as pt lives on his own.  pt is clearly cachectic. Suspicion for malnourishment with following weakness and falls.

## 2020-10-07 NOTE — PROGRESS NOTE ADULT - ASSESSMENT
IMP:    89 y/o male with traumatic acute RIGHT SDH underwent Crani for evacuation  POD # 1  Hypertensive emergency     High risk for acute decompensation and deterioration including death     Suggest:    Actively titrate Nicardipine gtt to keep SBP < 140.  Cont with PO BBx and Norvasc  Neuro checks  Cont with AED (4) for total of 7 days  Speech eval   NPO  HOB > 30  Pain control  IS  Further imaging as per NSGY  DVT prophy--SCD    ICU care-- d/w ICU staff on multi disciplinary rounds

## 2020-10-07 NOTE — PROGRESS NOTE ADULT - SUBJECTIVE AND OBJECTIVE BOX
Patient is a 89y old  Male who presents with a chief complaint of multiple falls, SDH (07 Oct 2020 11:21)    HPI:  Trauma Consult  Pt s/p multiple falls 10/4/20. Pt initially presented with AMS. At time of exam pt is AAO x 3  Pt seen and examined at bedside Pt in no acute distress. Pt denied c/o fever, chills, chest pain, SOB, abd pain, N/V/D, extremity pain or dysfunction, hemoptysis, hematemesis, hematuria, hematochexia, headache, diplopia, vertigo, dizzyness.  (04 Oct 2020 20:58)  10/7: Pt seen and examined, Nuro checks stable. Follow some commands, moves spontaneously. HD stable.  ROS:.  [] A ten-point review of systems was otherwise negative except as noted.  Systemic:	[ ] Fever	[ ] Chills	[ ] Night sweats    [ ] Fatigue	[ ] Other  [] Cardiovascular:  [] Pulmonary:  [] Renal/Urologic:  [] Gastrointestinal: abdominal pain, vomiting  [] Metabolic:  [] Neurologic:  [] Hematologic:  [] ENT:  [] Ophthalmologic:  [] Musculoskeletal:    [X ] Due to altered mental status/intubation, subjective information were not able to be obtained from the patient. History was obtained, to the extent possible, from review of the chart and collateral sources of information.( TBI)    PAST MEDICAL & SURGICAL HISTORY:  GERD (gastroesophageal reflux disease)    BPH (benign prostatic hyperplasia)    DM (diabetes mellitus)    HLD (hyperlipidemia)    HTN (hypertension)    CAD (coronary artery disease)    No significant past surgical history      FAMILY HISTORY:  No pertinent family history in first degree relatives      NC  Social history:     Alcohol: Denied  Smoking: Denied  Drug Use: Denied        Vital Signs Last 24 Hrs  T(C): 36.7 (07 Oct 2020 12:00), Max: 38.3 (07 Oct 2020 04:30)  T(F): 98.1 (07 Oct 2020 12:00), Max: 100.9 (07 Oct 2020 04:30)  HR: 59 (07 Oct 2020 16:00) (59 - 123)  BP: 125/58 (07 Oct 2020 16:00) (115/60 - 134/59)  BP(mean): 74 (07 Oct 2020 16:00) (67 - 81)  RR: 10 (07 Oct 2020 16:00) (9 - 20)  SpO2: 94% (07 Oct 2020 16:00) (94% - 100%)  PHYSICAL EXAM:  Constitutional: NAD, GCS: 13/15  AOX2  Eyes:  WNL  ENMT:  Dressing in place, SD drain in place.   Neck:  WNL, non tender  Back: Non tender  Respiratory: CTABL  Cardiovascular:  S1+S2+0  Gastrointestinal: Soft, ND , NT  Genitourinary:  WNL  Extremities: NV intact  Vascular:  Intact  Neurological: No focal neurological deficit,  CN, motor and sensory system grossly intact.  Skin: WNL  Musculoskeletal: WNL  Psychiatric: Grossly WNL      Lab                        10.9   17.13 )-----------( 314      ( 07 Oct 2020 06:40 )             32.1       10-07    136  |  105  |  18  ----------------------------<  176<H>  3.8   |  25  |  0.69    Ca    8.3<L>      07 Oct 2020 06:40    TPro  6.3  /  Alb  2.8<L>  /  TBili  1.3<H>  /  DBili  x   /  AST  23  /  ALT  17  /  AlkPhos  100  10-07      PT/INR - ( 07 Oct 2020 06:40 )   PT: 14.3 sec;   INR: 1.25 ratio       Radiology:    < from: CT Head No Cont (10.07.20 @ 15:03) >  IMPRESSION:    Compared to previous examination,    Redemonstrated is the right holohemispheric extra-axial fluid collection with subdural and possible epidural component stable in thickness measuring 2.1 cm. Stable leftward shift at the level of septum pellucidum of 4 mm.    Stable hypodense and probably chronic subdural hematoma overlying the left holohemispheric convexity.    < from: Xray Chest 1 View- PORTABLE-Routine (Xray Chest 1 View- PORTABLE-Routine in AM.) (10.05.20 @ 10:47) >  FINDINGS: Heart size appears within normal limits. Note is made of a coronary arterial stent. No hilar or superior mediastinal abnormalities are identified. There is no evidence for focal infiltrate, lobar consolidation or pulmonary edema. No pleural effusion or pneumothorax is demonstrated. No mediastinal shift is noted. CT described right fifth rib fracture is not demonstrated with certainty on this radiographic evaluation.    IMPRESSION: As above.    < end of copied text >

## 2020-10-07 NOTE — PROGRESS NOTE ADULT - ASSESSMENT
Will follow closely in ICU and repeat CTH today at around 4PM to ensure no expansion of residual  Continue Keppra  Neurochecks  Drain to be pulled back ~1-2 cm to assist with evacuation of residual

## 2020-10-07 NOTE — SWALLOW BEDSIDE ASSESSMENT ADULT - SLP GENERAL OBSERVATIONS
pt seated upright in bed:  in complete head wrap. eyes closed and initially not responsive to voice or to touch, even sternal rub.  Cachectic with prominent laryngeal structures, clavicles and ribs and ribs.  was eventually aroused by placement of upper dentures by Nsg with clinician's help.  Able to maintain wakefulness, but non-communicative; not answering questions, not following commands.

## 2020-10-07 NOTE — PROGRESS NOTE ADULT - SUBJECTIVE AND OBJECTIVE BOX
HPI: HPI:  Patient is a 89y old  Male who presents with a chief complaint of mechanical fall earlier today. States he was on his way to the bathroom when he fell and after he fell was unsteady and fell 2 more times. Sustained right SDH. Denies LOC, vomiting or headache. Denies focal weakness or numbness. Troponin slightly elevated and CT C/A/P shows questionable right rib fracture as well. Lives alone has a neighbor that checks on him regularly. Takes asa 81mg daily.     10/5- Pt seen and examined, no events overnight, pt awake alert, oriented X3, denies headache/nausea/vomiting/visual changes, no events overnight.  Repeat CT head today.    10/6- Pt had acute change in mental status, pt taken to the OR for right craniotomy and evacuation of SDH     10/7- POD #1 s/p Craniotomy and evacuation of SDH, right sided subdural drain left in place, pt seen with both Dr. Dyson and Dr. Arcos this am.  Pt attendings reviewed the CT done at 6:30am and were not concerned about epidural bleeding. No events overnight   Subdural drain output: 490 since surgery     Vital Signs Last 24 Hrs  T(C): 37.8 (07 Oct 2020 08:00), Max: 38.3 (07 Oct 2020 04:30)  T(F): 100 (07 Oct 2020 08:00), Max: 100.9 (07 Oct 2020 04:30)  HR: 76 (07 Oct 2020 11:00) (62 - 123)  BP: 115/60 (07 Oct 2020 11:00) (115/60 - 147/66)  BP(mean): 72 (07 Oct 2020 11:00) (67 - 81)  RR: 11 (07 Oct 2020 11:00) (7 - 20)  SpO2: 100% (07 Oct 2020 11:00) (96% - 100%)    MEDICATIONS  (STANDING):  amLODIPine   Tablet 5 milliGRAM(s) Oral daily      ceFAZolin  Injectable. 1000 milliGRAM(s) IV Push every 8 hours  finasteride 5 milliGRAM(s) Oral daily  gabapentin 300 milliGRAM(s) Oral three times a day  influenza   Vaccine 0.5 milliLiter(s) IntraMuscular once  insulin lispro (HumaLOG) corrective regimen sliding scale   SubCutaneous Before meals and at bedtime  levETIRAcetam 750 milliGRAM(s) Oral two times a day  metoclopramide Injectable 5 milliGRAM(s) IV Push every 6 hours  metoprolol tartrate 25 milliGRAM(s) Oral two times a day  multivitamin 1 Tablet(s) Oral daily  niCARdipine Infusion 5 mG/Hr (25 mL/Hr) IV Continuous <Continuous>  QUEtiapine 50 milliGRAM(s) Oral at bedtime  simvastatin 40 milliGRAM(s) Oral at bedtime  sodium chloride 1 Gram(s) Oral every 8 hours  sodium chloride 0.9%. 1000 milliLiter(s) (75 mL/Hr) IV Continuous <Continuous>    MEDICATIONS  (PRN):  acetaminophen   Tablet .. 650 milliGRAM(s) Oral every 6 hours PRN Temp greater or equal to 38C (100.4F), Mild Pain (1 - 3)  acetaminophen  IVPB .. 1000 milliGRAM(s) IV Intermittent once PRN Moderate Pain (4 - 6)  acetaminophen  IVPB .. 1000 milliGRAM(s) IV Intermittent once PRN Moderate Pain (4 - 6)  dextrose 40% Gel 15 Gram(s) Oral once PRN Blood Glucose LESS THAN 70 milliGRAM(s)/deciliter  glucagon  Injectable 1 milliGRAM(s) IntraMuscular once PRN Glucose LESS THAN 70 milligrams/deciliter  HYDROmorphone  Injectable 1 milliGRAM(s) IV Push every 4 hours PRN Severe Pain (7 - 10)  ondansetron Injectable 4 milliGRAM(s) IV Push every 6 hours PRN Nausea    ROS: pt hypophonic, difficult to assess, states he has headache/pain     PHYSICAL EXAM:  Constitutional: lethargic but arousable, cachetic   HEENT: PERRLA, EOMI  Neck: Supple  Respiratory: Breath sounds are clear bilaterally  Cardiovascular: S1 and S2, regular rhythm  Gastrointestinal: soft, nontender  Extremities:  no edema  Musculoskeletal: no joint swelling/tenderness, no abnormal movements  Skin: head wrap in place, no active bleeding     Neurological exam:  HF: lethragic but arousable, followed simple commands intermittently, minimal verbal output, hypophonic   CN: PEARRL, EOMI, VFF, facial sensation normal, no NLFD, tongue midline  Motor: moving all extremities antigravity, slight left arm.leg weakness but could be due to neglect   Sens: Intact to light touch, +left sided neglect   Reflexes: Symmetric and normal, downgoing toes b/l  Coord:  unable to assess   Gait/Balance: unable to assess    LABS:                         10.9   17.13 )-----------( 314      ( 07 Oct 2020 06:40 )             32.1     10-07    136  |  105  |  18  ----------------------------<  176<H>  3.8   |  25  |  0.69    Ca    8.3<L>      07 Oct 2020 06:40    TPro  6.3  /  Alb  2.8<L>  /  TBili  1.3<H>  /  DBili  x   /  AST  23  /  ALT  17  /  AlkPhos  100  10-07    LIVER FUNCTIONS - ( 07 Oct 2020 06:40 )  Alb: 2.8 g/dL / Pro: 6.3 gm/dL / ALK PHOS: 100 U/L / ALT: 17 U/L / AST: 23 U/L / GGT: x           RADIOLOGY:    < from: CT Head No Cont (10.07.20 @ 06:45) >  IMPRESSION:  Redemonstration right frontotemporal parietal craniotomy, right subdural drainage catheter, right holohemispheric extra-axial collection with subdural component,    possible epidural component, increased in attenuation now 2.1 cm,  4 mm leftward shift, with interventricular hemorrhage in occipital horns.    Redemonstration left 1.2 cm holohemispheric subdural, increased pneumocephalus, volume loss, and microvascular disease.   If symptoms persist consider follow-up head CT or MR if no contraindications

## 2020-10-07 NOTE — PROGRESS NOTE ADULT - SUBJECTIVE AND OBJECTIVE BOX
NS attending:    All events noted. Patient taken to OR yesterday for urgent evacuation of right SDH following slight worsening in mental status to GCS 13 and increase in size of SDH to 1.7 cm. Case completely uncomplicated. Overnight scans showed good evacuation of inferior and temporal portion of right SDH with residual hematoma superiorly, not causing cistern effacement or temporal/uncal displacement. Currently exam is significant for mild left-sided neglect although patient has full motor power and will move left arm and left leg with repeated prompting. Eyes open spontaneously, not lethargic, headwrap c/d/i.     Repeat CTH this AM significant for partial resolution of pneumocephalus and no clinically significant midline shift. Understand radiologist concern regarding possible high epidural component to residual, patient has well-placed circumferential dural tacking sutures to contain any collection.    Will follow closely in ICU and repeat CTH today at around 4PM to ensure no expansion of residual  Continue Keppra  Neurochecks  Drain to be pulled back ~1-2 cm to assist with evacuation of residual NS attending:    All events noted. Patient taken to OR yesterday for urgent evacuation of right SDH following slight worsening in mental status to GCS 13 and increase in size of SDH to 1.7 cm. Case completely uncomplicated. Overnight scans showed good evacuation of inferior and temporal portion of right SDH with residual hematoma superiorly, not causing cistern effacement or temporal/uncal displacement. Currently exam is significant for mild left-sided neglect although patient has full motor power and will move left arm and left leg with repeated prompting. Eyes open spontaneously, not lethargic, headwrap c/d/i.     Repeat CTH this AM significant for partial resolution of pneumocephalus and no clinically significant midline shift. Understand radiologist concern regarding possible high epidural component to residual, patient has well-placed circumferential dural tacking sutures to contain any collection.

## 2020-10-08 LAB
ANION GAP SERPL CALC-SCNC: 7 MMOL/L — SIGNIFICANT CHANGE UP (ref 5–17)
APTT BLD: 28.4 SEC — SIGNIFICANT CHANGE UP (ref 27.5–35.5)
BUN SERPL-MCNC: 16 MG/DL — SIGNIFICANT CHANGE UP (ref 7–23)
CALCIUM SERPL-MCNC: 8.3 MG/DL — LOW (ref 8.5–10.1)
CHLORIDE SERPL-SCNC: 108 MMOL/L — SIGNIFICANT CHANGE UP (ref 96–108)
CO2 SERPL-SCNC: 26 MMOL/L — SIGNIFICANT CHANGE UP (ref 22–31)
CREAT SERPL-MCNC: 0.6 MG/DL — SIGNIFICANT CHANGE UP (ref 0.5–1.3)
GLUCOSE SERPL-MCNC: 102 MG/DL — HIGH (ref 70–99)
HCT VFR BLD CALC: 32.8 % — LOW (ref 39–50)
HGB BLD-MCNC: 10.9 G/DL — LOW (ref 13–17)
INR BLD: 1.16 RATIO — SIGNIFICANT CHANGE UP (ref 0.88–1.16)
MCHC RBC-ENTMCNC: 31.8 PG — SIGNIFICANT CHANGE UP (ref 27–34)
MCHC RBC-ENTMCNC: 33.2 GM/DL — SIGNIFICANT CHANGE UP (ref 32–36)
MCV RBC AUTO: 95.6 FL — SIGNIFICANT CHANGE UP (ref 80–100)
PLATELET # BLD AUTO: 282 K/UL — SIGNIFICANT CHANGE UP (ref 150–400)
POTASSIUM SERPL-MCNC: 3.7 MMOL/L — SIGNIFICANT CHANGE UP (ref 3.5–5.3)
POTASSIUM SERPL-SCNC: 3.7 MMOL/L — SIGNIFICANT CHANGE UP (ref 3.5–5.3)
PROTHROM AB SERPL-ACNC: 13.5 SEC — SIGNIFICANT CHANGE UP (ref 10.6–13.6)
RBC # BLD: 3.43 M/UL — LOW (ref 4.2–5.8)
RBC # FLD: 14.1 % — SIGNIFICANT CHANGE UP (ref 10.3–14.5)
SODIUM SERPL-SCNC: 141 MMOL/L — SIGNIFICANT CHANGE UP (ref 135–145)
SURGICAL PATHOLOGY STUDY: SIGNIFICANT CHANGE UP
WBC # BLD: 13.37 K/UL — HIGH (ref 3.8–10.5)
WBC # FLD AUTO: 13.37 K/UL — HIGH (ref 3.8–10.5)

## 2020-10-08 PROCEDURE — 70450 CT HEAD/BRAIN W/O DYE: CPT | Mod: 26

## 2020-10-08 PROCEDURE — 99232 SBSQ HOSP IP/OBS MODERATE 35: CPT

## 2020-10-08 RX ORDER — SODIUM,POTASSIUM PHOSPHATES 278-250MG
1 POWDER IN PACKET (EA) ORAL THREE TIMES A DAY
Refills: 0 | Status: COMPLETED | OUTPATIENT
Start: 2020-10-08 | End: 2020-10-09

## 2020-10-08 RX ORDER — GABAPENTIN 400 MG/1
1 CAPSULE ORAL
Qty: 0 | Refills: 0 | DISCHARGE

## 2020-10-08 RX ORDER — OMEPRAZOLE 10 MG/1
1 CAPSULE, DELAYED RELEASE ORAL
Qty: 0 | Refills: 0 | DISCHARGE

## 2020-10-08 RX ORDER — QUETIAPINE FUMARATE 200 MG/1
1 TABLET, FILM COATED ORAL
Qty: 0 | Refills: 0 | DISCHARGE

## 2020-10-08 RX ORDER — SIMVASTATIN 20 MG/1
1 TABLET, FILM COATED ORAL
Qty: 0 | Refills: 0 | DISCHARGE

## 2020-10-08 RX ORDER — AMLODIPINE BESYLATE 2.5 MG/1
10 TABLET ORAL DAILY
Refills: 0 | Status: DISCONTINUED | OUTPATIENT
Start: 2020-10-08 | End: 2020-10-16

## 2020-10-08 RX ADMIN — Medication 1 TABLET(S): at 15:08

## 2020-10-08 RX ADMIN — FINASTERIDE 5 MILLIGRAM(S): 5 TABLET, FILM COATED ORAL at 10:50

## 2020-10-08 RX ADMIN — Medication 25 MILLIGRAM(S): at 21:49

## 2020-10-08 RX ADMIN — LEVETIRACETAM 750 MILLIGRAM(S): 250 TABLET, FILM COATED ORAL at 21:49

## 2020-10-08 RX ADMIN — GABAPENTIN 300 MILLIGRAM(S): 400 CAPSULE ORAL at 21:49

## 2020-10-08 RX ADMIN — Medication 650 MILLIGRAM(S): at 23:20

## 2020-10-08 RX ADMIN — AMLODIPINE BESYLATE 10 MILLIGRAM(S): 2.5 TABLET ORAL at 10:50

## 2020-10-08 RX ADMIN — SODIUM CHLORIDE 1 GRAM(S): 9 INJECTION INTRAMUSCULAR; INTRAVENOUS; SUBCUTANEOUS at 05:58

## 2020-10-08 RX ADMIN — Medication 1 TABLET(S): at 21:49

## 2020-10-08 RX ADMIN — Medication 1: at 16:45

## 2020-10-08 RX ADMIN — GABAPENTIN 300 MILLIGRAM(S): 400 CAPSULE ORAL at 13:24

## 2020-10-08 RX ADMIN — QUETIAPINE FUMARATE 50 MILLIGRAM(S): 200 TABLET, FILM COATED ORAL at 21:49

## 2020-10-08 RX ADMIN — Medication 5 MILLIGRAM(S): at 05:58

## 2020-10-08 RX ADMIN — Medication 5 MILLIGRAM(S): at 00:45

## 2020-10-08 RX ADMIN — GABAPENTIN 300 MILLIGRAM(S): 400 CAPSULE ORAL at 05:58

## 2020-10-08 RX ADMIN — SIMVASTATIN 40 MILLIGRAM(S): 20 TABLET, FILM COATED ORAL at 21:49

## 2020-10-08 RX ADMIN — LEVETIRACETAM 750 MILLIGRAM(S): 250 TABLET, FILM COATED ORAL at 10:50

## 2020-10-08 RX ADMIN — SODIUM CHLORIDE 1 GRAM(S): 9 INJECTION INTRAMUSCULAR; INTRAVENOUS; SUBCUTANEOUS at 21:49

## 2020-10-08 RX ADMIN — Medication 1 TABLET(S): at 10:50

## 2020-10-08 RX ADMIN — Medication 25 MILLIGRAM(S): at 10:50

## 2020-10-08 RX ADMIN — SODIUM CHLORIDE 1 GRAM(S): 9 INJECTION INTRAMUSCULAR; INTRAVENOUS; SUBCUTANEOUS at 13:24

## 2020-10-08 RX ADMIN — Medication 1000 MILLIGRAM(S): at 05:58

## 2020-10-08 NOTE — PROGRESS NOTE ADULT - SUBJECTIVE AND OBJECTIVE BOX
Hospital # 4  ICU # 3    CC:  Fall    HPI:    89 y/o male on ASA sustained mechanical fall found to have R SDH.  Treated conservatively.  Noted to have change in MS today.  HCT revealed expansion of R acute SDH--went to OR for evacuation.  Extubated.  Received 1u PC and plats.  Pt seen and examined in ICU.      10/6:  Case d/w NSGY attending.  Hypertensive.  Post op under anesthetic.   Son Caleb at bedside  10/7:  Case d/w NSGY at bedside.  Awake.  non verbal.  Does not follow commands.  On Cardene gtt at 2.5    10/8:  Case d/w NSGY at bedside.  Drain out.  Awake.  Moves all extreme on command.  Off Nimodipine gtt.      PMH:  As above.     PSH:  As above.     FH: Non Contributory other than those listed in HPI    Social History:  NC    MEDICATIONS  (STANDING):  amLODIPine   Tablet 5 milliGRAM(s) Oral daily  dextrose 5%. 1000 milliLiter(s) (50 mL/Hr) IV Continuous <Continuous>  dextrose 50% Injectable 12.5 Gram(s) IV Push once  dextrose 50% Injectable 25 Gram(s) IV Push once  dextrose 50% Injectable 25 Gram(s) IV Push once  finasteride 5 milliGRAM(s) Oral daily  gabapentin 300 milliGRAM(s) Oral three times a day  influenza   Vaccine 0.5 milliLiter(s) IntraMuscular once  insulin lispro (HumaLOG) corrective regimen sliding scale   SubCutaneous Before meals and at bedtime  levETIRAcetam 750 milliGRAM(s) Oral two times a day  metoprolol tartrate 25 milliGRAM(s) Oral two times a day  multivitamin 1 Tablet(s) Oral daily  niCARdipine Infusion 5 mG/Hr (25 mL/Hr) IV Continuous <Continuous>  QUEtiapine 50 milliGRAM(s) Oral at bedtime  simvastatin 40 milliGRAM(s) Oral at bedtime  sodium chloride 1 Gram(s) Oral every 8 hours  sodium chloride 0.9%. 1000 milliLiter(s) (75 mL/Hr) IV Continuous <Continuous>    MEDICATIONS  (PRN):  acetaminophen   Tablet .. 650 milliGRAM(s) Oral every 6 hours PRN Temp greater or equal to 38C (100.4F), Mild Pain (1 - 3)  acetaminophen  IVPB .. 1000 milliGRAM(s) IV Intermittent once PRN Moderate Pain (4 - 6)  dextrose 40% Gel 15 Gram(s) Oral once PRN Blood Glucose LESS THAN 70 milliGRAM(s)/deciliter  glucagon  Injectable 1 milliGRAM(s) IntraMuscular once PRN Glucose LESS THAN 70 milligrams/deciliter  HYDROmorphone  Injectable 1 milliGRAM(s) IV Push every 4 hours PRN Severe Pain (7 - 10)  ondansetron Injectable 4 milliGRAM(s) IV Push every 6 hours PRN Nausea      Allergies: NKDA    ROS:  SEE BELOW        ICU Vital Signs Last 24 Hrs  T(C): 36.9 (08 Oct 2020 08:00), Max: 37.4 (07 Oct 2020 21:00)  T(F): 98.5 (08 Oct 2020 08:00), Max: 99.3 (07 Oct 2020 21:00)  HR: 59 (08 Oct 2020 09:00) (56 - 86)  BP: 125/61 (08 Oct 2020 09:00) (110/52 - 173/61)  BP(mean): 77 (08 Oct 2020 09:00) (66 - 103)  ABP: 118/113 (07 Oct 2020 15:30) (118/113 - 149/58)  ABP(mean): 116 (07 Oct 2020 15:30) (74 - 116)  RR: 10 (08 Oct 2020 09:00) (9 - 16)  SpO2: 96% (08 Oct 2020 09:00) (94% - 100%)          I&O's Summary    07 Oct 2020 07:01  -  08 Oct 2020 07:00  --------------------------------------------------------  IN: 1769 mL / OUT: 865 mL / NET: 904 mL        Physical Exam:  SEE BELOW                          10.9   13.37 )-----------( 282      ( 08 Oct 2020 06:34 )             32.8       10-08    141  |  108  |  16  ----------------------------<  102<H>  3.7   |  26  |  0.60    Ca    8.3<L>      08 Oct 2020 06:34    TPro  6.3  /  Alb  2.8<L>  /  TBili  1.3<H>  /  DBili  x   /  AST  23  /  ALT  17  /  AlkPhos  100  10-07                    DVT Prophylaxis:                                                            Contraindication:     Advanced Directives:    Discussed with:    Visit Information:  Time spent excluding procedure:      ** Time is exclusive of billed procedures and/or teaching and/or routine family updates.

## 2020-10-08 NOTE — PROGRESS NOTE ADULT - ASSESSMENT
89 year old man s/p fall at home had acute change in exam yesterday and was taken to the OR for evacuation of SDH     POD #2 s/p Craniotomy and evacuation of SDH    -Dr. Obrien has reviewed the CT, relatively sable  -Advance diet and activity as tolerated  -Sodium stable, continue IV fluids and salt tabs at this time  -Neuro checks Q2 hours  -Physical Therapy   -Downgrade to SDU   -continued plan/management as per trauma  -will continue to follow     Discussed with Dr. Obrien

## 2020-10-08 NOTE — PROGRESS NOTE ADULT - SUBJECTIVE AND OBJECTIVE BOX
Brightly alert  comfortable  af  VSS  fluently conversant  responds appropriately to queries  moves all extremities well  drain o/p zero  drain pulled this am  NA+ stabilized at 141

## 2020-10-08 NOTE — PROGRESS NOTE ADULT - SUBJECTIVE AND OBJECTIVE BOX
HPI: Patient is a 89y old  Male who presents with a chief complaint of mechanical fall earlier today. States he was on his way to the bathroom when he fell and after he fell was unsteady and fell 2 more times. Sustained right SDH. Denies LOC, vomiting or headache. Denies focal weakness or numbness. Troponin slightly elevated and CT C/A/P shows questionable right rib fracture as well. Lives alone has a neighbor that checks on him regularly. Takes asa 81mg daily.     10/5- Pt seen and examined, no events overnight, pt awake alert, oriented X3, denies headache/nausea/vomiting/visual changes, no events overnight.  Repeat CT head today.    10/6- Pt had acute change in mental status, pt taken to the OR for right craniotomy and evacuation of SDH     10/7- POD #1 s/p Craniotomy and evacuation of SDH, right sided subdural drain left in place, pt seen with both Dr. Dyson and Dr. Arcos this am.  Pt attendings reviewed the CT done at 6:30am and were not concerned about epidural bleeding. No events overnight. Subdural drain output: 490 since surgery.    10/8- POD #2, pt seen and examined with Dr. Obrien, pt awake and alert, brighter than yesterday, minimal drain output, drain pulled by Dr. Obrien. Pt on Dysphagia diet and tolerating PO, afebrile.     Vital Signs Last 24 Hrs  T(C): 36.9 (08 Oct 2020 08:00), Max: 37.4 (07 Oct 2020 21:00)  T(F): 98.5 (08 Oct 2020 08:00), Max: 99.3 (07 Oct 2020 21:00)  HR: 73 (08 Oct 2020 10:00) (56 - 86)  BP: 155/76 (08 Oct 2020 10:00) (110/52 - 173/61)  BP(mean): 94 (08 Oct 2020 10:00) (66 - 103)  RR: 14 (08 Oct 2020 10:00) (9 - 16)  SpO2: 100% (08 Oct 2020 10:00) (94% - 100%)    MEDICATIONS  (STANDING):  amLODIPine   Tablet 10 milliGRAM(s) Oral daily  gabapentin 300 milliGRAM(s) Oral three times a day  influenza   Vaccine 0.5 milliLiter(s) IntraMuscular once  insulin lispro (HumaLOG) corrective regimen sliding scale SubCutaneous Before meals and at bedtime  levETIRAcetam 750 milliGRAM(s) Oral two times a day  metoprolol tartrate 25 milliGRAM(s) Oral two times a day  multivitamin 1 Tablet(s) Oral daily  QUEtiapine 50 milliGRAM(s) Oral at bedtime  simvastatin 40 milliGRAM(s) Oral at bedtime  sodium chloride 1 Gram(s) Oral every 8 hours    MEDICATIONS  (PRN):  acetaminophen   Tablet .. 650 milliGRAM(s) Oral every 6 hours PRN Temp greater or equal to 38C (100.4F), Mild Pain (1 - 3)  acetaminophen  IVPB .. 1000 milliGRAM(s) IV Intermittent once PRN Moderate Pain (4 - 6)  dextrose 40% Gel 15 Gram(s) Oral once PRN Blood Glucose LESS THAN 70 milliGRAM(s)/deciliter  glucagon  Injectable 1 milliGRAM(s) IntraMuscular once PRN Glucose LESS THAN 70 milligrams/deciliter  HYDROmorphone  Injectable 1 milliGRAM(s) IV Push every 4 hours PRN Severe Pain (7 - 10)  ondansetron Injectable 4 milliGRAM(s) IV Push every 6 hours PRN Nausea    ROS: pertinent positives in HPI, all other ROS were reviewed and are negative     PHYSICAL EXAM:  Constitutional: lethargic but arousable, more alert than yesterday, cachetic   HEENT: PERRLA, EOMI  Neck: Supple  Respiratory: Breath sounds are clear bilaterally  Cardiovascular: S1 and S2, regular rhythm  Gastrointestinal: soft, nontender  Extremities:  no edema  Musculoskeletal: no joint swelling/tenderness, no abnormal movements  Skin: head wrap in place, no active bleeding     Neurological exam:  HF: lethragic but arousable, followed simple commands, minimal verbal output, hypophonic   CN: PEARRL, EOMI, VFF, facial sensation normal, no NLFD, tongue midline  Motor: moving all extremities antigravity, left side slightly weaker than right   Sens: Intact to light touch   Reflexes: Symmetric and normal, downgoing toes b/l  Coord: finger to nose intact   Gait/Balance: unable to assess    LABS:                         10.9   13.37 )-----------( 282      ( 08 Oct 2020 06:34 )             32.8     10-08    141  |  108  |  16  ----------------------------<  102<H>  3.7   |  26  |  0.60    Ca    8.3<L>      08 Oct 2020 06:34  Phos  2.1     10-08  Mg     1.7     10-08    TPro  6.3  /  Alb  2.8<L>  /  TBili  1.3<H>  /  DBili  x   /  AST  23  /  ALT  17  /  AlkPhos  100  10-07    LIVER FUNCTIONS - ( 07 Oct 2020 06:40 )  Alb: 2.8 g/dL / Pro: 6.3 gm/dL / ALK PHOS: 100 U/L / ALT: 17 U/L / AST: 23 U/L / GGT: x           RADIOLOGY:  < from: CT Head No Cont (10.08.20 @ 09:44) >  The brain demonstrates interval removal of the RIGHT subdural drain. The RIGHT sided subdural hematoma has minimally increased in size now measuring 2.3 cm in maximum thickness. There is mass effect on the RIGHT hemisphere with 6 mm subfalcine herniation to the LEFT. Mild pneumocephalus. 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   No acute cerebral cortical infarct is seen.  No intracranial hemorrhage is found.  No mass effect is found in the brain.  The ventricles, sulci and basal cisterns moderate atrophy.  The orbits are unremarkable.  The paranasal sinuses are clear.  The nasal cavity appears intact.  The nasopharynx is symmetric.  The central skull base, petrous temporal bones and calvarium demonstrates RIGHT frontoparietal craniotomy.    IMPRESSION:   Interval removal of the RIGHT subdural drain.   The RIGHT sided subdural hematoma has minimally increased in size now measuring 2.3 cm in maximum thickness.   There is mass effect on the RIGHT hemisphere with 6 mm subfalcine herniation to the LEFT.   Mild pneumocephalus. 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

## 2020-10-08 NOTE — PROGRESS NOTE ADULT - ASSESSMENT
IMP:    89 y/o male with traumatic acute RIGHT SDH underwent Crani for evacuation  POD # 2  Hypertensive emergency--resolved     Suggest:    Cont with PO BBx and Norvasc--increase norvasc to 10 for better BP control  Neuro checks  Cont with AED (5) for total of 7 days  FS with insulin coverage--keep FS < 180  PO diet  HOB > 30  Pain control  IS  Further imaging as per NSGY  DVT prophy--SCD    ICU care-- d/w ICU staff on multi disciplinary rounds and NSGY at bedside

## 2020-10-09 LAB
ANION GAP SERPL CALC-SCNC: 7 MMOL/L — SIGNIFICANT CHANGE UP (ref 5–17)
BUN SERPL-MCNC: 15 MG/DL — SIGNIFICANT CHANGE UP (ref 7–23)
CALCIUM SERPL-MCNC: 8.7 MG/DL — SIGNIFICANT CHANGE UP (ref 8.5–10.1)
CHLORIDE SERPL-SCNC: 106 MMOL/L — SIGNIFICANT CHANGE UP (ref 96–108)
CO2 SERPL-SCNC: 28 MMOL/L — SIGNIFICANT CHANGE UP (ref 22–31)
CREAT SERPL-MCNC: 0.61 MG/DL — SIGNIFICANT CHANGE UP (ref 0.5–1.3)
GLUCOSE SERPL-MCNC: 128 MG/DL — HIGH (ref 70–99)
HCT VFR BLD CALC: 37.1 % — LOW (ref 39–50)
HGB BLD-MCNC: 11.9 G/DL — LOW (ref 13–17)
MCHC RBC-ENTMCNC: 31.7 PG — SIGNIFICANT CHANGE UP (ref 27–34)
MCHC RBC-ENTMCNC: 32.1 GM/DL — SIGNIFICANT CHANGE UP (ref 32–36)
MCV RBC AUTO: 98.9 FL — SIGNIFICANT CHANGE UP (ref 80–100)
PLATELET # BLD AUTO: 329 K/UL — SIGNIFICANT CHANGE UP (ref 150–400)
POTASSIUM SERPL-MCNC: 3.8 MMOL/L — SIGNIFICANT CHANGE UP (ref 3.5–5.3)
POTASSIUM SERPL-SCNC: 3.8 MMOL/L — SIGNIFICANT CHANGE UP (ref 3.5–5.3)
RBC # BLD: 3.75 M/UL — LOW (ref 4.2–5.8)
RBC # FLD: 14.2 % — SIGNIFICANT CHANGE UP (ref 10.3–14.5)
SODIUM SERPL-SCNC: 141 MMOL/L — SIGNIFICANT CHANGE UP (ref 135–145)
WBC # BLD: 14.97 K/UL — HIGH (ref 3.8–10.5)
WBC # FLD AUTO: 14.97 K/UL — HIGH (ref 3.8–10.5)

## 2020-10-09 PROCEDURE — 70450 CT HEAD/BRAIN W/O DYE: CPT | Mod: 26

## 2020-10-09 PROCEDURE — 99233 SBSQ HOSP IP/OBS HIGH 50: CPT

## 2020-10-09 PROCEDURE — 99232 SBSQ HOSP IP/OBS MODERATE 35: CPT

## 2020-10-09 RX ADMIN — SODIUM CHLORIDE 1 GRAM(S): 9 INJECTION INTRAMUSCULAR; INTRAVENOUS; SUBCUTANEOUS at 13:33

## 2020-10-09 RX ADMIN — Medication 1 TABLET(S): at 09:15

## 2020-10-09 RX ADMIN — LEVETIRACETAM 750 MILLIGRAM(S): 250 TABLET, FILM COATED ORAL at 21:03

## 2020-10-09 RX ADMIN — Medication 1: at 08:33

## 2020-10-09 RX ADMIN — GABAPENTIN 300 MILLIGRAM(S): 400 CAPSULE ORAL at 13:33

## 2020-10-09 RX ADMIN — Medication 650 MILLIGRAM(S): at 00:20

## 2020-10-09 RX ADMIN — SODIUM CHLORIDE 1 GRAM(S): 9 INJECTION INTRAMUSCULAR; INTRAVENOUS; SUBCUTANEOUS at 21:02

## 2020-10-09 RX ADMIN — Medication 650 MILLIGRAM(S): at 14:57

## 2020-10-09 RX ADMIN — LEVETIRACETAM 750 MILLIGRAM(S): 250 TABLET, FILM COATED ORAL at 09:15

## 2020-10-09 RX ADMIN — SODIUM CHLORIDE 1 GRAM(S): 9 INJECTION INTRAMUSCULAR; INTRAVENOUS; SUBCUTANEOUS at 05:52

## 2020-10-09 RX ADMIN — GABAPENTIN 300 MILLIGRAM(S): 400 CAPSULE ORAL at 05:52

## 2020-10-09 RX ADMIN — Medication 25 MILLIGRAM(S): at 21:03

## 2020-10-09 RX ADMIN — FINASTERIDE 5 MILLIGRAM(S): 5 TABLET, FILM COATED ORAL at 09:15

## 2020-10-09 RX ADMIN — QUETIAPINE FUMARATE 50 MILLIGRAM(S): 200 TABLET, FILM COATED ORAL at 21:02

## 2020-10-09 RX ADMIN — SIMVASTATIN 40 MILLIGRAM(S): 20 TABLET, FILM COATED ORAL at 21:02

## 2020-10-09 RX ADMIN — Medication 25 MILLIGRAM(S): at 09:15

## 2020-10-09 RX ADMIN — Medication 1 TABLET(S): at 05:52

## 2020-10-09 RX ADMIN — AMLODIPINE BESYLATE 10 MILLIGRAM(S): 2.5 TABLET ORAL at 09:15

## 2020-10-09 RX ADMIN — GABAPENTIN 300 MILLIGRAM(S): 400 CAPSULE ORAL at 21:03

## 2020-10-09 RX ADMIN — Medication 3: at 16:41

## 2020-10-09 NOTE — PROGRESS NOTE ADULT - ASSESSMENT
IMP:    89 y/o male with traumatic acute RIGHT SDH underwent Crani for evacuation  POD # 3  Hypertensive emergency--resolved     Stable for Tx out of ICU    Suggest:    Cont with PO BBx and Norvasc--increased norvasc to 10 for better BP control on 10/8.  May increase BBx as needed to 50 bid   Neuro checks  Cont with AED (6) for total of 7 days  FS with insulin coverage--keep FS < 180  PO diet  HOB > 30  Pain control  IS  Further imaging as per NSGY  DVT prophy--SCD    Stable for tx-- d/w ICU staff on multi disciplinary rounds   IMP:    88 y/o male with traumatic acute RIGHT SDH underwent Crani for evacuation  POD # 3  Hypertensive emergency--resolved   Severe Prot Rafa malnutrition     Stable for Tx out of ICU    Suggest:    Cont with PO BBx and Norvasc--increased norvasc to 10 for better BP control on 10/8.  May increase BBx as needed to 50 bid   Neuro checks  Cont with AED (6) for total of 7 days  FS with insulin coverage--keep FS < 180  PO diet  HOB > 30  Pain control  IS  Further imaging as per NSGY  DVT prophy--SCD    Stable for tx-- d/w ICU staff on multi disciplinary rounds

## 2020-10-09 NOTE — DIETITIAN INITIAL EVALUATION ADULT. - OTHER INFO
88 y/o male on ASA sustained mechanical fall found to have R SDH.  Treated conservatively.  Noted to have change in MS today.  HCT revealed expansion of R acute SDH--went to OR for evacuation.  Extubated.  S/P SLP evaluation with recommendations of pureed consistency and thin liquids. Aspiration protocol maintained. No bowel movement documented since admission x 6 days. No bowel meds ordered currently. Last hospital weight indicates significant weight loss x 4 months (16#/12%). 89 y/o male on ASA sustained mechanical fall found to have R SDH.  Treated conservatively.  Noted to have change in MS today.  HCT revealed expansion of R acute SDH--went to OR for evacuation.  Extubated.  S/P SLP evaluation with recommendations of pureed consistency and thin liquids. Aspiration protocol maintained. No bowel movement documented since admission x 6 days. No bowel meds ordered currently. Last hospital weight indicates significant weight loss x 4 months (16#/12%). Due to poor nutritional status/intake, advancing age, and A1C 5.7% recommend regular diet with no therapeutic restrictions. 88 y/o male on ASA sustained mechanical fall found to have R SDH.  Treated conservatively.  Noted to have change in MS today.  HCT revealed expansion of R acute SDH--went to OR for evacuation.  Extubated.  S/P SLP evaluation with recommendations of pureed consistency and thin liquids. Aspiration protocol maintained. No bowel movement documented since admission x 6 days. No bowel meds ordered currently. Last hospital weight indicates significant weight loss x 4 months (16#/12%). Due to poor nutritional status/intake, advancing age, and A1C 5.7% recommend regular diet with no therapeutic restrictions. Pt may benefit from additional po supplements with additional protein/calories. Pt appears extremely thin and laura. PO intake remains poor however fluctuating intake with assistance during meals. Will continue to monitor and follow up prn. See below for recommendations.

## 2020-10-09 NOTE — DIETITIAN INITIAL EVALUATION ADULT. - PERTINENT MEDS FT
MEDICATIONS  (STANDING):  amLODIPine   Tablet 10 milliGRAM(s) Oral daily  dextrose 5%. 1000 milliLiter(s) (50 mL/Hr) IV Continuous <Continuous>  dextrose 50% Injectable 12.5 Gram(s) IV Push once  dextrose 50% Injectable 25 Gram(s) IV Push once  dextrose 50% Injectable 25 Gram(s) IV Push once  finasteride 5 milliGRAM(s) Oral daily  gabapentin 300 milliGRAM(s) Oral three times a day  influenza   Vaccine 0.5 milliLiter(s) IntraMuscular once  insulin lispro (HumaLOG) corrective regimen sliding scale   SubCutaneous Before meals and at bedtime  levETIRAcetam 750 milliGRAM(s) Oral two times a day  metoprolol tartrate 25 milliGRAM(s) Oral two times a day  multivitamin 1 Tablet(s) Oral daily  QUEtiapine 50 milliGRAM(s) Oral at bedtime  simvastatin 40 milliGRAM(s) Oral at bedtime  sodium chloride 1 Gram(s) Oral every 8 hours    MEDICATIONS  (PRN):  acetaminophen   Tablet .. 650 milliGRAM(s) Oral every 6 hours PRN Temp greater or equal to 38C (100.4F), Mild Pain (1 - 3)  acetaminophen  IVPB .. 1000 milliGRAM(s) IV Intermittent once PRN Moderate Pain (4 - 6)  dextrose 40% Gel 15 Gram(s) Oral once PRN Blood Glucose LESS THAN 70 milliGRAM(s)/deciliter  glucagon  Injectable 1 milliGRAM(s) IntraMuscular once PRN Glucose LESS THAN 70 milligrams/deciliter  HYDROmorphone  Injectable 1 milliGRAM(s) IV Push every 4 hours PRN Severe Pain (7 - 10)  ondansetron Injectable 4 milliGRAM(s) IV Push every 6 hours PRN Nausea

## 2020-10-09 NOTE — DIETITIAN INITIAL EVALUATION ADULT. - SIGNS/SYMPTOMS
poor po intake, significant wt loss, severe muscle/fat wasting, multiple skin B/D stge 1-DTI, BMI 17

## 2020-10-09 NOTE — PROGRESS NOTE ADULT - SUBJECTIVE AND OBJECTIVE BOX
POD # 3 S/P right craniotomy for evac SDH  Brightly alert  communicating appropriately  incision clean  moves all well  carito po  step down pending  needs disposition planning

## 2020-10-09 NOTE — CHART NOTE - TREATMENT: THE FOLLOWING DIET HAS BEEN RECOMMENDED
Diet, Pureed:   Prosource Gelatein Plus     Qty per Day:  3  Supplement Feeding Modality:  Oral  Glucerna Shake Cans or Servings Per Day:  1       Frequency:  Three Times a day (10-09-20 @ 10:55) [Pending Verification By Attending]        Diet, Dysphagia 1 Pureed-Thin Liquids:   Consistent Carbohydrate {Evening Snack} (CSTCHOSN)  Prosource Gelatein 20 Sugar Free     Qty per Day:  3  Supplement Feeding Modality:  Oral (10-07-20 @ 13:24) [Active]    Malnutrition severe protein/calorie malnutrition in context of acute illness.   Etiology inadequate po intake due to subdural hematoma (intercranial hemorrhage.   Signs/Symptoms poor po intake, significant wt loss, severe muscle/fat wasting, multiple skin B/D stge 1-DTI, BMI 17.   Goal/Expected Outcome Optimal po intake to meet >50% ENN and tolerance of po supplements.      · Additional Recommendations  1) Liberalize diet to regular pureed w/ thin liquids and assist with meals PRN (no restriction needed 2/2 poor nutritional status and intake).   2) Maintain aspiration precautions, back of bed >35 degrees.   3) Suggest add Vit C 500 mg BID, add Zinc Sulfate 220 mg x 10 days to promote wound healing.   4) monitor lytes/labs/hydration replete as needed.   5) Glucerna 8oz po TID and Gelatein plus jello po TID (60gm protein).

## 2020-10-09 NOTE — PROGRESS NOTE ADULT - ASSESSMENT
89 Y old  male S/P craniotomy for SDH, drain in place, neurologically stable, CTH stable     Multimodal pain control  Neuro checks Q  4  Cervical collar: cleared   Incentive spirometry  Vitals, IS/OS Q 4  Diet:   (X ) as tolerated ( ) NPO  DVT/GI prophylaxis  Local wound care  Activity:   Ambulate when cleared by neurosurgery   PT  Hold A/C  Resume other home med  Antibitoics: Per NSX  Neurosurgery following : CTH and SQ heparin per  NSX  Stable from traum stand point  TX to floor.  SW for  D/C planning     89 Y old  male S/P craniotomy for SDH, drain in place, neurologically stable, CTH stable. Rt 5 th rib fracture.    Multimodal pain control  Neuro checks Q  4  Cervical collar: cleared   Incentive spirometry  Vitals, IS/OS Q 4  Diet:   (X ) as tolerated ( ) NPO  DVT/GI prophylaxis  Local wound care  Activity:   Ambulate when cleared by neurosurgery   PT  Hold A/C  Resume other home med  Antibitoics: Per NSX  Neurosurgery following : CTH and SQ heparin per  NSX  Stable from traum stand point  TX to floor.  SW for  D/C planning

## 2020-10-09 NOTE — DIETITIAN INITIAL EVALUATION ADULT. - PHYSICAL APPEARANCE
underweight/other (specify) NFPE:  Skin: wound right groin, thoracic spine DTI, Sacrum stage 1 with no edema documented. Tushar score= 14 (high risk for further skin breakdown) NFPE: Severe muscle wasting: Temporal, clavicle, scapula, and deltoids/  Severe fat loss: ocular, buccal, ribs, triceps.  Skin: wound right groin, thoracic spine DTI, Sacrum stage 1 with no edema documented. Tushar score= 14 (high risk for further skin breakdown)

## 2020-10-09 NOTE — PROGRESS NOTE ADULT - SUBJECTIVE AND OBJECTIVE BOX
Patient is a 89y old  Male who presents with a chief complaint of multiple falls, SDH (09 Oct 2020 10:34)    HPI:  Trauma Consult    Pt s/p multiple falls 10/4/20. Pt initially presented with AMS. At time of exam pt is AAO x 3  Pt seen and examined at bedside Pt in no acute distress. Pt denied c/o fever, chills, chest pain, SOB, abd pain, N/V/D, extremity pain or dysfunction, hemoptysis, hematemesis, hematuria, hematochexia, headache, diplopia, vertigo, dizzyness.  (04 Oct 2020 20:58)  10/9: Pt seen and examined, pain controlled, post op headache stable.  Neurochecks stable, no sensory motor compliant. No SOB, no chest pain. No abdominal pain. No bony pelvis pain. Moves all extremities, no focal neurological complaint. No fever. T head stable.   ROS:.  [] A ten-point review of systems was otherwise negative except as noted.  Systemic:	[ ] Fever	[ ] Chills	[ ] Night sweats    [ ] Fatigue	[ ] Other  [] Cardiovascular:  [] Pulmonary:  [] Renal/Urologic:  [] Gastrointestinal: abdominal pain, vomiting  [] Metabolic:  [] Neurologic:  [] Hematologic:  [] ENT:  [] Ophthalmologic:  [] Musculoskeletal:    [X ] Due to altered mental status/intubation, subjective information were not able to be obtained from the patient. History was obtained, to the extent possible, from review of the chart and collateral sources of information.( TBI)    PAST MEDICAL & SURGICAL HISTORY:  GERD (gastroesophageal reflux disease)    BPH (benign prostatic hyperplasia)    DM (diabetes mellitus)    HLD (hyperlipidemia)    HTN (hypertension)    CAD (coronary artery disease)    No significant past surgical history      FAMILY HISTORY:  No pertinent family history in first degree relatives      NC  Social history:     Alcohol: Denied  Smoking: Denied  Drug Use: Denied        Vital Signs Last 24 Hrs  T(C): 38 (09 Oct 2020 14:51), Max: 38.4 (08 Oct 2020 22:00)  T(F): 100.4 (09 Oct 2020 14:51), Max: 101.1 (08 Oct 2020 22:00)  HR: 75 (09 Oct 2020 14:51) (56 - 91)  BP: 138/65 (09 Oct 2020 14:51) (116/58 - 162/71)  BP(mean): 95 (09 Oct 2020 14:05) (71 - 96)  RR: 18 (09 Oct 2020 14:51) (5 - 22)  SpO2: 97% (09 Oct 2020 14:51) (90% - 99%)  PHYSICAL EXAM:  Constitutional: NAD, GCS: 15/15  AOX3  Eyes:  WNL  ENMT:  scalp incision CDI.  Neck:  WNL, non tender  Back: Non tender  Respiratory: CTABL  Cardiovascular:  S1+S2+0  Gastrointestinal: Soft, ND , NT  Genitourinary:  WNL  Extremities: NV intact  Vascular:  Intact  Neurological: No focal neurological deficit,  CN, motor and sensory system grossly intact.  Skin: WNL  Musculoskeletal: WNL  Psychiatric: Grossly WNL      Labs:                          11.9   14.97 )-----------( 329      ( 09 Oct 2020 06:29 )             37.1       10-09    141  |  106  |  15  ----------------------------<  128<H>  3.8   |  28  |  0.61    Ca    8.7      09 Oct 2020 06:29  Phos  2.1     10-08  Mg     1.7     10-08        PT/INR - ( 08 Oct 2020 06:34 )   PT: 13.5 sec;   INR: 1.16 ratio         PTT - ( 08 Oct 2020 06:34 )  PTT:28.4 sec    Radiology:    < from: CT Head No Cont (10.09.20 @ 11:12) >    IMPRESSION:    No significant interval change since prior exam of 10/8/2020.  Reidentified is a right frontotemporal craniotomy for evacuation of a subdural hemorrhage. There is a persistent right holohemispheric subdural hematoma measuring up to 2.1 cm in greatest width along the right lateral frontoparietal lobes, not significantly changed since prior exam. There is mass effect on the underlying parenchyma and mass effect upon the right lateral ventricle not significantly changed since prior exam. Minimal right to left midline shift of approximately 6 mm is unchanged.        < end of copied text >

## 2020-10-09 NOTE — DIETITIAN INITIAL EVALUATION ADULT. - ADD RECOMMEND
1) Liberalize diet to regular and assist with meals PRN (no restriction needed 2/2 poor nutritional status and intake). 2) Maintain aspiration precautions, back of bed >35 degrees. 3) Suggest add Vit C 500 mg BID, add Zinc Sulfate 220 mg x 10 days to promote wound healing. 4) monitor lytes/labs/hydration replete as needed. 5) Glucerna 8oz po TID and Gelatein plus jello po TID (60gm protein). 1) Liberalize diet to regular pureed w/ thin liquids and assist with meals PRN (no restriction needed 2/2 poor nutritional status and intake). 2) Maintain aspiration precautions, back of bed >35 degrees. 3) Suggest add Vit C 500 mg BID, add Zinc Sulfate 220 mg x 10 days to promote wound healing. 4) monitor lytes/labs/hydration replete as needed. 5) Glucerna 8oz po TID and Gelatein plus jello po TID (60gm protein).

## 2020-10-09 NOTE — PROGRESS NOTE ADULT - SUBJECTIVE AND OBJECTIVE BOX
Pt admitted under Trauma Service     Hospital # 5  ICU # 4    CC:  Fall    HPI:    89 y/o male on ASA sustained mechanical fall found to have R SDH.  Treated conservatively.  Noted to have change in MS today.  HCT revealed expansion of R acute SDH--went to OR for evacuation.  Extubated.  Received 1u PC and plats.  Pt seen and examined in ICU.      10/6:  Case d/w NSGY attending.  Hypertensive.  Post op under anesthetic.   Son Caleb at bedside  10/7:  Case d/w NSGY at bedside.  Awake.  non verbal.  Does not follow commands.  On Cardene gtt at 2.5    10/8:  Case d/w NSGY at bedside.  Drain out.  Awake.  Moves all extreme on command.  Off Nimodipine gtt  10/9:  No events overnight.  Little confused ON. Awake and eating this morning.  Drain out.  No complaints     PMH:  As above.     PSH:  As above.     FH: Non Contributory other than those listed in HPI    Social History:  NC    MEDICATIONS  (STANDING):  amLODIPine   Tablet 10 milliGRAM(s) Oral daily  dextrose 5%. 1000 milliLiter(s) (50 mL/Hr) IV Continuous <Continuous>  dextrose 50% Injectable 12.5 Gram(s) IV Push once  dextrose 50% Injectable 25 Gram(s) IV Push once  dextrose 50% Injectable 25 Gram(s) IV Push once  finasteride 5 milliGRAM(s) Oral daily  gabapentin 300 milliGRAM(s) Oral three times a day  influenza   Vaccine 0.5 milliLiter(s) IntraMuscular once  insulin lispro (HumaLOG) corrective regimen sliding scale   SubCutaneous Before meals and at bedtime  levETIRAcetam 750 milliGRAM(s) Oral two times a day  metoprolol tartrate 25 milliGRAM(s) Oral two times a day  multivitamin 1 Tablet(s) Oral daily  QUEtiapine 50 milliGRAM(s) Oral at bedtime  simvastatin 40 milliGRAM(s) Oral at bedtime  sodium chloride 1 Gram(s) Oral every 8 hours    MEDICATIONS  (PRN):  acetaminophen   Tablet .. 650 milliGRAM(s) Oral every 6 hours PRN Temp greater or equal to 38C (100.4F), Mild Pain (1 - 3)  acetaminophen  IVPB .. 1000 milliGRAM(s) IV Intermittent once PRN Moderate Pain (4 - 6)  dextrose 40% Gel 15 Gram(s) Oral once PRN Blood Glucose LESS THAN 70 milliGRAM(s)/deciliter  glucagon  Injectable 1 milliGRAM(s) IntraMuscular once PRN Glucose LESS THAN 70 milligrams/deciliter  HYDROmorphone  Injectable 1 milliGRAM(s) IV Push every 4 hours PRN Severe Pain (7 - 10)  ondansetron Injectable 4 milliGRAM(s) IV Push every 6 hours PRN Nausea      Allergies: NKDA    ROS:  SEE BELOW        ICU Vital Signs Last 24 Hrs  T(C): 36.7 (09 Oct 2020 08:00), Max: 38.4 (08 Oct 2020 22:00)  T(F): 98 (09 Oct 2020 08:00), Max: 101.1 (08 Oct 2020 22:00)  HR: 74 (09 Oct 2020 09:00) (56 - 91)  BP: 158/68 (09 Oct 2020 09:00) (118/54 - 162/71)  BP(mean): 88 (09 Oct 2020 09:00) (69 - 96)  ABP: --  ABP(mean): --  RR: 17 (09 Oct 2020 09:00) (12 - 21)  SpO2: 95% (09 Oct 2020 06:00) (93% - 99%)          I&O's Summary    08 Oct 2020 07:01  -  09 Oct 2020 07:00  --------------------------------------------------------  IN: 752 mL / OUT: 950 mL / NET: -198 mL        Physical Exam:  SEE BELOW                          11.9   14.97 )-----------( 329      ( 09 Oct 2020 06:29 )             37.1       10-09    141  |  106  |  15  ----------------------------<  128<H>  3.8   |  28  |  0.61    Ca    8.7      09 Oct 2020 06:29  Phos  2.1     10-08  Mg     1.7     10-08                      DVT Prophylaxis:                                                            Contraindication:     Advanced Directives:    Discussed with:    Visit Information:  Time spent excluding procedure:      ** Time is exclusive of billed procedures and/or teaching and/or routine family updates.

## 2020-10-10 LAB
CULTURE RESULTS: SIGNIFICANT CHANGE UP
CULTURE RESULTS: SIGNIFICANT CHANGE UP
SPECIMEN SOURCE: SIGNIFICANT CHANGE UP
SPECIMEN SOURCE: SIGNIFICANT CHANGE UP

## 2020-10-10 PROCEDURE — 99233 SBSQ HOSP IP/OBS HIGH 50: CPT

## 2020-10-10 PROCEDURE — 99221 1ST HOSP IP/OBS SF/LOW 40: CPT

## 2020-10-10 RX ORDER — TAMSULOSIN HYDROCHLORIDE 0.4 MG/1
0.4 CAPSULE ORAL AT BEDTIME
Refills: 0 | Status: DISCONTINUED | OUTPATIENT
Start: 2020-10-10 | End: 2020-10-16

## 2020-10-10 RX ORDER — INFLUENZA VIRUS VACCINE 15; 15; 15; 15 UG/.5ML; UG/.5ML; UG/.5ML; UG/.5ML
0.7 SUSPENSION INTRAMUSCULAR ONCE
Refills: 0 | Status: COMPLETED | OUTPATIENT
Start: 2020-10-10 | End: 2020-10-16

## 2020-10-10 RX ADMIN — Medication 2: at 20:21

## 2020-10-10 RX ADMIN — SODIUM CHLORIDE 1 GRAM(S): 9 INJECTION INTRAMUSCULAR; INTRAVENOUS; SUBCUTANEOUS at 20:21

## 2020-10-10 RX ADMIN — GABAPENTIN 300 MILLIGRAM(S): 400 CAPSULE ORAL at 13:37

## 2020-10-10 RX ADMIN — Medication 1: at 12:30

## 2020-10-10 RX ADMIN — GABAPENTIN 300 MILLIGRAM(S): 400 CAPSULE ORAL at 20:21

## 2020-10-10 RX ADMIN — GABAPENTIN 300 MILLIGRAM(S): 400 CAPSULE ORAL at 05:39

## 2020-10-10 RX ADMIN — LEVETIRACETAM 750 MILLIGRAM(S): 250 TABLET, FILM COATED ORAL at 09:26

## 2020-10-10 RX ADMIN — TAMSULOSIN HYDROCHLORIDE 0.4 MILLIGRAM(S): 0.4 CAPSULE ORAL at 20:21

## 2020-10-10 RX ADMIN — Medication 25 MILLIGRAM(S): at 09:26

## 2020-10-10 RX ADMIN — Medication 25 MILLIGRAM(S): at 20:21

## 2020-10-10 RX ADMIN — FINASTERIDE 5 MILLIGRAM(S): 5 TABLET, FILM COATED ORAL at 09:26

## 2020-10-10 RX ADMIN — Medication 650 MILLIGRAM(S): at 13:58

## 2020-10-10 RX ADMIN — SIMVASTATIN 40 MILLIGRAM(S): 20 TABLET, FILM COATED ORAL at 20:21

## 2020-10-10 RX ADMIN — SODIUM CHLORIDE 1 GRAM(S): 9 INJECTION INTRAMUSCULAR; INTRAVENOUS; SUBCUTANEOUS at 13:37

## 2020-10-10 RX ADMIN — Medication 2: at 17:24

## 2020-10-10 RX ADMIN — QUETIAPINE FUMARATE 50 MILLIGRAM(S): 200 TABLET, FILM COATED ORAL at 20:21

## 2020-10-10 RX ADMIN — Medication 650 MILLIGRAM(S): at 14:00

## 2020-10-10 RX ADMIN — Medication 1 TABLET(S): at 09:26

## 2020-10-10 RX ADMIN — SODIUM CHLORIDE 1 GRAM(S): 9 INJECTION INTRAMUSCULAR; INTRAVENOUS; SUBCUTANEOUS at 05:40

## 2020-10-10 RX ADMIN — AMLODIPINE BESYLATE 10 MILLIGRAM(S): 2.5 TABLET ORAL at 09:25

## 2020-10-10 NOTE — PROGRESS NOTE ADULT - SUBJECTIVE AND OBJECTIVE BOX
Patient is a 89y old  Male who presents with a chief complaint of multiple falls, SDH (10 Oct 2020 11:34)    HPI:  Trauma Consult    Pt s/p multiple falls 10/4/20. Pt initially presented with AMS. At time of exam pt is AAO x 3    Pt seen and examined at bedside Pt in no acute distress. Pt denied c/o fever, chills, chest pain, SOB, abd pain, N/V/D, extremity pain or dysfunction, hemoptysis, hematemesis, hematuria, hematochexia, headache, diplopia, vertigo, dizzyness.  (04 Oct 2020 20:58)  10/10: Pt seen and examined, pain controlled, No headache, no neck pain. neurochecks stable, no sensory motor compliant. No SOB, no chest pain. No abdominal pain. No bony pelvis pain. Moves all extremities, no focal neurological complaint. No fever. Retention of urine requiring Whitmore placement.  ROS:.  [] A ten-point review of systems was otherwise negative except as noted.  Systemic:	[ ] Fever	[ ] Chills	[ ] Night sweats    [ ] Fatigue	[ ] Other  [] Cardiovascular:  [] Pulmonary:  [] Renal/Urologic:  [] Gastrointestinal: abdominal pain, vomiting  [] Metabolic:  [] Neurologic:  [] Hematologic:  [] ENT:  [] Ophthalmologic:  [] Musculoskeletal:    [ X] Due to altered mental status/intubation, subjective information were not able to be obtained from the patient. History was obtained, to the extent possible, from review of the chart and collateral sources of information.    PAST MEDICAL & SURGICAL HISTORY:  GERD (gastroesophageal reflux disease)    BPH (benign prostatic hyperplasia)    DM (diabetes mellitus)    HLD (hyperlipidemia)    HTN (hypertension)    CAD (coronary artery disease)    No significant past surgical history      FAMILY HISTORY:  No pertinent family history in first degree relatives      NC  Social history:     Alcohol: Denied  Smoking: Denied  Drug Use: Denied        Vital Signs Last 24 Hrs  T(C): 36.6 (10 Oct 2020 19:49), Max: 37.6 (10 Oct 2020 15:34)  T(F): 97.9 (10 Oct 2020 19:49), Max: 99.6 (10 Oct 2020 15:34)  HR: 85 (10 Oct 2020 19:49) (62 - 85)  BP: 135/72 (10 Oct 2020 19:49) (129/72 - 151/65)  BP(mean): 85 (10 Oct 2020 03:18) (83 - 85)  RR: 18 (10 Oct 2020 19:49) (18 - 18)  SpO2: 95% (10 Oct 2020 19:49) (95% - 99%)  PHYSICAL EXAM:  Constitutional: NAD, GCS: 14/15  AOX2  Eyes:  WNL  ENMT:  WNL,Scalp incision CDI.  Neck:  WNL, non tender  Back: Non tender  Respiratory: CTABL  Cardiovascular:  S1+S2+0  Gastrointestinal: Soft, ND , NT  Genitourinary:  WNL  Extremities: NV intact  Vascular:  Intact  Neurological: No focal neurological deficit,  CN, motor and sensory system grossly intact.      Labs:                          11.9   14.97 )-----------( 329      ( 09 Oct 2020 06:29 )             37.1       10-09    141  |  106  |  15  ----------------------------<  128<H>  3.8   |  28  |  0.61    Ca    8.7      09 Oct 2020 06:29            Radiology:    < from: CT Head No Cont (10.09.20 @ 11:12) >    IMPRESSION:    No significant interval change since prior exam of 10/8/2020.  Reidentified is a right frontotemporal craniotomy for evacuation of a subdural hemorrhage. There is a persistent right holohemispheric subdural hematoma measuring up to 2.1 cm in greatest width along the right lateral frontoparietal lobes, not significantly changed since prior exam. There is mass effect on the underlying parenchyma and mass effect upon the right lateral ventricle not significantly changed since prior exam. Minimal right to left midline shift of approximately 6 mm is unchanged.      < end of copied text >

## 2020-10-10 NOTE — CONSULT NOTE ADULT - SUBJECTIVE AND OBJECTIVE BOX
89 y/o male with pmhx of GERD, BPH, DM, HLD, HTN, CAD presents to the ED for AMS. Pt lives at home alone. Pts neighbor who delivers food to the patient and called the ambulance due to him not being "himself" this evening; Pt states that he fell three times this morning. s/p Craniotomy, emergent, for subdural hematoma evacuation 06-Oct-2020. Pt is almost non verbal but per nursing ambulates well and follow commands; he is trying to urinate at this time ; had multiple  straight cath for retention and as per surgery Whitmore will be placed. Pt was adm to ICU, went to OR and now downgraded to med surg. Per staff has a son who is deaf and is visiting    Old record 12/2019 indicates functional, verbal patient - was here for   ROLAND.    PAST MEDICAL HISTORY:  BPH (benign prostatic hyperplasia)   CAD (coronary artery disease)   DM (diabetes mellitus)   GERD (gastroesophageal reflux disease)   HLD (hyperlipidemia)   HTN (hypertension).     PAST SURGICAL HISTORY:  No significant past surgical history.     No pertinent family history in first degree relatives.     Social History:   no reported etoh, tobacco, illicit drug use        Physical Exam:     HEENT: Normocephalic, + right periorbital ecchymosis, QASIM, EOM wnl, s/p SDH evacuation  Neck: Soft and supple, nontender to exam. No crepitus, no ecchymosis, no hematoma, to exam, no JVD, no tracheal deviation  Cardiovascular: S1S2 Present  Chest: no gross left rib pathology or tenderness to exam. + right lateral 5th rib region tenderness to exam. No sternal pathology or tenderness to exam.   Respiratory: Rate is 18; Respiratory Effort normal; no wheezes, rales or rhonchi to exam  ABD: bowel sounds (+), soft, nontender, non distended, no rebound, no guarding, no rigidity, no skin changes to exam. No pelvic instability to exam, no skin changes  Genitourinary: No scrotal/perineal/perirectal hematoma/ecchymosis/tenderness to exam  External genitalia: normal,  Musculoskeletal: Pt has palpable b/l radial, femoral, dorsalis pedis pulses. All digits are warm and well perfused.   Neuro: minimally verbal, moves all extr                          11.9   14.97 )-----------( 329      ( 09 Oct 2020 06:29 )             37.1   10-09    141  |  106  |  15  ----------------------------<  128<H>  3.8   |  28  |  0.61    Ca    8.7      09 Oct 2020 06:29    * s/p fall SDH s/p evacuation  stable  ambulatory  minimally verbal  on AED for 7 days  mild leukocytosis    * Urinary Retention  per nursing  becomes v agitated when he can't urinate  at risk for fall and another head trauma  insert Whitmore  Flomax was added today to Cardura    * HTN  Norvasc, BB    *T2 DM   sliding scale well ctr

## 2020-10-10 NOTE — PROGRESS NOTE ADULT - SUBJECTIVE AND OBJECTIVE BOX
POD#4 s/p craniotomy evacuation hematoma  comfortable  ambulated to bathroom this am  a bit sleepy now  cath placed for retention  will d/c [facility]  labs stable  dispo planning

## 2020-10-10 NOTE — PROGRESS NOTE ADULT - ASSESSMENT
89 Y old  male S/P craniotomy for SDH, drain in place, neurologically stable, CTH stable. Rt 5 th rib fracture. Developed retention of Urine.    Multimodal pain control  Neuro checks Q  4  Cervical collar: cleared   Incentive spirometry  Vitals, IS/OS Q 4  Diet:   (X ) as tolerated ( ) NPO  DVT/GI prophylaxis  Local wound care  Activity:   Ambulate with assitance  PT  Start flomax, TOV on Monday  Resume other home meds  Neurosurgery following : CTH and SQ heparin per  NSX  Stable from traum stand point   for  D/C planning

## 2020-10-11 LAB
BASOPHILS # BLD AUTO: 0.03 K/UL — SIGNIFICANT CHANGE UP (ref 0–0.2)
BASOPHILS NFR BLD AUTO: 0.2 % — SIGNIFICANT CHANGE UP (ref 0–2)
EOSINOPHIL # BLD AUTO: 0 K/UL — SIGNIFICANT CHANGE UP (ref 0–0.5)
EOSINOPHIL NFR BLD AUTO: 0 % — SIGNIFICANT CHANGE UP (ref 0–6)
HCT VFR BLD CALC: 31.6 % — LOW (ref 39–50)
HGB BLD-MCNC: 10.6 G/DL — LOW (ref 13–17)
IMM GRANULOCYTES NFR BLD AUTO: 0.5 % — SIGNIFICANT CHANGE UP (ref 0–1.5)
LACTATE SERPL-SCNC: 2.7 MMOL/L — HIGH (ref 0.7–2)
LYMPHOCYTES # BLD AUTO: 0.49 K/UL — LOW (ref 1–3.3)
LYMPHOCYTES # BLD AUTO: 3.6 % — LOW (ref 13–44)
MCHC RBC-ENTMCNC: 32.1 PG — SIGNIFICANT CHANGE UP (ref 27–34)
MCHC RBC-ENTMCNC: 33.5 GM/DL — SIGNIFICANT CHANGE UP (ref 32–36)
MCV RBC AUTO: 95.8 FL — SIGNIFICANT CHANGE UP (ref 80–100)
MONOCYTES # BLD AUTO: 0.91 K/UL — HIGH (ref 0–0.9)
MONOCYTES NFR BLD AUTO: 6.6 % — SIGNIFICANT CHANGE UP (ref 2–14)
NEUTROPHILS # BLD AUTO: 12.25 K/UL — HIGH (ref 1.8–7.4)
NEUTROPHILS NFR BLD AUTO: 89.1 % — HIGH (ref 43–77)
PLATELET # BLD AUTO: 308 K/UL — SIGNIFICANT CHANGE UP (ref 150–400)
RBC # BLD: 3.3 M/UL — LOW (ref 4.2–5.8)
RBC # FLD: 14 % — SIGNIFICANT CHANGE UP (ref 10.3–14.5)
WBC # BLD: 13.75 K/UL — HIGH (ref 3.8–10.5)
WBC # FLD AUTO: 13.75 K/UL — HIGH (ref 3.8–10.5)

## 2020-10-11 PROCEDURE — 71045 X-RAY EXAM CHEST 1 VIEW: CPT | Mod: 26

## 2020-10-11 PROCEDURE — 99232 SBSQ HOSP IP/OBS MODERATE 35: CPT

## 2020-10-11 PROCEDURE — 99233 SBSQ HOSP IP/OBS HIGH 50: CPT

## 2020-10-11 RX ORDER — ACETAMINOPHEN 500 MG
650 TABLET ORAL ONCE
Refills: 0 | Status: COMPLETED | OUTPATIENT
Start: 2020-10-11 | End: 2020-10-11

## 2020-10-11 RX ORDER — PIPERACILLIN AND TAZOBACTAM 4; .5 G/20ML; G/20ML
3.38 INJECTION, POWDER, LYOPHILIZED, FOR SOLUTION INTRAVENOUS EVERY 8 HOURS
Refills: 0 | Status: DISCONTINUED | OUTPATIENT
Start: 2020-10-11 | End: 2020-10-16

## 2020-10-11 RX ORDER — PIPERACILLIN AND TAZOBACTAM 4; .5 G/20ML; G/20ML
3.38 INJECTION, POWDER, LYOPHILIZED, FOR SOLUTION INTRAVENOUS ONCE
Refills: 0 | Status: COMPLETED | OUTPATIENT
Start: 2020-10-11 | End: 2020-10-11

## 2020-10-11 RX ORDER — SODIUM CHLORIDE 9 MG/ML
500 INJECTION, SOLUTION INTRAVENOUS ONCE
Refills: 0 | Status: COMPLETED | OUTPATIENT
Start: 2020-10-11 | End: 2020-10-11

## 2020-10-11 RX ORDER — SODIUM CHLORIDE 9 MG/ML
1650 INJECTION, SOLUTION INTRAVENOUS ONCE
Refills: 0 | Status: COMPLETED | OUTPATIENT
Start: 2020-10-11 | End: 2020-10-11

## 2020-10-11 RX ADMIN — FINASTERIDE 5 MILLIGRAM(S): 5 TABLET, FILM COATED ORAL at 09:19

## 2020-10-11 RX ADMIN — TAMSULOSIN HYDROCHLORIDE 0.4 MILLIGRAM(S): 0.4 CAPSULE ORAL at 21:13

## 2020-10-11 RX ADMIN — Medication 650 MILLIGRAM(S): at 20:05

## 2020-10-11 RX ADMIN — Medication 25 MILLIGRAM(S): at 21:13

## 2020-10-11 RX ADMIN — Medication 25 MILLIGRAM(S): at 09:20

## 2020-10-11 RX ADMIN — AMLODIPINE BESYLATE 10 MILLIGRAM(S): 2.5 TABLET ORAL at 09:19

## 2020-10-11 RX ADMIN — SODIUM CHLORIDE 1 GRAM(S): 9 INJECTION INTRAMUSCULAR; INTRAVENOUS; SUBCUTANEOUS at 15:04

## 2020-10-11 RX ADMIN — SODIUM CHLORIDE 1 GRAM(S): 9 INJECTION INTRAMUSCULAR; INTRAVENOUS; SUBCUTANEOUS at 21:13

## 2020-10-11 RX ADMIN — PIPERACILLIN AND TAZOBACTAM 200 GRAM(S): 4; .5 INJECTION, POWDER, LYOPHILIZED, FOR SOLUTION INTRAVENOUS at 22:09

## 2020-10-11 RX ADMIN — Medication 2: at 17:13

## 2020-10-11 RX ADMIN — GABAPENTIN 300 MILLIGRAM(S): 400 CAPSULE ORAL at 21:13

## 2020-10-11 RX ADMIN — Medication 2: at 12:38

## 2020-10-11 RX ADMIN — SODIUM CHLORIDE 1650 MILLILITER(S): 9 INJECTION, SOLUTION INTRAVENOUS at 20:10

## 2020-10-11 RX ADMIN — GABAPENTIN 300 MILLIGRAM(S): 400 CAPSULE ORAL at 15:04

## 2020-10-11 RX ADMIN — QUETIAPINE FUMARATE 50 MILLIGRAM(S): 200 TABLET, FILM COATED ORAL at 21:13

## 2020-10-11 RX ADMIN — SIMVASTATIN 40 MILLIGRAM(S): 20 TABLET, FILM COATED ORAL at 21:13

## 2020-10-11 RX ADMIN — SODIUM CHLORIDE 1 GRAM(S): 9 INJECTION INTRAMUSCULAR; INTRAVENOUS; SUBCUTANEOUS at 05:49

## 2020-10-11 RX ADMIN — Medication 650 MILLIGRAM(S): at 19:30

## 2020-10-11 RX ADMIN — GABAPENTIN 300 MILLIGRAM(S): 400 CAPSULE ORAL at 05:50

## 2020-10-11 RX ADMIN — Medication 1 TABLET(S): at 09:20

## 2020-10-11 NOTE — PROVIDER CONTACT NOTE (CHANGE IN STATUS NOTIFICATION) - ACTION/TREATMENT ORDERED:
Tylenol, hyperthermia blanket and new orders pending
Patient may continue to med/surg floor and neurosurgery will continue follow patient there.

## 2020-10-11 NOTE — PROGRESS NOTE ADULT - ASSESSMENT
89M.  admitted 10/04.  s/p fall and SDH.  s/p craniotomy and evacuation.  downgraded to med-surg from ICU.    SDH.  -remains minimally verbal.  -10/05 craniotomy.  -AED DC'd after 7 days.  -NSx following.    urinary retention.  -Whitmore catheter.  -tamsulosin + finasteride.  -TOV before discharge.    debility.  -mobilize/PT.    hx HTN.  -metoprolol tartrate 25mg bid.  -amlodipine 10mg poqd.    hx DM.  -correction insulin coverage.    disposition.  -anticipate transfer to rehab.

## 2020-10-11 NOTE — CHART NOTE - NSCHARTNOTEFT_GEN_A_CORE
88 yo M w PMH of HTN, CAD, DM, HLD, GERD & BPH presented to ED on 10/04/20 for fall, was found to have a SAH and was subsequently admitted.     Called to bedside due to T of 105.   Pt follow and understand commands, however refusing to answer questions. Per nursing staff, pt is minimally verbal.     Vitals  T(F): 99.3 (10-11-20 @ 23:58), Max: 105 (10-11-20 @ 19:00)  HR: 81 (10-11-20 @ 23:58) (68 - 120)  BP: 120/68 (10-11-20 @ 23:58) (120/68 - 149/81)  RR: 18 (10-11-20 @ 23:58) (18 - 19)  SpO2: 93% (10-11-20 @ 23:58) (90% - 97%)    Physical Exam   Gen: NAD, comfortable  HENT: staples oberserved along right side of patient's head.   CV: RRR, nl s1/s2, no M/R/G  Pulm: nl respiratory effort, CTAB, no wheezes/crackles/rhonchi  Back: no scoliosis, lordosis, or kyphosis, no tenderness, no bed sores  Abd: normoactive bowel sounds in all 4 quadrants, soft, nontender, nondistended, no rebound, no guarding, no masses  Extremities: no pedal edema, pedal pulses palpable   Skin: nl warm and dry, no wounds       LABS:  cret                        10.6   13.75 )-----------( 308      ( 11 Oct 2020 20:25 )             31.6     Lactate, Blood (10.11.20 @ 20:25)   Lactate, Blood: 2.7    MEDICATIONS  (STANDING):  amLODIPine   Tablet 10 milliGRAM(s) Oral daily  dextrose 5%. 1000 milliLiter(s) (50 mL/Hr) IV Continuous <Continuous>  dextrose 50% Injectable 12.5 Gram(s) IV Push once  dextrose 50% Injectable 25 Gram(s) IV Push once  dextrose 50% Injectable 25 Gram(s) IV Push once  finasteride 5 milliGRAM(s) Oral daily  gabapentin 300 milliGRAM(s) Oral three times a day  influenza  Vaccine (HIGH DOSE) 0.7 milliLiter(s) IntraMuscular once  insulin lispro (HumaLOG) corrective regimen sliding scale   SubCutaneous Before meals and at bedtime  metoprolol tartrate 25 milliGRAM(s) Oral two times a day  multivitamin 1 Tablet(s) Oral daily  piperacillin/tazobactam IVPB.. 3.375 Gram(s) IV Intermittent every 8 hours  QUEtiapine 50 milliGRAM(s) Oral at bedtime  simvastatin 40 milliGRAM(s) Oral at bedtime  sodium chloride 1 Gram(s) Oral every 8 hours  tamsulosin 0.4 milliGRAM(s) Oral at bedtime    MEDICATIONS  (PRN):  acetaminophen   Tablet .. 650 milliGRAM(s) Oral every 6 hours PRN Temp greater or equal to 38C (100.4F), Mild Pain (1 - 3)  acetaminophen  IVPB .. 1000 milliGRAM(s) IV Intermittent once PRN Moderate Pain (4 - 6)  dextrose 40% Gel 15 Gram(s) Oral once PRN Blood Glucose LESS THAN 70 milliGRAM(s)/deciliter  glucagon  Injectable 1 milliGRAM(s) IntraMuscular once PRN Glucose LESS THAN 70 milligrams/deciliter  HYDROmorphone  Injectable 1 milliGRAM(s) IV Push every 4 hours PRN Severe Pain (7 - 10)  ondansetron Injectable 4 milliGRAM(s) IV Push every 6 hours PRN Nausea    A/P:   88 yo M w PMH of HTN, CAD, DM, HLD, GERD & BPH presented to ED on 10/04/20 for fall, was found to have a SAH and was subsequently admitted.     Called to bedside due to elevated Temp 105. Pt meets sepsis criteria, T 105, tachycardia at 110, leukocytosis of 14 from previous days & possible source of infections include indwelling dawson catheter & aspiration PNA.   -Sepsis work-up ordered  -Completed 30 cc/kg LR bolus  -CBC revealed leukocytosis at 13  -Lactate returned elevated at 2.7  -Blood & Urine Cultures Obtained  -CXR reveals prominent right sided lung markings suggestive of possible infectious process. This with his swallow evaluation revealing aspiration, possible aspiration PNA.  -Indwelling catheter removed  -Started on Zosyn  -F/U Blood and Urine Cultures    -Discussed w Dr. Green

## 2020-10-11 NOTE — PROGRESS NOTE ADULT - ASSESSMENT
89 Y old  male S/P craniotomy for SDH, drain in place, neurologically stable, CTH stable. Rt 5 th rib fracture. Developed retention of Urine.    Multimodal pain control  Neuro checks Q  4  Cervical collar: cleared   Incentive spirometry  Vitals, IS/OS Q 4  Diet:   (X ) as tolerated ( ) NPO  DVT/GI prophylaxis  Local wound care  Activity:   Ambulate with assistance  PT  Start Flomax TOV tonight.  Resume other home meds  Neurosurgery following : CTH and SQ heparin per  NSX  Stable from trauma stand point  SW for  D/C planning

## 2020-10-11 NOTE — SWALLOW BEDSIDE ASSESSMENT ADULT - COMMENTS
order received for Speech-swallow evaluation for this pt. pt was initially seen on oct7, then sen for follow ups on Oct 8,9,10 and now 11.   Please see Flowsheets: Adult Assessment and Intervention for reports.
Patient is a 89y old  Male who presents with a chief complaint of mechanical fall 10/6/20. A sper Neuro note, he stated that he was on his way to the bathroom when he fell and after he fell was unsteady and fell twice more. Sustained right SDH. Denied  LOC, vomiting or headache. Denied focal weakness or numbness. Troponin slightly elevated and CT C/A/P shows questionable right rib fracture as well. Lives alone has a neighbor that checks on him regularly. Takes asa 81mg daily.  Patient taken to OR yesterday for urgent evacuation of right SDH following slight worsening in mental status to GCS 13 and increase in size of SDH to 1.7 cm.  A sper neuro note, case completely uncomplicated. Overnight scans showed good evacuation of inferior and temporal portion of right SDH with residual hematoma superiorly, not causing cistern effacement or temporal/uncal displacement. Currently exam is significant for mild left-sided neglect although patient has full motor power and will move left arm and left leg with repeated prompting. Eyes open spontaneously, not lethargic, headwrap c/d/i.    Service is now asked to evaluate Pt for po intake as pt is now lethargic, and non-communicative.    See hx below: includes GERD, DM,

## 2020-10-11 NOTE — PROGRESS NOTE ADULT - SUBJECTIVE AND OBJECTIVE BOX
CC:  Patient is a 89y old  Male who presents with a chief complaint of multiple falls, SDH (11 Oct 2020 10:02)    SUBJECTIVE:     -awake and alert.  -difficulty speaking.  appears functionally aphasic.    ROS:  all other review of systems are negative unless indicated above.    acetaminophen   Tablet .. 650 milliGRAM(s) Oral every 6 hours PRN  acetaminophen  IVPB .. 1000 milliGRAM(s) IV Intermittent once PRN  amLODIPine   Tablet 10 milliGRAM(s) Oral daily  dextrose 40% Gel 15 Gram(s) Oral once PRN  dextrose 5%. 1000 milliLiter(s) IV Continuous <Continuous>  dextrose 50% Injectable 12.5 Gram(s) IV Push once  dextrose 50% Injectable 25 Gram(s) IV Push once  dextrose 50% Injectable 25 Gram(s) IV Push once  finasteride 5 milliGRAM(s) Oral daily  gabapentin 300 milliGRAM(s) Oral three times a day  glucagon  Injectable 1 milliGRAM(s) IntraMuscular once PRN  HYDROmorphone  Injectable 1 milliGRAM(s) IV Push every 4 hours PRN  influenza  Vaccine (HIGH DOSE) 0.7 milliLiter(s) IntraMuscular once  insulin lispro (HumaLOG) corrective regimen sliding scale   SubCutaneous Before meals and at bedtime  metoprolol tartrate 25 milliGRAM(s) Oral two times a day  multivitamin 1 Tablet(s) Oral daily  ondansetron Injectable 4 milliGRAM(s) IV Push every 6 hours PRN  QUEtiapine 50 milliGRAM(s) Oral at bedtime  simvastatin 40 milliGRAM(s) Oral at bedtime  sodium chloride 1 Gram(s) Oral every 8 hours  tamsulosin 0.4 milliGRAM(s) Oral at bedtime    T(C): 37.3 (10-11-20 @ 08:14), Max: 37.6 (10-10-20 @ 15:34)  HR: 68 (10-11-20 @ 08:14) (68 - 85)  BP: 141/65 (10-11-20 @ 08:14) (129/72 - 141/65)  RR: 18 (10-11-20 @ 08:14) (16 - 18)  SpO2: 97% (10-11-20 @ 08:14) (94% - 98%)    Constitutional: NAD.   HEENT: s/p craniotomy.  PERRL, EOMI, MMM.  Neck: Soft and supple, No carotid bruit, No JVD  Respiratory: Breath sounds are clear bilaterally, No wheezing, rales or rhonchi  Cardiovascular: S1 and S2, regular rate and rhythm, no murmur, rub or gallop.  Gastrointestinal: Bowel Sounds present, soft, nontender, nondistended, no guarding, no rebound, no mass.  Extremities: No peripheral edema  Vascular: 2+ peripheral pulses  Neurological: A/O x 3, no focal deficits  Musculoskeletal: 5/5 strength b/l upper and lower extremities  Skin:  no visible rashes.

## 2020-10-11 NOTE — PROGRESS NOTE ADULT - NUTRITIONAL ASSESSMENT
This patient has been assessed with a concern for Malnutrition and has been determined to have a diagnosis/diagnoses of Severe protein-calorie malnutrition.    This patient is being managed with:   Diet Pureed-  Prosource Gelatein Plus     Qty per Day:  3  Supplement Feeding Modality:  Oral  Glucerna Shake Cans or Servings Per Day:  1       Frequency:  Three Times a day  Entered: Oct  9 2020 10:55AM    

## 2020-10-11 NOTE — PROGRESS NOTE ADULT - SUBJECTIVE AND OBJECTIVE BOX
Patient is a 89y old  Male who presents with a chief complaint of multiple falls, SDH (11 Oct 2020 10:27)    HPI:  Trauma Consult    Pt s/p multiple falls 10/4/20. Pt initially presented with AMS. At time of exam pt is AAO x 3  Pt seen and examined at bedside Pt in no acute distress. Pt denied c/o fever, chills, chest pain, SOB, abd pain, N/V/D, extremity pain or dysfunction, hemoptysis, hematemesis, hematuria, hematochexia, headache, diplopia, vertigo, dizzyness.  (04 Oct 2020 20:58)  10/11: pt seen and examined, doing well, pain controlled MS at base line since surgery, follow commands No fever.   ROS:.  [] A ten-point review of systems was otherwise negative except as noted.  Systemic:	[ ] Fever	[ ] Chills	[ ] Night sweats    [ ] Fatigue	[ ] Other  [] Cardiovascular:  [] Pulmonary:  [] Renal/Urologic:  [] Gastrointestinal: abdominal pain, vomiting  [] Metabolic:  [] Neurologic:  [] Hematologic:  [] ENT:  [] Ophthalmologic:  [] Musculoskeletal:    [ X] Due to altered mental status/intubation, subjective information were not able to be obtained from the patient. History was obtained, to the extent possible, from review of the chart and collateral sources of information.(TBI)    PAST MEDICAL & SURGICAL HISTORY:  GERD (gastroesophageal reflux disease)    BPH (benign prostatic hyperplasia)    DM (diabetes mellitus)    HLD (hyperlipidemia)    HTN (hypertension)    CAD (coronary artery disease)    No significant past surgical history      FAMILY HISTORY:  No pertinent family history in first degree relatives      NC  Social history:     Alcohol: Denied  Smoking: Denied  Drug Use: Denied        Vital Signs Last 24 Hrs  T(C): 37.3 (11 Oct 2020 08:14), Max: 37.6 (10 Oct 2020 15:34)  T(F): 99.2 (11 Oct 2020 08:14), Max: 99.6 (10 Oct 2020 15:34)  HR: 68 (11 Oct 2020 08:14) (68 - 85)  BP: 141/65 (11 Oct 2020 08:14) (129/72 - 141/65)  BP(mean): --  RR: 18 (11 Oct 2020 08:14) (16 - 18)  SpO2: 97% (11 Oct 2020 08:14) (94% - 98%)  PHYSICAL EXAM:  Constitutional: NAD, GCS: 15/15  AOX2  Eyes:  WNL  ENMT:  scalp incision CDI.   Neck:  WNL, non tender  Back: Non tender  Respiratory: CTABL  Cardiovascular:  S1+S2+0  Gastrointestinal: Soft, ND , NT  Genitourinary:  WNL, Whitmore in place  Extremities: NV intact  Vascular:  Intact  Neurological: No focal neurological deficit,  CN, motor and sensory system grossly intact.        Labs:        no new labs            Radiology:    no new imaging

## 2020-10-12 LAB
ANION GAP SERPL CALC-SCNC: 4 MMOL/L — LOW (ref 5–17)
BUN SERPL-MCNC: 28 MG/DL — HIGH (ref 7–23)
CALCIUM SERPL-MCNC: 8 MG/DL — LOW (ref 8.5–10.1)
CHLORIDE SERPL-SCNC: 105 MMOL/L — SIGNIFICANT CHANGE UP (ref 96–108)
CO2 SERPL-SCNC: 30 MMOL/L — SIGNIFICANT CHANGE UP (ref 22–31)
CREAT SERPL-MCNC: 0.78 MG/DL — SIGNIFICANT CHANGE UP (ref 0.5–1.3)
GLUCOSE SERPL-MCNC: 165 MG/DL — HIGH (ref 70–99)
HCT VFR BLD CALC: 28.1 % — LOW (ref 39–50)
HGB BLD-MCNC: 9.5 G/DL — LOW (ref 13–17)
LACTATE SERPL-SCNC: 1.8 MMOL/L — SIGNIFICANT CHANGE UP (ref 0.7–2)
LACTATE SERPL-SCNC: 2.5 MMOL/L — HIGH (ref 0.7–2)
MCHC RBC-ENTMCNC: 31.9 PG — SIGNIFICANT CHANGE UP (ref 27–34)
MCHC RBC-ENTMCNC: 33.8 GM/DL — SIGNIFICANT CHANGE UP (ref 32–36)
MCV RBC AUTO: 94.3 FL — SIGNIFICANT CHANGE UP (ref 80–100)
PLATELET # BLD AUTO: 226 K/UL — SIGNIFICANT CHANGE UP (ref 150–400)
POTASSIUM SERPL-MCNC: 4 MMOL/L — SIGNIFICANT CHANGE UP (ref 3.5–5.3)
POTASSIUM SERPL-SCNC: 4 MMOL/L — SIGNIFICANT CHANGE UP (ref 3.5–5.3)
RBC # BLD: 2.98 M/UL — LOW (ref 4.2–5.8)
RBC # FLD: 14 % — SIGNIFICANT CHANGE UP (ref 10.3–14.5)
SODIUM SERPL-SCNC: 139 MMOL/L — SIGNIFICANT CHANGE UP (ref 135–145)
WBC # BLD: 18.68 K/UL — HIGH (ref 3.8–10.5)
WBC # FLD AUTO: 18.68 K/UL — HIGH (ref 3.8–10.5)

## 2020-10-12 PROCEDURE — 93970 EXTREMITY STUDY: CPT | Mod: 26

## 2020-10-12 PROCEDURE — 99232 SBSQ HOSP IP/OBS MODERATE 35: CPT

## 2020-10-12 PROCEDURE — 99231 SBSQ HOSP IP/OBS SF/LOW 25: CPT

## 2020-10-12 RX ORDER — ACETAMINOPHEN 500 MG
1000 TABLET ORAL EVERY 6 HOURS
Refills: 0 | Status: DISCONTINUED | OUTPATIENT
Start: 2020-10-12 | End: 2020-10-16

## 2020-10-12 RX ADMIN — SIMVASTATIN 40 MILLIGRAM(S): 20 TABLET, FILM COATED ORAL at 21:40

## 2020-10-12 RX ADMIN — Medication 1: at 21:40

## 2020-10-12 RX ADMIN — FINASTERIDE 5 MILLIGRAM(S): 5 TABLET, FILM COATED ORAL at 10:22

## 2020-10-12 RX ADMIN — SODIUM CHLORIDE 500 MILLILITER(S): 9 INJECTION, SOLUTION INTRAVENOUS at 23:55

## 2020-10-12 RX ADMIN — Medication 1: at 08:04

## 2020-10-12 RX ADMIN — Medication 650 MILLIGRAM(S): at 18:42

## 2020-10-12 RX ADMIN — GABAPENTIN 300 MILLIGRAM(S): 400 CAPSULE ORAL at 21:40

## 2020-10-12 RX ADMIN — Medication 650 MILLIGRAM(S): at 01:55

## 2020-10-12 RX ADMIN — TAMSULOSIN HYDROCHLORIDE 0.4 MILLIGRAM(S): 0.4 CAPSULE ORAL at 21:39

## 2020-10-12 RX ADMIN — SODIUM CHLORIDE 1 GRAM(S): 9 INJECTION INTRAMUSCULAR; INTRAVENOUS; SUBCUTANEOUS at 06:00

## 2020-10-12 RX ADMIN — GABAPENTIN 300 MILLIGRAM(S): 400 CAPSULE ORAL at 16:54

## 2020-10-12 RX ADMIN — SODIUM CHLORIDE 1 GRAM(S): 9 INJECTION INTRAMUSCULAR; INTRAVENOUS; SUBCUTANEOUS at 21:40

## 2020-10-12 RX ADMIN — Medication 25 MILLIGRAM(S): at 10:22

## 2020-10-12 RX ADMIN — PIPERACILLIN AND TAZOBACTAM 25 GRAM(S): 4; .5 INJECTION, POWDER, LYOPHILIZED, FOR SOLUTION INTRAVENOUS at 21:41

## 2020-10-12 RX ADMIN — SODIUM CHLORIDE 1 GRAM(S): 9 INJECTION INTRAMUSCULAR; INTRAVENOUS; SUBCUTANEOUS at 13:52

## 2020-10-12 RX ADMIN — Medication 1 TABLET(S): at 10:22

## 2020-10-12 RX ADMIN — Medication 1: at 17:26

## 2020-10-12 RX ADMIN — GABAPENTIN 300 MILLIGRAM(S): 400 CAPSULE ORAL at 06:01

## 2020-10-12 RX ADMIN — PIPERACILLIN AND TAZOBACTAM 25 GRAM(S): 4; .5 INJECTION, POWDER, LYOPHILIZED, FOR SOLUTION INTRAVENOUS at 13:52

## 2020-10-12 RX ADMIN — Medication 650 MILLIGRAM(S): at 03:22

## 2020-10-12 RX ADMIN — PIPERACILLIN AND TAZOBACTAM 25 GRAM(S): 4; .5 INJECTION, POWDER, LYOPHILIZED, FOR SOLUTION INTRAVENOUS at 06:01

## 2020-10-12 RX ADMIN — QUETIAPINE FUMARATE 50 MILLIGRAM(S): 200 TABLET, FILM COATED ORAL at 21:40

## 2020-10-12 RX ADMIN — AMLODIPINE BESYLATE 10 MILLIGRAM(S): 2.5 TABLET ORAL at 10:22

## 2020-10-12 RX ADMIN — Medication 25 MILLIGRAM(S): at 21:39

## 2020-10-12 NOTE — CHART NOTE - NSCHARTNOTEFT_GEN_A_CORE
F/U Sepsis Note:    88 yo M w PMH of HTN, CAD, DM, HLD, GERD & BPH presented to ED on 10/04/20 for fall, was found to have a SAH and was subsequently admitted.     -Fever resolved, latest T of 99. HR 81.   -Repeat lactate returned at 3.4 after fluids  -Ordered 500 cc bolus LR  -Repeat Lactate after fluids completion  -On Zosyn  -F/U Blood and Urine Cultures    -Discussed w Dr. Green.

## 2020-10-12 NOTE — PHYSICAL THERAPY INITIAL EVALUATION ADULT - PLANNED THERAPY INTERVENTIONS, PT EVAL
bed mobility training/balance training/gait training/strengthening/transfer training
transfer training/balance training/bed mobility training/gait training/strengthening

## 2020-10-12 NOTE — PROGRESS NOTE ADULT - ASSESSMENT
fever overnight  being worked up  suspected pneumonia  on empiric abx  obtain lower extremity venous duplex  cultured

## 2020-10-12 NOTE — PROGRESS NOTE ADULT - SUBJECTIVE AND OBJECTIVE BOX
CC:Patient is a 89y old  Male who presents with a chief complaint of multiple falls, SDH (12 Oct 2020 11:44)      Subjective:  Pt seen and examined at bedside with chaperone. Pt is drowsy, responsive, pt in no acute distress. No reported c/o chills, chest pain, SOB, abd pain, N/V/D, extremity pain or dysfunction, hemoptysis, hematemesis, hematuria, hematochexia, headache, diplopia, vertigo, dizzyness.   Pt had fever overnight 10/11/20, pt pending dawson for urinary retention    ROS:  otherwise as abovementioned ROS    Vital Signs Last 24 Hrs  T(C): 37.3 (12 Oct 2020 08:13), Max: 40.6 (11 Oct 2020 19:00)  T(F): 99.1 (12 Oct 2020 08:13), Max: 105 (11 Oct 2020 19:00)  HR: 64 (12 Oct 2020 08:13) (64 - 120)  BP: 124/58 (12 Oct 2020 08:13) (120/68 - 149/81)  BP(mean): --  RR: 17 (12 Oct 2020 08:13) (17 - 19)  SpO2: 93% (12 Oct 2020 08:13) (90% - 94%)    Labs:                                9.5    18.68 )-----------( 226      ( 12 Oct 2020 08:02 )             28.1     CBC Full  -  ( 12 Oct 2020 08:02 )  WBC Count : 18.68 K/uL  RBC Count : 2.98 M/uL  Hemoglobin : 9.5 g/dL  Hematocrit : 28.1 %  Platelet Count - Automated : 226 K/uL  Mean Cell Volume : 94.3 fl  Mean Cell Hemoglobin : 31.9 pg  Mean Cell Hemoglobin Concentration : 33.8 gm/dL  Auto Neutrophil # : x  Auto Lymphocyte # : x  Auto Monocyte # : x  Auto Eosinophil # : x  Auto Basophil # : x  Auto Neutrophil % : x  Auto Lymphocyte % : x  Auto Monocyte % : x  Auto Eosinophil % : x  Auto Basophil % : x    10-12    139  |  105  |  28<H>  ----------------------------<  165<H>  4.0   |  30  |  0.78    Ca    8.0<L>      12 Oct 2020 08:02              Meds:  acetaminophen   Tablet .. 650 milliGRAM(s) Oral every 6 hours PRN  acetaminophen  IVPB .. 1000 milliGRAM(s) IV Intermittent once PRN  amLODIPine   Tablet 10 milliGRAM(s) Oral daily  dextrose 40% Gel 15 Gram(s) Oral once PRN  dextrose 5%. 1000 milliLiter(s) IV Continuous <Continuous>  dextrose 50% Injectable 12.5 Gram(s) IV Push once  dextrose 50% Injectable 25 Gram(s) IV Push once  dextrose 50% Injectable 25 Gram(s) IV Push once  finasteride 5 milliGRAM(s) Oral daily  gabapentin 300 milliGRAM(s) Oral three times a day  glucagon  Injectable 1 milliGRAM(s) IntraMuscular once PRN  influenza  Vaccine (HIGH DOSE) 0.7 milliLiter(s) IntraMuscular once  insulin lispro (HumaLOG) corrective regimen sliding scale   SubCutaneous Before meals and at bedtime  metoprolol tartrate 25 milliGRAM(s) Oral two times a day  multivitamin 1 Tablet(s) Oral daily  ondansetron Injectable 4 milliGRAM(s) IV Push every 6 hours PRN  piperacillin/tazobactam IVPB.. 3.375 Gram(s) IV Intermittent every 8 hours  QUEtiapine 50 milliGRAM(s) Oral at bedtime  simvastatin 40 milliGRAM(s) Oral at bedtime  sodium chloride 1 Gram(s) Oral every 8 hours  tamsulosin 0.4 milliGRAM(s) Oral at bedtime      Radiology:  Pending cxr 10/12/20    < from: CT Head No Cont (10.09.20 @ 11:12) >  EXAM:  CT BRAIN                            PROCEDURE DATE:  10/09/2020          INTERPRETATION:  Exam Date: 10/9/2020 11:12 AM    CT head without IV contrast    CLINICAL INFORMATION:  eval of sdh, s/p drain removal ADMDIAG1: S06.5X9A TRAUM SUBDR HEM W LOC OF UNSP DURATION, INIT/    TECHNIQUE: Contiguous axial sections were obtained through the head.   This scan was performed using automatic exposure control (radiation dose reduction software) to obtain a diagnostic image quality scan with patient dose as low as reasonably achievable.    COMPARISON: CT head 10/8/2020    FINDINGS:    Reidentified is a right frontotemporal craniotomy for evacuation of a subdural hemorrhage. There is a persistent right holohemispheric subdural hematoma measuring up to 2.1 cm in greatest width along the right lateral frontoparietal lobes, not significantly changed since prior exam. There is mass effect on the underlying parenchyma and mass effect upon the right lateral ventricle not significantly changed since prior exam. Minimal right to left midline shift of approximately 6 mm is unchanged. Postsurgical air in the extra-axial space is unchanged.    Skin staples and hematoma within the right lateral scalp is unchanged.    IMPRESSION:    No significant interval change since prior exam of 10/8/2020.  Reidentified is a right frontotemporal craniotomy for evacuation of a subdural hemorrhage. There is a persistent right holohemispheric subdural hematoma measuring up to 2.1 cm in greatest width along the right lateral frontoparietal lobes, not significantly changed since prior exam. There is mass effect on the underlying parenchyma and mass effect upon the right lateral ventricle not significantly changed since prior exam. Minimal right to left midline shift of approximately 6 mm is unchanged.            FRANCIS MONTERROSO M.D., ATTENDING RADIOLOGIST  This document has been electronically signed. Oct  9 2020  2:33PM    < end of copied text >    Physical exam:  Pt is arousable, drowsy  Pt in no acute distress  HEENT: s/p craniotomy  Neck: no JVD, no tracheal deviation, soft and supple  Psych: drowsy  Resp: CTAB  CVS: S1S2(+)  ABD: bowel sounds (+), soft, non distended, no rebound, no guarding, no rigidity, no skin changes to exam. No tenderness to exam  EXT: no calf tenderness or edema to exam b/l, on VTE prophylaxis  Skin: no adverse skin changes to exam    : pending dawson cath for urinary retention

## 2020-10-12 NOTE — PHYSICAL THERAPY INITIAL EVALUATION ADULT - GENERAL OBSERVATIONS, REHAB EVAL
Pt was found lying in bed in ICU, pt is pleasant and willing to participate in PT
Pt was found lying in bed, confused, able to participate in few bed exs

## 2020-10-12 NOTE — PHYSICAL THERAPY INITIAL EVALUATION ADULT - DIAGNOSIS, PT EVAL
S/p Mechanical fall, Right SDH, S/P emergent craniotomy, POD#6, Overnight -on Rectal temp monitors now, minimally verbal

## 2020-10-12 NOTE — PHYSICAL THERAPY INITIAL EVALUATION ADULT - PERTINENT HX OF CURRENT PROBLEM, REHAB EVAL
Patient is a 89y old  Male who presents with a chief complaint of mechanical fall earlier today. States he was on his way to the bathroom when he fell and after he fell was unsteady and fell 2 more times. Sustained right SDH, CT C/A/P shows questionable right rib fracture,
Pt presents with a chief complaint of multiple falls, SDH, Pt had a mechanical fall at home. States he was on his way to the bathroom when he fell and after he fell was unsteady and fell 2 more times. Sustained right SDH,S/P emergent craniotomy with SDH, Rt rib fx

## 2020-10-12 NOTE — PROGRESS NOTE ADULT - SUBJECTIVE AND OBJECTIVE BOX
POD # 6 s/p right craniotomy evacuation SDH.   overnight  WBC 18.7  alert  fluently conversant  moves all well

## 2020-10-12 NOTE — CONSULT NOTE ADULT - ASSESSMENT
89 y/o male with h/o GERD, BPH, DM type 2, HLD, HTN, CAD was admitted on 10/4 for AMS. Pt lives at home alone. Pt's neighbor, who delivers food to the patient, called the ambulance due to him not being "himself" this evening; Pt states that he fell three times. Workup showed a SDH and the patient underwent emergent craniotomy for subdural hematoma evacuation 06-Oct-2020. Postoperative, the patient is almost non verbal but per nursing ambulates well and follow commands; he had difficulty; had multiple straight cath for retention and as per surgery Whitmore will be placed. He received ceftriaxone/ azithromycin, cefazolin, then zosyn.    1. New febrile syndrome. Right pulmonary infiltrate. Probable aspiration pneumonia. SDH s/p craniotomy.  -leukocytosis  -obtain BC x 2  -start zosyn 3.375 gm IV q8h  -reason for abx use and side effects reviewed with patient; monitor BMP   -aspiration precautions  -old chart reviewed to assess prior cultures  -monitor temps  -f/u CBC  -supportive care  2. Other issues:   -care per medicine   88 y/o male with h/o GERD, BPH, DM type 2, HLD, HTN, CAD was admitted on 10/4 for AMS. Pt lives at home alone. Pt's neighbor, who delivers food to the patient, called the ambulance due to him not being "himself" this evening; Pt states that he fell three times. Workup showed a SDH and the patient underwent emergent craniotomy for subdural hematoma evacuation 06-Oct-2020. Postoperative, the patient is almost non verbal but per nursing ambulates well and follow commands; he had difficulty; had multiple straight cath for retention and as per surgery Whitmore will be placed. He received ceftriaxone/ azithromycin, cefazolin, then zosyn.    1. New febrile syndrome. Right pulmonary infiltrate. Probable aspiration pneumonia. SDH s/p craniotomy. BPH.  -at risk for urinary retention  -leukocytosis  -obtain BC x 2  -start zosyn 3.375 gm IV q8h  -reason for abx use and side effects reviewed with patient; monitor BMP   -aspiration precautions  -speech evaluation  -old chart reviewed to assess prior cultures  -monitor temps  -f/u CBC  -supportive care  2. Other issues:   -care per medicine

## 2020-10-12 NOTE — CONSULT NOTE ADULT - SUBJECTIVE AND OBJECTIVE BOX
Patient is a 89y old  Male who presents with a chief complaint of multiple falls, SDH     HPI:  89 y/o male with h/o GERD, BPH, DM type 2, HLD, HTN, CAD was admitted on 10/4 for AMS. Pt lives at home alone. Pt's neighbor, who delivers food to the patient, called the ambulance due to him not being "himself" this evening; Pt states that he fell three times. Workup showed a SDH and the patient underwent emergent craniotomy for subdural hematoma evacuation 06-Oct-2020. Postoperative, the patient is almost non verbal but per nursing ambulates well and follow commands; he had difficulty; had multiple straight cath for retention and as per surgery Whitmore will be placed. He received ceftriaxone/ azithromycin, cefazolin, then zosyn.  Overnight he was noted febrile to 105F    Old record 12/2019 indicates functional, verbal patient.    PAST MEDICAL HISTORY:  BPH (benign prostatic hyperplasia)   CAD (coronary artery disease)   DM (diabetes mellitus)   GERD (gastroesophageal reflux disease)   HLD (hyperlipidemia)   HTN (hypertension).     PAST SURGICAL HISTORY:  No significant past surgical history.     Meds: per reconciliation sheet, noted below  MEDICATIONS  (STANDING):  amLODIPine   Tablet 10 milliGRAM(s) Oral daily  dextrose 5%. 1000 milliLiter(s) (50 mL/Hr) IV Continuous <Continuous>  dextrose 50% Injectable 12.5 Gram(s) IV Push once  dextrose 50% Injectable 25 Gram(s) IV Push once  dextrose 50% Injectable 25 Gram(s) IV Push once  finasteride 5 milliGRAM(s) Oral daily  gabapentin 300 milliGRAM(s) Oral three times a day  influenza  Vaccine (HIGH DOSE) 0.7 milliLiter(s) IntraMuscular once  insulin lispro (HumaLOG) corrective regimen sliding scale   SubCutaneous Before meals and at bedtime  metoprolol tartrate 25 milliGRAM(s) Oral two times a day  multivitamin 1 Tablet(s) Oral daily  piperacillin/tazobactam IVPB.. 3.375 Gram(s) IV Intermittent every 8 hours  QUEtiapine 50 milliGRAM(s) Oral at bedtime  simvastatin 40 milliGRAM(s) Oral at bedtime  sodium chloride 1 Gram(s) Oral every 8 hours  tamsulosin 0.4 milliGRAM(s) Oral at bedtime    MEDICATIONS  (PRN):  acetaminophen   Tablet .. 650 milliGRAM(s) Oral every 6 hours PRN Temp greater or equal to 38C (100.4F), Mild Pain (1 - 3)  acetaminophen  IVPB .. 1000 milliGRAM(s) IV Intermittent once PRN Moderate Pain (4 - 6)  dextrose 40% Gel 15 Gram(s) Oral once PRN Blood Glucose LESS THAN 70 milliGRAM(s)/deciliter  glucagon  Injectable 1 milliGRAM(s) IntraMuscular once PRN Glucose LESS THAN 70 milligrams/deciliter  ondansetron Injectable 4 milliGRAM(s) IV Push every 6 hours PRN Nausea    Allergies    No Known Allergies    Intolerances      Social: no smoking, no alcohol, no illegal drugs; no recent travel, no exposure to TB  FAMILY HISTORY:  No pertinent family history in first degree relatives      no history of premature cardiovascular disease in first degree relatives    ROS: the patient denies fever, no chills, no HA, no seizures, no dizziness, no sore throat, no nasal congestion, no blurry vision, no CP, no palpitations, no SOB, no cough, no abdominal pain, no diarrhea, no N/V, no dysuria, no leg pain, no claudication, no rash, no joint aches, no rectal pain or bleeding, no night sweats  All other systems reviewed and are negative    Vital Signs Last 24 Hrs  T(C): 37.3 (12 Oct 2020 08:13), Max: 40.6 (11 Oct 2020 19:00)  T(F): 99.1 (12 Oct 2020 08:13), Max: 105 (11 Oct 2020 19:00)  HR: 64 (12 Oct 2020 08:13) (64 - 120)  BP: 124/58 (12 Oct 2020 08:13) (120/68 - 149/81)  BP(mean): --  RR: 17 (12 Oct 2020 08:13) (17 - 19)  SpO2: 93% (12 Oct 2020 08:13) (90% - 94%)  Daily     Daily     PE:    Constitutional:  No acute distress  HEENT: NC/AT, EOMI, PERRLA, conjunctivae clear; ears and nose atraumatic; pharynx benign  Neck: supple; thyroid not palpable  Back: no tenderness  Respiratory: respiratory effort normal; clear to auscultation  Cardiovascular: S1S2 regular, no murmurs  Abdomen: soft, not tender, not distended, positive BS; no liver or spleen organomegaly  Genitourinary: no suprapubic tenderness  Lymphatic: no LN palpable  Musculoskeletal: no muscle tenderness, no joint swelling or tenderness  Extremities: no pedal edema  Neurological/ Psychiatric: confused, judgement and insight impaired; moving all extremities  Skin: no rashes; no palpable lesions    Labs: all available labs reviewed                        9.5    18.68 )-----------( 226      ( 12 Oct 2020 08:02 )             28.1     10-12    139  |  105  |  28<H>  ----------------------------<  165<H>  4.0   |  30  |  0.78    Ca    8.0<L>      12 Oct 2020 08:02      Culture - Urine (collected 05 Oct 2020 01:40)  Source: .Urine Clean Catch (Midstream)  Final Report (06 Oct 2020 15:45):    No growth    Culture - Blood (collected 04 Oct 2020 20:42)  Source: .Blood None  Final Report (10 Oct 2020 02:00):    No Growth Final    Culture - Blood (collected 04 Oct 2020 20:42)  Source: .Blood None  Final Report (10 Oct 2020 02:00):    No Growth Final      Radiology: all available radiological tests reviewed    < from: Xray Chest 1 View AP/PA. (10.11.20 @ 20:01) >  The lungs show a subtle asymmetric RIGHT lung base a diffuse airspace disease. Remaining lung parenchyma clear.  The heart and mediastinum are within normal limits.  Visualized osseous structures are intact.    < end of copied text >      Advanced directives addressed: full resuscitation Patient is a 89y old  Male who presents with a chief complaint of multiple falls, SDH     HPI:  88 y/o male with h/o GERD, BPH, DM type 2, HLD, HTN, CAD was admitted on 10/4 for AMS. Pt lives at home alone. Pt's neighbor, who delivers food to the patient, called the ambulance due to him not being "himself" this evening; Pt states that he fell three times. Workup showed a SDH and the patient underwent emergent craniotomy for subdural hematoma evacuation 06-Oct-2020. Postoperative, the patient is almost non verbal but per nursing ambulates well and follow commands; he had difficulty; had multiple straight cath for retention and as per surgery Whitmore will be placed. He received ceftriaxone/ azithromycin, cefazolin, then zosyn.  Overnight he was noted febrile to 105F    Old record 12/2019 indicates functional, verbal patient.    PAST MEDICAL HISTORY:  BPH (benign prostatic hyperplasia)   CAD (coronary artery disease)   DM (diabetes mellitus)   GERD (gastroesophageal reflux disease)   HLD (hyperlipidemia)   HTN (hypertension).     PAST SURGICAL HISTORY:  No significant past surgical history.     Meds: per reconciliation sheet, noted below  MEDICATIONS  (STANDING):  amLODIPine   Tablet 10 milliGRAM(s) Oral daily  dextrose 5%. 1000 milliLiter(s) (50 mL/Hr) IV Continuous <Continuous>  dextrose 50% Injectable 12.5 Gram(s) IV Push once  dextrose 50% Injectable 25 Gram(s) IV Push once  dextrose 50% Injectable 25 Gram(s) IV Push once  finasteride 5 milliGRAM(s) Oral daily  gabapentin 300 milliGRAM(s) Oral three times a day  influenza  Vaccine (HIGH DOSE) 0.7 milliLiter(s) IntraMuscular once  insulin lispro (HumaLOG) corrective regimen sliding scale   SubCutaneous Before meals and at bedtime  metoprolol tartrate 25 milliGRAM(s) Oral two times a day  multivitamin 1 Tablet(s) Oral daily  piperacillin/tazobactam IVPB.. 3.375 Gram(s) IV Intermittent every 8 hours  QUEtiapine 50 milliGRAM(s) Oral at bedtime  simvastatin 40 milliGRAM(s) Oral at bedtime  sodium chloride 1 Gram(s) Oral every 8 hours  tamsulosin 0.4 milliGRAM(s) Oral at bedtime    MEDICATIONS  (PRN):  acetaminophen   Tablet .. 650 milliGRAM(s) Oral every 6 hours PRN Temp greater or equal to 38C (100.4F), Mild Pain (1 - 3)  acetaminophen  IVPB .. 1000 milliGRAM(s) IV Intermittent once PRN Moderate Pain (4 - 6)  dextrose 40% Gel 15 Gram(s) Oral once PRN Blood Glucose LESS THAN 70 milliGRAM(s)/deciliter  glucagon  Injectable 1 milliGRAM(s) IntraMuscular once PRN Glucose LESS THAN 70 milligrams/deciliter  ondansetron Injectable 4 milliGRAM(s) IV Push every 6 hours PRN Nausea    Allergies    No Known Allergies    Intolerances      Social: no smoking, no alcohol, no illegal drugs; no recent travel, no exposure to TB  FAMILY HISTORY:  No pertinent family history in first degree relatives      no history of premature cardiovascular disease in first degree relatives    ROS: the patient is confused; poorly verbal  All other systems reviewed and are negative    Vital Signs Last 24 Hrs  T(C): 37.3 (12 Oct 2020 08:13), Max: 40.6 (11 Oct 2020 19:00)  T(F): 99.1 (12 Oct 2020 08:13), Max: 105 (11 Oct 2020 19:00)  HR: 64 (12 Oct 2020 08:13) (64 - 120)  BP: 124/58 (12 Oct 2020 08:13) (120/68 - 149/81)  BP(mean): --  RR: 17 (12 Oct 2020 08:13) (17 - 19)  SpO2: 93% (12 Oct 2020 08:13) (90% - 94%)  Daily     Daily     PE:    Constitutional:  No acute distress  HEENT: NC/AT, EOMI, PERRLA, conjunctivae clear; ears and nose atraumatic; pharynx benign  Neck: supple; thyroid not palpable  Back: no tenderness  Respiratory: respiratory effort normal; clear to auscultation  Cardiovascular: S1S2 regular, no murmurs  Abdomen: soft, not tender, not distended, positive BS; no liver or spleen organomegaly  Genitourinary: no suprapubic tenderness  Lymphatic: no LN palpable  Musculoskeletal: no muscle tenderness, no joint swelling or tenderness  Extremities: no pedal edema  Neurological/ Psychiatric: confused, judgement and insight impaired; moving all extremities  Skin: no rashes; no palpable lesions    Labs: all available labs reviewed                        9.5    18.68 )-----------( 226      ( 12 Oct 2020 08:02 )             28.1     10-12    139  |  105  |  28<H>  ----------------------------<  165<H>  4.0   |  30  |  0.78    Ca    8.0<L>      12 Oct 2020 08:02      Culture - Urine (collected 05 Oct 2020 01:40)  Source: .Urine Clean Catch (Midstream)  Final Report (06 Oct 2020 15:45):    No growth    Culture - Blood (collected 04 Oct 2020 20:42)  Source: .Blood None  Final Report (10 Oct 2020 02:00):    No Growth Final    Culture - Blood (collected 04 Oct 2020 20:42)  Source: .Blood None  Final Report (10 Oct 2020 02:00):    No Growth Final      Radiology: all available radiological tests reviewed    < from: Xray Chest 1 View AP/PA. (10.11.20 @ 20:01) >  The lungs show a subtle asymmetric RIGHT lung base a diffuse airspace disease. Remaining lung parenchyma clear.  The heart and mediastinum are within normal limits.  Visualized osseous structures are intact.    < end of copied text >      Advanced directives addressed: full resuscitation

## 2020-10-12 NOTE — PHYSICAL THERAPY INITIAL EVALUATION ADULT - LIVES WITH, PROFILE
alone; Lives alone has a neighbor that checks on him regularly., pt has 2 steps to enter house, ranch house/alone
alone/Lives alone has a neighbor that checks on him regularly., pt has 2 steps to enter house, [place] house

## 2020-10-12 NOTE — PROGRESS NOTE ADULT - ASSESSMENT
89M.  admitted 10/04.  s/p fall and SDH.  s/p craniotomy and evacuation.  downgraded to med-surg from ICU.    sepsis.    -r/o aspiration PN.  -10/11 Tm 105F.  -LA normalized.  -BCx and UCx received by lab and are pending.  -CXR (preliminary), possible new RLL infiltrate as compared to 10/05 imaging.  f/u official reading.  -Zosyn started.  -IVFs administered.  -ID consult.    SDH.  -remains minimally verbal.  -10/05 craniotomy.  -AED DC'd after 7 days.  -NSx following.    urinary retention.  -Whitmore catheter resumed.  -tamsulosin + finasteride.  -10/12 TOV, patient continues to retain urine.    debility.  -mobilize/PT.    hx HTN.  -metoprolol tartrate 25mg bid.  -amlodipine 10mg po qd.    hx DM.  -correction insulin coverage.    disposition.  -anticipate transfer to rehab.   89M.  admitted 10/04.  s/p fall and SDH.  s/p craniotomy and evacuation.  downgraded to med-surg from ICU.    SIRS.      -ddx:  sepsis (? aspiration) v. central fever s/p craniotomy.   -10/11 Tm 105F.  -LA normalized.  -BCx and UCx received by lab and are pending.  -CXR (preliminary), possible new RLL infiltrate as compared to 10/05 imaging.  f/u official reading.  -Zosyn started.  -IVFs administered.  -ID consult.    SDH.  -remains minimally verbal.  -10/05 craniotomy.  -AED DC'd after 7 days.  -NSx following.    urinary retention.  -Whitmore catheter resumed.  -tamsulosin + finasteride.  -10/12 TOV, patient continues to retain urine.    debility.  -mobilize/PT.    hx HTN.  -metoprolol tartrate 25mg bid.  -amlodipine 10mg po qd.    hx DM.  -correction insulin coverage.    disposition.  -anticipate transfer to rehab.    communication.  -RN.  -Sx.

## 2020-10-12 NOTE — PHYSICAL THERAPY INITIAL EVALUATION ADULT - CRITERIA FOR SKILLED THERAPEUTIC INTERVENTIONS
predicted duration of therapy intervention/functional limitations in following categories/therapy frequency/anticipated discharge recommendation/impairments found/risk reduction/prevention/rehab potential/anticipated equipment needs at discharge
anticipated discharge recommendation/therapy frequency/predicted duration of therapy intervention/rehab potential/anticipated equipment needs at discharge/impairments found/functional limitations in following categories/risk reduction/prevention

## 2020-10-12 NOTE — PROGRESS NOTE ADULT - SUBJECTIVE AND OBJECTIVE BOX
CC:  Patient is a 89y old  Male who presents with a chief complaint of multiple falls, SDH (11 Oct 2020 12:01)    SUBJECTIVE:     -no new complaints or issues at current time.    ROS:  all other review of systems are negative unless indicated above.    acetaminophen   Tablet .. 650 milliGRAM(s) Oral every 6 hours PRN  acetaminophen  IVPB .. 1000 milliGRAM(s) IV Intermittent once PRN  amLODIPine   Tablet 10 milliGRAM(s) Oral daily  dextrose 40% Gel 15 Gram(s) Oral once PRN  dextrose 5%. 1000 milliLiter(s) IV Continuous <Continuous>  dextrose 50% Injectable 12.5 Gram(s) IV Push once  dextrose 50% Injectable 25 Gram(s) IV Push once  dextrose 50% Injectable 25 Gram(s) IV Push once  finasteride 5 milliGRAM(s) Oral daily  gabapentin 300 milliGRAM(s) Oral three times a day  glucagon  Injectable 1 milliGRAM(s) IntraMuscular once PRN  influenza  Vaccine (HIGH DOSE) 0.7 milliLiter(s) IntraMuscular once  insulin lispro (HumaLOG) corrective regimen sliding scale   SubCutaneous Before meals and at bedtime  metoprolol tartrate 25 milliGRAM(s) Oral two times a day  multivitamin 1 Tablet(s) Oral daily  ondansetron Injectable 4 milliGRAM(s) IV Push every 6 hours PRN  piperacillin/tazobactam IVPB.. 3.375 Gram(s) IV Intermittent every 8 hours  QUEtiapine 50 milliGRAM(s) Oral at bedtime  simvastatin 40 milliGRAM(s) Oral at bedtime  sodium chloride 1 Gram(s) Oral every 8 hours  tamsulosin 0.4 milliGRAM(s) Oral at bedtime    T(C): 37.3 (10-12-20 @ 08:13), Max: 40.6 (10-11-20 @ 19:00)  HR: 64 (10-12-20 @ 08:13) (64 - 120)  BP: 124/58 (10-12-20 @ 08:13) (120/68 - 149/81)  RR: 17 (10-12-20 @ 08:13) (17 - 19)  SpO2: 93% (10-12-20 @ 08:13) (90% - 94%)    Constitutional: NAD.   HEENT: PERRL, EOMI, MMM.  Neck: Soft and supple, No carotid bruit, No JVD  Respiratory: Breath sounds are clear bilaterally, No wheezing, rales or rhonchi  Cardiovascular: S1 and S2, regular rate and rhythm, no murmur, rub or gallop.  Gastrointestinal: Bowel Sounds present, soft, nontender, nondistended, no guarding, no rebound, no mass.  Extremities: No peripheral edema  Vascular: 2+ peripheral pulses  Neurological: A/O x , no focal deficits  Musculoskeletal: 5/5 strength b/l upper and lower extremities  Skin:  no visible rashes.                         9.5    18.68 )-----------( 226      ( 12 Oct 2020 08:02 )             28.1       10-12    139  |  105  |  28<H>  ----------------------------<  165<H>  4.0   |  30  |  0.78    Ca    8.0<L>      12 Oct 2020 08:02

## 2020-10-12 NOTE — PROGRESS NOTE ADULT - ASSESSMENT
A/P:  Fever workup  S/P emergent craniotomy with SDH  Neurosurgery  ID consult pending  F/U labs, radiologic studies  Hospitalist on consult for medical management  Urinary retention, dawson cath placement  Monitor neurological status  Cont current care and meds  Sppech/swallow on consult/evaluation  Aspiration risk precautions  Fall risk precautions

## 2020-10-12 NOTE — PHYSICAL THERAPY INITIAL EVALUATION ADULT - FOLLOWS COMMANDS/ANSWERS QUESTIONS, REHAB EVAL
slight [place]/75% of the time
able to follow single-step instructions/25% of the time/unable to answer questions

## 2020-10-12 NOTE — PHYSICAL THERAPY INITIAL EVALUATION ADULT - LEVEL OF INDEPENDENCE, REHAB EVAL
minimum assist (75% patients effort)
deferred oob due to AMS, LE venous dopplers pending/unable to perform

## 2020-10-12 NOTE — PHYSICAL THERAPY INITIAL EVALUATION ADULT - IMPAIRMENTS FOUND, PT EVAL
cognitive impairment/aerobic capacity/endurance/arousal, attention, and cognition/gross motor/gait, locomotion, and balance/joint integrity and mobility/muscle strength
muscle strength/gross motor/gait, locomotion, and balance

## 2020-10-13 LAB
CULTURE RESULTS: NO GROWTH — SIGNIFICANT CHANGE UP
SPECIMEN SOURCE: SIGNIFICANT CHANGE UP

## 2020-10-13 PROCEDURE — 99232 SBSQ HOSP IP/OBS MODERATE 35: CPT

## 2020-10-13 PROCEDURE — 99231 SBSQ HOSP IP/OBS SF/LOW 25: CPT

## 2020-10-13 RX ADMIN — Medication 1: at 21:31

## 2020-10-13 RX ADMIN — QUETIAPINE FUMARATE 50 MILLIGRAM(S): 200 TABLET, FILM COATED ORAL at 21:30

## 2020-10-13 RX ADMIN — Medication 650 MILLIGRAM(S): at 17:05

## 2020-10-13 RX ADMIN — GABAPENTIN 300 MILLIGRAM(S): 400 CAPSULE ORAL at 21:30

## 2020-10-13 RX ADMIN — Medication 650 MILLIGRAM(S): at 21:30

## 2020-10-13 RX ADMIN — SODIUM CHLORIDE 1 GRAM(S): 9 INJECTION INTRAMUSCULAR; INTRAVENOUS; SUBCUTANEOUS at 21:30

## 2020-10-13 RX ADMIN — PIPERACILLIN AND TAZOBACTAM 25 GRAM(S): 4; .5 INJECTION, POWDER, LYOPHILIZED, FOR SOLUTION INTRAVENOUS at 14:16

## 2020-10-13 RX ADMIN — Medication 25 MILLIGRAM(S): at 12:15

## 2020-10-13 RX ADMIN — Medication 1000 MILLIGRAM(S): at 01:49

## 2020-10-13 RX ADMIN — Medication 400 MILLIGRAM(S): at 00:15

## 2020-10-13 RX ADMIN — GABAPENTIN 300 MILLIGRAM(S): 400 CAPSULE ORAL at 14:16

## 2020-10-13 RX ADMIN — SODIUM CHLORIDE 1 GRAM(S): 9 INJECTION INTRAMUSCULAR; INTRAVENOUS; SUBCUTANEOUS at 14:16

## 2020-10-13 RX ADMIN — Medication 1: at 17:30

## 2020-10-13 RX ADMIN — GABAPENTIN 300 MILLIGRAM(S): 400 CAPSULE ORAL at 06:11

## 2020-10-13 RX ADMIN — PIPERACILLIN AND TAZOBACTAM 25 GRAM(S): 4; .5 INJECTION, POWDER, LYOPHILIZED, FOR SOLUTION INTRAVENOUS at 06:11

## 2020-10-13 RX ADMIN — Medication 25 MILLIGRAM(S): at 21:30

## 2020-10-13 RX ADMIN — Medication 1: at 08:40

## 2020-10-13 RX ADMIN — Medication 1 TABLET(S): at 09:48

## 2020-10-13 RX ADMIN — FINASTERIDE 5 MILLIGRAM(S): 5 TABLET, FILM COATED ORAL at 09:48

## 2020-10-13 RX ADMIN — PIPERACILLIN AND TAZOBACTAM 25 GRAM(S): 4; .5 INJECTION, POWDER, LYOPHILIZED, FOR SOLUTION INTRAVENOUS at 21:31

## 2020-10-13 RX ADMIN — Medication 650 MILLIGRAM(S): at 22:35

## 2020-10-13 RX ADMIN — TAMSULOSIN HYDROCHLORIDE 0.4 MILLIGRAM(S): 0.4 CAPSULE ORAL at 21:30

## 2020-10-13 RX ADMIN — Medication 650 MILLIGRAM(S): at 15:22

## 2020-10-13 RX ADMIN — SIMVASTATIN 40 MILLIGRAM(S): 20 TABLET, FILM COATED ORAL at 21:30

## 2020-10-13 RX ADMIN — SODIUM CHLORIDE 1 GRAM(S): 9 INJECTION INTRAMUSCULAR; INTRAVENOUS; SUBCUTANEOUS at 06:11

## 2020-10-13 NOTE — PROGRESS NOTE ADULT - SUBJECTIVE AND OBJECTIVE BOX
HPI: This is a 89y old  Male who presented with a chief complaint of mechanical fall earlier today. States he was on his way to the bathroom when he fell and after he fell was unsteady and fell 2 more times. Sustained right SDH. Denies LOC, vomiting or headache. Denies focal weakness or numbness. Troponin slightly elevated and CT C/A/P shows questionable right rib fracture as well. Lives alone has a neighbor that checks on him regularly. Takes asa 81mg daily.     10/5- Pt seen and examined, no events overnight, pt awake alert, oriented X3, denies headache/nausea/vomiting/visual changes, no events overnight.  Repeat CT head today.    10/6- Pt had acute change in mental status, pt taken to the OR for right craniotomy and evacuation of SDH     10/7- POD #1 s/p Craniotomy and evacuation of SDH, right sided subdural drain left in place, pt seen with both Dr. Dyson and Dr. Arcos this am.  Pt attendings reviewed the CT done at 6:30am and were not concerned about epidural bleeding. No events overnight. Subdural drain output: 490 since surgery.    10/8- POD #2, pt seen and examined with Dr. Obrien, pt awake and alert, brighter than yesterday, minimal drain output, drain pulled by Dr. Obrien. Pt on Dysphagia diet and tolerating PO, afebrile.     10/13 POD# 3 pt seen and examined earlier this am patient asleep but easily arousable with no complaint febrile overnight    Vital Signs Last 24 Hrs  T(C): 37.8 (13 Oct 2020 14:00), Max: 38.4 (12 Oct 2020 23:42)  T(F): 100.1 (13 Oct 2020 14:00), Max: 101.2 (12 Oct 2020 23:42)  HR: 72 (13 Oct 2020 14:00) (57 - 76)  BP: 135/58 (13 Oct 2020 14:00) (111/52 - 135/58)  BP(mean): --  RR: 17 (13 Oct 2020 08:12) (16 - 23)  SpO2: 100% (13 Oct 2020 08:12) (79% - 100%)    MEDICATIONS  (STANDING):  amLODIPine   Tablet 10 milliGRAM(s) Oral daily  dextrose 5%. 1000 milliLiter(s) (50 mL/Hr) IV Continuous <Continuous>  dextrose 50% Injectable 12.5 Gram(s) IV Push once  dextrose 50% Injectable 25 Gram(s) IV Push once  dextrose 50% Injectable 25 Gram(s) IV Push once  finasteride 5 milliGRAM(s) Oral daily  gabapentin 300 milliGRAM(s) Oral three times a day  influenza  Vaccine (HIGH DOSE) 0.7 milliLiter(s) IntraMuscular once  insulin lispro (HumaLOG) corrective regimen sliding scale   SubCutaneous Before meals and at bedtime  metoprolol tartrate 25 milliGRAM(s) Oral two times a day  multivitamin 1 Tablet(s) Oral daily  piperacillin/tazobactam IVPB.. 3.375 Gram(s) IV Intermittent every 8 hours  QUEtiapine 50 milliGRAM(s) Oral at bedtime  simvastatin 40 milliGRAM(s) Oral at bedtime  sodium chloride 1 Gram(s) Oral every 8 hours  tamsulosin 0.4 milliGRAM(s) Oral at bedtime                          9.5    18.68 )-----------( 226      ( 12 Oct 2020 08:02 )             28.1     Neurological exam:  HF: lethragic but arousable tovoice, keeps eyes closed, followed simple commands with repetative questioning, minimal verbal output but oriented to questions  CN: PERRL, EOMI, VFF, facial sensation normal, no NLFD, tongue midline  Motor: moving all extremities antigravity, with generalized weakness, left side slightly weaker than right   Sens: Intact to light touch   Reflexes: Symmetric and normal, downgoing toes b/l  Coord: unable to complete  Gait/Balance: unable to assess

## 2020-10-13 NOTE — PROGRESS NOTE ADULT - SUBJECTIVE AND OBJECTIVE BOX
CC:Patient is a 89y old  Male who presents with a chief complaint of multiple falls, SDH (13 Oct 2020 08:42)      Subjective:  Pt seen and examined at bedside with chaperone. Pt is drowsy, responsive, pt in no acute distress. No reported c/o chills, chest pain, SOB, abd pain, N/V/D, extremity pain or dysfunction, hemoptysis, hematemesis, hematuria, hematochexia, headache, diplopia, vertigo, dizzyness.   Pt had fever overnight, pt being treated for likely aspiration pneumonia    ROS:  otherwise as abovementioned ROS    Vital Signs Last 24 Hrs  T(C): 37.7 (13 Oct 2020 09:48), Max: 38.4 (12 Oct 2020 23:42)  T(F): 99.8 (13 Oct 2020 09:48), Max: 101.2 (12 Oct 2020 23:42)  HR: 69 (13 Oct 2020 09:48) (57 - 76)  BP: 111/52 (13 Oct 2020 09:48) (111/52 - 135/56)  BP(mean): --  RR: 17 (13 Oct 2020 08:12) (16 - 23)  SpO2: 100% (13 Oct 2020 08:12) (79% - 100%)    Labs:                                9.5    18.68 )-----------( 226      ( 12 Oct 2020 08:02 )             28.1     CBC Full  -  ( 12 Oct 2020 08:02 )  WBC Count : 18.68 K/uL  RBC Count : 2.98 M/uL  Hemoglobin : 9.5 g/dL  Hematocrit : 28.1 %  Platelet Count - Automated : 226 K/uL  Mean Cell Volume : 94.3 fl  Mean Cell Hemoglobin : 31.9 pg  Mean Cell Hemoglobin Concentration : 33.8 gm/dL  Auto Neutrophil # : x  Auto Lymphocyte # : x  Auto Monocyte # : x  Auto Eosinophil # : x  Auto Basophil # : x  Auto Neutrophil % : x  Auto Lymphocyte % : x  Auto Monocyte % : x  Auto Eosinophil % : x  Auto Basophil % : x    10-12    139  |  105  |  28<H>  ----------------------------<  165<H>  4.0   |  30  |  0.78    Ca    8.0<L>      12 Oct 2020 08:02              Meds:  acetaminophen   Tablet .. 650 milliGRAM(s) Oral every 6 hours PRN  acetaminophen  IVPB .. 1000 milliGRAM(s) IV Intermittent once PRN  acetaminophen  IVPB .. 1000 milliGRAM(s) IV Intermittent every 6 hours PRN  amLODIPine   Tablet 10 milliGRAM(s) Oral daily  dextrose 40% Gel 15 Gram(s) Oral once PRN  dextrose 5%. 1000 milliLiter(s) IV Continuous <Continuous>  dextrose 50% Injectable 12.5 Gram(s) IV Push once  dextrose 50% Injectable 25 Gram(s) IV Push once  dextrose 50% Injectable 25 Gram(s) IV Push once  finasteride 5 milliGRAM(s) Oral daily  gabapentin 300 milliGRAM(s) Oral three times a day  glucagon  Injectable 1 milliGRAM(s) IntraMuscular once PRN  influenza  Vaccine (HIGH DOSE) 0.7 milliLiter(s) IntraMuscular once  insulin lispro (HumaLOG) corrective regimen sliding scale   SubCutaneous Before meals and at bedtime  metoprolol tartrate 25 milliGRAM(s) Oral two times a day  multivitamin 1 Tablet(s) Oral daily  ondansetron Injectable 4 milliGRAM(s) IV Push every 6 hours PRN  piperacillin/tazobactam IVPB.. 3.375 Gram(s) IV Intermittent every 8 hours  QUEtiapine 50 milliGRAM(s) Oral at bedtime  simvastatin 40 milliGRAM(s) Oral at bedtime  sodium chloride 1 Gram(s) Oral every 8 hours  tamsulosin 0.4 milliGRAM(s) Oral at bedtime      Radiology:    < from: US Duplex Venous Lower Ext Complete, Bilateral (10.12.20 @ 16:18) >    EXAM:  US DPLX LWR EXT VEINS COMPL BI                            PROCEDURE DATE:  10/12/2020          INTERPRETATION:  CLINICAL INFORMATION: Postop fever    COMPARISON: 5/1/2020    TECHNIQUE: Duplex sonography of the BILATERAL LOWER extremity veinswith color and spectral Doppler, with and without compression.    FINDINGS:    There is normal compressibility of the bilateral common femoral, femoral and popliteal veins.  Doppler examination shows normal spontaneous and phasic flow.    No calf vein thrombosis is detected.    IMPRESSION:  No evidence of deep venous thrombosis in either lower extremity.                GRIFFIN ESPARZA M.D., ATTENDING RADIOLOGIST  This document has been electronically signed. Oct 12 2020  4:23PM    < end of copied text >    Physical exam:  Pt is arousable, drowsy  Pt in no acute distress  HEENT: s/p craniotomy  Neck: no JVD, no tracheal deviation, soft and supple  Psych: drowsy  Resp: CTAB  CVS: S1S2(+)  ABD: bowel sounds (+), soft, non distended, no rebound, no guarding, no rigidity, no skin changes to exam. No tenderness to exam  EXT: no calf tenderness or edema to exam b/l, on VTE prophylaxis  Skin: no adverse skin changes to exam

## 2020-10-13 NOTE — PROGRESS NOTE ADULT - ASSESSMENT
88 y/o male with h/o GERD, BPH, DM type 2, HLD, HTN, CAD was admitted on 10/4 for AMS. Pt lives at home alone. Pt's neighbor, who delivers food to the patient, called the ambulance due to him not being "himself" this evening; Pt states that he fell three times. Workup showed a SDH and the patient underwent emergent craniotomy for subdural hematoma evacuation 06-Oct-2020. Postoperative, the patient is almost non verbal but per nursing ambulates well and follow commands; he had difficulty; had multiple straight cath for retention and as per surgery Whitmore will be placed. He received ceftriaxone/ azithromycin, cefazolin, then zosyn.    1. Febrile syndrome. Right pulmonary infiltrate. Probable aspiration pneumonia. SDH s/p craniotomy. BPH.  -at risk for urinary retention  -leukocytosis  -f/u BC x 2  -on zosyn 3.375 gm IV q8h # 2  -tolerating abx well so far; no side effects noted  -aspiration precautions  -speech evaluation  -continue abx coverage  -monitor temps  -f/u CBC  -supportive care  2. Other issues:   -care per medicine

## 2020-10-13 NOTE — PROGRESS NOTE ADULT - SUBJECTIVE AND OBJECTIVE BOX
Date of service: 10-13-20 @ 08:43    Events noted  Febrile overnight to 101.2F  Lethargic, arousable  Has cough    ROS: denies pina; poorly verbal    MEDICATIONS  (STANDING):  amLODIPine   Tablet 10 milliGRAM(s) Oral daily  dextrose 5%. 1000 milliLiter(s) (50 mL/Hr) IV Continuous <Continuous>  dextrose 50% Injectable 12.5 Gram(s) IV Push once  dextrose 50% Injectable 25 Gram(s) IV Push once  dextrose 50% Injectable 25 Gram(s) IV Push once  finasteride 5 milliGRAM(s) Oral daily  gabapentin 300 milliGRAM(s) Oral three times a day  influenza  Vaccine (HIGH DOSE) 0.7 milliLiter(s) IntraMuscular once  insulin lispro (HumaLOG) corrective regimen sliding scale   SubCutaneous Before meals and at bedtime  metoprolol tartrate 25 milliGRAM(s) Oral two times a day  multivitamin 1 Tablet(s) Oral daily  piperacillin/tazobactam IVPB.. 3.375 Gram(s) IV Intermittent every 8 hours  QUEtiapine 50 milliGRAM(s) Oral at bedtime  simvastatin 40 milliGRAM(s) Oral at bedtime  sodium chloride 1 Gram(s) Oral every 8 hours  tamsulosin 0.4 milliGRAM(s) Oral at bedtime    Vital Signs Last 24 Hrs  T(C): 37.7 (13 Oct 2020 08:12), Max: 38.4 (12 Oct 2020 23:42)  T(F): 99.8 (13 Oct 2020 08:12), Max: 101.2 (12 Oct 2020 23:42)  HR: 73 (13 Oct 2020 08:12) (57 - 76)  BP: 113/56 (13 Oct 2020 08:12) (113/56 - 135/56)  BP(mean): --  RR: 17 (13 Oct 2020 08:12) (16 - 23)  SpO2: 100% (13 Oct 2020 08:12) (79% - 100%)     Physical exam:    Constitutional:  No acute distress  HEENT: NC/AT, EOMI, PERRLA, conjunctivae clear  Neck: supple; thyroid not palpable  Back: no tenderness  Respiratory: respiratory effort normal; crackles at bases  Cardiovascular: S1S2 regular, no murmurs  Abdomen: soft, not tender, not distended, positive BS  Genitourinary: no suprapubic tenderness  Lymphatic: no LN palpable  Musculoskeletal: no muscle tenderness, no joint swelling or tenderness  Extremities: no pedal edema  Neurological/ Psychiatric: confused, moving all extremities  Skin: no rashes; no palpable lesions    Labs: reviewed                        9.5    18.68 )-----------( 226      ( 12 Oct 2020 08:02 )             28.1     10-12    139  |  105  |  28<H>  ----------------------------<  165<H>  4.0   |  30  |  0.78    Ca    8.0<L>      12 Oct 2020 08:02      Culture - Urine (collected 05 Oct 2020 01:40)  Source: .Urine Clean Catch (Midstream)  Final Report (06 Oct 2020 15:45):    No growth    Culture - Blood (collected 04 Oct 2020 20:42)  Source: .Blood None  Final Report (10 Oct 2020 02:00):    No Growth Final    Culture - Blood (collected 04 Oct 2020 20:42)  Source: .Blood None  Final Report (10 Oct 2020 02:00):    No Growth Final      Culture - Blood (collected 11 Oct 2020 20:25)  Source: .Blood None  Preliminary Report (13 Oct 2020 01:03):    No growth to date.    Culture - Blood (collected 11 Oct 2020 20:25)  Source: .Blood None  Preliminary Report (13 Oct 2020 01:03):    No growth to date.    Culture - Urine (collected 05 Oct 2020 01:40)  Source: .Urine Clean Catch (Midstream)  Final Report (06 Oct 2020 15:45):    No growth    Radiology: all available radiological tests reviewed    < from: Xray Chest 1 View AP/PA. (10.11.20 @ 20:01) >  The lungs show a subtle asymmetric RIGHT lung base a diffuse airspace disease. Remaining lung parenchyma clear.  The heart and mediastinum are within normal limits.  Visualized osseous structures are intact.    < end of copied text >      Advanced directives addressed: full resuscitation

## 2020-10-13 NOTE — PROGRESS NOTE ADULT - ASSESSMENT
89M.  admitted 10/04.  s/p fall and SDH.  s/p craniotomy and evacuation.  downgraded to med-surg from ICU.    sepsis (? aspiration).   -BCx and UCx, no growth.  -CXR, RLL infiltrate.  -Zosyn.  -modified diet, puree nectar.    SDH.  -remains minimally verbal.  -10/05 craniotomy.  -AED DC'd after 7 days.  -NSx following.    urinary retention.  -Whitmore catheter resumed.  -tamsulosin + finasteride.  -10/12 TOV, patient continues to retain urine.    debility.  -mobilize/PT.    hx HTN.  -metoprolol tartrate 25mg bid.  -amlodipine 10mg po qd.    hx DM.  -correction insulin coverage.    disposition.  -anticipate transfer to rehab.    communication.  -RN.  -Sx.  -10/13 patient's son.

## 2020-10-13 NOTE — PROGRESS NOTE ADULT - ASSESSMENT
AP  89 year old man s/p fall at home had acute change in exam yesterday and was taken to the OR for evacuation of SDH     POD #9  s/p Craniotomy and evacuation of SDH  continue supporitve measure per medicine, mobilize as tolerated  fever work up in progress

## 2020-10-13 NOTE — PROGRESS NOTE ADULT - SUBJECTIVE AND OBJECTIVE BOX
CC:  Patient is a 89y old  Male who presents with a chief complaint of multiple falls, SDH (13 Oct 2020 14:42)    SUBJECTIVE:     -no new complaints or issues at current time.    ROS:  all other review of systems are negative unless indicated above.    acetaminophen   Tablet .. 650 milliGRAM(s) Oral every 6 hours PRN  acetaminophen  IVPB .. 1000 milliGRAM(s) IV Intermittent once PRN  acetaminophen  IVPB .. 1000 milliGRAM(s) IV Intermittent every 6 hours PRN  amLODIPine   Tablet 10 milliGRAM(s) Oral daily  dextrose 40% Gel 15 Gram(s) Oral once PRN  dextrose 5%. 1000 milliLiter(s) IV Continuous <Continuous>  dextrose 50% Injectable 12.5 Gram(s) IV Push once  dextrose 50% Injectable 25 Gram(s) IV Push once  dextrose 50% Injectable 25 Gram(s) IV Push once  finasteride 5 milliGRAM(s) Oral daily  gabapentin 300 milliGRAM(s) Oral three times a day  glucagon  Injectable 1 milliGRAM(s) IntraMuscular once PRN  influenza  Vaccine (HIGH DOSE) 0.7 milliLiter(s) IntraMuscular once  insulin lispro (HumaLOG) corrective regimen sliding scale   SubCutaneous Before meals and at bedtime  metoprolol tartrate 25 milliGRAM(s) Oral two times a day  multivitamin 1 Tablet(s) Oral daily  ondansetron Injectable 4 milliGRAM(s) IV Push every 6 hours PRN  piperacillin/tazobactam IVPB.. 3.375 Gram(s) IV Intermittent every 8 hours  QUEtiapine 50 milliGRAM(s) Oral at bedtime  simvastatin 40 milliGRAM(s) Oral at bedtime  sodium chloride 1 Gram(s) Oral every 8 hours  tamsulosin 0.4 milliGRAM(s) Oral at bedtime    T(C): 38.1 (10-13-20 @ 15:22), Max: 38.4 (10-12-20 @ 23:42)  HR: 72 (10-13-20 @ 14:00) (57 - 73)  BP: 135/58 (10-13-20 @ 14:00) (111/52 - 135/58)  RR: 17 (10-13-20 @ 08:12) (16 - 23)  SpO2: 100% (10-13-20 @ 08:12) (79% - 100%)    Constitutional: NAD.   HEENT: PERRL, EOMI, MMM.  Neck: Soft and supple, No carotid bruit, No JVD  Respiratory: Breath sounds are clear bilaterally, No wheezing, rales or rhonchi  Cardiovascular: S1 and S2, regular rate and rhythm, no murmur, rub or gallop.  Gastrointestinal: Bowel Sounds present, soft, nontender, nondistended, no guarding, no rebound, no mass.  Extremities: No peripheral edema  Vascular: 2+ peripheral pulses  Neurological: A/O x , no focal deficits  Musculoskeletal: 5/5 strength b/l upper and lower extremities  Skin:  no visible rashes.                         9.5    18.68 )-----------( 226      ( 12 Oct 2020 08:02 )             28.1       10-12    139  |  105  |  28<H>  ----------------------------<  165<H>  4.0   |  30  |  0.78    Ca    8.0<L>      12 Oct 2020 08:02    Culture - Urine (10.11.20 @ 22:00)   Specimen Source: .Urine Catheterized   Culture Results:   No growth Culture - Urine (10.11.20 @ 22:00)   Culture - Blood (10.11.20 @ 20:25)   Specimen Source: .Blood None   Culture Results:   No growth to date.     Culture - Blood (10.11.20 @ 20:25)   Specimen Source: .Blood None   Culture Results:   No growth to date.     < from: Xray Chest 1 View AP/PA. (10.11.20 @ 20:01) >    IMPRESSION:   Mild RIGHT lower lung base diffuse airspace disease.  Confirmation lateral radiograph recommended.    < end of copied text >

## 2020-10-14 LAB
ANION GAP SERPL CALC-SCNC: 5 MMOL/L — SIGNIFICANT CHANGE UP (ref 5–17)
BUN SERPL-MCNC: 24 MG/DL — HIGH (ref 7–23)
CALCIUM SERPL-MCNC: 8 MG/DL — LOW (ref 8.5–10.1)
CHLORIDE SERPL-SCNC: 112 MMOL/L — HIGH (ref 96–108)
CO2 SERPL-SCNC: 28 MMOL/L — SIGNIFICANT CHANGE UP (ref 22–31)
CREAT SERPL-MCNC: 0.79 MG/DL — SIGNIFICANT CHANGE UP (ref 0.5–1.3)
GLUCOSE BLDC GLUCOMTR-MCNC: 125 MG/DL — HIGH (ref 70–99)
GLUCOSE BLDC GLUCOMTR-MCNC: 164 MG/DL — HIGH (ref 70–99)
GLUCOSE BLDC GLUCOMTR-MCNC: 166 MG/DL — HIGH (ref 70–99)
GLUCOSE SERPL-MCNC: 145 MG/DL — HIGH (ref 70–99)
HCT VFR BLD CALC: 27.6 % — LOW (ref 39–50)
HGB BLD-MCNC: 9.2 G/DL — LOW (ref 13–17)
MCHC RBC-ENTMCNC: 31.7 PG — SIGNIFICANT CHANGE UP (ref 27–34)
MCHC RBC-ENTMCNC: 33.3 GM/DL — SIGNIFICANT CHANGE UP (ref 32–36)
MCV RBC AUTO: 95.2 FL — SIGNIFICANT CHANGE UP (ref 80–100)
PLATELET # BLD AUTO: 242 K/UL — SIGNIFICANT CHANGE UP (ref 150–400)
POTASSIUM SERPL-MCNC: 3.6 MMOL/L — SIGNIFICANT CHANGE UP (ref 3.5–5.3)
POTASSIUM SERPL-SCNC: 3.6 MMOL/L — SIGNIFICANT CHANGE UP (ref 3.5–5.3)
RBC # BLD: 2.9 M/UL — LOW (ref 4.2–5.8)
RBC # FLD: 14 % — SIGNIFICANT CHANGE UP (ref 10.3–14.5)
SODIUM SERPL-SCNC: 145 MMOL/L — SIGNIFICANT CHANGE UP (ref 135–145)
WBC # BLD: 11.88 K/UL — HIGH (ref 3.8–10.5)
WBC # FLD AUTO: 11.88 K/UL — HIGH (ref 3.8–10.5)

## 2020-10-14 PROCEDURE — 99232 SBSQ HOSP IP/OBS MODERATE 35: CPT

## 2020-10-14 PROCEDURE — 99233 SBSQ HOSP IP/OBS HIGH 50: CPT

## 2020-10-14 RX ORDER — ACETAMINOPHEN 500 MG
650 TABLET ORAL EVERY 6 HOURS
Refills: 0 | Status: DISCONTINUED | OUTPATIENT
Start: 2020-10-14 | End: 2020-10-16

## 2020-10-14 RX ORDER — BETHANECHOL CHLORIDE 25 MG
50 TABLET ORAL
Refills: 0 | Status: DISCONTINUED | OUTPATIENT
Start: 2020-10-14 | End: 2020-10-16

## 2020-10-14 RX ADMIN — GABAPENTIN 300 MILLIGRAM(S): 400 CAPSULE ORAL at 20:47

## 2020-10-14 RX ADMIN — AMLODIPINE BESYLATE 10 MILLIGRAM(S): 2.5 TABLET ORAL at 09:12

## 2020-10-14 RX ADMIN — Medication 10 MILLIGRAM(S): at 18:27

## 2020-10-14 RX ADMIN — PIPERACILLIN AND TAZOBACTAM 25 GRAM(S): 4; .5 INJECTION, POWDER, LYOPHILIZED, FOR SOLUTION INTRAVENOUS at 05:25

## 2020-10-14 RX ADMIN — Medication 50 MILLIGRAM(S): at 20:47

## 2020-10-14 RX ADMIN — PIPERACILLIN AND TAZOBACTAM 25 GRAM(S): 4; .5 INJECTION, POWDER, LYOPHILIZED, FOR SOLUTION INTRAVENOUS at 14:05

## 2020-10-14 RX ADMIN — GABAPENTIN 300 MILLIGRAM(S): 400 CAPSULE ORAL at 05:25

## 2020-10-14 RX ADMIN — SODIUM CHLORIDE 1 GRAM(S): 9 INJECTION INTRAMUSCULAR; INTRAVENOUS; SUBCUTANEOUS at 14:05

## 2020-10-14 RX ADMIN — Medication 25 MILLIGRAM(S): at 09:12

## 2020-10-14 RX ADMIN — Medication 50 MILLIGRAM(S): at 12:38

## 2020-10-14 RX ADMIN — TAMSULOSIN HYDROCHLORIDE 0.4 MILLIGRAM(S): 0.4 CAPSULE ORAL at 20:47

## 2020-10-14 RX ADMIN — FINASTERIDE 5 MILLIGRAM(S): 5 TABLET, FILM COATED ORAL at 09:12

## 2020-10-14 RX ADMIN — SODIUM CHLORIDE 1 GRAM(S): 9 INJECTION INTRAMUSCULAR; INTRAVENOUS; SUBCUTANEOUS at 05:25

## 2020-10-14 RX ADMIN — SODIUM CHLORIDE 1 GRAM(S): 9 INJECTION INTRAMUSCULAR; INTRAVENOUS; SUBCUTANEOUS at 20:47

## 2020-10-14 RX ADMIN — Medication 1: at 09:06

## 2020-10-14 RX ADMIN — Medication 1: at 20:47

## 2020-10-14 RX ADMIN — PIPERACILLIN AND TAZOBACTAM 25 GRAM(S): 4; .5 INJECTION, POWDER, LYOPHILIZED, FOR SOLUTION INTRAVENOUS at 20:47

## 2020-10-14 RX ADMIN — SIMVASTATIN 40 MILLIGRAM(S): 20 TABLET, FILM COATED ORAL at 20:47

## 2020-10-14 RX ADMIN — GABAPENTIN 300 MILLIGRAM(S): 400 CAPSULE ORAL at 14:05

## 2020-10-14 RX ADMIN — Medication 25 MILLIGRAM(S): at 20:47

## 2020-10-14 RX ADMIN — QUETIAPINE FUMARATE 50 MILLIGRAM(S): 200 TABLET, FILM COATED ORAL at 20:47

## 2020-10-14 RX ADMIN — Medication 1 TABLET(S): at 09:12

## 2020-10-14 NOTE — PROGRESS NOTE ADULT - SUBJECTIVE AND OBJECTIVE BOX
CC:  Patient is a 89y old  Male who presents with a chief complaint of multiple falls, SDH (14 Oct 2020 09:39)    SUBJECTIVE:     -sleepy, but able to arouse and appropriate.    ROS:  all other review of systems are negative unless indicated above.    acetaminophen   Tablet .. 650 milliGRAM(s) Oral every 6 hours PRN  acetaminophen  IVPB .. 1000 milliGRAM(s) IV Intermittent once PRN  acetaminophen  IVPB .. 1000 milliGRAM(s) IV Intermittent every 6 hours PRN  amLODIPine   Tablet 10 milliGRAM(s) Oral daily  bethanechol 50 milliGRAM(s) Oral two times a day  finasteride 5 milliGRAM(s) Oral daily  gabapentin 300 milliGRAM(s) Oral three times a day  glucagon  Injectable 1 milliGRAM(s) IntraMuscular once PRN  influenza  Vaccine (HIGH DOSE) 0.7 milliLiter(s) IntraMuscular once  insulin lispro (HumaLOG) corrective regimen sliding scale   SubCutaneous Before meals and at bedtime  metoprolol tartrate 25 milliGRAM(s) Oral two times a day  multivitamin 1 Tablet(s) Oral daily  ondansetron Injectable 4 milliGRAM(s) IV Push every 6 hours PRN  piperacillin/tazobactam IVPB.. 3.375 Gram(s) IV Intermittent every 8 hours  QUEtiapine 50 milliGRAM(s) Oral at bedtime  simvastatin 40 milliGRAM(s) Oral at bedtime  sodium chloride 1 Gram(s) Oral every 8 hours  tamsulosin 0.4 milliGRAM(s) Oral at bedtime    T(C): 37.8 (10-14-20 @ 14:14), Max: 38.1 (10-13-20 @ 15:22)  HR: 75 (10-14-20 @ 08:29) (72 - 84)  BP: 111/58 (10-14-20 @ 08:29) (111/58 - 124/65)  RR: 17 (10-14-20 @ 08:29) (16 - 17)  SpO2: 94% (10-14-20 @ 08:29) (94% - 100%)    Constitutional: NAD.   HEENT: PERRL, EOMI, MMM.  Neck: Soft and supple, No carotid bruit, No JVD  Respiratory: Breath sounds are clear bilaterally, No wheezing, rales or rhonchi  Cardiovascular: S1 and S2, regular rate and rhythm, no murmur, rub or gallop.  Gastrointestinal: Bowel Sounds present, soft, nontender, nondistended, no guarding, no rebound, no mass.  Extremities: No peripheral edema  Vascular: 2+ peripheral pulses  Neurological: A/O x 2-3, no focal deficits  Musculoskeletal: 5/5 strength b/l upper and lower extremities  Skin:  no visible rashes.                         9.2    11.88 )-----------( 242      ( 14 Oct 2020 07:19 )             27.6       10-14    145  |  112<H>  |  24<H>  ----------------------------<  145<H>  3.6   |  28  |  0.79    Ca    8.0<L>      14 Oct 2020 07:19

## 2020-10-14 NOTE — PROGRESS NOTE ADULT - ASSESSMENT
89 Y old  male S/P craniotomy for SDH, drain in place, neurologically stable, CTH stable. Rt 5 th rib fracture. Developed retention of Urine.  Now with Aspiration pneumonia.    Multimodal pain control  Neuro checks Q  4  Cervical collar: cleared   Incentive spirometry  Vitals, IS/OS Q 4  Diet:   (X ) as tolerated ( ) NPO  DVT/GI prophylaxis  Local wound care  Activity:   Ambulate with assistance  PT  Start Flomax TOV per urology   Resume other home meds  medical service following  Urology following  ID following on Zosyn.  Neurosurgery following : Post op stable.   Stable from trauma stand point  SW for  D/C planning

## 2020-10-14 NOTE — CONSULT NOTE ADULT - ASSESSMENT
88 yo male admitted for AMS/multiple falls, SDH s/p craniotomy and evacuation. Urology consulted as pt was in urinary retention after the craniotomy but has been voiding after the dawson was d/mitesh with PVR of around 300. As per RN at bedside pt has been voiding in the condom cath- yellow urine. His random bladder scans have been around 300, 307, 324 mL- this may be patient's baseline. UCx neg. Creat WNL.  Recommend  - Continue monitoring PVR/ bladder scan  - Would recommend indwelling dawson if PVR >400 mL and d/c pt with dawson to leg bag for outpatient TOV/ urodynamics.  - Continue Flomax and Bethanechol  - Follow up with Dr. Singer in the office    Case discussed with Dr. Singer

## 2020-10-14 NOTE — PROGRESS NOTE ADULT - SUBJECTIVE AND OBJECTIVE BOX
HPI: This is a 89y old  Male who presented with a chief complaint of mechanical fall earlier today. States he was on his way to the bathroom when he fell and after he fell was unsteady and fell 2 more times. Sustained right SDH. Denies LOC, vomiting or headache. Denies focal weakness or numbness. Troponin slightly elevated and CT C/A/P shows questionable right rib fracture as well. Lives alone has a neighbor that checks on him regularly. Takes asa 81mg daily.     10/5- Pt seen and examined, no events overnight, pt awake alert, oriented X3, denies headache/nausea/vomiting/visual changes, no events overnight.  Repeat CT head today.    10/6- Pt had acute change in mental status, pt taken to the OR for right craniotomy and evacuation of SDH     10/7- POD #1 s/p Craniotomy and evacuation of SDH, right sided subdural drain left in place, pt seen with both Dr. Dyson and Dr. Arcos this am.  Pt attendings reviewed the CT done at 6:30am and were not concerned about epidural bleeding. No events overnight. Subdural drain output: 490 since surgery.    10/14- POD#8, pt seen and examined on 2SW, sitting up nicely eating breakfast, minimal verbal output but followed commands and wanted to finish his breakfast.   He is on IV ABX as per Dr. Mead for and aspiration pneumonia and needs another few days before he is ready to be switched to oral ABX and ready for discharge.   No events overnight, tolerating PO, Tmax today was 100.8. Pt has no complaints.     Vital Signs Last 24 Hrs  T(C): 37.3 (14 Oct 2020 18:35), Max: 38.2 (14 Oct 2020 15:00)  T(F): 99.2 (14 Oct 2020 18:35), Max: 100.8 (14 Oct 2020 15:00)  HR: 77 (14 Oct 2020 17:03) (72 - 84)  BP: 105/50 (14 Oct 2020 17:03) (105/50 - 124/65)  RR: 16 (14 Oct 2020 17:03) (16 - 17)  SpO2: 94% (14 Oct 2020 17:03) (94% - 100%)    MEDICATIONS  (STANDING):  amLODIPine   Tablet 10 milliGRAM(s) Oral daily  bethanechol 50 milliGRAM(s) Oral two times a day  finasteride 5 milliGRAM(s) Oral daily  gabapentin 300 milliGRAM(s) Oral three times a day  influenza  Vaccine (HIGH DOSE) 0.7 milliLiter(s) IntraMuscular once  insulin lispro (HumaLOG) corrective regimen sliding scale   SubCutaneous Before meals and at bedtime  metoprolol tartrate 25 milliGRAM(s) Oral two times a day  multivitamin 1 Tablet(s) Oral daily  piperacillin/tazobactam IVPB.. 3.375 Gram(s) IV Intermittent every 8 hours  QUEtiapine 50 milliGRAM(s) Oral at bedtime  simvastatin 40 milliGRAM(s) Oral at bedtime  sodium chloride 1 Gram(s) Oral every 8 hours  tamsulosin 0.4 milliGRAM(s) Oral at bedtime    MEDICATIONS  (PRN):  acetaminophen   Tablet .. 650 milliGRAM(s) Oral every 6 hours PRN Temp greater or equal to 38.5C (101.3F)  acetaminophen  IVPB .. 1000 milliGRAM(s) IV Intermittent every 6 hours PRN Temp greater or equal to 38C (100.4F)  acetaminophen  IVPB .. 1000 milliGRAM(s) IV Intermittent once PRN Moderate Pain (4 - 6)  bisacodyl Suppository 10 milliGRAM(s) Rectal daily PRN Constipation  dextrose 40% Gel 15 Gram(s) Oral once PRN Blood Glucose LESS THAN 70 milliGRAM(s)/deciliter  glucagon  Injectable 1 milliGRAM(s) IntraMuscular once PRN Glucose LESS THAN 70 milligrams/deciliter  ondansetron Injectable 4 milliGRAM(s) IV Push every 6 hours PRN Nausea    ROS: pertinent positives in HPI, all other ROS were reviewed and are negative     PHYSICAL EXAM:  Constitutional: awake and alert  HEENT: PERRLA, EOMI  Neck: Supple  Respiratory: Breath sounds are clear bilaterally  Cardiovascular: S1 and S2, regular rhythm  Gastrointestinal: soft, nontender  Extremities:  no edema  Musculoskeletal: no joint swelling/tenderness, no abnormal movements  Skin: right craniotomy incision clean and dry, staples intact     Neurological exam:  HF: awake and alert, sitting up eating breakfast, minimal verbal output, follows commands   CN: PEARRL, EOMI, VFF, facial sensation normal, no NLFD, tongue midline, Palate moves equally, SCM equal bilaterally  Motor: moving all extremities antigravity. good strength   Sens: Intact to light touch   Coord:  unable to test   Gait/Balance: unable to test     LABS:                         9.2    11.88 )-----------( 242      ( 14 Oct 2020 07:19 )             27.6     10-14    145  |  112<H>  |  24<H>  ----------------------------<  145<H>  3.6   |    28          |  0.79    Ca    8.0<L>      14 Oct 2020 07:19

## 2020-10-14 NOTE — PROGRESS NOTE ADULT - ASSESSMENT
89M.  admitted 10/04.  s/p fall and SDH.  s/p craniotomy and evacuation.  downgraded to med-surg from ICU.    sepsis (? aspiration).   -temp 100.1F.  -BCx and UCx, no growth.  -CXR, RLL infiltrate.  -Zosyn.  -modified diet, puree nectar.    SDH.  -remains minimally verbal.  -10/05 craniotomy.  -AED DC'd after 7 days.  -NSx following.    urinary retention.  -Whitmore catheter resumed.  -tamsulosin + finasteride + bethanechol.  -10/12 TOV, patient continues to retain urine.    debility.  -mobilize/PT.    hx HTN.  -metoprolol tartrate 25mg bid.  -amlodipine 10mg po qd.    hx DM.  -correction insulin coverage.    disposition.  -anticipate transfer to rehab once transitioned to PO ABx.  patient will be DC'd w/ Whitmore catheter.  -patient's son mentions that he would like to take his Dad to [facility].    communication.  -RN.  -Sx.  -10/13, 10/14 patient's son. 89M.  admitted 10/04.  s/p fall and SDH.  s/p craniotomy and evacuation.  downgraded to med-surg from ICU.    sepsis (? aspiration).   -temp 100.1F.  -BCx and UCx, no growth.  -CXR, RLL infiltrate.  -Zosyn.  -modified diet, puree nectar.    SDH.  -remains minimally verbal.  -10/05 craniotomy.  -AED DC'd after 7 days.  -NSx following.    urinary retention.  -[place] catheter in place.  -tamsulosin + finasteride + bethanechol.    debility.  -mobilize/PT.    hx HTN.  -metoprolol tartrate 25mg bid.  -amlodipine 10mg po qd.    hx DM.  -correction insulin coverage.    disposition.  -anticipate transfer to rehab once transitioned to PO ABx.  patient will be DC'd w/ Whitmore catheter.  -patient's son mentions that he would like to take his Dad to [facility].    communication.  -RN.  -Sx.  -10/13, 10/14 patient's son.

## 2020-10-14 NOTE — CONSULT NOTE ADULT - SUBJECTIVE AND OBJECTIVE BOX
HPI:  Trauma Consult    Pt s/p multiple falls 10/4/20. Pt initially presented with AMS. At time of exam pt is AAO x 3    Pt seen and examined at bedside Pt in no acute distress. Pt denied c/o fever, chills, chest pain, SOB, abd pain, N/V/D, extremity pain or dysfunction, hemoptysis, hematemesis, hematuria, hematochexia, headache, diplopia, vertigo, dizzyness.  (04 Oct 2020 20:58)    88 yo male admitted for AMS/multiple falls, SDH s/p craniotomy and evacuation. Urology consulted as pt was in urinary retention after the craniotomy but has been voiding after the dawson was d/mitesh with PVR of around 300. Patient seen and examined at bedside. He just nods his head "yes" or "no". Patient is a poor historian and nods his head "no" when asked about abd/flank pain or difficulty voiding. As per RN at bedside pt has been voiding in the condom cath- yellow urine. His random bladder scans have been around 300, 307, 324 mL.     PAST MEDICAL & SURGICAL HISTORY:  GERD (gastroesophageal reflux disease)    BPH (benign prostatic hyperplasia)    DM (diabetes mellitus)    HLD (hyperlipidemia)    HTN (hypertension)    CAD (coronary artery disease)    No significant past surgical history        REVIEW OF SYSTEMS     Unable to assess pt is a poor historian    MEDICATIONS  (STANDING):  amLODIPine   Tablet 10 milliGRAM(s) Oral daily  bethanechol 50 milliGRAM(s) Oral two times a day  dextrose 5%. 1000 milliLiter(s) (50 mL/Hr) IV Continuous <Continuous>  dextrose 50% Injectable 12.5 Gram(s) IV Push once  dextrose 50% Injectable 25 Gram(s) IV Push once  dextrose 50% Injectable 25 Gram(s) IV Push once  finasteride 5 milliGRAM(s) Oral daily  gabapentin 300 milliGRAM(s) Oral three times a day  influenza  Vaccine (HIGH DOSE) 0.7 milliLiter(s) IntraMuscular once  insulin lispro (HumaLOG) corrective regimen sliding scale   SubCutaneous Before meals and at bedtime  metoprolol tartrate 25 milliGRAM(s) Oral two times a day  multivitamin 1 Tablet(s) Oral daily  piperacillin/tazobactam IVPB.. 3.375 Gram(s) IV Intermittent every 8 hours  QUEtiapine 50 milliGRAM(s) Oral at bedtime  simvastatin 40 milliGRAM(s) Oral at bedtime  sodium chloride 1 Gram(s) Oral every 8 hours  tamsulosin 0.4 milliGRAM(s) Oral at bedtime    MEDICATIONS  (PRN):  acetaminophen   Tablet .. 650 milliGRAM(s) Oral every 6 hours PRN Temp greater or equal to 38C (100.4F), Mild Pain (1 - 3)  acetaminophen  IVPB .. 1000 milliGRAM(s) IV Intermittent once PRN Moderate Pain (4 - 6)  acetaminophen  IVPB .. 1000 milliGRAM(s) IV Intermittent every 6 hours PRN Temp greater or equal to 38C (100.4F)  dextrose 40% Gel 15 Gram(s) Oral once PRN Blood Glucose LESS THAN 70 milliGRAM(s)/deciliter  glucagon  Injectable 1 milliGRAM(s) IntraMuscular once PRN Glucose LESS THAN 70 milligrams/deciliter  ondansetron Injectable 4 milliGRAM(s) IV Push every 6 hours PRN Nausea      Allergies    No Known Allergies    Intolerances        SOCIAL HISTORY:    FAMILY HISTORY:  No pertinent family history in first degree relatives        Vital Signs Last 24 Hrs  T(C): 37.6 (14 Oct 2020 08:29), Max: 38.1 (13 Oct 2020 15:22)  T(F): 99.7 (14 Oct 2020 08:29), Max: 100.6 (13 Oct 2020 15:22)  HR: 75 (14 Oct 2020 08:29) (69 - 84)  BP: 111/58 (14 Oct 2020 08:29) (111/52 - 135/58)  BP(mean): --  RR: 17 (14 Oct 2020 08:29) (16 - 17)  SpO2: 94% (14 Oct 2020 08:29) (94% - 100%)    PHYSICAL EXAM:    General: No distress, No anxiety  VITALS  T(C): 37.6 (10-14-20 @ 08:29), Max: 38.1 (10-13-20 @ 15:22)  HR: 75 (10-14-20 @ 08:29) (69 - 84)  BP: 111/58 (10-14-20 @ 08:29) (111/52 - 135/58)  RR: 17 (10-14-20 @ 08:29) (16 - 17)  SpO2: 94% (10-14-20 @ 08:29) (94% - 100%)            Skin     : No jaundice  HEENT: No icterus , EOM full , No epistaxis  Lung    :  No resp distress  Abdo:   : Soft, Non tender, No guarding, No distension   Back    : No CVAT b/l no grimacing  Extremity: No calf tenderness   Genitalia Male: No Dawson, condom cath in place draining yellow urine      LABS:                        9.2    11.88 )-----------( 242      ( 14 Oct 2020 07:19 )             27.6     10-14    145  |  112<H>  |  24<H>  ----------------------------<  145<H>  3.6   |  28  |  0.79    Ca    8.0<L>      14 Oct 2020 07:19      10/11/20 UCx neg.

## 2020-10-14 NOTE — PROGRESS NOTE ADULT - ASSESSMENT
89 y/o male with h/o GERD, BPH, DM type 2, HLD, HTN, CAD was admitted on 10/4 for AMS. Pt lives at home alone. Pt's neighbor, who delivers food to the patient, called the ambulance due to him not being "himself" this evening; Pt states that he fell three times. Workup showed a SDH and the patient underwent emergent craniotomy for subdural hematoma evacuation 06-Oct-2020. Postoperative, the patient is almost non verbal but per nursing ambulates well and follow commands; he had difficulty; had multiple straight cath for retention and as per surgery Whitmore will be placed. He received ceftriaxone/ azithromycin, cefazolin, then zosyn.    1. Febrile syndrome. Right pulmonary infiltrate. Probable aspiration pneumonia. SDH s/p craniotomy. BPH.  -fever is down  -at risk for urinary retention  -leukocytosis improving  -f/u BC x 2  -on zosyn 3.375 gm IV q8h # 3  -tolerating abx well so far; no side effects noted  -aspiration precautions  -speech evaluation  -continue abx coverage  -monitor temps  -f/u CBC  -supportive care  2. Other issues:   -care per medicine

## 2020-10-14 NOTE — PROGRESS NOTE ADULT - SUBJECTIVE AND OBJECTIVE BOX
Patient is a 89y old  Male who presents with a chief complaint of multiple falls, SDH (14 Oct 2020 14:21)    HPI:  Trauma Consult    Pt s/p multiple falls 10/4/20. Pt initially presented with AMS. At time of exam pt is AAO x 3    Pt seen and examined at bedside Pt in no acute distress. Pt denied c/o fever, chills, chest pain, SOB, abd pain, N/V/D, extremity pain or dysfunction, hemoptysis, hematemesis, hematuria, hematochexia, headache, diplopia, vertigo, dizzyness.  (04 Oct 2020 20:58)  10/14: Pt seen and examined, more awake today, febrile possible aspiration PNA, O2 sat stable. Neuro checks stable.  ROS:.  [] A ten-point review of systems was otherwise negative except as noted.  Systemic:	[ ] Fever	[ ] Chills	[ ] Night sweats    [ ] Fatigue	[ ] Other  [] Cardiovascular:  [] Pulmonary:  [] Renal/Urologic:  [] Gastrointestinal: abdominal pain, vomiting  [] Metabolic:  [] Neurologic:  [] Hematologic:  [] ENT:  [] Ophthalmologic:  [] Musculoskeletal:    [ X] Due to altered mental status/intubation, subjective information were not able to be obtained from the patient. History was obtained, to the extent possible, from review of the chart and collateral sources of information( TBI)    PAST MEDICAL & SURGICAL HISTORY:  GERD (gastroesophageal reflux disease)    BPH (benign prostatic hyperplasia)    DM (diabetes mellitus)    HLD (hyperlipidemia)    HTN (hypertension)    CAD (coronary artery disease)    No significant past surgical history      FAMILY HISTORY:  No pertinent family history in first degree relatives      NC  Social history:     Alcohol: Denied  Smoking: Denied  Drug Use: Denied  Vital Signs Last 24 Hrs  T(C): 38 (14 Oct 2020 16:31), Max: 38.2 (14 Oct 2020 15:00)  T(F): 100.4 (14 Oct 2020 16:31), Max: 100.8 (14 Oct 2020 15:00)  HR: 75 (14 Oct 2020 08:29) (72 - 84)  BP: 111/58 (14 Oct 2020 08:29) (111/58 - 124/65)  BP(mean): --  RR: 17 (14 Oct 2020 08:29) (16 - 17)  SpO2: 94% (14 Oct 2020 08:29) (94% - 100%)  PHYSICAL EXAM:  Constitutional: NAD, GCS: 15/15  AOX2  Eyes:  WNL  ENMT:  WNL, scalp incision CDI.  Neck:  WNL, non tender  Back: Non tender  Respiratory: CTABL  Cardiovascular:  S1+S2+0  Gastrointestinal: Soft, ND , NT  Genitourinary:  WNL  Extremities: NV intact  Vascular:  Intact  Neurological: No focal neurological deficit,  CN, motor and sensory system grossly intact.      Labs:                          9.2    11.88 )-----------( 242      ( 14 Oct 2020 07:19 )             27.6       10-14    145  |  112<H>  |  24<H>  ----------------------------<  145<H>  3.6   |  28  |  0.79    Ca    8.0<L>      14 Oct 2020 07:19    Radiology:    < from: Xray Chest 1 View AP/PA. (10.11.20 @ 20:01) >    IMPRESSION:   Mild RIGHT lower lung base diffuse airspace disease.  Confirmation lateral radiograph recommended.      < from: US Duplex Venous Lower Ext Complete, Bilateral (10.12.20 @ 16:18) >    IMPRESSION:  No evidence of deep venous thrombosis in either lower extremity.        < end of copied text >

## 2020-10-14 NOTE — PROGRESS NOTE ADULT - ASSESSMENT
89 year old man s/p fall, RIGHT SDH with brain compression    POD #8, s/p right craniotomy and evacuation of SDH   on IV ABX for aspiration pneumonia     PLAN-  Advance diet and activity at tolerated  Once stable for PO ABX and be discharged to rehab   d/c staples prior to discharge   Venodynes for DVT PPX  AED DC'd after 7 days -- no signs of seizure   Urology following for urinary retention, monitor with bladder scanner, continue Flomax and bethanechol

## 2020-10-15 LAB
GLUCOSE BLDC GLUCOMTR-MCNC: 171 MG/DL — HIGH (ref 70–99)
GLUCOSE BLDC GLUCOMTR-MCNC: 174 MG/DL — HIGH (ref 70–99)
GLUCOSE BLDC GLUCOMTR-MCNC: 184 MG/DL — HIGH (ref 70–99)
GLUCOSE BLDC GLUCOMTR-MCNC: 199 MG/DL — HIGH (ref 70–99)

## 2020-10-15 PROCEDURE — 99239 HOSP IP/OBS DSCHRG MGMT >30: CPT

## 2020-10-15 RX ADMIN — GABAPENTIN 300 MILLIGRAM(S): 400 CAPSULE ORAL at 20:38

## 2020-10-15 RX ADMIN — Medication 1: at 20:38

## 2020-10-15 RX ADMIN — PIPERACILLIN AND TAZOBACTAM 25 GRAM(S): 4; .5 INJECTION, POWDER, LYOPHILIZED, FOR SOLUTION INTRAVENOUS at 20:38

## 2020-10-15 RX ADMIN — AMLODIPINE BESYLATE 10 MILLIGRAM(S): 2.5 TABLET ORAL at 09:46

## 2020-10-15 RX ADMIN — PIPERACILLIN AND TAZOBACTAM 25 GRAM(S): 4; .5 INJECTION, POWDER, LYOPHILIZED, FOR SOLUTION INTRAVENOUS at 13:11

## 2020-10-15 RX ADMIN — TAMSULOSIN HYDROCHLORIDE 0.4 MILLIGRAM(S): 0.4 CAPSULE ORAL at 20:38

## 2020-10-15 RX ADMIN — Medication 650 MILLIGRAM(S): at 20:39

## 2020-10-15 RX ADMIN — GABAPENTIN 300 MILLIGRAM(S): 400 CAPSULE ORAL at 05:57

## 2020-10-15 RX ADMIN — PIPERACILLIN AND TAZOBACTAM 25 GRAM(S): 4; .5 INJECTION, POWDER, LYOPHILIZED, FOR SOLUTION INTRAVENOUS at 05:57

## 2020-10-15 RX ADMIN — SODIUM CHLORIDE 1 GRAM(S): 9 INJECTION INTRAMUSCULAR; INTRAVENOUS; SUBCUTANEOUS at 20:38

## 2020-10-15 RX ADMIN — Medication 1: at 12:36

## 2020-10-15 RX ADMIN — SODIUM CHLORIDE 1 GRAM(S): 9 INJECTION INTRAMUSCULAR; INTRAVENOUS; SUBCUTANEOUS at 13:11

## 2020-10-15 RX ADMIN — Medication 25 MILLIGRAM(S): at 09:47

## 2020-10-15 RX ADMIN — Medication 50 MILLIGRAM(S): at 20:38

## 2020-10-15 RX ADMIN — Medication 1 TABLET(S): at 09:47

## 2020-10-15 RX ADMIN — Medication 50 MILLIGRAM(S): at 09:47

## 2020-10-15 RX ADMIN — SODIUM CHLORIDE 1 GRAM(S): 9 INJECTION INTRAMUSCULAR; INTRAVENOUS; SUBCUTANEOUS at 05:57

## 2020-10-15 RX ADMIN — FINASTERIDE 5 MILLIGRAM(S): 5 TABLET, FILM COATED ORAL at 09:47

## 2020-10-15 RX ADMIN — SIMVASTATIN 40 MILLIGRAM(S): 20 TABLET, FILM COATED ORAL at 20:38

## 2020-10-15 RX ADMIN — GABAPENTIN 300 MILLIGRAM(S): 400 CAPSULE ORAL at 13:10

## 2020-10-15 RX ADMIN — Medication 1: at 17:40

## 2020-10-15 RX ADMIN — Medication 25 MILLIGRAM(S): at 20:38

## 2020-10-15 RX ADMIN — Medication 650 MILLIGRAM(S): at 21:10

## 2020-10-15 RX ADMIN — Medication 1: at 08:18

## 2020-10-15 RX ADMIN — QUETIAPINE FUMARATE 50 MILLIGRAM(S): 200 TABLET, FILM COATED ORAL at 20:38

## 2020-10-15 NOTE — PROGRESS NOTE ADULT - SUBJECTIVE AND OBJECTIVE BOX
Date of service: 10-15-20 @ 09:04    Lying in bed in NAD  Weak looking  Alert and verbal  Able to eat with help  Has low grade fever    ROS: no fever or chills; limited    MEDICATIONS  (STANDING):  amLODIPine   Tablet 10 milliGRAM(s) Oral daily  bethanechol 50 milliGRAM(s) Oral two times a day  dextrose 5%. 1000 milliLiter(s) (50 mL/Hr) IV Continuous <Continuous>  dextrose 50% Injectable 12.5 Gram(s) IV Push once  dextrose 50% Injectable 25 Gram(s) IV Push once  dextrose 50% Injectable 25 Gram(s) IV Push once  finasteride 5 milliGRAM(s) Oral daily  gabapentin 300 milliGRAM(s) Oral three times a day  influenza  Vaccine (HIGH DOSE) 0.7 milliLiter(s) IntraMuscular once  insulin lispro (HumaLOG) corrective regimen sliding scale   SubCutaneous Before meals and at bedtime  metoprolol tartrate 25 milliGRAM(s) Oral two times a day  multivitamin 1 Tablet(s) Oral daily  piperacillin/tazobactam IVPB.. 3.375 Gram(s) IV Intermittent every 8 hours  QUEtiapine 50 milliGRAM(s) Oral at bedtime  simvastatin 40 milliGRAM(s) Oral at bedtime  sodium chloride 1 Gram(s) Oral every 8 hours  tamsulosin 0.4 milliGRAM(s) Oral at bedtime    Vital Signs Last 24 Hrs  T(C): 36.7 (15 Oct 2020 07:53), Max: 38.2 (14 Oct 2020 15:00)  T(F): 98 (15 Oct 2020 07:53), Max: 100.8 (14 Oct 2020 15:00)  HR: 69 (15 Oct 2020 07:53) (69 - 80)  BP: 122/80 (15 Oct 2020 07:53) (105/50 - 130/52)  BP(mean): --  RR: 17 (15 Oct 2020 07:53) (16 - 18)  SpO2: 93% (15 Oct 2020 07:53) (93% - 94%)     Physical exam:    Constitutional:  No acute distress  HEENT: NC/AT, EOMI, PERRLA, conjunctivae clear  Neck: supple; thyroid not palpable  Back: no tenderness  Respiratory: respiratory effort normal; crackles at bases  Cardiovascular: S1S2 regular, no murmurs  Abdomen: soft, not tender, not distended, positive BS  Genitourinary: no suprapubic tenderness  Lymphatic: no LN palpable  Musculoskeletal: no muscle tenderness, no joint swelling or tenderness  Extremities: no pedal edema  Neurological/ Psychiatric: confused, moving all extremities  Skin: no rashes; no palpable lesions    Labs: reviewed                        9.2    11.88 )-----------( 242      ( 14 Oct 2020 07:19 )             27.6     10-14    145  |  112<H>  |  24<H>  ----------------------------<  145<H>  3.6   |  28  |  0.79    Ca    8.0<L>      14 Oct 2020 07:19                        9.5    18.68 )-----------( 226      ( 12 Oct 2020 08:02 )             28.1     10-12    139  |  105  |  28<H>  ----------------------------<  165<H>  4.0   |  30  |  0.78    Ca    8.0<L>      12 Oct 2020 08:02      Culture - Urine (collected 05 Oct 2020 01:40)  Source: .Urine Clean Catch (Midstream)  Final Report (06 Oct 2020 15:45):    No growth    Culture - Blood (collected 04 Oct 2020 20:42)  Source: .Blood None  Final Report (10 Oct 2020 02:00):    No Growth Final    Culture - Blood (collected 04 Oct 2020 20:42)  Source: .Blood None  Final Report (10 Oct 2020 02:00):    No Growth Final      Culture - Blood (collected 11 Oct 2020 20:25)  Source: .Blood None  Preliminary Report (13 Oct 2020 01:03):    No growth to date.    Culture - Blood (collected 11 Oct 2020 20:25)  Source: .Blood None  Preliminary Report (13 Oct 2020 01:03):    No growth to date.    Culture - Urine (collected 05 Oct 2020 01:40)  Source: .Urine Clean Catch (Midstream)  Final Report (06 Oct 2020 15:45):    No growth    Radiology: all available radiological tests reviewed    < from: Xray Chest 1 View AP/PA. (10.11.20 @ 20:01) >  The lungs show a subtle asymmetric RIGHT lung base a diffuse airspace disease. Remaining lung parenchyma clear.  The heart and mediastinum are within normal limits.  Visualized osseous structures are intact.    < end of copied text >      Advanced directives addressed: full resuscitation

## 2020-10-15 NOTE — PROGRESS NOTE ADULT - SUBJECTIVE AND OBJECTIVE BOX
HPI: This is a 89y old  Male who presented with a chief complaint of mechanical fall earlier today. States he was on his way to the bathroom when he fell and after he fell was unsteady and fell 2 more times. Sustained right SDH. Denies LOC, vomiting or headache. Denies focal weakness or numbness. Troponin slightly elevated and CT C/A/P shows questionable right rib fracture as well. Lives alone has a neighbor that checks on him regularly. Takes asa 81mg daily.     10/5- Pt seen and examined, no events overnight, pt awake alert, oriented X3, denies headache/nausea/vomiting/visual changes, no events overnight.  Repeat CT head today.    10/6- Pt had acute change in mental status, pt taken to the OR for right craniotomy and evacuation of SDH     10/7- POD #1 s/p Craniotomy and evacuation of SDH, right sided subdural drain left in place, pt seen with both Dr. Dyson and Dr. Arcos this am.  Pt attendings reviewed the CT done at 6:30am and were not concerned about epidural bleeding. No events overnight. Subdural drain output: 490 since surgery.    10/14- POD#8, pt seen and examined on 2SW, sitting up nicely eating breakfast, minimal verbal output but followed commands and wanted to finish his breakfast.   He is on IV ABX as per Dr. Mead for and aspiration pneumonia and needs another few days before he is ready to be switched to oral ABX and ready for discharge.   No events overnight, tolerating PO, Tmax today was 100.8. Pt has no complaints.     10/15- POD#9.  Patient examined eating breakfast assisted by PCA.  Pleasantly interactive this AM.  Talkative, expressed desire to go home.      Vital Signs Last 24 Hrs  T(C): 36.7 (15 Oct 2020 07:53), Max: 38.2 (14 Oct 2020 15:00)  T(F): 98 (15 Oct 2020 07:53), Max: 100.8 (14 Oct 2020 15:00)  HR: 69 (15 Oct 2020 07:53) (69 - 80)  BP: 122/80 (15 Oct 2020 07:53) (105/50 - 130/52)  BP(mean): --  RR: 17 (15 Oct 2020 07:53) (16 - 18)  SpO2: 93% (15 Oct 2020 07:53) (93% - 94%)      MEDICATIONS  (STANDING):  amLODIPine   Tablet 10 milliGRAM(s) Oral daily  bethanechol 50 milliGRAM(s) Oral two times a day  finasteride 5 milliGRAM(s) Oral daily  gabapentin 300 milliGRAM(s) Oral three times a day  influenza  Vaccine (HIGH DOSE) 0.7 milliLiter(s) IntraMuscular once  insulin lispro (HumaLOG) corrective regimen sliding scale   SubCutaneous Before meals and at bedtime  metoprolol tartrate 25 milliGRAM(s) Oral two times a day  multivitamin 1 Tablet(s) Oral daily  piperacillin/tazobactam IVPB.. 3.375 Gram(s) IV Intermittent every 8 hours  QUEtiapine 50 milliGRAM(s) Oral at bedtime  simvastatin 40 milliGRAM(s) Oral at bedtime  sodium chloride 1 Gram(s) Oral every 8 hours  tamsulosin 0.4 milliGRAM(s) Oral at bedtime    MEDICATIONS  (PRN):  acetaminophen   Tablet .. 650 milliGRAM(s) Oral every 6 hours PRN Temp greater or equal to 38.5C (101.3F)  acetaminophen  IVPB .. 1000 milliGRAM(s) IV Intermittent every 6 hours PRN Temp greater or equal to 38C (100.4F)  acetaminophen  IVPB .. 1000 milliGRAM(s) IV Intermittent once PRN Moderate Pain (4 - 6)  bisacodyl Suppository 10 milliGRAM(s) Rectal daily PRN Constipation  dextrose 40% Gel 15 Gram(s) Oral once PRN Blood Glucose LESS THAN 70 milliGRAM(s)/deciliter  glucagon  Injectable 1 milliGRAM(s) IntraMuscular once PRN Glucose LESS THAN 70 milligrams/deciliter  ondansetron Injectable 4 milliGRAM(s) IV Push every 6 hours PRN Nausea    ROS: pertinent positives in HPI, all other ROS were reviewed and are negative     PHYSICAL EXAM:  Constitutional: awake and alert  HEENT: PERRLA, EOMI  Neck: Supple  Respiratory: Breath sounds are clear bilaterally  Cardiovascular: S1 and S2, regular rhythm  Gastrointestinal: soft, nontender  Extremities:  no edema  Musculoskeletal: no joint swelling/tenderness, no abnormal movements  Skin: right craniotomy incision clean and dry, staples intact     Neurological exam:  AOx2-3 (name, year, place), Following commands, speech fluent, PERRL EOM-I.  BARAJAS 5/5 no drifts.  Incision staples intact, C/D/I      LABS:                                    9.2    11.88 )-----------( 242      ( 14 Oct 2020 07:19 )             27.6     10-14    145  |  112<H>  |  24<H>  ----------------------------<  145<H>  3.6   |  28  |  0.79    Ca    8.0<L>      14 Oct 2020 07:19    c

## 2020-10-15 NOTE — PROGRESS NOTE ADULT - ASSESSMENT
89M.  admitted 10/04.  s/p fall and SDH.  s/p craniotomy and evacuation.  downgraded to med-surg from ICU.    sepsis (? aspiration).   -Tm 100.8F.  -BCx and UCx, no growth.  -CXR, RLL infiltrate.  -c/w Zosyn x 1 more day.  -modified diet, puree nectar.    SDH.  -remains minimally verbal.  -10/05 craniotomy.  -AED DC'd after 7 days.  -NSx following.    urinary retention.  -Whitmore catheter in place.  -tamsulosin + finasteride + bethanechol.    debility.  -mobilize/PT.    hx HTN.  -metoprolol tartrate 25mg bid.  -amlodipine 10mg po qd.    hx DM.  -correction insulin coverage.    disposition.  -anticipate transfer to rehab in AM once transitioned to PO ABx.  patient will be DC'd w/ Whitmore catheter.  -patient's son mentions that he would like to take his Dad to [facility].    communication.  -RN.  -Sx.  -10/13, 10/14 patient's son. 89M.  admitted 10/04.  s/p fall and SDH.  s/p craniotomy and evacuation.  downgraded to med-surg from ICU.    sepsis (? aspiration).   -Tm 100.8F.  -BCx and UCx, no growth.  -CXR, RLL infiltrate.  -c/w Zosyn x 1 more day.  -modified diet, puree nectar.    SDH.  -MS improved.  -10/05 craniotomy.  -AED DC'd after 7 days.  -NSx following.    urinary retention.  -Whitmore catheter in place.  -tamsulosin + finasteride + bethanechol.    debility.  -mobilize/PT.    hx HTN.  -metoprolol tartrate 25mg bid.  -amlodipine 10mg po qd.    hx DM.  -correction insulin coverage.    disposition.  -anticipate transfer to rehab in AM once transitioned to PO ABx.  patient will be DC'd w/ Whitmore catheter.  -patient's son mentions that he would like to take his Dad to [facility].    communication.  -RN.  -Sx.  -10/13, 10/14 patient's son.

## 2020-10-15 NOTE — PROGRESS NOTE ADULT - SUBJECTIVE AND OBJECTIVE BOX
POD#8 s/p right craniotomy evacuation SDH  bright  alert  fluently conversant  moves all well  dispo planning

## 2020-10-15 NOTE — PROGRESS NOTE ADULT - SUBJECTIVE AND OBJECTIVE BOX
CC:  Patient is a 89y old  Male who presents with a chief complaint of multiple falls, SDH (15 Oct 2020 13:45)    SUBJECTIVE:     -bright, awake and alert.  -Whitmore catheter replaced.    ROS:  all other review of systems are negative unless indicated above.    acetaminophen   Tablet .. 650 milliGRAM(s) Oral every 6 hours PRN  acetaminophen  IVPB .. 1000 milliGRAM(s) IV Intermittent every 6 hours PRN  acetaminophen  IVPB .. 1000 milliGRAM(s) IV Intermittent once PRN  amLODIPine   Tablet 10 milliGRAM(s) Oral daily  bethanechol 50 milliGRAM(s) Oral two times a day  bisacodyl Suppository 10 milliGRAM(s) Rectal daily PRN  dextrose 40% Gel 15 Gram(s) Oral once PRN  dextrose 5%. 1000 milliLiter(s) IV Continuous <Continuous>  dextrose 50% Injectable 12.5 Gram(s) IV Push once  dextrose 50% Injectable 25 Gram(s) IV Push once  dextrose 50% Injectable 25 Gram(s) IV Push once  finasteride 5 milliGRAM(s) Oral daily  gabapentin 300 milliGRAM(s) Oral three times a day  glucagon  Injectable 1 milliGRAM(s) IntraMuscular once PRN  influenza  Vaccine (HIGH DOSE) 0.7 milliLiter(s) IntraMuscular once  insulin lispro (HumaLOG) corrective regimen sliding scale   SubCutaneous Before meals and at bedtime  metoprolol tartrate 25 milliGRAM(s) Oral two times a day  multivitamin 1 Tablet(s) Oral daily  ondansetron Injectable 4 milliGRAM(s) IV Push every 6 hours PRN  piperacillin/tazobactam IVPB.. 3.375 Gram(s) IV Intermittent every 8 hours  QUEtiapine 50 milliGRAM(s) Oral at bedtime  simvastatin 40 milliGRAM(s) Oral at bedtime  sodium chloride 1 Gram(s) Oral every 8 hours  tamsulosin 0.4 milliGRAM(s) Oral at bedtime    T(C): 36.7 (10-15-20 @ 07:53), Max: 38.2 (10-14-20 @ 15:00)  HR: 69 (10-15-20 @ 07:53) (69 - 80)  BP: 122/80 (10-15-20 @ 07:53) (105/50 - 130/52)  RR: 17 (10-15-20 @ 07:53) (16 - 18)  SpO2: 93% (10-15-20 @ 07:53) (93% - 94%)    Constitutional: NAD.   HEENT: PERRL, EOMI, MMM.  Neck: Soft and supple, No carotid bruit, No JVD  Respiratory: Breath sounds are clear bilaterally, No wheezing, rales or rhonchi  Cardiovascular: S1 and S2, regular rate and rhythm, no murmur, rub or gallop.  Gastrointestinal: Bowel Sounds present, soft, nontender, nondistended, no guarding, no rebound, no mass.  Extremities: No peripheral edema  Vascular: 2+ peripheral pulses  Neurological: A/O x 2, no focal deficits  Musculoskeletal: 5/5 strength b/l upper and lower extremities  Skin:  no visible rashes.                         9.2    11.88 )-----------( 242      ( 14 Oct 2020 07:19 )             27.6       10-14    145  |  112<H>  |  24<H>  ----------------------------<  145<H>  3.6   |  28  |  0.79    Ca    8.0<L>      14 Oct 2020 07:19

## 2020-10-15 NOTE — PROGRESS NOTE ADULT - ASSESSMENT
89 year old man s/p fall, RIGHT SDH with brain compression    POD #9, s/p right craniotomy and evacuation of SDH   on IV ABX for aspiration pneumonia     PLAN-  Will d/c staples tomorrow 10.16 / prior to discharge  Neurosurgery clear for discharge, follow-up with Dr. Royal Obrien upon discharge from rehab.   Tolerating Pureed diet, will advance as tolerated.   ID following for asp pneumonia, noted to have no signs of infection.  Likely DC ABX tomorrow.    AED DC'd after 7 days -- no signs of seizure   Urology following for urinary retention, monitor with bladder scanner, continue Flomax and bethanechol

## 2020-10-15 NOTE — PROGRESS NOTE ADULT - ASSESSMENT
88 y/o male with h/o GERD, BPH, DM type 2, HLD, HTN, CAD was admitted on 10/4 for AMS. Pt lives at home alone. Pt's neighbor, who delivers food to the patient, called the ambulance due to him not being "himself" this evening; Pt states that he fell three times. Workup showed a SDH and the patient underwent emergent craniotomy for subdural hematoma evacuation 06-Oct-2020. Postoperative, the patient is almost non verbal but per nursing ambulates well and follow commands; he had difficulty; had multiple straight cath for retention and as per surgery Whitmore will be placed. He received ceftriaxone/ azithromycin, cefazolin, then zosyn.    1. Low grade fever. Right pulmonary infiltrate. Probable aspiration pneumonia. SDH s/p craniotomy. BPH.  -fever is down  -at risk for urinary retention  -leukocytosis improving  -f/u BC x 2  -on zosyn 3.375 gm IV q8h # 4  -tolerating abx well so far; no side effects noted  -aspiration precautions  -speech evaluation  -continue abx coverage for one more day if continues to improve  -monitor temps  -f/u CBC  -supportive care  2. Other issues:   -care per medicine

## 2020-10-16 ENCOUNTER — TRANSCRIPTION ENCOUNTER (OUTPATIENT)
Age: 85
End: 2020-10-16

## 2020-10-16 VITALS — OXYGEN SATURATION: 100 %

## 2020-10-16 LAB
ANION GAP SERPL CALC-SCNC: 6 MMOL/L — SIGNIFICANT CHANGE UP (ref 5–17)
BUN SERPL-MCNC: 19 MG/DL — SIGNIFICANT CHANGE UP (ref 7–23)
CALCIUM SERPL-MCNC: 8.1 MG/DL — LOW (ref 8.5–10.1)
CHLORIDE SERPL-SCNC: 110 MMOL/L — HIGH (ref 96–108)
CO2 SERPL-SCNC: 25 MMOL/L — SIGNIFICANT CHANGE UP (ref 22–31)
CREAT SERPL-MCNC: 0.85 MG/DL — SIGNIFICANT CHANGE UP (ref 0.5–1.3)
GLUCOSE BLDC GLUCOMTR-MCNC: 140 MG/DL — HIGH (ref 70–99)
GLUCOSE BLDC GLUCOMTR-MCNC: 164 MG/DL — HIGH (ref 70–99)
GLUCOSE BLDC GLUCOMTR-MCNC: 166 MG/DL — HIGH (ref 70–99)
GLUCOSE SERPL-MCNC: 176 MG/DL — HIGH (ref 70–99)
POTASSIUM SERPL-MCNC: 3.7 MMOL/L — SIGNIFICANT CHANGE UP (ref 3.5–5.3)
POTASSIUM SERPL-SCNC: 3.7 MMOL/L — SIGNIFICANT CHANGE UP (ref 3.5–5.3)
SARS-COV-2 RNA SPEC QL NAA+PROBE: SIGNIFICANT CHANGE UP
SODIUM SERPL-SCNC: 141 MMOL/L — SIGNIFICANT CHANGE UP (ref 135–145)

## 2020-10-16 PROCEDURE — 99232 SBSQ HOSP IP/OBS MODERATE 35: CPT

## 2020-10-16 PROCEDURE — 99239 HOSP IP/OBS DSCHRG MGMT >30: CPT

## 2020-10-16 RX ORDER — ACETAMINOPHEN 500 MG
2 TABLET ORAL
Qty: 0 | Refills: 0 | DISCHARGE
Start: 2020-10-16

## 2020-10-16 RX ORDER — TEMAZEPAM 15 MG/1
1 CAPSULE ORAL
Qty: 0 | Refills: 0 | DISCHARGE

## 2020-10-16 RX ORDER — QUETIAPINE FUMARATE 200 MG/1
1 TABLET, FILM COATED ORAL
Qty: 0 | Refills: 0 | DISCHARGE
Start: 2020-10-16

## 2020-10-16 RX ORDER — FINASTERIDE 5 MG/1
1 TABLET, FILM COATED ORAL
Qty: 0 | Refills: 0 | DISCHARGE

## 2020-10-16 RX ORDER — HYDROXYZINE HCL 10 MG
1 TABLET ORAL
Qty: 0 | Refills: 0 | DISCHARGE

## 2020-10-16 RX ORDER — ACETAMINOPHEN 500 MG
100 TABLET ORAL
Qty: 0 | Refills: 0 | DISCHARGE
Start: 2020-10-16

## 2020-10-16 RX ORDER — BETHANECHOL CHLORIDE 25 MG
1 TABLET ORAL
Qty: 0 | Refills: 0 | DISCHARGE
Start: 2020-10-16

## 2020-10-16 RX ORDER — GABAPENTIN 400 MG/1
1 CAPSULE ORAL
Qty: 0 | Refills: 0 | DISCHARGE
Start: 2020-10-16

## 2020-10-16 RX ORDER — AMLODIPINE BESYLATE 2.5 MG/1
1 TABLET ORAL
Qty: 0 | Refills: 0 | DISCHARGE

## 2020-10-16 RX ORDER — METOPROLOL TARTRATE 50 MG
1 TABLET ORAL
Qty: 0 | Refills: 0 | DISCHARGE
Start: 2020-10-16

## 2020-10-16 RX ORDER — FINASTERIDE 5 MG/1
1 TABLET, FILM COATED ORAL
Qty: 0 | Refills: 0 | DISCHARGE
Start: 2020-10-16

## 2020-10-16 RX ORDER — ALFUZOSIN HYDROCHLORIDE 10 MG/1
1 TABLET, EXTENDED RELEASE ORAL
Qty: 0 | Refills: 0 | DISCHARGE

## 2020-10-16 RX ORDER — SIMVASTATIN 20 MG/1
1 TABLET, FILM COATED ORAL
Qty: 0 | Refills: 0 | DISCHARGE
Start: 2020-10-16

## 2020-10-16 RX ORDER — ASPIRIN/CALCIUM CARB/MAGNESIUM 324 MG
1 TABLET ORAL
Qty: 0 | Refills: 0 | DISCHARGE

## 2020-10-16 RX ORDER — AMLODIPINE BESYLATE 2.5 MG/1
1 TABLET ORAL
Qty: 0 | Refills: 0 | DISCHARGE
Start: 2020-10-16

## 2020-10-16 RX ORDER — TAMSULOSIN HYDROCHLORIDE 0.4 MG/1
1 CAPSULE ORAL
Qty: 0 | Refills: 0 | DISCHARGE
Start: 2020-10-16

## 2020-10-16 RX ADMIN — SODIUM CHLORIDE 1 GRAM(S): 9 INJECTION INTRAMUSCULAR; INTRAVENOUS; SUBCUTANEOUS at 14:58

## 2020-10-16 RX ADMIN — PIPERACILLIN AND TAZOBACTAM 25 GRAM(S): 4; .5 INJECTION, POWDER, LYOPHILIZED, FOR SOLUTION INTRAVENOUS at 05:42

## 2020-10-16 RX ADMIN — Medication 1: at 08:27

## 2020-10-16 RX ADMIN — FINASTERIDE 5 MILLIGRAM(S): 5 TABLET, FILM COATED ORAL at 09:11

## 2020-10-16 RX ADMIN — GABAPENTIN 300 MILLIGRAM(S): 400 CAPSULE ORAL at 14:58

## 2020-10-16 RX ADMIN — SODIUM CHLORIDE 1 GRAM(S): 9 INJECTION INTRAMUSCULAR; INTRAVENOUS; SUBCUTANEOUS at 05:41

## 2020-10-16 RX ADMIN — Medication 25 MILLIGRAM(S): at 09:11

## 2020-10-16 RX ADMIN — GABAPENTIN 300 MILLIGRAM(S): 400 CAPSULE ORAL at 05:42

## 2020-10-16 RX ADMIN — Medication 1 TABLET(S): at 09:11

## 2020-10-16 RX ADMIN — AMLODIPINE BESYLATE 10 MILLIGRAM(S): 2.5 TABLET ORAL at 09:11

## 2020-10-16 RX ADMIN — PIPERACILLIN AND TAZOBACTAM 25 GRAM(S): 4; .5 INJECTION, POWDER, LYOPHILIZED, FOR SOLUTION INTRAVENOUS at 15:13

## 2020-10-16 RX ADMIN — INFLUENZA VIRUS VACCINE 0.7 MILLILITER(S): 15; 15; 15; 15 SUSPENSION INTRAMUSCULAR at 16:27

## 2020-10-16 RX ADMIN — Medication 50 MILLIGRAM(S): at 09:11

## 2020-10-16 NOTE — PROGRESS NOTE ADULT - SUBJECTIVE AND OBJECTIVE BOX
CC:Patient is a 89y old  Male who presents with a chief complaint of multiple falls, SDH (16 Oct 2020 10:10)      Subjective:  Pt seen and examined at bedside with chaperone. Pt is awake, alert, comfortable, cooperative, responsive, pt in no acute distress. No reported c/o chills, chest pain, SOB, abd pain, N/V/D, extremity pain or dysfunction, hemoptysis, hematemesis, hematuria, hematochexia, headache, diplopia, vertigo, dizzyness. + dawson, + diet tolerance  Pt being treated for aspiration pneumonia    ROS:  otherwise as abovementioned ROS    Vital Signs Last 24 Hrs  T(C): 36.6 (16 Oct 2020 07:48), Max: 37.7 (15 Oct 2020 20:00)  T(F): 97.8 (16 Oct 2020 07:48), Max: 99.9 (15 Oct 2020 20:00)  HR: 68 (16 Oct 2020 07:48) (68 - 89)  BP: 113/68 (16 Oct 2020 07:48) (109/52 - 137/49)  BP(mean): --  RR: 19 (16 Oct 2020 07:48) (17 - 19)  SpO2: 92% (16 Oct 2020 07:48) (92% - 95%)    Labs:                            Meds:  acetaminophen   Tablet .. 650 milliGRAM(s) Oral every 6 hours PRN  acetaminophen  IVPB .. 1000 milliGRAM(s) IV Intermittent every 6 hours PRN  acetaminophen  IVPB .. 1000 milliGRAM(s) IV Intermittent once PRN  amLODIPine   Tablet 10 milliGRAM(s) Oral daily  bethanechol 50 milliGRAM(s) Oral two times a day  bisacodyl Suppository 10 milliGRAM(s) Rectal daily PRN  dextrose 40% Gel 15 Gram(s) Oral once PRN  dextrose 5%. 1000 milliLiter(s) IV Continuous <Continuous>  dextrose 50% Injectable 12.5 Gram(s) IV Push once  dextrose 50% Injectable 25 Gram(s) IV Push once  dextrose 50% Injectable 25 Gram(s) IV Push once  finasteride 5 milliGRAM(s) Oral daily  gabapentin 300 milliGRAM(s) Oral three times a day  glucagon  Injectable 1 milliGRAM(s) IntraMuscular once PRN  influenza  Vaccine (HIGH DOSE) 0.7 milliLiter(s) IntraMuscular once  insulin lispro (HumaLOG) corrective regimen sliding scale   SubCutaneous Before meals and at bedtime  metoprolol tartrate 25 milliGRAM(s) Oral two times a day  multivitamin 1 Tablet(s) Oral daily  ondansetron Injectable 4 milliGRAM(s) IV Push every 6 hours PRN  piperacillin/tazobactam IVPB.. 3.375 Gram(s) IV Intermittent every 8 hours  QUEtiapine 50 milliGRAM(s) Oral at bedtime  simvastatin 40 milliGRAM(s) Oral at bedtime  sodium chloride 1 Gram(s) Oral every 8 hours  tamsulosin 0.4 milliGRAM(s) Oral at bedtime      Radiology:      Physical exam:  Pt is AAOX3  Pt in no acute distress  HEENT: s/p craniotomy  Neck: no JVD, no tracheal deviation, soft and supple  Psych: normal affect  Resp: CTAB  CVS: S1S2(+)  ABD: bowel sounds (+), soft, non distended, no rebound, no guarding, no rigidity, no skin changes to exam. No tenderness to exam  EXT: no calf tenderness or edema to exam b/l, on VTE prophylaxis  Skin: no adverse skin changes to exam

## 2020-10-16 NOTE — PROGRESS NOTE ADULT - SUBJECTIVE AND OBJECTIVE BOX
Date of service: 10-16-20 @ 09:29    Sitting in bed in NAD  Alert and poorly verbal  Weak looking  Has low great fever    ROS: poorly verbal    MEDICATIONS  (STANDING):  amLODIPine   Tablet 10 milliGRAM(s) Oral daily  bethanechol 50 milliGRAM(s) Oral two times a day  dextrose 5%. 1000 milliLiter(s) (50 mL/Hr) IV Continuous <Continuous>  dextrose 50% Injectable 12.5 Gram(s) IV Push once  dextrose 50% Injectable 25 Gram(s) IV Push once  dextrose 50% Injectable 25 Gram(s) IV Push once  finasteride 5 milliGRAM(s) Oral daily  gabapentin 300 milliGRAM(s) Oral three times a day  influenza  Vaccine (HIGH DOSE) 0.7 milliLiter(s) IntraMuscular once  insulin lispro (HumaLOG) corrective regimen sliding scale   SubCutaneous Before meals and at bedtime  metoprolol tartrate 25 milliGRAM(s) Oral two times a day  multivitamin 1 Tablet(s) Oral daily  piperacillin/tazobactam IVPB.. 3.375 Gram(s) IV Intermittent every 8 hours  QUEtiapine 50 milliGRAM(s) Oral at bedtime  simvastatin 40 milliGRAM(s) Oral at bedtime  sodium chloride 1 Gram(s) Oral every 8 hours  tamsulosin 0.4 milliGRAM(s) Oral at bedtime    Vital Signs Last 24 Hrs  T(C): 36.6 (16 Oct 2020 07:48), Max: 37.7 (15 Oct 2020 20:00)  T(F): 97.8 (16 Oct 2020 07:48), Max: 99.9 (15 Oct 2020 20:00)  HR: 68 (16 Oct 2020 07:48) (68 - 89)  BP: 113/68 (16 Oct 2020 07:48) (109/52 - 137/49)  BP(mean): --  RR: 19 (16 Oct 2020 07:48) (17 - 19)  SpO2: 92% (16 Oct 2020 07:48) (92% - 95%)     Physical exam:    Constitutional:  No acute distress  HEENT: NC/AT, EOMI, PERRLA, conjunctivae clear  Neck: supple; thyroid not palpable  Back: no tenderness  Respiratory: respiratory effort normal; crackles at bases  Cardiovascular: S1S2 regular, no murmurs  Abdomen: soft, not tender, not distended, positive BS  Genitourinary: no suprapubic tenderness  Lymphatic: no LN palpable  Musculoskeletal: no muscle tenderness, no joint swelling or tenderness  Extremities: no pedal edema  Neurological/ Psychiatric: confused, moving all extremities  Skin: no rashes; no palpable lesions    Labs: reviewed                        9.2    11.88 )-----------( 242      ( 14 Oct 2020 07:19 )             27.6     10-14    145  |  112<H>  |  24<H>  ----------------------------<  145<H>  3.6   |  28  |  0.79    Ca    8.0<L>      14 Oct 2020 07:19                        9.5    18.68 )-----------( 226      ( 12 Oct 2020 08:02 )             28.1     10-12    139  |  105  |  28<H>  ----------------------------<  165<H>  4.0   |  30  |  0.78    Ca    8.0<L>      12 Oct 2020 08:02      Culture - Urine (collected 05 Oct 2020 01:40)  Source: .Urine Clean Catch (Midstream)  Final Report (06 Oct 2020 15:45):    No growth    Culture - Blood (collected 04 Oct 2020 20:42)  Source: .Blood None  Final Report (10 Oct 2020 02:00):    No Growth Final    Culture - Blood (collected 04 Oct 2020 20:42)  Source: .Blood None  Final Report (10 Oct 2020 02:00):    No Growth Final      Culture - Blood (collected 11 Oct 2020 20:25)  Source: .Blood None  Preliminary Report (13 Oct 2020 01:03):    No growth to date.    Culture - Blood (collected 11 Oct 2020 20:25)  Source: .Blood None  Preliminary Report (13 Oct 2020 01:03):    No growth to date.    Culture - Urine (collected 05 Oct 2020 01:40)  Source: .Urine Clean Catch (Midstream)  Final Report (06 Oct 2020 15:45):    No growth    Radiology: all available radiological tests reviewed    < from: Xray Chest 1 View AP/PA. (10.11.20 @ 20:01) >  The lungs show a subtle asymmetric RIGHT lung base a diffuse airspace disease. Remaining lung parenchyma clear.  The heart and mediastinum are within normal limits.  Visualized osseous structures are intact.    < end of copied text >      Advanced directives addressed: full resuscitation

## 2020-10-16 NOTE — DISCHARGE NOTE PROVIDER - NSDCCPCAREPLAN_GEN_ALL_CORE_FT
PRINCIPAL DISCHARGE DIAGNOSIS  Diagnosis: Subdural hematoma  Assessment and Plan of Treatment:       SECONDARY DISCHARGE DIAGNOSES  Diagnosis: Rib fractures  Assessment and Plan of Treatment:

## 2020-10-16 NOTE — PROGRESS NOTE ADULT - SUBJECTIVE AND OBJECTIVE BOX
POD # 10 s/p right craniotomy for evac SDH  no complaints  af  bright, alert  fluently conversant  moves all well

## 2020-10-16 NOTE — DISCHARGE NOTE PROVIDER - PROVIDER TOKENS
PROVIDER:[TOKEN:[[id number]],FOLLOWUP:[1 week]],PROVIDER:[TOKEN:[[id number]],FOLLOWUP:[1 week]],PROVIDER:[TOKEN:[[id number]],FOLLOWUP:[1 week]]

## 2020-10-16 NOTE — PROGRESS NOTE ADULT - ASSESSMENT
89M.  admitted 10/04.  s/p fall and SDH.  s/p craniotomy and evacuation.  downgraded to med-surg from ICU.    sepsis (? aspiration).   -afebrile.  -BCx and UCx, no growth.  -CXR, RLL infiltrate.  -continue Zosyn through weekend.  -modified diet, puree nectar.  -ID.    SDH.  -MS improved.  -10/05 craniotomy.  -AED DC'd after 7 days.  -craniotomy staples to be removed prior to DC.  -NSx following.    urinary retention.  -Whitmore catheter placed.  -tamsulosin + finasteride + bethanechol.  -Urology f/u outpatient.    debility.  -mobilize/PT.    hx HTN.  -metoprolol tartrate 25mg bid.  -amlodipine 10mg po qd.    hx DM.  -correction insulin coverage.    disposition.  -anticipate transfer to rehab once ABx course is completed.  patient will be DC'd w/ Whitmore catheter.  -patient's son mentions that he would like to take his Dad to [facility].    communication.  -RN.  -ID.  -10/13, 10/14, 10/16 patient's son.    Sign out to AM Hospitalist  89M previously living independently in the community prior to his fall and resulting SDH which was evaculated by craniotomy 10 days ago.      after downgrade to the general medical delacruz, patient developed urinary retention.  despite medical management, patient continued to retain urine and Whitmore catheter was placed.  patient will be DC'd w/ Whitmore.    patient developed fever, cough, SOB and elevated WBCs.  repeat CXR suggestive of PN, probable aspiration.  Zosyn was started.  diet modified.  BCx, no growth.  ID advised patient to remain on ABx through the weekend.    patient's mental status has been improving steadily.  he is generally found awake, conversant and able to make his needs known to staff.  he gets confused and remains a fall risk.    disposition is to DC to rehab early next week if all is well.  son [facility] but this does not seem likely.   are aware.

## 2020-10-16 NOTE — PROGRESS NOTE ADULT - ASSESSMENT
A/P:  Fever workup  Likely aspiration pneumonia  S/P emergent craniotomy with SDH  Neurosurgery  Urology on consult, dawson with outpt f/u  ID on consult   IV antibiotics  F/U labs, radiologic studies  Hospitalist on consult for medical management  Urinary retention, s/p dawson  Monitor neurological status, stable/improved  Cont current care and meds  Speech/swallow on consult/evaluation  Aspiration risk precautions  Fall risk precautions   for d/c planning

## 2020-10-16 NOTE — PROGRESS NOTE ADULT - THIS PATIENT HAS THE FOLLOWING CONDITION(S)/DIAGNOSES ON THIS ADMISSION:
None
Brain Compression / Herniation
Functional Quadriplegia/Brain Compression / Herniation
None
Sepsis
None
None
Brain Compression / Herniation
Cerebral Edema/Brain Compression / Herniation

## 2020-10-16 NOTE — DISCHARGE NOTE PROVIDER - CARE PROVIDERS DIRECT ADDRESSES
,DirectAddress_Unknown,[email or web address],DirectAddress_Unknown

## 2020-10-16 NOTE — PROGRESS NOTE ADULT - SUBJECTIVE AND OBJECTIVE BOX
HPI:   HPI: This is a 89y old  Male who presented with a chief complaint of mechanical fall earlier today. States he was on his way to the bathroom when he fell and after he fell was unsteady and fell 2 more times. Sustained right SDH. Denies LOC, vomiting or headache. Denies focal weakness or numbness. Troponin slightly elevated and CT C/A/P shows questionable right rib fracture as well. Lives alone has a neighbor that checks on him regularly. Takes asa 81mg daily.     10/5- Pt seen and examined, no events overnight, pt awake alert, oriented X3, denies headache/nausea/vomiting/visual changes, no events overnight.  Repeat CT head today.    10/6- Pt had acute change in mental status, pt taken to the OR for right craniotomy and evacuation of SDH     10/7- POD #1 s/p Craniotomy and evacuation of SDH, right sided subdural drain left in place, pt seen with both Dr. Dyson and Dr. Arcos this am.  Pt attendings reviewed the CT done at 6:30am and were not concerned about epidural bleeding. No events overnight. Subdural drain output: 490 since surgery.    10/14- POD#8, pt seen and examined on 2SW, sitting up nicely eating breakfast, minimal verbal output but followed commands and wanted to finish his breakfast.   He is on IV ABX as per Dr. Mead for and aspiration pneumonia and needs another few days before he is ready to be switched to oral ABX and ready for discharge.   No events overnight, tolerating PO, Tmax today was 100.8. Pt has no complaints.     10/15- POD#9.  Patient examined eating breakfast assisted by PCA.  Pleasantly interactive this AM.  Talkative, expressed desire to go home.    Vital Signs Last 24 Hrs  T(C): 36.6 (16 Oct 2020 07:48), Max: 37.7 (15 Oct 2020 20:00)  T(F): 97.8 (16 Oct 2020 07:48), Max: 99.9 (15 Oct 2020 20:00)  HR: 68 (16 Oct 2020 07:48) (68 - 89)  BP: 113/68 (16 Oct 2020 07:48) (109/52 - 137/49)  RR: 19 (16 Oct 2020 07:48) (17 - 19)  SpO2: 92% (16 Oct 2020 07:48) (92% - 95%)    MEDICATIONS  (STANDING):  amLODIPine   Tablet 10 milliGRAM(s) Oral daily  bethanechol 50 milliGRAM(s) Oral two times a day  finasteride 5 milliGRAM(s) Oral daily  gabapentin 300 milliGRAM(s) Oral three times a day  influenza  Vaccine (HIGH DOSE) 0.7 milliLiter(s) IntraMuscular once  insulin lispro (HumaLOG) corrective regimen sliding scale   SubCutaneous Before meals and at bedtime  metoprolol tartrate 25 milliGRAM(s) Oral two times a day  multivitamin 1 Tablet(s) Oral daily  piperacillin/tazobactam IVPB.. 3.375 Gram(s) IV Intermittent every 8 hours  QUEtiapine 50 milliGRAM(s) Oral at bedtime  simvastatin 40 milliGRAM(s) Oral at bedtime  sodium chloride 1 Gram(s) Oral every 8 hours  tamsulosin 0.4 milliGRAM(s) Oral at bedtime    MEDICATIONS  (PRN):  acetaminophen   Tablet .. 650 milliGRAM(s) Oral every 6 hours PRN Temp greater or equal to 38.5C (101.3F)  acetaminophen  IVPB .. 1000 milliGRAM(s) IV Intermittent every 6 hours PRN Temp greater or equal to 38C (100.4F)  acetaminophen  IVPB .. 1000 milliGRAM(s) IV Intermittent once PRN Moderate Pain (4 - 6)  bisacodyl Suppository 10 milliGRAM(s) Rectal daily PRN Constipation  dextrose 40% Gel 15 Gram(s) Oral once PRN Blood Glucose LESS THAN 70 milliGRAM(s)/deciliter  glucagon  Injectable 1 milliGRAM(s) IntraMuscular once PRN Glucose LESS THAN 70 milligrams/deciliter  ondansetron Injectable 4 milliGRAM(s) IV Push every 6 hours PRN Nausea      ROS: pertinent positives in HPI, all other ROS were reviewed and are negative     PHYSICAL EXAM:      Constitutional: awake and alert.  HEENT: PERRLA, EOMI,   Neck: Supple.  Respiratory: Breath sounds are clear bilaterally  Cardiovascular: S1 and S2, regular / irregular rhythm  Gastrointestinal: soft, nontender  Extremities:  no edema  Vascular: Caritid Bruit - no  Musculoskeletal: no joint swelling/tenderness, no abnormal movements  Skin: No rashes    Neurological exam:  HF: A x O x 3. Appropriately interactive, normal affect. Speech fluent, No Aphasia or paraphasic errors. Naming /repetition intact   CN: DEDRA, EOMI, VFF, facial sensation normal, no NLFD, tongue midline, Palate moves equally, SCM equal bilaterally  Motor: No pronator drift, Strength 5/5 in all 4 ext, normal bulk and tone, no tremor, rigidity or bradykinesia.    Sens: Intact to light touch / PP/ VS/ JS    Reflexes: Symmetric and normal . BJ 2+, BR 2+, KJ 2+, AJ 2+, downgoing toes b/l  Coord:  No FNFA, dysmetria, JESUS intact   Gait/Balance: Normal/Cannot test    NIHSS:          LABS:                   RADIOLOGY:

## 2020-10-16 NOTE — PROGRESS NOTE ADULT - ASSESSMENT
89 year old man s/p fall, RIGHT SDH with brain compression    POD #10, s/p right craniotomy and evacuation of SDH   on IV ABX for aspiration pneumonia     PLAN-  Will d/c staples prior to discharge  Neurosurgery clear for discharge, follow-up with Dr. Royal Obrien upon discharge from rehab, 3-4 weeks   Tolerating Pureed diet, will advance as tolerated.   ID following for asp pneumonia, noted to have no signs of infection   AED DC'd after 7 days -- no signs of seizure   Urology following for urinary retention, monitor with bladder scanner, continue Flomax and bethanechol

## 2020-10-16 NOTE — DISCHARGE NOTE PROVIDER - HOSPITAL COURSE
Pt s/p trauma with resultant sdh, subsequent craniotomy and evacuation of hematoma, right 5th rib fracture, aspiration pneumonia, urinary retention. Pt under care of ID, neurosurgery, hospitalist, urology, and trauma services.

## 2020-10-16 NOTE — PROGRESS NOTE ADULT - ASSESSMENT
88 y/o male with h/o GERD, BPH, DM type 2, HLD, HTN, CAD was admitted on 10/4 for AMS. Pt lives at home alone. Pt's neighbor, who delivers food to the patient, called the ambulance due to him not being "himself" this evening; Pt states that he fell three times. Workup showed a SDH and the patient underwent emergent craniotomy for subdural hematoma evacuation 06-Oct-2020. Postoperative, the patient is almost non verbal but per nursing ambulates well and follow commands; he had difficulty; had multiple straight cath for retention and as per surgery Whitmore will be placed. He received ceftriaxone/ azithromycin, cefazolin, then zosyn.    1. Low grade fever. Right pulmonary infiltrate. Probable aspiration pneumonia. SDH s/p craniotomy. BPH.  -respiratory frail  -at risk for urinary retention  -leukocytosis improving  -f/u BC x 2  -on zosyn 3.375 gm IV q8h # 5  -tolerating abx well so far; no side effects noted  -aspiration precautions  -speech evaluation  -continue abx coverage  -monitor temps  -f/u CBC  -supportive care  2. Other issues:   -care per medicine

## 2020-10-16 NOTE — PROGRESS NOTE ADULT - REASON FOR ADMISSION
multiple falls, SDH
s/p fall subdural hematoma
multiple falls, SDH

## 2020-10-16 NOTE — DISCHARGE NOTE PROVIDER - CARE PROVIDER_API CALL
Chris Berger  [facility]  [address] [place] [address]  Phone: [phone]  Fax: [phone]  Follow Up Time: 1 week    Jayce Singer  UROLOGY  [address] [facility], [address] [place] [address]  Phone: [phone]  Fax: [phone]  Follow Up Time: 1 week    Royal Obrien)  Neurosurgery  [address] [place] [address]  Phone: [phone]  Fax: [phone]  Follow Up Time: 1 week

## 2020-10-16 NOTE — DISCHARGE NOTE PROVIDER - NSDCACTIVITY_GEN_ALL_CORE
Do not make important decisions/Showering allowed/Walking - Indoors allowed/No heavy lifting/straining/Walking - Outdoors allowed/Stairs allowed/Do not drive or operate machinery

## 2020-10-16 NOTE — DISCHARGE NOTE NURSING/CASE MANAGEMENT/SOCIAL WORK - PATIENT PORTAL LINK FT
You can access the FollowMyHealth Patient Portal offered by [facility] by registering at the following website: [email or web address]. By joining [facility]’s FollowMyHealth portal, you will also be able to view your health information using other applications (apps) compatible with our system.

## 2020-10-16 NOTE — DISCHARGE NOTE PROVIDER - NSDCFUADDINST_GEN_ALL_CORE_FT
Take all appropriate fall risk precautions  Whitmore to leg bag  Outpatient follow up as instructed with urology, neurosurgery, medical specialist    Please seek immediate medical attention for any adverse changes to health or mentation

## 2020-10-16 NOTE — DISCHARGE NOTE PROVIDER - NSDCMRMEDTOKEN_GEN_ALL_CORE_FT
acetaminophen 10 mg/mL intravenous solution: 100 milliliter(s) intravenous every 6 hours, As needed, Temp greater or equal to 38C (100.4F)  acetaminophen 10 mg/mL intravenous solution: 100 milliliter(s) intravenous once, As needed, Moderate Pain (4 - 6)  acetaminophen 325 mg oral tablet: 2 tab(s) orally every 6 hours, As needed, Temp greater or equal to 38.5C (101.3F)  amLODIPine 10 mg oral tablet: 1 tab(s) orally once a day  bethanechol 50 mg oral tablet: 1 tab(s) orally 2 times a day  finasteride 5 mg oral tablet: 1 tab(s) orally once a day  gabapentin 300 mg oral capsule: 1 cap(s) orally 3 times a day  metoprolol tartrate 25 mg oral tablet: 1 tab(s) orally 2 times a day  Multiple Vitamins oral tablet: 1 tab(s) orally once a day  omeprazole 20 mg oral delayed release capsule: 1 cap(s) orally once a day  QUEtiapine 50 mg oral tablet: 1 tab(s) orally once a day (at bedtime)  simvastatin 40 mg oral tablet: 1 tab(s) orally once a day (at bedtime)  tamsulosin 0.4 mg oral capsule: 1 cap(s) orally once a day (at bedtime)

## 2020-10-16 NOTE — PROGRESS NOTE ADULT - SUBJECTIVE AND OBJECTIVE BOX
CC:  Patient is a 89y old  Male who presents with a chief complaint of multiple falls, SDH (16 Oct 2020 13:13)    SUBJECTIVE:     -no new complaints or issues at current time.    ROS:  all other review of systems are negative unless indicated above.    acetaminophen   Tablet .. 650 milliGRAM(s) Oral every 6 hours PRN  acetaminophen  IVPB .. 1000 milliGRAM(s) IV Intermittent every 6 hours PRN  acetaminophen  IVPB .. 1000 milliGRAM(s) IV Intermittent once PRN  amLODIPine   Tablet 10 milliGRAM(s) Oral daily  bethanechol 50 milliGRAM(s) Oral two times a day  bisacodyl Suppository 10 milliGRAM(s) Rectal daily PRN  dextrose 40% Gel 15 Gram(s) Oral once PRN  dextrose 5%. 1000 milliLiter(s) IV Continuous <Continuous>  dextrose 50% Injectable 12.5 Gram(s) IV Push once  dextrose 50% Injectable 25 Gram(s) IV Push once  dextrose 50% Injectable 25 Gram(s) IV Push once  finasteride 5 milliGRAM(s) Oral daily  gabapentin 300 milliGRAM(s) Oral three times a day  glucagon  Injectable 1 milliGRAM(s) IntraMuscular once PRN  influenza  Vaccine (HIGH DOSE) 0.7 milliLiter(s) IntraMuscular once  insulin lispro (HumaLOG) corrective regimen sliding scale   SubCutaneous Before meals and at bedtime  metoprolol tartrate 25 milliGRAM(s) Oral two times a day  multivitamin 1 Tablet(s) Oral daily  ondansetron Injectable 4 milliGRAM(s) IV Push every 6 hours PRN  piperacillin/tazobactam IVPB.. 3.375 Gram(s) IV Intermittent every 8 hours  QUEtiapine 50 milliGRAM(s) Oral at bedtime  simvastatin 40 milliGRAM(s) Oral at bedtime  sodium chloride 1 Gram(s) Oral every 8 hours  tamsulosin 0.4 milliGRAM(s) Oral at bedtime    T(C): 36.6 (10-16-20 @ 07:48), Max: 37.7 (10-15-20 @ 20:00)  HR: 68 (10-16-20 @ 07:48) (68 - 89)  BP: 113/68 (10-16-20 @ 07:48) (109/52 - 137/49)  RR: 19 (10-16-20 @ 07:48) (17 - 19)  SpO2: 92% (10-16-20 @ 07:48) (92% - 95%)    Constitutional: NAD.   HEENT: PERRL, EOMI, MMM.  Neck: Soft and supple, No carotid bruit, No JVD  Respiratory: Breath sounds are clear bilaterally, No wheezing, rales or rhonchi  Cardiovascular: S1 and S2, regular rate and rhythm, no murmur, rub or gallop.  Gastrointestinal: Bowel Sounds present, soft, nontender, nondistended, no guarding, no rebound, no mass.  Extremities: No peripheral edema  Vascular: 2+ peripheral pulses  Neurological: A/O x , no focal deficits  Musculoskeletal: 5/5 strength b/l upper and lower extremities  Skin:  no visible rashes.

## 2020-10-16 NOTE — DISCHARGE NOTE NURSING/CASE MANAGEMENT/SOCIAL WORK - NSDCVIVACCINE_GEN_ALL_CORE_FT
Tdap , 2020/6/13 12:12 , Trent Friedman (DINESH)   Influenza , 2020/10/16 16:27 , Rosy Hines (RN)  Tdap , 2020/6/13 12:12 , Trent Friedman (RN)

## 2020-10-21 DIAGNOSIS — R33.9 RETENTION OF URINE, UNSPECIFIED: ICD-10-CM
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
## 2020-10-21 DIAGNOSIS — A41.9 SEPSIS, UNSPECIFIED ORGANISM: ICD-10-CM
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
## 2020-10-21 DIAGNOSIS — N40.0 BENIGN PROSTATIC HYPERPLASIA WITHOUT LOWER URINARY TRACT SYMPTOMS: ICD-10-CM

## 2020-10-21 DIAGNOSIS — R13.10 DYSPHAGIA, UNSPECIFIED: ICD-10-CM

## 2020-10-21 DIAGNOSIS — Z79.82 LONG TERM (CURRENT) USE OF ASPIRIN: ICD-10-CM

## 2020-10-21 DIAGNOSIS — S06.2X0A DIFFUSE TRAUMATIC BRAIN INJURY WITHOUT LOSS OF CONSCIOUSNESS, INITIAL ENCOUNTER: ICD-10-CM

## 2020-10-21 DIAGNOSIS — S06.5X0A TRAUMATIC SUBDURAL HEMORRHAGE WITHOUT LOSS OF CONSCIOUSNESS, INITIAL ENCOUNTER: ICD-10-CM

## 2020-10-21 DIAGNOSIS — R40.2142 COMA SCALE, EYES OPEN, SPONTANEOUS, AT ARRIVAL TO EMERGENCY DEPARTMENT: ICD-10-CM

## 2020-10-21 DIAGNOSIS — S22.31XA FRACTURE OF ONE RIB, RIGHT SIDE, INITIAL ENCOUNTER FOR CLOSED FRACTURE: ICD-10-CM

## 2020-10-21 DIAGNOSIS — I25.10 ATHEROSCLEROTIC HEART DISEASE OF NATIVE CORONARY ARTERY WITHOUT ANGINA PECTORIS: ICD-10-CM

## 2020-10-21 DIAGNOSIS — R40.2364 COMA SCALE, BEST MOTOR RESPONSE, OBEYS COMMANDS, 24 HOURS OR MORE AFTER HOSPITAL ADMISSION: ICD-10-CM

## 2020-10-21 DIAGNOSIS — R40.2134: ICD-10-CM

## 2020-10-21 DIAGNOSIS — K21.9 GASTRO-ESOPHAGEAL REFLUX DISEASE WITHOUT ESOPHAGITIS: ICD-10-CM

## 2020-10-21 DIAGNOSIS — E43 UNSPECIFIED SEVERE PROTEIN-CALORIE MALNUTRITION: ICD-10-CM

## 2020-10-21 DIAGNOSIS — D72.829 ELEVATED WHITE BLOOD CELL COUNT, UNSPECIFIED: ICD-10-CM

## 2020-10-21 DIAGNOSIS — J69.0 PNEUMONITIS DUE TO INHALATION OF FOOD AND VOMIT: ICD-10-CM

## 2020-10-21 DIAGNOSIS — E11.9 TYPE 2 DIABETES MELLITUS WITHOUT COMPLICATIONS: ICD-10-CM

## 2020-10-21 DIAGNOSIS — G93.89 OTHER SPECIFIED DISORDERS OF BRAIN: ICD-10-CM

## 2020-10-21 DIAGNOSIS — R40.2362 COMA SCALE, BEST MOTOR RESPONSE, OBEYS COMMANDS, AT ARRIVAL TO EMERGENCY DEPARTMENT: ICD-10-CM

## 2020-10-21 DIAGNOSIS — I10 ESSENTIAL (PRIMARY) HYPERTENSION: ICD-10-CM

## 2020-10-21 DIAGNOSIS — R53.81 OTHER MALAISE: ICD-10-CM
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
## 2020-10-21 DIAGNOSIS — I62.1 NONTRAUMATIC EXTRADURAL HEMORRHAGE: ICD-10-CM

## 2020-10-21 DIAGNOSIS — I16.1 HYPERTENSIVE EMERGENCY: ICD-10-CM
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
## 2020-10-21 DIAGNOSIS — R40.2252 COMA SCALE, BEST VERBAL RESPONSE, ORIENTED, AT ARRIVAL TO EMERGENCY DEPARTMENT: ICD-10-CM

## 2020-10-21 DIAGNOSIS — R40.2214 COMA SCALE, BEST VERBAL RESPONSE, NONE, 24 HOURS OR MORE AFTER HOSPITAL ADMISSION: ICD-10-CM

## 2020-10-21 DIAGNOSIS — S06.4X0A EPIDURAL HEMORRHAGE WITHOUT LOSS OF CONSCIOUSNESS, INITIAL ENCOUNTER: ICD-10-CM

## 2020-10-21 DIAGNOSIS — E78.5 HYPERLIPIDEMIA, UNSPECIFIED: ICD-10-CM

## 2020-10-21 DIAGNOSIS — E83.42 HYPOMAGNESEMIA: ICD-10-CM

## 2020-10-26 ENCOUNTER — INPATIENT (INPATIENT)
Facility: HOSPITAL | Age: 85
LOS: 14 days | Discharge: NURSING, REHAB OR LONG-TERM CARE | DRG: 698 | End: 2020-11-10
Attending: HOSPITALIST | Admitting: HOSPITALIST
Payer: MEDICARE

## 2020-10-26 VITALS
HEART RATE: 92 BPM | HEIGHT: 69 IN | DIASTOLIC BLOOD PRESSURE: 52 MMHG | SYSTOLIC BLOOD PRESSURE: 94 MMHG | OXYGEN SATURATION: 99 % | RESPIRATION RATE: 24 BRPM | WEIGHT: 119.93 LBS | TEMPERATURE: 99 F

## 2020-10-26 DIAGNOSIS — N39.0 URINARY TRACT INFECTION, SITE NOT SPECIFIED: ICD-10-CM

## 2020-10-26 LAB
ALBUMIN SERPL ELPH-MCNC: 2.4 G/DL — LOW (ref 3.3–5)
ALP SERPL-CCNC: 166 U/L — HIGH (ref 40–120)
ALT FLD-CCNC: 44 U/L — SIGNIFICANT CHANGE UP (ref 12–78)
ANION GAP SERPL CALC-SCNC: 5 MMOL/L — SIGNIFICANT CHANGE UP (ref 5–17)
APPEARANCE UR: ABNORMAL
APTT BLD: 23.6 SEC — LOW (ref 27.5–35.5)
AST SERPL-CCNC: 48 U/L — HIGH (ref 15–37)
BASOPHILS # BLD AUTO: 0 K/UL — SIGNIFICANT CHANGE UP (ref 0–0.2)
BASOPHILS NFR BLD AUTO: 0 % — SIGNIFICANT CHANGE UP (ref 0–2)
BILIRUB SERPL-MCNC: 0.8 MG/DL — SIGNIFICANT CHANGE UP (ref 0.2–1.2)
BILIRUB UR-MCNC: NEGATIVE — SIGNIFICANT CHANGE UP
BUN SERPL-MCNC: 32 MG/DL — HIGH (ref 7–23)
CALCIUM SERPL-MCNC: 8.5 MG/DL — SIGNIFICANT CHANGE UP (ref 8.5–10.1)
CHLORIDE SERPL-SCNC: 109 MMOL/L — HIGH (ref 96–108)
CO2 SERPL-SCNC: 28 MMOL/L — SIGNIFICANT CHANGE UP (ref 22–31)
COLOR SPEC: ABNORMAL
CREAT SERPL-MCNC: 1.16 MG/DL — SIGNIFICANT CHANGE UP (ref 0.5–1.3)
DIFF PNL FLD: ABNORMAL
EOSINOPHIL # BLD AUTO: 0 K/UL — SIGNIFICANT CHANGE UP (ref 0–0.5)
EOSINOPHIL NFR BLD AUTO: 0 % — SIGNIFICANT CHANGE UP (ref 0–6)
GLUCOSE SERPL-MCNC: 124 MG/DL — HIGH (ref 70–99)
GLUCOSE UR QL: NEGATIVE MG/DL — SIGNIFICANT CHANGE UP
HCT VFR BLD CALC: 29.5 % — LOW (ref 39–50)
HGB BLD-MCNC: 9.7 G/DL — LOW (ref 13–17)
INR BLD: 1.19 RATIO — HIGH (ref 0.88–1.16)
KETONES UR-MCNC: NEGATIVE — SIGNIFICANT CHANGE UP
LACTATE SERPL-SCNC: 2.1 MMOL/L — HIGH (ref 0.7–2)
LEUKOCYTE ESTERASE UR-ACNC: ABNORMAL
LYMPHOCYTES # BLD AUTO: 0.6 K/UL — LOW (ref 1–3.3)
LYMPHOCYTES # BLD AUTO: 2 % — LOW (ref 13–44)
MCHC RBC-ENTMCNC: 32.3 PG — SIGNIFICANT CHANGE UP (ref 27–34)
MCHC RBC-ENTMCNC: 32.9 GM/DL — SIGNIFICANT CHANGE UP (ref 32–36)
MCV RBC AUTO: 98.3 FL — SIGNIFICANT CHANGE UP (ref 80–100)
MONOCYTES # BLD AUTO: 0.3 K/UL — SIGNIFICANT CHANGE UP (ref 0–0.9)
MONOCYTES NFR BLD AUTO: 1 % — LOW (ref 2–14)
NEUTROPHILS # BLD AUTO: 28.87 K/UL — HIGH (ref 1.8–7.4)
NEUTROPHILS NFR BLD AUTO: 91 % — HIGH (ref 43–77)
NITRITE UR-MCNC: NEGATIVE — SIGNIFICANT CHANGE UP
NRBC # BLD: SIGNIFICANT CHANGE UP /100 WBCS (ref 0–0)
PH UR: 5 — SIGNIFICANT CHANGE UP (ref 5–8)
PLATELET # BLD AUTO: 366 K/UL — SIGNIFICANT CHANGE UP (ref 150–400)
POTASSIUM SERPL-MCNC: 4 MMOL/L — SIGNIFICANT CHANGE UP (ref 3.5–5.3)
POTASSIUM SERPL-SCNC: 4 MMOL/L — SIGNIFICANT CHANGE UP (ref 3.5–5.3)
PROT SERPL-MCNC: 5.9 GM/DL — LOW (ref 6–8.3)
PROT UR-MCNC: 100 MG/DL
PROTHROM AB SERPL-ACNC: 13.8 SEC — HIGH (ref 10.6–13.6)
RBC # BLD: 3 M/UL — LOW (ref 4.2–5.8)
RBC # FLD: 15.2 % — HIGH (ref 10.3–14.5)
SODIUM SERPL-SCNC: 142 MMOL/L — SIGNIFICANT CHANGE UP (ref 135–145)
SP GR SPEC: 1.01 — SIGNIFICANT CHANGE UP (ref 1.01–1.02)
UROBILINOGEN FLD QL: NEGATIVE MG/DL — SIGNIFICANT CHANGE UP
WBC # BLD: 29.76 K/UL — HIGH (ref 3.8–10.5)
WBC # FLD AUTO: 29.76 K/UL — HIGH (ref 3.8–10.5)

## 2020-10-26 PROCEDURE — 84100 ASSAY OF PHOSPHORUS: CPT

## 2020-10-26 PROCEDURE — 85025 COMPLETE CBC W/AUTO DIFF WBC: CPT

## 2020-10-26 PROCEDURE — 97530 THERAPEUTIC ACTIVITIES: CPT | Mod: GP

## 2020-10-26 PROCEDURE — 85027 COMPLETE CBC AUTOMATED: CPT

## 2020-10-26 PROCEDURE — 83605 ASSAY OF LACTIC ACID: CPT

## 2020-10-26 PROCEDURE — 70450 CT HEAD/BRAIN W/O DYE: CPT | Mod: 26

## 2020-10-26 PROCEDURE — 86900 BLOOD TYPING SEROLOGIC ABO: CPT

## 2020-10-26 PROCEDURE — 93005 ELECTROCARDIOGRAM TRACING: CPT

## 2020-10-26 PROCEDURE — 83735 ASSAY OF MAGNESIUM: CPT

## 2020-10-26 PROCEDURE — 97116 GAIT TRAINING THERAPY: CPT | Mod: GP

## 2020-10-26 PROCEDURE — 93306 TTE W/DOPPLER COMPLETE: CPT

## 2020-10-26 PROCEDURE — 92523 SPEECH SOUND LANG COMPREHEN: CPT | Mod: GN

## 2020-10-26 PROCEDURE — 86850 RBC ANTIBODY SCREEN: CPT

## 2020-10-26 PROCEDURE — 82962 GLUCOSE BLOOD TEST: CPT

## 2020-10-26 PROCEDURE — 84484 ASSAY OF TROPONIN QUANT: CPT

## 2020-10-26 PROCEDURE — 97110 THERAPEUTIC EXERCISES: CPT | Mod: GP

## 2020-10-26 PROCEDURE — 86769 SARS-COV-2 COVID-19 ANTIBODY: CPT

## 2020-10-26 PROCEDURE — 92507 TX SP LANG VOICE COMM INDIV: CPT | Mod: GN

## 2020-10-26 PROCEDURE — 86901 BLOOD TYPING SEROLOGIC RH(D): CPT

## 2020-10-26 PROCEDURE — 74177 CT ABD & PELVIS W/CONTRAST: CPT

## 2020-10-26 PROCEDURE — U0003: CPT

## 2020-10-26 PROCEDURE — 71045 X-RAY EXAM CHEST 1 VIEW: CPT | Mod: 26

## 2020-10-26 PROCEDURE — 82550 ASSAY OF CK (CPK): CPT

## 2020-10-26 PROCEDURE — 93010 ELECTROCARDIOGRAM REPORT: CPT

## 2020-10-26 PROCEDURE — 80053 COMPREHEN METABOLIC PANEL: CPT

## 2020-10-26 PROCEDURE — 87040 BLOOD CULTURE FOR BACTERIA: CPT

## 2020-10-26 PROCEDURE — 36415 COLL VENOUS BLD VENIPUNCTURE: CPT

## 2020-10-26 PROCEDURE — 92526 ORAL FUNCTION THERAPY: CPT | Mod: GN

## 2020-10-26 PROCEDURE — 92610 EVALUATE SWALLOWING FUNCTION: CPT | Mod: GN

## 2020-10-26 PROCEDURE — 97163 PT EVAL HIGH COMPLEX 45 MIN: CPT | Mod: GP

## 2020-10-26 RX ORDER — SODIUM CHLORIDE 9 MG/ML
1700 INJECTION, SOLUTION INTRAVENOUS ONCE
Refills: 0 | Status: COMPLETED | OUTPATIENT
Start: 2020-10-26 | End: 2020-10-26

## 2020-10-26 RX ORDER — ACETAMINOPHEN 500 MG
650 TABLET ORAL ONCE
Refills: 0 | Status: COMPLETED | OUTPATIENT
Start: 2020-10-26 | End: 2020-10-26

## 2020-10-26 RX ORDER — OMEPRAZOLE 10 MG/1
1 CAPSULE, DELAYED RELEASE ORAL
Qty: 0 | Refills: 0 | DISCHARGE

## 2020-10-26 RX ORDER — CEFEPIME 1 G/1
2000 INJECTION, POWDER, FOR SOLUTION INTRAMUSCULAR; INTRAVENOUS ONCE
Refills: 0 | Status: COMPLETED | OUTPATIENT
Start: 2020-10-26 | End: 2020-10-26

## 2020-10-26 RX ADMIN — CEFEPIME 100 MILLIGRAM(S): 1 INJECTION, POWDER, FOR SOLUTION INTRAMUSCULAR; INTRAVENOUS at 21:40

## 2020-10-26 RX ADMIN — Medication 650 MILLIGRAM(S): at 20:15

## 2020-10-26 RX ADMIN — Medication 650 MILLIGRAM(S): at 23:06

## 2020-10-26 RX ADMIN — CEFEPIME 2000 MILLIGRAM(S): 1 INJECTION, POWDER, FOR SOLUTION INTRAMUSCULAR; INTRAVENOUS at 22:18

## 2020-10-26 RX ADMIN — SODIUM CHLORIDE 1700 MILLILITER(S): 9 INJECTION, SOLUTION INTRAVENOUS at 20:30

## 2020-10-26 RX ADMIN — SODIUM CHLORIDE 1700 MILLILITER(S): 9 INJECTION, SOLUTION INTRAVENOUS at 23:00

## 2020-10-26 NOTE — H&P ADULT - ATTENDING COMMENTS
89 year old male patient with narrative as previously stated with mental status changes and hypotension    -Admit to monitored bed    #Encephalopathy   -possilby due to UTI  -on IVF abx  -neuro checks per routine  -CT head negative for acute pathology    #Sepsis  -urine likely source  - pt volume resuscitated in the ED  -on IVF hydration  -f/u blood and urine cx  -on IV abx  -get ID evaluation    #Leukocytosis  -likely secondary to above  -on IV abx  -trend cbc    #Lactic acidosis  -IVF hydration  -get CT abdomen/pelvis with contrast    #Hypotension  -likely secondary to sepsis  -IVF hydration  -currently asymptomatic    #Persistenent right sides subdural hematoma  -stable ct head  -neurosurgery following  -neuro checks per routine    #Traumatic dawson removal/ Hematuria  -start CBI  -get urology evaluation    # CAD  -stable    #BPH  -on flomax    #HTN  - hold anti hypertensives in lieu of hypotension    #HLD  -on statin    #Advanced directives  -Full code    #DVT ppx  -scds 89 year old male patient with narrative as previously stated with mental status changes and hypotension    -Admit to monitored bed    #Encephalopathy   -possilby due to UTI  -on IVF abx  -neuro checks per routine  -CT head negative for acute pathology    #Sepsis  -urine likely source  - pt volume resuscitated in the ED  -on IVF hydration  -f/u blood and urine cx  -on IV abx  -get ID evaluation    #Leukocytosis  -likely secondary to above  -on IV abx  -trend cbc    #Lactic acidosis  -IVF hydration  -get CT abdomen/pelvis with contrast    #Hypotension  -likely secondary to sepsis  -IVF hydration  -currently asymptomatic    #Anemia  -stable and at baseline    #Persistent right sides subdural hematoma  -stable ct head  -neurosurgery following  -neuro checks per routine    #Traumatic dawson removal/ Hematuria  -start CBI  -get urology evaluation    #MÓNICA  -likely pre-renal  -IVF hydration  -trend kidney function    # CAD  -stable    #BPH  -on flomax    #HTN  - hold anti hypertensives in lieu of hypotension    #HLD  -on statin    #Advanced directives  -Full code    #DVT ppx  -scds

## 2020-10-26 NOTE — CONSULT NOTE ADULT - SUBJECTIVE AND OBJECTIVE BOX
Neurosurgery:    89M POD # 21 s/p right crani evacuation of right SDH, rib fxr's. PMHx of BPH, CAD, DM, GERD, HLD, SDH s/p craniotomy and surgical removal, urine retention requiring Dawson catheter, patient has stage 2 pressure sores to left buttock and right sacrum.  Pt returns with Fever 104, Hypotensive, Hypoxic, WBC 30K and self removal of dawson with hematuria.   Pt has a depressed Mental status but is oriented to Self / President Trump / Hospital. He follows commands and demonstrates equal power throughout, Legs bilaterally weaker than arms.  Head incision is well healed, no erythema or discharge / oozing.      Rad: CTH  IMPRESSION: 10/26/20  1. Persistent, large right-sided subdural hematoma that demonstrates components of variable age, including acute/subacute components) and measures up to 2.1 cm in thickness. This results in right-sided mass effect with right to left midline shift of approximately 6 mm. When compared to prior CT brain of 10/9/2020, a similar sized right holohemispheric subdural hematoma was demonstrated with similar appearance of the ventricles, mass effect and right to left midline shift.    Non surgical as collection has evolved to more chronic status with persistent acute element.  Also no new pneumocephalus therefore cause for fever source is extremely low.    PAST MEDICAL & SURGICAL HISTORY:  GERD (gastroesophageal reflux disease)  BPH (benign prostatic hyperplasia)  DM (diabetes mellitus)  HLD (hyperlipidemia)  HTN (hypertension)  CAD (coronary artery disease)    * Patient Currently Takes Medications as of 16-Oct-2020 16:13 documented in Structured Notes  · 	Multiple Vitamins oral tablet: 1 tab(s) orally once a day  · 	bethanechol 50 mg oral tablet: 1 tab(s) orally 2 times a day  · 	amLODIPine 10 mg oral tablet: 1 tab(s) orally once a day  · 	metoprolol tartrate 25 mg oral tablet: 1 tab(s) orally 2 times a day  · 	QUEtiapine 50 mg oral tablet: 1 tab(s) orally once a day (at bedtime)  · 	simvastatin 40 mg oral tablet: 1 tab(s) orally once a day (at bedtime)  · 	gabapentin 300 mg oral capsule: 1 cap(s) orally 3 times a day  · 	tamsulosin 0.4 mg oral capsule: 1 cap(s) orally once a day (at bedtime)  · 	acetaminophen 10 mg/mL intravenous solution: 100 milliliter(s) intravenous once, As needed, Moderate Pain (4 - 6)  · 	acetaminophen 10 mg/mL intravenous solution: 100 milliliter(s) intravenous every 6 hours, As needed, Temp greater or equal to 38C (100.4F)  · 	acetaminophen 325 mg oral tablet: 2 tab(s) orally every 6 hours, As needed, Temp greater or equal to 38.5C (101.3F)  · 	finasteride 5 mg oral tablet: 1 tab(s) orally once a day  · 	omeprazole 20 mg oral delayed release capsule: 1 cap(s) orally once a day    Vital Signs Last 24 Hrs  T(C): 37.8 (26 Oct 2020 22:31), Max: 39.6 (26 Oct 2020 20:06)  T(F): 100 (26 Oct 2020 22:31), Max: 103.3 (26 Oct 2020 20:06)  HR: 84 (26 Oct 2020 20:06) (84 - 92)  BP: 97/46 (26 Oct 2020 20:06) (94/52 - 97/46)  BP(mean): --  RR: 22 (26 Oct 2020 20:06) (22 - 24)  SpO2: 100% (26 Oct 2020 20:06) (99% - 100%)    PHYSICAL EXAM:  Constitutional: Sleepy but easily arousable  HEENT: PERRLA, EOMI  Neck: Supple  Respiratory: Breath sounds are clear bilaterally  Cardiovascular: S1 and S2, regular rhythm  Gastrointestinal: soft, nontender  Extremities:  no edema  Musculoskeletal: no joint swelling/tenderness, no abnormal movements  Skin: right craniotomy incision clean and dry, staples intact     Neurological exam:  GCS is 14 (eyes open to voice)  AOx2-3 (name, Trump, Place), Following commands, speech slow yet fluent, PERRL EOM-I.  BARAJAS 5/5 no drifts in UE  LE's moves feet and toes equally.  Asked pt to lift legs off the bed limited due to generalized weakness in all groups,  but pt able to do so with both LE's.       Labs:                        9.7    29.76 )-----------( 366      ( 26 Oct 2020 20:09 )             29.5     10-26    142  |  109<H>  |  32<H>  ----------------------------<  124<H>  4.0   |  28  |  1.16    Ca    8.5      26 Oct 2020 20:09  TPro  5.9<L>  /  Alb  2.4<L>  /  TBili  0.8  /  DBili  x   /  AST  48<H>  /  ALT  44  /  AlkPhos  166<H>  10-26    PT/INR - ( 26 Oct 2020 20:09 )   PT: 13.8 sec;   INR: 1.19 ratio    PTT - ( 26 Oct 2020 20:09 )  PTT:23.6 sec  Lactate 2.1      89 year old man s/p fall, RIGHT SDH with brain compression s/p evacuation (POD#21) returns with stable size SDH collection and new Fever 104, WBC 30K, Hypotensive and Hypoxic. + hematuria secondary to dawson removal.  Pt treated with Abx / Sepsis protocol / Bacteremic protocol in ED.      PLAN-  No neurosurgical intervention - Stable SDH  d/w [facility] attending on Call - Dr. Huggins - Dav Obrien will be informed in AM  Abx in ED, Fever w/u in process.  Well healed incision, no pneumocephalus in surgical cavity on CTH.  ID consult  Medical admit  PT/OT permitted depending on pt's tolerance and capability  No ASA for now  will follow and offer further recommendations via [facility] neurosurgical private practice group  Mechanical DVT ppx only for now,  chemical will be considered on daily basis but should be held for now

## 2020-10-26 NOTE — ED PROVIDER NOTE - NEUROLOGICAL, MLM
arousable to verbal stimuli but requires repeated verbal stimulation, non verbal, follows some basic commands

## 2020-10-26 NOTE — ED PROVIDER NOTE - CONSTITUTIONAL, MLM
normal... elderly frail white male acutely ill, obtunded, requires repeated verbal stimulation, non verbal

## 2020-10-26 NOTE — H&P ADULT - NSHPREVIEWOFSYSTEMS_GEN_ALL_CORE
Review of Symptoms  Gen: no fevers, chills, sweats, weight loss, fatigue  Visual: no recent changes in vision, no blurriness, no seeing spots  Cardiovascular: no chest pain, no palpitations, no orthopnea, no leg swelling  Respiratory: no shortness of breath, no exertional dyspnea, no cough, no rhinorrhea, no nasal congestion  GI: no difficulty swallowing, no nausea, no vomiting, no abdominal pain, no diarrhea, no constipation, no melana  : no dysuria, no increased freq, no hematuria, no malodorous urine  Derm: no wounds, no rashes  Heme: no easy bleeding or bruising  MSK: no joint pain, no joint swelling or redness, no extremity pain   Neuro: no headache, no numbness, no weakness, no memory loss  Psych: no depression, no anxiety, no SI ROS:Unable to obtain due to pt's status ROS: Unable to obtain due to pt's status

## 2020-10-26 NOTE — ED PROVIDER NOTE - SECONDARY DIAGNOSIS.
Sepsis Gross hematuria Subdural hematoma Dehydration Hypotension, unspecified hypotension type Metabolic encephalopathy

## 2020-10-26 NOTE — ED PROVIDER NOTE - OBJECTIVE STATEMENT
88 y/o male with PMHx of BPH, CAD, DM, GERD, HLD, SDH s/p craniotomy and surgical removal, urine retention requiring Whitmore catheter, and HTN presents to ED c/o hematuria. Patient BIBA presents to ED from [facility], as per nursing home patient had Whitmore catheter replaced last night after patient pulled it out, and today noted to have fever of 104, BP 90 systolic, ad hypoxic at 77% on RA, increased confusion and lethargy. Patient has stage 2 pressure sores to left buttock and right sacrum, no allergies, no AC medication. No MOLST form present, patient is full code.

## 2020-10-26 NOTE — ED PROVIDER NOTE - SKIN, MLM
Tactile warmth, shin bilateral superficial abrasions, +decubital pressure sore stage 2 Tactile warmth, shin bilateral superficial abrasions, +decubital pressure ulcerations stage 2 L buttock/R sacrum, not obviously infected

## 2020-10-26 NOTE — ED ADULT TRIAGE NOTE - CHIEF COMPLAINT QUOTE
Pt presents to er from [facility] with EMS from [place], as per EMS, pt had his dawson catheter changed last night and hematuria noted, at facility, pt was 104 and now presents 98.7 oral temp in ems bay, pt pale, diopheritic at this time, disoriented at baseline.

## 2020-10-26 NOTE — H&P ADULT - NSHPPHYSICALEXAM_GEN_ALL_CORE
Vitals  T(F): 100 (10-26-20 @ 22:31), Max: 103.3 (10-26-20 @ 20:06)  HR: 84 (10-26-20 @ 20:06) (84 - 92)  BP: 97/46 (10-26-20 @ 20:06) (94/52 - 97/46)  RR: 22 (10-26-20 @ 20:06) (22 - 24)  SpO2: 100% (10-26-20 @ 20:06) (99% - 100%)    Physical Exam   Gen: NAD, comfortable  HENT: atraumatic head and ears, no gross abnormalities of ears, mucous membranes moist, no oral lesions, neck supple without masses/goiter/lymphadenopathy  CV: RRR, nl s1/s2, no M/R/G  Pulm: nl respiratory effort, CTAB, no wheezes/crackles/rhonchi  Back: no scoliosis, lordosis, or kyphosis, no tenderness  Abd: normoactive bowel sounds in all 4 quadrants, soft, nontender, nondistended, no rebound, no guarding, no masses  Extremities: no pedal edema, pedal pulses palpable   Skin: nl warm and dry, no wounds   Neuro: A&Ox3, answering questions appropriately, PERRL, EOMI, face symmetric, sensation equal bilaterally in face, tongue midline, no dysarthria, 5/5 strength in upper and lower extremities bilaterally, sensation intact in upper and lower extremities bilaterally, nl finger to nose, and nl heel to shin   Pysch: no depression, no SI, no HI Vitals  T(F): 100 (10-26-20 @ 22:31), Max: 103.3 (10-26-20 @ 20:06)  HR: 84 (10-26-20 @ 20:06) (84 - 92)  BP: 97/46 (10-26-20 @ 20:06) (94/52 - 97/46)  RR: 22 (10-26-20 @ 20:06) (22 - 24)  SpO2: 100% (10-26-20 @ 20:06) (99% - 100%)    Physical Exam   Gen: nonverbal, opens eye to verbal command, cachetic   HENT: atraumatic head and ears, no gross abnormalities of ears, dry mucous membranes, no oral lesions, neck supple without masses/goiter/lymphadenopathy, healing ecchymosis right side of neck inferior to jaw  CV: RRR, nl s1/s2, no M/R/G  Pulm: nl respiratory effort, CTABL, no wheezes/crackles/rhonchi  Abd: normoactive bowel sounds in all 4 quadrants, soft, nontender, nondistended, no rebound, no guarding, no masses  Extremities: no pedal edema, pedal pulses palpable   Skin: nl warm and dry, L buttock stage 2 ulcer, L sacral stage 2 ulcer, + skin tenting  Neuro: awake, confused Vitals  T(F): 100 (10-26-20 @ 22:31), Max: 103.3 (10-26-20 @ 20:06)  HR: 84 (10-26-20 @ 20:06) (84 - 92)  BP: 97/46 (10-26-20 @ 20:06) (94/52 - 97/46)  RR: 22 (10-26-20 @ 20:06) (22 - 24)  SpO2: 100% (10-26-20 @ 20:06) (99% - 100%)    Physical Exam   Gen: nonverbal, opens eye to verbal command, cachetic   HENT: atraumatic head and ears, no gross abnormalities of ears, dry mucous membranes, no oral lesions, neck supple without masses/goiter/lymphadenopathy, healing ecchymosis right side of neck inferior to jaw  CV: RRR, nl s1/s2, no M/R/G  Pulm: nl respiratory effort, CTABL, no wheezes/crackles/rhonchi  Abd: normoactive bowel sounds in all 4 quadrants, soft, nontender, nondistended, no rebound, no guarding, no masses  Extremities: no pedal edema, pedal pulses palpable   Skin: nl warm and dry, L buttock stage 2 ulcer, R sacral stage 2 ulcer, + skin tenting  Neuro: awake, confused

## 2020-10-26 NOTE — H&P ADULT - ASSESSMENT
88 y/o M PMHx BPH, CAD, DMII, GERD, HLD, SDH s/p craniotomy and surgical removal, urine rentention with Whitmore catheter, HTN presenting for [facility] with 1 day h/o hematuria    Admit to [facility]    #Metabolic encephelopathy 2/2 Sepsis 2/2 urinary source   #Hematuria  -CTH noting persistent, large R-sided subdural hematoma with acute/subacute components, R sided mass effect with R to L midline shift of 6mm. Similar to imaging perfromed on 10/9/20.  -Febrile Tmax 103.3 in ED  -Leukocytosis WBC 29.76 with L shift  -Hypotensive and tachypneic   -s/p cefepime 2g IV x 1 in ED  -s/p 1.7L LR bolus in ED  -Lactate 2.1-->   -Likely urinary source  -UA notable for microscopic hematuria, small leuks, protein  -F/u Blood/Urine Cx  -Monitor SpO2 on supplemental O2 and RA  -CXR results pending but no acute pathology seen      #SDH s/p craniotomy  -Evaluative by Neurosurgery in ED  -No intervention by NSGY due to stable SDH; well healed incision, no pneumocephlus on CTH  -Hold ADA  -Mechanical DVT ppx for now    #MÓNICA 2/2 dehyration  -Cr 1.16 on admission (BL 0.79)  -s/p 1.7L LR bolus in ED  -f/u AM BMP    #BPH    #Elevated AlkPhos    #CAD    #DMII    #GERD    #HLD        #Stage II pressure ulcers  -L buttock and R sacrum  -Wound consult    #DVT PPX    #Advance Directives  -No MOLST in chart   89 y/o M PMHx BPH, CAD, DMII, GERD, HLD, SDH s/p craniotomy and surgical removal, urine rentention with Dawson catheter, HTN presenting for [facility] with 1 day h/o hematuria    Admit to CICU    #Metabolic encephelopathy 2/2 Sepsis 2/2 UTI chronic dawson   #Hematuria  -CTH noting persistent, large R-sided subdural hematoma with acute/subacute components, R sided mass effect with R to L midline shift of 6mm. Similar to imaging performed on 10/9/20. Stable  -Febrile Tmax 103.3 in ED  -Leukocytosis WBC 29.76 with L shift  -Hypotensive and tachypneic   -s/p cefepime 2g IV x 1 in ED  -s/p 2.7L LR bolus in ED  -Lactate 2.1-->10.1  -f/u r/p lactate  -CT A/P w/ IV contrast ordered r/o ischemic bowel  -Urology consult placed for eval of hematuria and possible trauma from dawson removal/placement  -UA notable for microscopic hematuria, small leuks, protein  -F/u Blood/Urine Cx  -Monitor SpO2 on supplemental O2 and RA  -CXR results pending but no acute pathology seen  -trend CBC    #SDH s/p craniotomy  -Evaluative by Neurosurgery in ED  -No intervention by NSGY due to stable SDH; well healed incision, no pneumocephlus on CTH  -Hold ASA  -Mechanical DVT ppx for now    #MÓNICA 2/2 dehyration  -Cr 1.16 on admission (BL 0.79)  -s/p 2.7L LR bolus in ED  -Cont IVF NS at 70cc/hr  -f/u AM BMP    #BPH  -cont finasteride  -cont tamsulosin    #CAD  -EKG reviewed, no acute changes noted  -hold ASA for now in light of hematuria    #DMII  -Low ISS  -AM A1C  -BGM qac qhs    #GERD  -Cont home meds    #HLD  -Cont simvastatin    #Stage II pressure ulcers  -L buttock and R sacrum  -Wound consult    #Low albumin  -nutrition consult    #DVT PPX  -SCDs  -Hold oral anticoagulation due to active bleeding    #Advance Directives  -No MOLST in chart  -Spoke with son, Caleb Finn who is next of kin and making medical decisions for pt as he has no capacity at this time  -FULL CODE for now; will discuss with day team and review paperwork son to bring in   88 y/o M PMHx BPH, CAD, DMII, GERD, HLD, SDH s/p craniotomy and surgical removal, urine rentention with Dawson catheter, HTN presenting for [facility] with 1 day h/o hematuria, hypotension, and AMS    Admit to CICU    #Metabolic encephelopathy 2/2 UTI  -CTH noting persistent, stable large R-sided SDH with acute/subacute components, R to L midline shift. Similar to imaging performed on 10/9/20; no acute pathology  -Cont cefepime 2g IV q12h  -Neuro checks q4h    #Severe sepsis 2/2 complicated UTI  -Febrile Tmax 103.3 and hypotensive in ED  -WBC 29.76  -s/p 3.7L LR bolus in ED  -Cont IVF hydration w/ NS 70cc/hr  -Cont to monitor BP; goal MAP>65  -f/u Blood/Urine Cx  -s/p cefepime 2g IV x 1 in ED  -Continue cefepime 2g IV q12h  -ID consult placed  -Monitor SpO2 on supplemental O2 and RA  -CXR results pending  -f/u AM CBC    #Lactic Acidosis  -s/p 3.7L LR in ED  -cont IVF hydration at 70cc/hr  -Lactate 2.1-->10.1-->2.1  -f/u AM lactate  -CT A/P w/ IV contrast ordered r/o ischemic bowel    #Hematuria 2/2 trauma related to dawson removal  -UA notable for hematuria, small leuks  -Urology consult placed  -Consider starting CBI  -trend CBC    #MÓNICA 2/2 dehydration  -Cr 1.16 on admission (BL 0.79)  -s/p 3.7L LR bolus in ED  -Cont IVF NS at 70cc/hr  -f/u AM BMP    #SDH s/p craniotomy  Evaluated by Neurosurgery in ED  -No intervention by NSGY due to stable SDH on CTH; rec hold ASA  -Neuro checks q4h    #HTN  -Hold BP meds due to hypotension on admission  -Closely monitor VS    #BPH  -cont finasteride  -cont tamsulosin    #CAD/HLD  -EKG reviewed, no acute changes noted  -hold ASA for now in light of hematuria  -Cont simvastatin    #DMII  -Low ISS  -A1C 5.7 (10/5)  -BGM qac qhs    #Stage II pressure ulcers  -L buttock and R sacrum on arrival to ED  -Wound consult    #DVT PPX  -SCDs  -Hold oral anticoagulation due to active bleeding    #Advance Directives  -Spoke with son, Caleb Finn who is next of kin and making medical decisions for pt as he has no capacity at this time  -FULL CODE; verbal order given; MIRANDA signed and in chart  -Son will discuss with day team and review paperwork son to bring in

## 2020-10-26 NOTE — ED ADULT NURSE NOTE - CHIEF COMPLAINT QUOTE
Pt presents to er from [facility] with EMS from [place], as per EMS, pt had his dawson catheter changed last night and hematuria noted, at facility, pt was 104 and now presents 98.7 oral temp in ems bay, pt pale, diopheritic at this time, disoriented at baseline.

## 2020-10-26 NOTE — ED PROVIDER NOTE - CARE PLAN
Principal Discharge DX:	Acute UTI (urinary tract infection)  Secondary Diagnosis:	Sepsis  Secondary Diagnosis:	Gross hematuria  Secondary Diagnosis:	Subdural hematoma  Secondary Diagnosis:	Metabolic encephalopathy  Secondary Diagnosis:	Dehydration  Secondary Diagnosis:	Hypotension, unspecified hypotension type

## 2020-10-26 NOTE — H&P ADULT - HISTORY OF PRESENT ILLNESS
89 y/o M PMHx BPH, CAD, DMII, GERD, HLD, SDH s/p craniotomy and surgical removal, urine rentention with Whitmore catheter, HTN presenting for [facility] with 1 day h/o hematuria. Whitmore was replaced last night after pt pulled it out. Today, pt was noted to be febrile Tmas 104, SBP 90, and hypoxic on RA Sp02 on RA. Also noted to be more confused and lethargic. Pt not on AC.    Pt was admitted in early October (D/c 10 days ago) s/p fall with AMS and imaging + for SDH requiring craniotomy and evacation; also with aspiration PNA tx with IV abx. Whitmore was placed for urinary retention. 88 y/o M PMHx BPH, CAD, DMII, GERD, HLD, SDH s/p craniotomy and surgical removal, urine rentention with Whitmore catheter, HTN presenting for [facility] with 1 day h/o hematuria. Whitmore was replaced in the NH last night after pt pulled it out. Today, pt was noted to be febrile Tmas 104, SBP 90, and hypoxic on RA Sp02 on RA. Also noted to be more confused and lethargic with gross hematuria. Pt not on AC due to recent SDH.    Pt was admitted in early October (D/c 10 days ago) s/p fall with AMS and imaging + for SDH requiring craniotomy and evacuation also with aspiration PNA tx with IV abx. Whitmore was placed for urinary retention.     Spoke with pt's son Caleb whiteside ESL  this evening. Updated him on pt's status; as the next of kin, he consented to CT w/ contrast imaging. MOLST was reviewed; FULL CODE for now but son will bring in paperwork tomorrow to discuss with the day team. 89 y/o M PMHx BPH, CAD, DMII, GERD, HLD, SDH s/p craniotomy and surgical removal, urine rentention with Whitmore catheter, HTN presenting for [facility] with 1 day h/o hematuria. Whitmore was replaced in the NH last night after pt pulled it out. Today, pt was noted to be febrile Tmas 104, SBP 90, and hypoxic on RA Sp02 on RA. Also noted to be more confused and lethargic with gross hematuria. Pt not on AC due to recent SDH.  Pt was admitted in early October (D/c 10 days ago) s/p fall with AMS and imaging + for SDH requiring craniotomy and evacuation also with aspiration PNA tx with IV abx. Whitmore was placed for urinary retention.   Spoke with pt's son Caleb whiteside ESL  this evening. Updated him on pt's status; as the next of kin, he consented to CT w/ contrast imaging. MOLST was reviewed and received verbal consent; FULL CODE for now but son will bring in paperwork tomorrow to discuss with the day team. 88 y/o M PMHx BPH, CAD, DMII, GERD, HLD, SDH s/p craniotomy and surgical removal, urine rentention with Whitmore catheter, HTN presenting for [facility] with 1 day h/o hematuria. Whitmore was replaced in the NH last night after the pt pulled it out. Today, pt was noted to be febrile at the NH Tmax: 104, SBP 90, and hypoxic on RA Sp02 on RA. Also noted to be more confused and lethargic with gross hematuria. Pt not on AC due to recent SDH.  Pt was admitted in early October (D/c 10 days ago) s/p fall with AMS and imaging + for SDH requiring craniotomy and evacuation also with aspiration PNA tx with IV abx. Whitmore was placed for urinary retention.   Spoke with pt's son Caleb whiteside ESL  this evening. Updated him on pt's status; as the next of kin, he consented to CT w/ contrast imaging. MOLST was reviewed and received verbal consent; FULL CODE for now but son will bring in paperwork tomorrow to discuss with the day team.

## 2020-10-26 NOTE — ED ADULT NURSE NOTE - NSIMPLEMENTINTERV_GEN_ALL_ED
Implemented All Fall with Harm Risk Interventions:  [place] to call system. Call bell, personal items and telephone within reach. Instruct patient to call for assistance. Room bathroom lighting operational. Non-slip footwear when patient is off stretcher. Physically safe environment: no spills, clutter or unnecessary equipment. Stretcher in lowest position, wheels locked, appropriate side rails in place. Provide visual cue, wrist band, yellow gown, etc. Monitor gait and stability. Monitor for mental status changes and reorient to person, place, and time. Review medications for side effects contributing to fall risk. Reinforce activity limits and safety measures with patient and family. Provide visual clues: red socks.

## 2020-10-26 NOTE — ED PROVIDER NOTE - CARDIAC, MLM
Normal rate, regular rhythm.  Heart sounds S1, S2.  No murmurs, rubs or gallops, RRR on radial pulse Normal rate, regular rhythm.  Heart sounds S1, S2.  No murmurs, rubs or gallops, normal radial pulse

## 2020-10-26 NOTE — ED PROVIDER NOTE - PROGRESS NOTE DETAILS
Loreta SAINI for ED attending, Dr. Bullock. Neurosurgery PA aware of ED consult, CT head formal report pending, patient will require admission Dr. Bullock;  BP 95/56 w/ MAP = 65.  + UTI on labs, with + high WBC cnt., lactate slt elev at 2.1.  Dr. Coburn aware of admission.

## 2020-10-26 NOTE — ED PROVIDER NOTE - CLINICAL SUMMARY MEDICAL DECISION MAKING FREE TEXT BOX
88 y/o white male with PMHx of cardiac, +fall beginning of October 2020 with subsequent AMS, +SDH requiring craniotomy with evacuation of hematoma, aspiration pneumonia treated with IV abx, urine retention with dawson catheter placed; D/C 10 days ago to [facility] for [facility], BIBA from nursing home regarding fever, AMS, borderline low blood pressure. Patient pulled Dawson out last night, and was replaced. Today gross hematuria noted. on exam, patient obtunded, BP borderline, positive febrile. Plan; sepsis alert initiated; EKG, CXR, CT head, sepsis labs including pan culture COVID serum lactate, sepsi IV fluids, IV Cefepime, Suppository Tylenol, Dawson irrigation, monitor, observe, and re-assess. Expect admission

## 2020-10-26 NOTE — ED ADULT NURSE NOTE - OBJECTIVE STATEMENT
Pt  BIBA from [facility].  Per EMS, pt had his dawson catheter changed last night and hematuria noted. Pt nonverbal, eye opening to verbal commands noted.  Dawson catheter draining, gross hematuria noted. Stage 2 pressure ulcer noted approximately [id number] on sacrum region.

## 2020-10-27 LAB
ALBUMIN SERPL ELPH-MCNC: 1.8 G/DL — LOW (ref 3.3–5)
ALP SERPL-CCNC: 122 U/L — HIGH (ref 40–120)
ALT FLD-CCNC: 32 U/L — SIGNIFICANT CHANGE UP (ref 12–78)
ANION GAP SERPL CALC-SCNC: 5 MMOL/L — SIGNIFICANT CHANGE UP (ref 5–17)
AST SERPL-CCNC: 33 U/L — SIGNIFICANT CHANGE UP (ref 15–37)
BASOPHILS # BLD AUTO: 0.05 K/UL — SIGNIFICANT CHANGE UP (ref 0–0.2)
BASOPHILS NFR BLD AUTO: 0.1 % — SIGNIFICANT CHANGE UP (ref 0–2)
BILIRUB SERPL-MCNC: 0.6 MG/DL — SIGNIFICANT CHANGE UP (ref 0.2–1.2)
BUN SERPL-MCNC: 28 MG/DL — HIGH (ref 7–23)
CALCIUM SERPL-MCNC: 7.6 MG/DL — LOW (ref 8.5–10.1)
CHLORIDE SERPL-SCNC: 110 MMOL/L — HIGH (ref 96–108)
CO2 SERPL-SCNC: 25 MMOL/L — SIGNIFICANT CHANGE UP (ref 22–31)
CREAT SERPL-MCNC: 0.77 MG/DL — SIGNIFICANT CHANGE UP (ref 0.5–1.3)
ENTEROCOC DNA BLD POS QL NAA+NON-PROBE: SIGNIFICANT CHANGE UP
EOSINOPHIL # BLD AUTO: 0 K/UL — SIGNIFICANT CHANGE UP (ref 0–0.5)
EOSINOPHIL NFR BLD AUTO: 0 % — SIGNIFICANT CHANGE UP (ref 0–6)
GLUCOSE SERPL-MCNC: 155 MG/DL — HIGH (ref 70–99)
GRAM STN FLD: SIGNIFICANT CHANGE UP
HCT VFR BLD CALC: 24.1 % — LOW (ref 39–50)
HGB BLD-MCNC: 7.8 G/DL — LOW (ref 13–17)
IMM GRANULOCYTES NFR BLD AUTO: 1.4 % — SIGNIFICANT CHANGE UP (ref 0–1.5)
LACTATE SERPL-SCNC: 1.5 MMOL/L — SIGNIFICANT CHANGE UP (ref 0.7–2)
LACTATE SERPL-SCNC: 2.1 MMOL/L — HIGH (ref 0.7–2)
LYMPHOCYTES # BLD AUTO: 1.32 K/UL — SIGNIFICANT CHANGE UP (ref 1–3.3)
LYMPHOCYTES # BLD AUTO: 3.6 % — LOW (ref 13–44)
MAGNESIUM SERPL-MCNC: 1.6 MG/DL — SIGNIFICANT CHANGE UP (ref 1.6–2.6)
MCHC RBC-ENTMCNC: 31.6 PG — SIGNIFICANT CHANGE UP (ref 27–34)
MCHC RBC-ENTMCNC: 32.4 GM/DL — SIGNIFICANT CHANGE UP (ref 32–36)
MCV RBC AUTO: 97.6 FL — SIGNIFICANT CHANGE UP (ref 80–100)
METHOD TYPE: SIGNIFICANT CHANGE UP
MONOCYTES # BLD AUTO: 1.45 K/UL — HIGH (ref 0–0.9)
MONOCYTES NFR BLD AUTO: 4 % — SIGNIFICANT CHANGE UP (ref 2–14)
NEUTROPHILS # BLD AUTO: 33.16 K/UL — HIGH (ref 1.8–7.4)
NEUTROPHILS NFR BLD AUTO: 90.9 % — HIGH (ref 43–77)
PHOSPHATE SERPL-MCNC: 3.3 MG/DL — SIGNIFICANT CHANGE UP (ref 2.5–4.5)
PLATELET # BLD AUTO: 269 K/UL — SIGNIFICANT CHANGE UP (ref 150–400)
POTASSIUM SERPL-MCNC: 4 MMOL/L — SIGNIFICANT CHANGE UP (ref 3.5–5.3)
POTASSIUM SERPL-SCNC: 4 MMOL/L — SIGNIFICANT CHANGE UP (ref 3.5–5.3)
PROT SERPL-MCNC: 4.8 GM/DL — LOW (ref 6–8.3)
RBC # BLD: 2.47 M/UL — LOW (ref 4.2–5.8)
RBC # FLD: 15.2 % — HIGH (ref 10.3–14.5)
SARS-COV-2 IGG SERPL QL IA: NEGATIVE — SIGNIFICANT CHANGE UP
SARS-COV-2 IGM SERPL IA-ACNC: <0.1 INDEX — SIGNIFICANT CHANGE UP
SARS-COV-2 RNA SPEC QL NAA+PROBE: SIGNIFICANT CHANGE UP
SODIUM SERPL-SCNC: 140 MMOL/L — SIGNIFICANT CHANGE UP (ref 135–145)
SPECIMEN SOURCE: SIGNIFICANT CHANGE UP
SPECIMEN SOURCE: SIGNIFICANT CHANGE UP
WBC # BLD: 36.49 K/UL — HIGH (ref 3.8–10.5)
WBC # FLD AUTO: 36.49 K/UL — HIGH (ref 3.8–10.5)

## 2020-10-27 PROCEDURE — 99223 1ST HOSP IP/OBS HIGH 75: CPT | Mod: GC

## 2020-10-27 PROCEDURE — 12345: CPT | Mod: NC,GC

## 2020-10-27 PROCEDURE — 74177 CT ABD & PELVIS W/CONTRAST: CPT | Mod: 26

## 2020-10-27 RX ORDER — VANCOMYCIN HCL 1 G
VIAL (EA) INTRAVENOUS
Refills: 0 | Status: DISCONTINUED | OUTPATIENT
Start: 2020-10-27 | End: 2020-10-27

## 2020-10-27 RX ORDER — FINASTERIDE 5 MG/1
5 TABLET, FILM COATED ORAL DAILY
Refills: 0 | Status: DISCONTINUED | OUTPATIENT
Start: 2020-10-27 | End: 2020-11-10

## 2020-10-27 RX ORDER — VANCOMYCIN HCL 1 G
750 VIAL (EA) INTRAVENOUS EVERY 12 HOURS
Refills: 0 | Status: DISCONTINUED | OUTPATIENT
Start: 2020-10-27 | End: 2020-10-27

## 2020-10-27 RX ORDER — DEXTROSE 50 % IN WATER 50 %
12.5 SYRINGE (ML) INTRAVENOUS ONCE
Refills: 0 | Status: DISCONTINUED | OUTPATIENT
Start: 2020-10-27 | End: 2020-11-10

## 2020-10-27 RX ORDER — VANCOMYCIN HCL 1 G
750 VIAL (EA) INTRAVENOUS ONCE
Refills: 0 | Status: COMPLETED | OUTPATIENT
Start: 2020-10-27 | End: 2020-10-27

## 2020-10-27 RX ORDER — MEROPENEM 1 G/30ML
500 INJECTION INTRAVENOUS ONCE
Refills: 0 | Status: DISCONTINUED | OUTPATIENT
Start: 2020-10-27 | End: 2020-10-27

## 2020-10-27 RX ORDER — ACETAMINOPHEN 500 MG
650 TABLET ORAL EVERY 6 HOURS
Refills: 0 | Status: DISCONTINUED | OUTPATIENT
Start: 2020-10-27 | End: 2020-11-10

## 2020-10-27 RX ORDER — MEROPENEM 1 G/30ML
INJECTION INTRAVENOUS
Refills: 0 | Status: DISCONTINUED | OUTPATIENT
Start: 2020-10-27 | End: 2020-10-27

## 2020-10-27 RX ORDER — DEXTROSE 50 % IN WATER 50 %
25 SYRINGE (ML) INTRAVENOUS ONCE
Refills: 0 | Status: DISCONTINUED | OUTPATIENT
Start: 2020-10-27 | End: 2020-11-10

## 2020-10-27 RX ORDER — SIMVASTATIN 20 MG/1
40 TABLET, FILM COATED ORAL AT BEDTIME
Refills: 0 | Status: DISCONTINUED | OUTPATIENT
Start: 2020-10-27 | End: 2020-11-10

## 2020-10-27 RX ORDER — MEROPENEM 1 G/30ML
500 INJECTION INTRAVENOUS EVERY 8 HOURS
Refills: 0 | Status: DISCONTINUED | OUTPATIENT
Start: 2020-10-27 | End: 2020-10-27

## 2020-10-27 RX ORDER — CEFEPIME 1 G/1
2000 INJECTION, POWDER, FOR SOLUTION INTRAMUSCULAR; INTRAVENOUS EVERY 12 HOURS
Refills: 0 | Status: DISCONTINUED | OUTPATIENT
Start: 2020-10-27 | End: 2020-10-27

## 2020-10-27 RX ORDER — BETHANECHOL CHLORIDE 25 MG
50 TABLET ORAL
Refills: 0 | Status: DISCONTINUED | OUTPATIENT
Start: 2020-10-27 | End: 2020-11-10

## 2020-10-27 RX ORDER — QUETIAPINE FUMARATE 200 MG/1
50 TABLET, FILM COATED ORAL AT BEDTIME
Refills: 0 | Status: DISCONTINUED | OUTPATIENT
Start: 2020-10-27 | End: 2020-11-10

## 2020-10-27 RX ORDER — SODIUM CHLORIDE 9 MG/ML
1000 INJECTION, SOLUTION INTRAVENOUS
Refills: 0 | Status: DISCONTINUED | OUTPATIENT
Start: 2020-10-27 | End: 2020-11-10

## 2020-10-27 RX ORDER — SODIUM CHLORIDE 9 MG/ML
1000 INJECTION INTRAMUSCULAR; INTRAVENOUS; SUBCUTANEOUS ONCE
Refills: 0 | Status: COMPLETED | OUTPATIENT
Start: 2020-10-27 | End: 2020-10-27

## 2020-10-27 RX ORDER — INSULIN LISPRO 100/ML
VIAL (ML) SUBCUTANEOUS
Refills: 0 | Status: DISCONTINUED | OUTPATIENT
Start: 2020-10-27 | End: 2020-11-02

## 2020-10-27 RX ORDER — SODIUM CHLORIDE 9 MG/ML
1000 INJECTION INTRAMUSCULAR; INTRAVENOUS; SUBCUTANEOUS
Refills: 0 | Status: DISCONTINUED | OUTPATIENT
Start: 2020-10-27 | End: 2020-10-28

## 2020-10-27 RX ORDER — PIPERACILLIN AND TAZOBACTAM 4; .5 G/20ML; G/20ML
3.38 INJECTION, POWDER, LYOPHILIZED, FOR SOLUTION INTRAVENOUS EVERY 8 HOURS
Refills: 0 | Status: DISCONTINUED | OUTPATIENT
Start: 2020-10-27 | End: 2020-10-31

## 2020-10-27 RX ORDER — DEXTROSE 50 % IN WATER 50 %
15 SYRINGE (ML) INTRAVENOUS ONCE
Refills: 0 | Status: DISCONTINUED | OUTPATIENT
Start: 2020-10-27 | End: 2020-11-10

## 2020-10-27 RX ORDER — TAMSULOSIN HYDROCHLORIDE 0.4 MG/1
0.4 CAPSULE ORAL AT BEDTIME
Refills: 0 | Status: DISCONTINUED | OUTPATIENT
Start: 2020-10-27 | End: 2020-11-10

## 2020-10-27 RX ORDER — INSULIN LISPRO 100/ML
VIAL (ML) SUBCUTANEOUS AT BEDTIME
Refills: 0 | Status: DISCONTINUED | OUTPATIENT
Start: 2020-10-27 | End: 2020-11-02

## 2020-10-27 RX ORDER — GABAPENTIN 400 MG/1
300 CAPSULE ORAL THREE TIMES A DAY
Refills: 0 | Status: DISCONTINUED | OUTPATIENT
Start: 2020-10-27 | End: 2020-10-27

## 2020-10-27 RX ORDER — GLUCAGON INJECTION, SOLUTION 0.5 MG/.1ML
1 INJECTION, SOLUTION SUBCUTANEOUS ONCE
Refills: 0 | Status: DISCONTINUED | OUTPATIENT
Start: 2020-10-27 | End: 2020-11-10

## 2020-10-27 RX ADMIN — PIPERACILLIN AND TAZOBACTAM 25 GRAM(S): 4; .5 INJECTION, POWDER, LYOPHILIZED, FOR SOLUTION INTRAVENOUS at 21:42

## 2020-10-27 RX ADMIN — SODIUM CHLORIDE 1000 MILLILITER(S): 9 INJECTION INTRAMUSCULAR; INTRAVENOUS; SUBCUTANEOUS at 00:29

## 2020-10-27 RX ADMIN — CEFEPIME 100 MILLIGRAM(S): 1 INJECTION, POWDER, FOR SOLUTION INTRAMUSCULAR; INTRAVENOUS at 10:17

## 2020-10-27 RX ADMIN — Medication 1 TABLET(S): at 10:16

## 2020-10-27 RX ADMIN — QUETIAPINE FUMARATE 50 MILLIGRAM(S): 200 TABLET, FILM COATED ORAL at 21:42

## 2020-10-27 RX ADMIN — Medication 1: at 06:51

## 2020-10-27 RX ADMIN — Medication 1 APPLICATION(S): at 21:20

## 2020-10-27 RX ADMIN — SIMVASTATIN 40 MILLIGRAM(S): 20 TABLET, FILM COATED ORAL at 21:42

## 2020-10-27 RX ADMIN — SODIUM CHLORIDE 70 MILLILITER(S): 9 INJECTION INTRAMUSCULAR; INTRAVENOUS; SUBCUTANEOUS at 04:31

## 2020-10-27 RX ADMIN — Medication 250 MILLIGRAM(S): at 02:15

## 2020-10-27 RX ADMIN — TAMSULOSIN HYDROCHLORIDE 0.4 MILLIGRAM(S): 0.4 CAPSULE ORAL at 21:42

## 2020-10-27 RX ADMIN — Medication 1 APPLICATION(S): at 10:16

## 2020-10-27 RX ADMIN — PIPERACILLIN AND TAZOBACTAM 25 GRAM(S): 4; .5 INJECTION, POWDER, LYOPHILIZED, FOR SOLUTION INTRAVENOUS at 13:57

## 2020-10-27 RX ADMIN — FINASTERIDE 5 MILLIGRAM(S): 5 TABLET, FILM COATED ORAL at 10:16

## 2020-10-27 RX ADMIN — Medication 50 MILLIGRAM(S): at 10:16

## 2020-10-27 RX ADMIN — Medication 50 MILLIGRAM(S): at 21:42

## 2020-10-27 NOTE — SWALLOW BEDSIDE ASSESSMENT ADULT - ASR SWALLOW DENTITION
Notable for maxillary denture placement and natural mandibular dentition with some missing posterior lower teeth.

## 2020-10-27 NOTE — CONSULT NOTE ADULT - REASON FOR ADMISSION
Neurosurgery asked to comment on known residual SDH collection
hematuria, fever, hypotension
hematuria, fever, hypotension

## 2020-10-27 NOTE — DIETITIAN INITIAL EVALUATION ADULT. - ADD RECOMMEND
1) when feasible, advance diet as tolerated to regular with texture per SLP with ensure enlive BID and gelatein BID 2) add vitamin C 500mg BID and zinc sulfate 220mg BID x 10 days to optimize wound healing 3) monitor length NPO, advancement/tolerance nutr source 4) weekly wt checks to track/trend changes

## 2020-10-27 NOTE — DIETITIAN INITIAL EVALUATION ADULT. - PERTINENT MEDS FT
MEDICATIONS  (STANDING):  bethanechol 50 milliGRAM(s) Oral two times a day  dextrose 5%. 1000 milliLiter(s) (50 mL/Hr) IV Continuous <Continuous>  dextrose 50% Injectable 12.5 Gram(s) IV Push once  dextrose 50% Injectable 25 Gram(s) IV Push once  dextrose 50% Injectable 25 Gram(s) IV Push once  finasteride 5 milliGRAM(s) Oral daily  insulin lispro (ADMELOG) corrective regimen sliding scale   SubCutaneous three times a day before meals  insulin lispro (ADMELOG) corrective regimen sliding scale   SubCutaneous at bedtime  multivitamin 1 Tablet(s) Oral daily  piperacillin/tazobactam IVPB.. 3.375 Gram(s) IV Intermittent every 8 hours  QUEtiapine 50 milliGRAM(s) Oral at bedtime  silver sulfADIAZINE 1% Cream 1 Application(s) Topical two times a day  simvastatin 40 milliGRAM(s) Oral at bedtime  sodium chloride 0.9%. 1000 milliLiter(s) (70 mL/Hr) IV Continuous <Continuous>  tamsulosin Oral Tab/Cap - Peds 0.4 milliGRAM(s) Oral at bedtime    MEDICATIONS  (PRN):  acetaminophen  Suppository .. 650 milliGRAM(s) Rectal every 6 hours PRN Temp greater or equal to 38C (100.4F)  dextrose 40% Gel 15 Gram(s) Oral once PRN Blood Glucose LESS THAN 70 milliGRAM(s)/deciliter  glucagon  Injectable 1 milliGRAM(s) IntraMuscular once PRN Glucose LESS THAN 70 milligrams/deciliter

## 2020-10-27 NOTE — DIETITIAN INITIAL EVALUATION ADULT. - NAME AND PHONE
Hyun Solis MA, RDN, CDN, [facility]  [phone] (office number)  [phone] (pager number)

## 2020-10-27 NOTE — PROGRESS NOTE ADULT - ASSESSMENT
89 year old man s/p fall, RIGHT SDH with brain compression s/p evacuation (POD#22) returns with stable size SDH collection and new Fever 104, WBC 30K, Hypotensive and Hypoxic. + hematuria secondary to dawson removal.  Pt treated with Abx / Sepsis protocol / Bacteremic protocol in ED.      - No acte neurosurgical intervention, stable SDH  - Well healed incision, no pneumocephalus in surgical cavity  - Medical mgmt of hematuria / fevers  - PT/OT permitted depending on pt's tolerance and capability  - No ASA for now  - Venodynes    Will discuss with Dr Obrien

## 2020-10-27 NOTE — SWALLOW BEDSIDE ASSESSMENT ADULT - DIET PRIOR TO ADMI
The pt was on a puree diet with nectar thick liquids at [facility]. The patient was on a puree diet with nectar thick liquids at [facility].

## 2020-10-27 NOTE — PATIENT PROFILE ADULT - STATED REASON FOR ADMISSION
[facility] in [facility] after pulling dawson, fever, and increased confusion and lethargy

## 2020-10-27 NOTE — SWALLOW BEDSIDE ASSESSMENT ADULT - SWALLOW EVAL: RECOMMENDED DIET
SUGGEST A DYSPHAGIA 1 DIET WITH NECTAR THICK LIQUIDS AS THIS IS THE LEAST RESTRICTIVE TOLERABLE DIET CONSISTENCIES FROM AN OROPHARYNGEAL SWALLOWING STANCE. SUGGEST A DYSPHAGIA 1 DIET WITH NECTAR THICK LIQUIDS AS THIS IS THE LEAST RESTRICTIVE TOLERABLE DIET CONSISTENCIES FROM AN OROPHARYNGEAL SWALLOWING STANCE ON EXAM.

## 2020-10-27 NOTE — CHART NOTE - TREATMENT: THE FOLLOWING DIET HAS BEEN RECOMMENDED
Diet, NPO:   Except Medications (10-27-20 @ 04:56) [Active]    severe malnutrition in chronic illness r/t inability to consume sufficient energy/protein 2/2 swallowing ability AEB 23% wt loss x 4 mo; severe muscle/fat wasting    RECOMMENDATIONS:  1) when feasible, advance diet as tolerated to regular with texture per SLP with ensure enlive BID and gelatein BID   2) add vitamin C 500mg BID and zinc sulfate 220mg BID x 10 days to optimize wound healing   3) monitor length NPO, advancement/tolerance nutr source   4) weekly wt checks to track/trend changes

## 2020-10-27 NOTE — PROGRESS NOTE ADULT - SUBJECTIVE AND OBJECTIVE BOX
POD#21 s/p right craniotomy for evac SDH  admitted from SNF for febrile illness  bright, alert, fluently and appropriately conversant  moves all well    CT head shows excellent post op status with small residual blood collection at the craniotomy site

## 2020-10-27 NOTE — ED ADULT NURSE REASSESSMENT NOTE - NS ED NURSE REASSESS COMMENT FT1
Pt IV fluid input 1700ml. Pt output 700ml. Bladder scanner 150ml. Gross hematuria noted with no visible clots. Pt catheter irrigated. MD Coburn notified.

## 2020-10-27 NOTE — PROGRESS NOTE ADULT - SUBJECTIVE AND OBJECTIVE BOX
HPI: 89 y/o M PMHx BPH, CAD, DMII, GERD, HLD, SDH s/p craniotomy and surgical removal, urine rentention with Whitmore catheter, HTN presenting for [facility] with 1 day h/o hematuria. Whitmore was replaced in the NH last night after the pt pulled it out. Today, pt was noted to be febrile at the NH Tmax: 104, SBP 90, and hypoxic on RA Sp02 on RA. Also noted to be more confused and lethargic with gross hematuria. Pt not on AC due to recent SDH.  Pt was admitted in early October (D/c 10 days ago) s/p fall with AMS and imaging + for SDH requiring craniotomy and evacuation also with aspiration PNA tx with IV abx. Whitmore was placed for urinary retention.   Spoke with pt's son Caleb whiteside ESL  this evening. Updated him on pt's status; as the next of kin, he consented to CT w/ contrast imaging. MOLST was reviewed and received verbal consent; FULL CODE for now but son will bring in paperwork tomorrow to discuss with the day team.    SUBJECTIVE: Pt seen and evaluated at bedside. No acute events since admission. Vitals stable. Afebrile overnight     REVIEW OF SYSTEMS:  unable to obtain due to medical condition     Vital Signs Last 24 Hrs  T(C): 36.8 (27 Oct 2020 16:04), Max: 39.6 (26 Oct 2020 20:06)  T(F): 98.2 (27 Oct 2020 16:04), Max: 103.3 (26 Oct 2020 20:06)  HR: 86 (27 Oct 2020 18:00) (65 - 92)  BP: 130/99 (27 Oct 2020 18:00) (91/49 - 130/99)  BP(mean): 100 (27 Oct 2020 18:00) (50 - 100)  RR: 19 (27 Oct 2020 18:00) (12 - 24)  SpO2: 100% (27 Oct 2020 12:00) (96% - 100%)    I&O's Summary    26 Oct 2020 07:01  -  27 Oct 2020 07:00  --------------------------------------------------------  IN: 2700 mL / OUT: 955 mL / NET: 1745 mL    27 Oct 2020 07:01  -  27 Oct 2020 19:45  --------------------------------------------------------  IN: 960 mL / OUT: 500 mL / NET: 460 mL        CAPILLARY BLOOD GLUCOSE      POCT Blood Glucose.: 124 mg/dL (27 Oct 2020 12:14)  POCT Blood Glucose.: 174 mg/dL (27 Oct 2020 05:51)  POCT Blood Glucose.: 152 mg/dL (27 Oct 2020 02:12)      PHYSICAL EXAM:  Constitutional: NAD,   Neck: Soft and supple, No LAD, No JVD  Respiratory: +adventitious breath sounds L>R   Cardiovascular: S1 and S2, regular rate and rhythm, no Murmurs, gallops or rubs  Gastrointestinal: Bowel Sounds present, soft, ondistended, no guarding, no rebound, +supra-pubic tenderness, + Whitmore in place draining yellow urine, no hematuria   Extremities: No peripheral edema  Vascular: 2+ peripheral pulses, < 2 sec cap refill   Skin: No rashes    MEDICATIONS  (STANDING):  bethanechol 50 milliGRAM(s) Oral two times a day  dextrose 5%. 1000 milliLiter(s) (50 mL/Hr) IV Continuous <Continuous>  dextrose 50% Injectable 12.5 Gram(s) IV Push once  dextrose 50% Injectable 25 Gram(s) IV Push once  dextrose 50% Injectable 25 Gram(s) IV Push once  finasteride 5 milliGRAM(s) Oral daily  insulin lispro (ADMELOG) corrective regimen sliding scale   SubCutaneous three times a day before meals  insulin lispro (ADMELOG) corrective regimen sliding scale   SubCutaneous at bedtime  multivitamin 1 Tablet(s) Oral daily  piperacillin/tazobactam IVPB.. 3.375 Gram(s) IV Intermittent every 8 hours  QUEtiapine 50 milliGRAM(s) Oral at bedtime  silver sulfADIAZINE 1% Cream 1 Application(s) Topical two times a day  simvastatin 40 milliGRAM(s) Oral at bedtime  sodium chloride 0.9%. 1000 milliLiter(s) (70 mL/Hr) IV Continuous <Continuous>  tamsulosin Oral Tab/Cap - Peds 0.4 milliGRAM(s) Oral at bedtime    MEDICATIONS  (PRN):  acetaminophen  Suppository .. 650 milliGRAM(s) Rectal every 6 hours PRN Temp greater or equal to 38C (100.4F)  dextrose 40% Gel 15 Gram(s) Oral once PRN Blood Glucose LESS THAN 70 milliGRAM(s)/deciliter  glucagon  Injectable 1 milliGRAM(s) IntraMuscular once PRN Glucose LESS THAN 70 milligrams/deciliter        LABS: All Labs Reviewed:                        7.8    36.49 )-----------( 269      ( 27 Oct 2020 06:41 )             24.1     10-27    140  |  110<H>  |  28<H>  ----------------------------<  155<H>  4.0   |  25  |  0.77    Ca    7.6<L>      27 Oct 2020 06:41  Phos  3.3     10-27  Mg     1.6     10-27    TPro  4.8<L>  /  Alb  1.8<L>  /  TBili  0.6  /  DBili  x   /  AST  33  /  ALT  32  /  AlkPhos  122<H>  10-27    PT/INR - ( 26 Oct 2020 20:09 )   PT: 13.8 sec;   INR: 1.19 ratio         PTT - ( 26 Oct 2020 20:09 )  PTT:23.6 sec      Blood Culture: 10-26 @ 20:09  Organism --  Gram Stain Blood -- Gram Stain   Growth in aerobic bottle: Gram positive cocci in pairs  Specimen Source .Blood Blood-Peripheral  Culture-Blood --        RADIOLOGY/EKG:  < from: 12 Lead ECG (10.26.20 @ 20:02) >  Diagnosis Line Sinus rhythm with Premature supraventricular complexes  Right bundle branch block  Cannot rule out Inferior infarct , age undetermined  Cannot rule out Anterior infarct (cited on or before 26-DEC-2019)  Abnormal ECG    < from: CT Head No Cont (10.26.20 @ 21:56) >  MPRESSION:  1. Persistent, large right-sided subdural hematoma that demonstrates components of variable age, including acute/subacute components) and measures up to 2.1 cm in thickness. This results in right-sided mass effect with right to left midline shift of approximately 6 mm. When compared to prior CT brain of 10/9/2020, a similar sized right holohemispheric subdural hematoma was demonstrated with similar appearance of the ventricles, mass effect and right to left midline shift.    < from: CT Abdomen and Pelvis w/ IV Cont (10.27.20 @ 01:42) >  EXAM:  CT ABDOMEN AND PELVIS IC                            PROCEDURE DATE:  10/27/2020          INTERPRETATION:  CLINICAL INFORMATION: Metabolic encephalopathy secondary to likely UTI, gross hematuria, sepsis.    PROCEDURE:  Helical axial images were obtained from the domes of the diaphragm through the pubic symphysis without intravenous or oral contrast. Coronal and sagittal reformats were also obtained.    COMPARISON: 10/4/2020 and 8/3/2010.    FINDINGS: Evaluation of the abdominal/pelvic organs, viscera and vasculature is limited without intravenous contrast. Artifact from the patient's arms degrades images.    LOWER CHEST: Centrilobular emphysema. Suggest nonspecific, interlobular septal thickening. Dependent opacities in the left > right lower lobe. Subsegmental atelectasis. Aortic valve, coronary artery and mitral annular calcification.    LIVER: Fatty infiltration near the fossa ligament again noted.  GALLBLADDER/BILE DUCTS: No intrahepatic or extrahepatic biliary dilatation. Cholelithiasis.  PANCREAS: Unremarkable.  SPLEEN: Again noted, mild contour deformity and punctate calcifications.    ADRENALS: Unremarkable.  KIDNEYS/URETERS: Nonspecific mild bilateral perinephric stranding without hydroureteronephrosis. Question striated bilateral nephrogram. Right renal cyst again noted.  BLADDER: Mildly distended with air and fluid. Question trabeculated versus emphysematous bladder wall.  REPRODUCTIVE ORGANS: Partially visualized Whitmore catheter balloon at the level of the penile urethra.    BOWEL: No bowel obstruction. Focal outpouching of air along the posterior aspect of the terminal ileum, which may represent a diverticulum or appendix. No secondary signs of appendicitis. Rectosigmoid colon wall thickening surrounding inflammatory change. Colon diverticulosis.  PERITONEUM: No drainable fluid collection or free air.  VESSELS: Atherosclerosis. Again noted, ectatic infrarenal aorta containing intramural plaque/thrombus.  RETROPERITONEUM: No lymphadenopathy.  ABDOMINAL WALL/SOFT TISSUES: Post surgical changes along the anterior pelvic wall soft tissue.  BONES: Degenerative changes of the spine. Stable minimal retrolisthesis of L1 on L2, L2 on L3, L3 on L4 and L4 on L5.    IMPRESSION:    Rectosigmoid colitis. No bowelobstruction.    Nonspecific mild bilateral perinephric stranding without hydroureteronephrosis. Question striated bilateral nephrogram. Recommend clinical correlation to assess pyelonephritis. Question emphysematous cystitis versus bladder wall trabeculation.    Malpositioned Whitmore catheter at the level of the penile urethra. Recommend removal.    Dependent opacities in the left > right lower lobe, which may represent atelectasis. Recommend clinical correlation to assess aspiration.    Additionalfindings as described.          DVT PPX:    ADVANCED DIRECTIVE:    DISPOSITION:

## 2020-10-27 NOTE — SWALLOW BEDSIDE ASSESSMENT ADULT - PHARYNGEAL PHASE
Swallow trigger was timely to mildly latent. Laryngeal lift on palpation during swallowing trials was mildly to moderately decreased but felt to be grossly functional with the above modified food consistencies. NO behavioral aspiration signs exhibited with puree foods and nectar thick liquids.

## 2020-10-27 NOTE — PROGRESS NOTE ADULT - ASSESSMENT
89 year old male patient with narrative as previously stated with mental status changes and hypotension      #Encephalopathy, complicated cystitis. pyelonephritis. BPH/urinary retention. hematuria. aspiration pna. ? colitis.   -s/p IV Cefepime in ED   -s/p fluid resuscitation in ED   -Leukocytosis noted   -repeat lactate normal   -c/w Zosyn   -on IVF hydration  -f/u blood and urine cx  -s/p Whitmore replaced by urology   -ID and urology recommendations noted     #Leukocytosis  -on IV abx  -trend cbc      #MÓNICA 2/2 dehydration  -IVF   -f/u BMP and cR     #SDH s/p craniotomy  -Evaluated by Neurosurgery in ED  -No intervention by NSGY due to stable SDH on CTH; rec hold ASA  -Neuro checks q4h    #HTN  -Hold BP meds due to hypotension on admission  -Closely monitor VS    #BPH  -cont finasteride  -cont tamsulosin    #CAD/HLD  -EKG reviewed, no acute changes noted  -hold ASA for now in light of hematuria  -Cont simvastatin    #DMII  -Low ISS  -A1C 5.7 (10/5)  -BGM qac qhs    #Stage II pressure ulcers  -L buttock and R sacrum on arrival to ED  -Wound consult    #DVT PPX  -SCDs  -Hold oral anticoagulation due to active bleeding    #Advance Directives  -Spoke with son, Caleb Finn who is next of kin and making medical decisions for pt as he has no capacity at this time  -DNI- MOSLT in chart

## 2020-10-27 NOTE — CONSULT NOTE ADULT - SUBJECTIVE AND OBJECTIVE BOX
UROLOGY CONSULTATION    CHEL LEVY  [id number]    [facility] and [facility] limited. patient is a poor historian.     HPI  Patient is a 89 y/o M PMHx BPH, CAD, DMII, GERD, HLD, SDH s/p craniotomy and surgical removal, urine rentention with Dawson catheter, HTN presenting from [facility] with 1 day h/o hematuria. Dawson was replaced in the NH last night after the pt pulled it out. In the ED he was found to be febrile therefore he was admitted for IVF and anitbiotics.    Of note, patient with recent admission for SDH s/p craniotomy and evacuation also with aspiration PNA tx with IV abx. Dawson was placed for urinary retention.     Patient seen and examined at bedside.     Current complaints: suprapubic pain    Denies chills, nausea, vomiting     ROS  12 point ROS negative except that outlined in HPI    PMHX  Urinary tract infection    No pertinent family history in first degree relatives    GERD (gastroesophageal reflux disease)    BPH (benign prostatic hyperplasia)    DM (diabetes mellitus)    HLD (hyperlipidemia)    HTN (hypertension)    CAD (coronary artery disease)    No pertinent past medical history    No significant past surgical history        MEDS  acetaminophen  Suppository .. 650 milliGRAM(s) Rectal every 6 hours PRN  bethanechol 50 milliGRAM(s) Oral two times a day  cefepime   IVPB 2000 milliGRAM(s) IV Intermittent every 12 hours  dextrose 40% Gel 15 Gram(s) Oral once PRN  dextrose 5%. 1000 milliLiter(s) IV Continuous <Continuous>  dextrose 50% Injectable 12.5 Gram(s) IV Push once  dextrose 50% Injectable 25 Gram(s) IV Push once  dextrose 50% Injectable 25 Gram(s) IV Push once  finasteride 5 milliGRAM(s) Oral daily  glucagon  Injectable 1 milliGRAM(s) IntraMuscular once PRN  insulin lispro (ADMELOG) corrective regimen sliding scale   SubCutaneous three times a day before meals  insulin lispro (ADMELOG) corrective regimen sliding scale   SubCutaneous at bedtime  multivitamin 1 Tablet(s) Oral daily  QUEtiapine 50 milliGRAM(s) Oral at bedtime  silver sulfADIAZINE 1% Cream 1 Application(s) Topical two times a day  simvastatin 40 milliGRAM(s) Oral at bedtime  sodium chloride 0.9%. 1000 milliLiter(s) IV Continuous <Continuous>  tamsulosin Oral Tab/Cap - Peds 0.4 milliGRAM(s) Oral at bedtime      Allergies  No Known Allergies        VITALS  T(C): 36.8 (10-27-20 @ 03:44), Max: 39.6 (10-26-20 @ 20:06)  HR: 81 (10-27-20 @ 08:00)  BP: 114/52 (10-27-20 @ 08:00)  RR: 22 (10-27-20 @ 08:00)  SpO2: 99% (10-27-20 @ 08:00)    10-26-20 @ 07:01  -  10-27-20 @ 07:00  --------------------------------------------------------  IN: 2700 mL / OUT: 955 mL / NET: 1745 mL        Gen: elderly cachetic Male in NAD,  HEENT: normocephalic, anicteric sclera, moist mucus membranes; hearing intact  Chest: non labored breathing  Abd: soft, NT, ND, no masses  : + suprapubic distension and tenderness, uncircumcised phallus with paraphimosis, 20fr dawson not in correct position draining pink urine  Psych: Alert, cooperative  Ext: moves all four extremities freely, no peripheral edema    PROCEDURE  After deflating dawson balloon I was unable to advance his catheter due to resistance therefore it was removed and under sterile technique a new 16fr coude dawson was inserted without difficulty. 300cc of yellow urine was drained. Catheter secured to right leg.    paraphimosis reduced manually at bedside    LABS  10-27    140  |  110<H>  |  28<H>  ----------------------------<  155<H>  4.0   |  25  |  0.77    Ca    7.6<L>      27 Oct 2020 06:41  Phos  3.3     10-27  Mg     1.6     10-27    TPro  4.8<L>  /  Alb  1.8<L>  /  TBili  0.6  /  DBili  x   /  AST  33  /  ALT  32  /  AlkPhos  122<H>  10-27    CBC Full  -  ( 27 Oct 2020 06:41 )  WBC Count : 36.49 K/uL  Hemoglobin : 7.8 g/dL  Hematocrit : 24.1 %  Platelet Count - Automated : 269 K/uL  Mean Cell Volume : 97.6 fl  Mean Cell Hemoglobin : 31.6 pg  Mean Cell Hemoglobin Concentration : 32.4 gm/dL  Auto Neutrophil # : 33.16 K/uL  Auto Lymphocyte # : 1.32 K/uL  Auto Monocyte # : 1.45 K/uL  Auto Eosinophil # : 0.00 K/uL  Auto Basophil # : 0.05 K/uL  Auto Neutrophil % : 90.9 %  Auto Lymphocyte % : 3.6 %  Auto Monocyte % : 4.0 %  Auto Eosinophil % : 0.0 %  Auto Basophil % : 0.1 %    RADIOLOGY  CT Abdomen and Pelvis w/ IV Cont:   EXAM:  CT ABDOMEN AND PELVIS IC                            PROCEDURE DATE:  10/27/2020          INTERPRETATION:  CLINICAL INFORMATION: Metabolic encephalopathy secondary to likely UTI, gross hematuria, sepsis.    PROCEDURE:  Helical axial images were obtained from the domes of the diaphragm through the pubic symphysis without intravenous or oral contrast. Coronal and sagittal reformats were also obtained.    COMPARISON: 10/4/2020 and 8/3/2010.    FINDINGS: Evaluation of the abdominal/pelvic organs, viscera and vasculature is limited without intravenous contrast. Artifact from the patient's arms degrades images.    LOWER CHEST: Centrilobular emphysema. Suggest nonspecific, interlobular septal thickening. Dependent opacities in the left > right lower lobe. Subsegmental atelectasis. Aortic valve, coronary artery and mitral annular calcification.    LIVER: Fatty infiltration near the fossa ligament again noted.  GALLBLADDER/BILE DUCTS: No intrahepatic or extrahepatic biliary dilatation. Cholelithiasis.  PANCREAS: Unremarkable.  SPLEEN: Again noted, mild contour deformity and punctate calcifications.    ADRENALS: Unremarkable.  KIDNEYS/URETERS: Nonspecific mild bilateral perinephric stranding without hydroureteronephrosis. Question striated bilateral nephrogram. Right renal cyst again noted.  BLADDER: Mildly distended with air and fluid. Question trabeculated versus emphysematous bladder wall.  REPRODUCTIVE ORGANS: Partially visualized Dawson catheter balloon at the level of the penile urethra.    BOWEL: No bowel obstruction. Focal outpouching of air along the posterior aspect of the terminal ileum, which may represent a diverticulum or appendix. No secondary signs of appendicitis. Rectosigmoid colon wall thickening surrounding inflammatory change. Colon diverticulosis.  PERITONEUM: No drainable fluid collection or free air.  VESSELS: Atherosclerosis. Again noted, ectatic infrarenal aorta containing intramural plaque/thrombus.  RETROPERITONEUM: No lymphadenopathy.  ABDOMINAL WALL/SOFT TISSUES: Post surgical changes along the anterior pelvic wall soft tissue.  BONES: Degenerative changes of the spine. Stable minimal retrolisthesis of L1 on L2, L2 on L3, L3 on L4 and L4 on L5.    IMPRESSION:    Rectosigmoid colitis. No bowel obstruction.    Nonspecific mild bilateral perinephric stranding without hydroureteronephrosis. Question striated bilateral nephrogram. Recommend clinical correlation to assess pyelonephritis. Question emphysematous cystitis versus bladder wall trabeculation.    Malpositioned Dawson catheter at the level of the penile urethra. Recommend removal.    Dependent opacities in the left > right lower lobe, which may represent atelectasis. Recommend clinical correlation to assess aspiration.    Additional findings as described.            OBI BROWNING MD; Attending Radiologist  This document has been electronically signed. Oct 27 2020  4:21AM (10-27-20 @ 01:42)        ASSESSMENT & PLAN    Retention of Urine  BPH  - Dawson repositioned correctly  - C/w catheter change every 4 weeks (should be done through the NH)  - Patient is a poor candidate to consider outlet surgery    Gross hematuria  - CT neg for sinister findings  - Secondary to traumatic dawson removal  - Recommend broad spectrum antibiotics for 7 days to cover for UTI since he has fevers and leukocytosis    Renal Cysts  - No further work up        Thank you for allowing me to participate in the care of this patient  Please call if there are questions or concerns    I explained the various issues and complexities regarding their current urological condition(s) and treatment options. The patient verbalized understanding and I answered all of their questions to satisfaction.     Lora Bey MD  [facility]  [phone]    10-27-20 @ 09:22   UROLOGY CONSULTATION    CHEL LEVY  [id number]    [facility] and [facility] limited. patient is a poor historian.     HPI  Patient is a 88 y/o M PMHx BPH, CAD, DMII, GERD, HLD, SDH s/p craniotomy and surgical removal, urine rentention with Dawson catheter, HTN presenting from [facility] with 1 day h/o hematuria. Dawson was replaced in the NH last night after the pt pulled it out. In the ED he was found to be febrile therefore he was admitted for IVF and anitbiotics.    Of note, patient with recent admission for SDH s/p craniotomy and evacuation also with aspiration PNA tx with IV abx. Dawson was placed for urinary retention.     Patient seen and examined at bedside.     Current complaints: suprapubic pain    Denies chills, nausea, vomiting     ROS  12 point ROS negative except that outlined in HPI    PMHX  Urinary tract infection    No pertinent family history in first degree relatives    GERD (gastroesophageal reflux disease)    BPH (benign prostatic hyperplasia)    DM (diabetes mellitus)    HLD (hyperlipidemia)    HTN (hypertension)    CAD (coronary artery disease)    No pertinent past medical history    No significant past surgical history        MEDS  acetaminophen  Suppository .. 650 milliGRAM(s) Rectal every 6 hours PRN  bethanechol 50 milliGRAM(s) Oral two times a day  cefepime   IVPB 2000 milliGRAM(s) IV Intermittent every 12 hours  dextrose 40% Gel 15 Gram(s) Oral once PRN  dextrose 5%. 1000 milliLiter(s) IV Continuous <Continuous>  dextrose 50% Injectable 12.5 Gram(s) IV Push once  dextrose 50% Injectable 25 Gram(s) IV Push once  dextrose 50% Injectable 25 Gram(s) IV Push once  finasteride 5 milliGRAM(s) Oral daily  glucagon  Injectable 1 milliGRAM(s) IntraMuscular once PRN  insulin lispro (ADMELOG) corrective regimen sliding scale   SubCutaneous three times a day before meals  insulin lispro (ADMELOG) corrective regimen sliding scale   SubCutaneous at bedtime  multivitamin 1 Tablet(s) Oral daily  QUEtiapine 50 milliGRAM(s) Oral at bedtime  silver sulfADIAZINE 1% Cream 1 Application(s) Topical two times a day  simvastatin 40 milliGRAM(s) Oral at bedtime  sodium chloride 0.9%. 1000 milliLiter(s) IV Continuous <Continuous>  tamsulosin Oral Tab/Cap - Peds 0.4 milliGRAM(s) Oral at bedtime      Allergies  No Known Allergies        VITALS  T(C): 36.8 (10-27-20 @ 03:44), Max: 39.6 (10-26-20 @ 20:06)  HR: 81 (10-27-20 @ 08:00)  BP: 114/52 (10-27-20 @ 08:00)  RR: 22 (10-27-20 @ 08:00)  SpO2: 99% (10-27-20 @ 08:00)    10-26-20 @ 07:01  -  10-27-20 @ 07:00  --------------------------------------------------------  IN: 2700 mL / OUT: 955 mL / NET: 1745 mL        Gen: elderly cachetic Male in NAD,  HEENT: normocephalic, anicteric sclera, moist mucus membranes; hearing intact  Chest: non labored breathing  Abd: soft, NT, ND, no masses  : + suprapubic distension and tenderness, uncircumcised phallus with paraphimosis, 20fr dawson not in correct position draining pink urine  Psych: Alert, cooperative  Ext: moves all four extremities freely, no peripheral edema    PROCEDURE  After deflating dawson balloon I was unable to advance his catheter due to resistance therefore it was removed and under sterile technique a new 16fr coude dawson was inserted without difficulty. 300cc of yellow urine was drained. Catheter secured to right leg.    paraphimosis reduced manually at bedside    LABS  10-27    140  |  110<H>  |  28<H>  ----------------------------<  155<H>  4.0   |  25  |  0.77    Ca    7.6<L>      27 Oct 2020 06:41  Phos  3.3     10-27  Mg     1.6     10-27    TPro  4.8<L>  /  Alb  1.8<L>  /  TBili  0.6  /  DBili  x   /  AST  33  /  ALT  32  /  AlkPhos  122<H>  10-27    CBC Full  -  ( 27 Oct 2020 06:41 )  WBC Count : 36.49 K/uL  Hemoglobin : 7.8 g/dL  Hematocrit : 24.1 %  Platelet Count - Automated : 269 K/uL  Mean Cell Volume : 97.6 fl  Mean Cell Hemoglobin : 31.6 pg  Mean Cell Hemoglobin Concentration : 32.4 gm/dL  Auto Neutrophil # : 33.16 K/uL  Auto Lymphocyte # : 1.32 K/uL  Auto Monocyte # : 1.45 K/uL  Auto Eosinophil # : 0.00 K/uL  Auto Basophil # : 0.05 K/uL  Auto Neutrophil % : 90.9 %  Auto Lymphocyte % : 3.6 %  Auto Monocyte % : 4.0 %  Auto Eosinophil % : 0.0 %  Auto Basophil % : 0.1 %    RADIOLOGY  CT Abdomen and Pelvis w/ IV Cont:   EXAM:  CT ABDOMEN AND PELVIS IC                            PROCEDURE DATE:  10/27/2020          INTERPRETATION:  CLINICAL INFORMATION: Metabolic encephalopathy secondary to likely UTI, gross hematuria, sepsis.    PROCEDURE:  Helical axial images were obtained from the domes of the diaphragm through the pubic symphysis without intravenous or oral contrast. Coronal and sagittal reformats were also obtained.    COMPARISON: 10/4/2020 and 8/3/2010.    FINDINGS: Evaluation of the abdominal/pelvic organs, viscera and vasculature is limited without intravenous contrast. Artifact from the patient's arms degrades images.    LOWER CHEST: Centrilobular emphysema. Suggest nonspecific, interlobular septal thickening. Dependent opacities in the left > right lower lobe. Subsegmental atelectasis. Aortic valve, coronary artery and mitral annular calcification.    LIVER: Fatty infiltration near the fossa ligament again noted.  GALLBLADDER/BILE DUCTS: No intrahepatic or extrahepatic biliary dilatation. Cholelithiasis.  PANCREAS: Unremarkable.  SPLEEN: Again noted, mild contour deformity and punctate calcifications.    ADRENALS: Unremarkable.  KIDNEYS/URETERS: Nonspecific mild bilateral perinephric stranding without hydroureteronephrosis. Question striated bilateral nephrogram. Right renal cyst again noted.  BLADDER: Mildly distended with air and fluid. Question trabeculated versus emphysematous bladder wall.  REPRODUCTIVE ORGANS: Partially visualized Dawson catheter balloon at the level of the penile urethra.    BOWEL: No bowel obstruction. Focal outpouching of air along the posterior aspect of the terminal ileum, which may represent a diverticulum or appendix. No secondary signs of appendicitis. Rectosigmoid colon wall thickening surrounding inflammatory change. Colon diverticulosis.  PERITONEUM: No drainable fluid collection or free air.  VESSELS: Atherosclerosis. Again noted, ectatic infrarenal aorta containing intramural plaque/thrombus.  RETROPERITONEUM: No lymphadenopathy.  ABDOMINAL WALL/SOFT TISSUES: Post surgical changes along the anterior pelvic wall soft tissue.  BONES: Degenerative changes of the spine. Stable minimal retrolisthesis of L1 on L2, L2 on L3, L3 on L4 and L4 on L5.    IMPRESSION:    Rectosigmoid colitis. No bowel obstruction.    Nonspecific mild bilateral perinephric stranding without hydroureteronephrosis. Question striated bilateral nephrogram. Recommend clinical correlation to assess pyelonephritis. Question emphysematous cystitis versus bladder wall trabeculation.    Malpositioned Dawson catheter at the level of the penile urethra. Recommend removal.    Dependent opacities in the left > right lower lobe, which may represent atelectasis. Recommend clinical correlation to assess aspiration.    Additional findings as described.            OBI BROWNING MD; Attending Radiologist  This document has been electronically signed. Oct 27 2020  4:21AM (10-27-20 @ 01:42)        ASSESSMENT & PLAN    Retention of Urine  BPH  - Dawson repositioned correctly  - C/w catheter change every 4 weeks (should be done through the NH)  - Patient is a poor candidate to consider outlet surgery    Gross hematuria  - CT neg for sinister findings  - Secondary to traumatic dawson removal  - Recommend broad spectrum antibiotics for 7 days to cover for UTI since he has fevers and leukocytosis    Renal Cysts  - No further work up    Paraphimosis  - Reduced. keep foreskin over the glans    Thank you for allowing me to participate in the care of this patient  Please call if there are questions or concerns    I explained the various issues and complexities regarding their current urological condition(s) and treatment options. The patient verbalized understanding and I answered all of their questions to satisfaction.     Lora Bey MD  [facility]  [phone]    10-27-20 @ 09:22

## 2020-10-27 NOTE — SWALLOW BEDSIDE ASSESSMENT ADULT - COMMENTS
The patient was admitted to  from [facility] with fever/hematuria. Hospital course is notable for encephalopathy, hypotension, and urosepsis. This profile is superimposed upon a history of a recent SDH status post evacuation/craniotomy, Dysphagia, CAD, DM, HLD, BPH and GERD.

## 2020-10-27 NOTE — CONSULT NOTE ADULT - ASSESSMENT
89 y/o M PMHx BPH, CAD, DMII, GERD, HLD, SDH s/p craniotomy and surgical removal, urine retention with Dawson catheter, HTN presenting for [facility] with 1 day h/o hematuria. Dawson was replaced in the NH 10/25 after the pt pulled it out. 10/26 pt was noted to be febrile at the NH Tmax: 104, SBP 90, and hypoxic on RA Sp02 on RA. Also noted to be more confused and lethargic with gross hematuria. Pt not on AC due to recent SDH. Pt was admitted in early October (D/c 10 days ago) s/p fall with AMS and imaging + for SDH requiring craniotomy and evacuation also with aspiration PNA tx with IV abx. Dawson was placed for urinary retention/hematuria, UA with pyuria concerning for UTI. 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 Was given IV vancomycin/cefepime. dawson placed by urology.     1. sepsis. complicated cystitis. pyelonephritis. BPH/urinary retention. hematuria. aspiration pna. ? colitis. R SDH  - change cefepime to IV zosyn  - urology eval appreciated  - dawson care  - imaging reviewed  - aspiration precautions  - tolerating abx well so far; no side effects noted  - reason for abx use and side effects reviewed with patient  - supportive care  - fu cbc    2. other issues - care per medicine

## 2020-10-27 NOTE — SWALLOW BEDSIDE ASSESSMENT ADULT - NS SPL SWALLOW CLINIC TRIAL FT
Solids and thin liquids were not offered given dysphagic profile. Odynophagia was denied. Solids/thin liquids were not offered given dysphagic profile and considering that these PO types are reportedly not in food inventory PTH. Odynophagia was denied.

## 2020-10-27 NOTE — DIETITIAN INITIAL EVALUATION ADULT. - OTHER INFO
89yo male with PMH significant for CAD, DM2, GERD, HLD, SDH s/p craniotomy, surgical removal, urine rentention with dawson catheter, HTN p/ w hematuria.  Pt admitted for metabolic encephalopathy 2/2 UTI with sepsis, lactic acidosis, hematuria 2/2 trauma, MÓNICA 2/2 dehydration.    *pt was 132# 4 months ago; pt with ~30# wt loss in 4 months (23%); clinically significant.    *pending SLP eval; pt on nectar thick liquids in NH.

## 2020-10-27 NOTE — CHART NOTE - NSCHARTNOTEFT_GEN_A_CORE
Pt seen and examined with house staff.  Plan formulated and reviewed on rounds     Briefly,  89 y/o male with DM and recent SDH underwent evacuation admitted with Toxic Metabolic Encephalopathy related to indwelling dawson UTI sepsis POA  Started on IV abx  Awake and alert but not able to fully answer questions  ros Unobtainable due to clinical condition     NAD  Stable vitals.  No fevers    Grossly non focal     Labs reviewed  WBC 36  Likely erroneous lactate level > 10    Severe Prot Rafa malnutrition     Cont with Pip/Anival (1)--S/P Peggy in ED  Follow up Cxs  Trend WBC and fever curve  Speech eval and diet supplements  DC IVF once taking PO  Chronic dawson  Cont with BPH meds  DVT prophy    Can tx to med surg

## 2020-10-27 NOTE — SWALLOW BEDSIDE ASSESSMENT ADULT - SWALLOW EVAL: DIAGNOSIS
1) The pt demonstrates Oropharyngeal Dysphagia which subjectively appears to be a functional condition with a restricted inventory of modified food textures. Dysphagia is in setting of acute medical deconditioning/urosepsis, recent SDH s/p craniotomy and underlying muscle wasting.  2) The pt is awake. He is interactive but distractible. Pt was able to verbalize during communicative probes. At these times, his motor speech abilities were felt to be grossly functional. However, his verbalizations were often brief, variably vague/fragmented, frequently tangential and periodically contextually inappropriate. 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

## 2020-10-27 NOTE — SWALLOW BEDSIDE ASSESSMENT ADULT - SWALLOW EVAL: RECOMMENDED FEEDING/EATING TECHNIQUES
crush medication (when feasible)/check mouth frequently for oral residue/pocketing/maintain upright posture during/after eating for 30 mins

## 2020-10-27 NOTE — PROGRESS NOTE ADULT - SUBJECTIVE AND OBJECTIVE BOX
Patient is a 89y old  Male who presents with a chief complaint of hematuria, fever, hypotension (27 Oct 2020 10:15)      HPI:  88 yo male POD#21 s/p right crani evacuation of right SDH, rib fxr's. PMHx of BPH, CAD, DM, GERD, HLD, SDH s/p craniotomy and surgical removal, urine retention requiring Dawson catheter, patient has stage 2 pressure sores to left buttock and right sacrum.  Pt returns with Fever 104, Hypotensive, Hypoxic, WBC 30K and self removal of dawson with hematuria.   Pt has a depressed Mental status but [facility] He follows commands and demonstrates equal power throughout, Legs bilaterally weaker than arms.  Head incision is well healed, no erythema or discharge / oozing.    10/27 - POD#22 Pt seen and examined earlier today, in NAD. Appears comfortable, denies new complaints, no overnight events       acetaminophen  Suppository .. 650 milliGRAM(s) Rectal every 6 hours PRN  bethanechol 50 milliGRAM(s) Oral two times a day  dextrose 40% Gel 15 Gram(s) Oral once PRN  dextrose 5%. 1000 milliLiter(s) IV Continuous <Continuous>  dextrose 50% Injectable 12.5 Gram(s) IV Push once  dextrose 50% Injectable 25 Gram(s) IV Push once  dextrose 50% Injectable 25 Gram(s) IV Push once  finasteride 5 milliGRAM(s) Oral daily  glucagon  Injectable 1 milliGRAM(s) IntraMuscular once PRN  insulin lispro (ADMELOG) corrective regimen sliding scale   SubCutaneous three times a day before meals  insulin lispro (ADMELOG) corrective regimen sliding scale   SubCutaneous at bedtime  multivitamin 1 Tablet(s) Oral daily  piperacillin/tazobactam IVPB.. 3.375 Gram(s) IV Intermittent every 8 hours  QUEtiapine 50 milliGRAM(s) Oral at bedtime  silver sulfADIAZINE 1% Cream 1 Application(s) Topical two times a day  simvastatin 40 milliGRAM(s) Oral at bedtime  sodium chloride 0.9%. 1000 milliLiter(s) IV Continuous <Continuous>  tamsulosin Oral Tab/Cap - Peds 0.4 milliGRAM(s) Oral at bedtime      Vital Signs Last 24 Hrs  T(C): 36.2 (27 Oct 2020 08:39), Max: 39.6 (26 Oct 2020 20:06)  T(F): 97.2 (27 Oct 2020 08:39), Max: 103.3 (26 Oct 2020 20:06)  HR: 81 (27 Oct 2020 08:00) (65 - 92)  BP: 114/52 (27 Oct 2020 08:00) (91/49 - 114/52)  BP(mean): 67 (27 Oct 2020 08:00) (56 - 77)  RR: 22 (27 Oct 2020 08:00) (12 - 24)  SpO2: 99% (27 Oct 2020 08:00) (96% - 100%)      PHYSICAL EXAM:  Constitutional: Awake / alert  HEENT: PERRLA, EOMI  Neck: Supple  Respiratory: Breath sounds are clear bilaterally  Cardiovascular: S1 and S2, regular rhythm  Gastrointestinal: soft, nontender  Extremities:  no edema  Musculoskeletal: no abnormal movements  Skin: right craniotomy incision clean and dry, staples intact     Neurological Exam:  HF: A x O x 2, appropriately interactive, normal affect, speech fluent, no aphasia or paraphasic errors. Naming /repetition intact   CN: PERRL, EOMI, no NLFD, tongue midline  Motor: No pronator drift, MAEx 4 antigravity, no tremor, rigidity or bradykinesia  Sens: Intact to light touch  Gait/Balance: Cannot test                          7.8    36.49 )-----------( 269      ( 27 Oct 2020 06:41 )             24.1    140  |  110<H>  |  28<H>  ----------------------------<  155<H>  4.0   |  25  |  0.77    Ca    7.6<L>      27 Oct 2020 06:41  Phos  3.3     10-27  Mg     1.6     10-27    TPro  4.8<L>  /  Alb  1.8<L>  /  TBili  0.6  /  DBili  x   /  AST  33  /  ALT  32  /  AlkPhos  122<H>  10-27    PT/INR - ( 26 Oct 2020 20:09 )   PT: 13.8 sec;   INR: 1.19 ratio      PTT - ( 26 Oct 2020 20:09 )  PTT:23.6 sec      Radiology:  CT Head No Cont (10.26.20 @ 21:56) >  1. Persistent, large right-sided subdural hematoma that demonstrates components of variable age, including acute/subacute components) and measures up to 2.1 cm in thickness. This results in right-sided mass effect with right to left midline shift of approximately 6 mm. When compared to prior CT brain of 10/9/2020, a similar sized right holohemispheric subdural hematoma was demonstrated with similar appearance of the ventricles, mass effect and right to left midline shift.

## 2020-10-27 NOTE — SWALLOW BEDSIDE ASSESSMENT ADULT - SLP GENERAL OBSERVATIONS
On encounter, pt was wearing mittens on his hands. Some loss of bulk was evident in strap muscle regions. Temporal wasting was evident.  The pt is awake. He is interactive but distractible. Pt was able to verbalize during communicative probes. At these times, his motor speech abilities were felt to be grossly functional. However, his verbalizations were often brief, variably vague/fragmented, frequently tangential and periodically contextually inappropriate. 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

## 2020-10-27 NOTE — SWALLOW BEDSIDE ASSESSMENT ADULT - ASR SWALLOW RECOMMEND DIAG
DEFER MBS AS PT APPEARED CLINICALLY TOLERANT OF SUGGESTED PO TEXTURES FROM AN OROPHARYNGEAL SWALLOWING STANCE, DYSPHAGIA WITH A PROPENSITY TO FLUCTUATE IN SEVERITY GIVEN PROFILE AND CONSIDERING HIS ALTERED MENTATION.

## 2020-10-27 NOTE — SWALLOW BEDSIDE ASSESSMENT ADULT - ORAL PHASE
Bolus formation/transfer were achieved via mildly to moderately prolonged discontinuous lingual pumping actions that were felt to be grossly functional with the above modified food consistencies. Piecemeal deglutition was evident. A very small amount of tongue debris noted with puree foods.

## 2020-10-27 NOTE — CONSULT NOTE ADULT - SUBJECTIVE AND OBJECTIVE BOX
Patient is a 89y old  Male who presents with a chief complaint of hematuria, fever, hypotension (27 Oct 2020 09:22)    HPI:  88 y/o M PMHx BPH, CAD, DMII, GERD, HLD, SDH s/p craniotomy and surgical removal, urine rentention with Dawson catheter, HTN presenting for [facility] with 1 day h/o hematuria. Dawson was replaced in the NH 10/25 after the pt pulled it out. 10/26 pt was noted to be febrile at the NH Tmax: 104, SBP 90, and hypoxic on RA Sp02 on RA. Also noted to be more confused and lethargic with gross hematuria. Pt not on AC due to recent SDH. Pt was admitted in early October (D/c 10 days ago) s/p fall with AMS and imaging + for SDH requiring craniotomy and evacuation also with aspiration PNA tx with IV abx. Dawson was placed for urinary retention/hematuria, UA with pyuria concerning for UTI. 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 Was given IV vancomycin/cefepime. dawson placed by urology.       PSH: as above  Meds: per reconciliation sheet, noted below  MEDICATIONS  (STANDING):  bethanechol 50 milliGRAM(s) Oral two times a day  cefepime   IVPB 2000 milliGRAM(s) IV Intermittent every 12 hours  dextrose 5%. 1000 milliLiter(s) (50 mL/Hr) IV Continuous <Continuous>  dextrose 50% Injectable 12.5 Gram(s) IV Push once  dextrose 50% Injectable 25 Gram(s) IV Push once  dextrose 50% Injectable 25 Gram(s) IV Push once  finasteride 5 milliGRAM(s) Oral daily  insulin lispro (ADMELOG) corrective regimen sliding scale   SubCutaneous three times a day before meals  insulin lispro (ADMELOG) corrective regimen sliding scale   SubCutaneous at bedtime  multivitamin 1 Tablet(s) Oral daily  QUEtiapine 50 milliGRAM(s) Oral at bedtime  silver sulfADIAZINE 1% Cream 1 Application(s) Topical two times a day  simvastatin 40 milliGRAM(s) Oral at bedtime  sodium chloride 0.9%. 1000 milliLiter(s) (70 mL/Hr) IV Continuous <Continuous>  tamsulosin Oral Tab/Cap - Peds 0.4 milliGRAM(s) Oral at bedtime    Allergies    No Known Allergies    Intolerances      Social: no smoking, no alcohol, no illegal drugs; no recent travel, no exposure to TB  FAMILY HISTORY:  No pertinent family history in first degree relatives       no history of premature cardiovascular disease in first degree relatives    ROS: unable to obtain d/t medical condition   All other systems reviewed and are negative    Vital Signs Last 24 Hrs  T(C): 36.2 (27 Oct 2020 08:39), Max: 39.6 (26 Oct 2020 20:06)  T(F): 97.2 (27 Oct 2020 08:39), Max: 103.3 (26 Oct 2020 20:06)  HR: 81 (27 Oct 2020 08:00) (65 - 92)  BP: 114/52 (27 Oct 2020 08:00) (91/49 - 114/52)  BP(mean): 67 (27 Oct 2020 08:00) (56 - 77)  RR: 22 (27 Oct 2020 08:00) (12 - 24)  SpO2: 99% (27 Oct 2020 08:00) (96% - 100%)  Daily Height in cm: 175.26 (26 Oct 2020 19:47)    Daily Weight in k.5 (27 Oct 2020 04:15)    PE:  Constitutional: frail looking  HEENT: NC/AT, EOMI, PERRLA, conjunctivae clear; ears and nose atraumatic; pharynx benign  Neck: supple; thyroid not palpable  Back: no tenderness  Respiratory: respiratory effort normal; clear to auscultation  Cardiovascular: S1S2 regular, no murmurs  Abdomen: soft, not tender, not distended, positive BS; liver and spleen WNL  Genitourinary: no suprapubic tenderness  Lymphatic: no LN palpable  Musculoskeletal: no muscle tenderness, no joint swelling or tenderness  Extremities: no pedal edema  Neurological/ Psychiatric: AxOx3, Judgement and insight normal;  moving all extremities  Skin: no rashes; no palpable lesions    Labs: all available labs reviewed                        7.8    36.49 )-----------( 269      ( 27 Oct 2020 06:41 )             24.1     10-27    140  |  110<H>  |  28<H>  ----------------------------<  155<H>  4.0   |  25  |  0.77    Ca    7.6<L>      27 Oct 2020 06:41  Phos  3.3     10-27  Mg     1.6     10-27    TPro  4.8<L>  /  Alb  1.8<L>  /  TBili  0.6  /  DBili  x   /  AST  33  /  ALT  32  /  AlkPhos  122<H>  10-27     LIVER FUNCTIONS - ( 27 Oct 2020 06:41 )  Alb: 1.8 g/dL / Pro: 4.8 gm/dL / ALK PHOS: 122 U/L / ALT: 32 U/L / AST: 33 U/L / GGT: x           Urinalysis Basic - ( 26 Oct 2020 20:52 )    Color: Red / Appearance: Slightly Turbid / S.010 / pH: x  Gluc: x / Ketone: Negative  / Bili: Negative / Urobili: Negative mg/dL   Blood: x / Protein: 100 mg/dL / Nitrite: Negative   Leuk Esterase: Small / RBC: >50 /HPF / WBC 10-15 /HPF   Sq Epi: x / Non Sq Epi: Occasional / Bacteria: Few          Radiology: all available radiological tests reviewed  < from: CT Abdomen and Pelvis w/ IV Cont (10.27.20 @ 01:42) >    EXAM:  CT ABDOMEN AND PELVIS IC                            PROCEDURE DATE:  10/27/2020          INTERPRETATION:  CLINICAL INFORMATION: Metabolic encephalopathy secondary to likely UTI, gross hematuria, sepsis.    PROCEDURE:  Helical axial images were obtained from the domes of the diaphragm through the pubic symphysis without intravenous or oral contrast. Coronal and sagittal reformats were also obtained.    COMPARISON: 10/4/2020 and 8/3/2010.    FINDINGS: Evaluation of the abdominal/pelvic organs, viscera and vasculature is limited without intravenous contrast. Artifact from the patient's arms degrades images.    LOWER CHEST: Centrilobular emphysema. Suggest nonspecific, interlobular septal thickening. Dependent opacities in the left > right lower lobe. Subsegmental atelectasis. Aortic valve, coronary artery and mitral annular calcification.    LIVER: Fatty infiltration near the fossa ligament again noted.  GALLBLADDER/BILE DUCTS: No intrahepatic or extrahepatic biliary dilatation. Cholelithiasis.  PANCREAS: Unremarkable.  SPLEEN: Again noted, mild contour deformity and punctate calcifications.    ADRENALS: Unremarkable.  KIDNEYS/URETERS: Nonspecific mild bilateral perinephric stranding without hydroureteronephrosis. Question striated bilateral nephrogram. Right renal cyst again noted.  BLADDER: Mildly distended with air and fluid. Question trabeculated versus emphysematous bladder wall.  REPRODUCTIVE ORGANS: Partially visualized Dawson catheter balloon at the level of the penile urethra.    BOWEL: No bowel obstruction. Focal outpouching of air along the posterior aspect of the terminal ileum, which may represent a diverticulum or appendix. No secondary signs of appendicitis. Rectosigmoid colon wall thickening surrounding inflammatory change. Colon diverticulosis.  PERITONEUM: No drainable fluid collection or free air.  VESSELS: Atherosclerosis. Again noted, ectatic infrarenal aorta containing intramural plaque/thrombus.  RETROPERITONEUM: No lymphadenopathy.  ABDOMINAL WALL/SOFT TISSUES: Post surgical changes along the anterior pelvic wall soft tissue.  BONES: Degenerative changes of the spine. Stable minimal retrolisthesis of L1 on L2, L2 on L3, L3 on L4 and L4 on L5.    IMPRESSION:    Rectosigmoid colitis. No bowelobstruction.    Nonspecific mild bilateral perinephric stranding without hydroureteronephrosis. Question striated bilateral nephrogram. Recommend clinical correlation to assess pyelonephritis. Question emphysematous cystitis versus bladder wall trabeculation.    Malpositioned Dawson catheter at the level of the penile urethra. Recommend removal.    Dependent opacities in the left > right lower lobe, which may represent atelectasis. Recommend clinical correlation to assess aspiration.    Additionalfindings as described.          < end of copied text >  < from: CT Head No Cont (10.26.20 @ 21:56) >  EXAM:  CT BRAIN                            PROCEDURE DATE:  10/26/2020          INTERPRETATION:  CLINICAL INFORMATION:  AMS, prior SDH treated by surgery.    TECHNIQUE:  Axial CT images were acquired through the head.  Intravenous contrast: None  Two-dimensional reformats were generated.    COMPARISON STUDY: CT brain 10/9/2020    FINDINGS: The patient is status post right-sided frontoparietal craniotomy again demonstrated. Once again, there is a large holohemispheric right-sided subdural collection demonstrating variable attenuation, consistent with a subdural hematoma with acute, intermediate and chronic components. This collection measures 2.1 cm in thickness. This results in adjacent mass effect with effacement of the right lateral ventricle and right to left midline shift of approximately 6 mm. Chronic right corona radiata lacunar infarct again seen.    Imaged portions of the orbits, and left mastoid air cells are grossly unremarkable. Tiny retention cyst/polyp left ethmoid sinus. Mild opacification of the inferior right mastoid air cells seen. Calcific atherosclerosis of bilateral cavernous ICAs.    IMPRESSION:  1. Persistent, large right-sided subdural hematoma that demonstrates components of variable age, including acute/subacute components) and measures up to 2.1 cm in thickness. This results in right-sided mass effect with right to left midline shift of approximately 6 mm. When compared to prior CT brain of 10/9/2020, a similar sized right holohemispheric subdural hematoma was demonstrated with similar appearance of the ventricles, mass effect and right to left midline shift.      < end of copied text >    Advanced directives addressed: full resuscitation

## 2020-10-27 NOTE — DIETITIAN INITIAL EVALUATION ADULT. - PERTINENT LABORATORY DATA
10-27    140  |  110<H>  |  28<H>  ----------------------------<  155<H>  4.0   |  25  |  0.77    Ca    7.6<L>      27 Oct 2020 06:41  Phos  3.3     10-27  Mg     1.6     10-27    TPro  4.8<L>  /  Alb  1.8<L>  /  TBili  0.6  /  DBili  x   /  AST  33  /  ALT  32  /  AlkPhos  122<H>  10-27

## 2020-10-27 NOTE — DIETITIAN INITIAL EVALUATION ADULT. - MALNUTRITION
severe malnutrition in chronic illness severe malnutrition in chronic illness r/t inability to consume sufficient energy/protein 2/2 swallowing ability AEB 23% wt loss x 4 mo; severe muscle/fat wasting

## 2020-10-27 NOTE — DIETITIAN INITIAL EVALUATION ADULT. - NUTRITION CONSULT
Your opinion matters!  Thank you for choosing the [facility].  You might receive a survey in the mail about your experience today to evaluate our clinic.  Please fill it out and return it in the pre-paid envelope.  It was a pleasure to care for you today!    BOTOX (onabotulimtoxin A) is a prescription medicine that is injected into the bladder muscle during an in-office procedure.  It is used to treat overactive bladder symptoms such as a strong need to urinate with leaking or wetting accidents (urge urinary incontinence), a strong need to urinate right away (urgency), and urinating often (frequency) in adults 18 years and older when another type of medicine (anticholinergic) does not work well enough or cannot be taken.    If you decide to proceed with botox;  You will be scheduled for an hour long appointment, however the actual procedure takes only about 15 minutes.  You will lie flat on a table and the doctor will look inside your bladder with a special scope.  Using this scope, the doctor will be able to inject the botox into different areas of your bladder.  Seven to ten days prior to the procedure we will need you to give a urine sample to your local ACL lab.  For further information on locations, please go to the ACL website. You will be prescribed antibiotics to take before and after the procedure depending on your urine results.  You may also be prescribed medication prior to the procedure to calm your nerves.  If this happens, you will NOT be able to drive yourself to or from the botox injection.    The results of botox typically last 6-12 months and injections can be repeated as often as needed.      Due to the risk of urinary retention (not being able to empty the bladder), only patients who are willing and able to initiate catheterization post-treatment, if required, should be considered for treatment.  Catheterization can be either as a catheter you pass yourself  several times a day or an indwelling catheter.    In clinical trials, 6.5% of patients (36/552) initiated clean intermittent catheterization for urinary retention following treatment.  This is NOT a permanent side effect.      Other possible side effects of BOTOX® include all of which are rare include: dry mouth, discomfort or pain at the injection site, tiredness, headache, neck pain, and eye problems: double vision, blurred vision, decreased eyesight, drooping eyelids, swelling of your eyelids, and dry eyes.            yes

## 2020-10-28 LAB
-  AMPICILLIN: SIGNIFICANT CHANGE UP
-  CIPROFLOXACIN: SIGNIFICANT CHANGE UP
-  LEVOFLOXACIN: SIGNIFICANT CHANGE UP
-  LINEZOLID: SIGNIFICANT CHANGE UP
-  NITROFURANTOIN: SIGNIFICANT CHANGE UP
-  TETRACYCLINE: SIGNIFICANT CHANGE UP
-  VANCOMYCIN: SIGNIFICANT CHANGE UP
ADD ON TEST-SPECIMEN IN LAB: SIGNIFICANT CHANGE UP
ALBUMIN SERPL ELPH-MCNC: 1.8 G/DL — LOW (ref 3.3–5)
ALP SERPL-CCNC: 113 U/L — SIGNIFICANT CHANGE UP (ref 40–120)
ALT FLD-CCNC: 29 U/L — SIGNIFICANT CHANGE UP (ref 12–78)
ANION GAP SERPL CALC-SCNC: 4 MMOL/L — LOW (ref 5–17)
AST SERPL-CCNC: 29 U/L — SIGNIFICANT CHANGE UP (ref 15–37)
BILIRUB SERPL-MCNC: 0.5 MG/DL — SIGNIFICANT CHANGE UP (ref 0.2–1.2)
BUN SERPL-MCNC: 27 MG/DL — HIGH (ref 7–23)
CALCIUM SERPL-MCNC: 7.6 MG/DL — LOW (ref 8.5–10.1)
CHLORIDE SERPL-SCNC: 113 MMOL/L — HIGH (ref 96–108)
CO2 SERPL-SCNC: 27 MMOL/L — SIGNIFICANT CHANGE UP (ref 22–31)
CREAT SERPL-MCNC: 0.7 MG/DL — SIGNIFICANT CHANGE UP (ref 0.5–1.3)
CULTURE RESULTS: SIGNIFICANT CHANGE UP
GLUCOSE SERPL-MCNC: 119 MG/DL — HIGH (ref 70–99)
HCT VFR BLD CALC: 23.8 % — LOW (ref 39–50)
HGB BLD-MCNC: 7.5 G/DL — LOW (ref 13–17)
MCHC RBC-ENTMCNC: 31.4 PG — SIGNIFICANT CHANGE UP (ref 27–34)
MCHC RBC-ENTMCNC: 31.5 GM/DL — LOW (ref 32–36)
MCV RBC AUTO: 99.6 FL — SIGNIFICANT CHANGE UP (ref 80–100)
METHOD TYPE: SIGNIFICANT CHANGE UP
ORGANISM # SPEC MICROSCOPIC CNT: SIGNIFICANT CHANGE UP
ORGANISM # SPEC MICROSCOPIC CNT: SIGNIFICANT CHANGE UP
PLATELET # BLD AUTO: 266 K/UL — SIGNIFICANT CHANGE UP (ref 150–400)
POTASSIUM SERPL-MCNC: 4.1 MMOL/L — SIGNIFICANT CHANGE UP (ref 3.5–5.3)
POTASSIUM SERPL-SCNC: 4.1 MMOL/L — SIGNIFICANT CHANGE UP (ref 3.5–5.3)
PROT SERPL-MCNC: 4.7 GM/DL — LOW (ref 6–8.3)
RBC # BLD: 2.39 M/UL — LOW (ref 4.2–5.8)
RBC # FLD: 15.4 % — HIGH (ref 10.3–14.5)
SODIUM SERPL-SCNC: 144 MMOL/L — SIGNIFICANT CHANGE UP (ref 135–145)
SPECIMEN SOURCE: SIGNIFICANT CHANGE UP
WBC # BLD: 19.52 K/UL — HIGH (ref 3.8–10.5)
WBC # FLD AUTO: 19.52 K/UL — HIGH (ref 3.8–10.5)

## 2020-10-28 PROCEDURE — 99233 SBSQ HOSP IP/OBS HIGH 50: CPT | Mod: GC

## 2020-10-28 RX ORDER — DAPTOMYCIN 500 MG/10ML
INJECTION, POWDER, LYOPHILIZED, FOR SOLUTION INTRAVENOUS
Refills: 0 | Status: DISCONTINUED | OUTPATIENT
Start: 2020-10-28 | End: 2020-10-28

## 2020-10-28 RX ORDER — METOPROLOL TARTRATE 50 MG
25 TABLET ORAL
Refills: 0 | Status: DISCONTINUED | OUTPATIENT
Start: 2020-10-28 | End: 2020-11-10

## 2020-10-28 RX ORDER — DAPTOMYCIN 500 MG/10ML
320 INJECTION, POWDER, LYOPHILIZED, FOR SOLUTION INTRAVENOUS EVERY 24 HOURS
Refills: 0 | Status: DISCONTINUED | OUTPATIENT
Start: 2020-10-28 | End: 2020-10-30

## 2020-10-28 RX ADMIN — Medication 25 MILLIGRAM(S): at 10:14

## 2020-10-28 RX ADMIN — PIPERACILLIN AND TAZOBACTAM 25 GRAM(S): 4; .5 INJECTION, POWDER, LYOPHILIZED, FOR SOLUTION INTRAVENOUS at 21:31

## 2020-10-28 RX ADMIN — Medication 1 APPLICATION(S): at 21:05

## 2020-10-28 RX ADMIN — Medication 1: at 12:20

## 2020-10-28 RX ADMIN — PIPERACILLIN AND TAZOBACTAM 25 GRAM(S): 4; .5 INJECTION, POWDER, LYOPHILIZED, FOR SOLUTION INTRAVENOUS at 14:29

## 2020-10-28 RX ADMIN — Medication 1 TABLET(S): at 10:15

## 2020-10-28 RX ADMIN — Medication 2: at 17:23

## 2020-10-28 RX ADMIN — FINASTERIDE 5 MILLIGRAM(S): 5 TABLET, FILM COATED ORAL at 10:14

## 2020-10-28 RX ADMIN — Medication 50 MILLIGRAM(S): at 21:05

## 2020-10-28 RX ADMIN — Medication 50 MILLIGRAM(S): at 10:15

## 2020-10-28 RX ADMIN — DAPTOMYCIN 112.8 MILLIGRAM(S): 500 INJECTION, POWDER, LYOPHILIZED, FOR SOLUTION INTRAVENOUS at 10:15

## 2020-10-28 RX ADMIN — QUETIAPINE FUMARATE 50 MILLIGRAM(S): 200 TABLET, FILM COATED ORAL at 21:05

## 2020-10-28 RX ADMIN — Medication 1 APPLICATION(S): at 10:21

## 2020-10-28 RX ADMIN — Medication 25 MILLIGRAM(S): at 21:05

## 2020-10-28 RX ADMIN — SIMVASTATIN 40 MILLIGRAM(S): 20 TABLET, FILM COATED ORAL at 21:05

## 2020-10-28 RX ADMIN — PIPERACILLIN AND TAZOBACTAM 25 GRAM(S): 4; .5 INJECTION, POWDER, LYOPHILIZED, FOR SOLUTION INTRAVENOUS at 05:46

## 2020-10-28 RX ADMIN — TAMSULOSIN HYDROCHLORIDE 0.4 MILLIGRAM(S): 0.4 CAPSULE ORAL at 21:05

## 2020-10-28 NOTE — PROGRESS NOTE ADULT - ASSESSMENT
88 y/o M PMHx BPH, CAD, DMII, GERD, HLD, SDH s/p craniotomy and surgical removal, urine retention with Dawson catheter, HTN presenting for [facility] with 1 day h/o hematuria. Dawson was replaced in the NH 10/25 after the pt pulled it out. 10/26 pt was noted to be febrile at the NH Tmax: 104, SBP 90, and hypoxic on RA Sp02 on RA. Also noted to be more confused and lethargic with gross hematuria. Pt not on AC due to recent SDH. Pt was admitted in early October (D/c 10 days ago) s/p fall with AMS and imaging + for SDH requiring craniotomy and evacuation also with aspiration PNA tx with IV abx. Dawson was placed for urinary retention/hematuria, UA with pyuria concerning for UTI. 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 Was given IV vancomycin/cefepime. dawson placed by urology.     1. sepsis with VRE, complicated cystitis. pyelonephritis. BPH/urinary retention. hematuria. aspiration pna. ? colitis. R SDH  - urine culture/blood cultures growing VRE   - leukocytosis  - on zosyn #2, add daptomycin 320mg daily check cpk, and weekly while on therapy  - urology eval appreciated  - dawson care  - imaging reviewed  - aspiration precautions  - tolerating abx well so far; no side effects noted  - reason for abx use and side effects reviewed with patient  - supportive care  - fu cbc    2. other issues - care per medicine

## 2020-10-28 NOTE — CHART NOTE - NSCHARTNOTEFT_GEN_A_CORE
discussed condition of patient with Dr. Obrien and he has given the ok for aspirin if needed for cardiovascular protection at this time. Benefit of aspirin outweighs risk, no neurosurgical intervention required at this time.   recall prn.   thank you

## 2020-10-28 NOTE — PROGRESS NOTE ADULT - SUBJECTIVE AND OBJECTIVE BOX
HPI: 89 y/o M PMHx BPH, CAD, DMII, GERD, HLD, SDH s/p craniotomy and surgical removal, urine rentention with Whitmore catheter, HTN presenting for [facility] with 1 day h/o hematuria. Whitmore was replaced in the NH last night after the pt pulled it out. Today, pt was noted to be febrile at the NH Tmax: 104, SBP 90, and hypoxic on RA Sp02 on RA. Also noted to be more confused and lethargic with gross hematuria. Pt not on AC due to recent SDH.  Pt was admitted in early October (D/c 10 days ago) s/p fall with AMS and imaging + for SDH requiring craniotomy and evacuation also with aspiration PNA tx with IV abx. Whitmore was placed for urinary retention.   Spoke with pt's son Caleb whiteside ESL  this evening. Updated him on pt's status; as the next of kin, he consented to CT w/ contrast imaging. MOLST was reviewed and received verbal consent; FULL CODE for now but son will bring in paperwork tomorrow to discuss with the day team.    S: Patient notes pain within penis. Patient seen and examined by bedside. No acute events since admission. Afebrile overnight    O:   REVIEW OF SYSTEMS:  CONSTITUTIONAL: No weakness, fevers or chills  EYES/ENT: No visual changes;  No vertigo or throat pain   NECK: No pain or stiffness  RESPIRATORY: No cough, wheezing, hemoptysis; No shortness of breath  CARDIOVASCULAR: No chest pain or palpitations  GASTROINTESTINAL: No abdominal or epigastric pain. No nausea, vomiting, or hematemesis; No diarrhea or constipation. No melena or hematochezia.  GENITOURINARY: No dysuria, frequency or hematuria  NEUROLOGICAL: No numbness or weakness  SKIN: No itching, burning, rashes, or lesions   All other review of systems is negative unless indicated above    PHYSICAL EXAM:  Vital Signs Last 24 Hrs  T(C): 36.4 (28 Oct 2020 16:24), Max: 37.1 (28 Oct 2020 11:36)  T(F): 97.5 (28 Oct 2020 16:24), Max: 98.8 (28 Oct 2020 11:36)  HR: 72 (28 Oct 2020 16:24) (70 - 92)  BP: 115/57 (28 Oct 2020 16:24) (114/53 - 137/61)  BP(mean): 69 (28 Oct 2020 13:00) (67 - 78)  RR: 17 (28 Oct 2020 16:24) (15 - 17)  SpO2: 98% (28 Oct 2020 16:24) (96% - 100%)    PHYSICAL EXAM:  Constitutional: NAD,   Neck: Soft and supple, No LAD, No JVD  Respiratory: +adventitious breath sounds L>R   Cardiovascular: S1 and S2, regular rate and rhythm, no Murmurs, gallops or rubs  Gastrointestinal: Bowel Sounds present, soft, nondistended, no guarding, no rebound, +supra-pubic tenderness, + Whitmore in place draining yellow urine, no hematuria   : blood noted at urethral meatus  Neuro: AAOx3  Extremities: No peripheral edema  Vascular: 2+ peripheral pulses   Skin: No rashes    MEDICATIONS:  MEDICATIONS  (STANDING):  bethanechol 50 milliGRAM(s) Oral two times a day  DAPTOmycin IVPB 320 milliGRAM(s) IV Intermittent every 24 hours  dextrose 5%. 1000 milliLiter(s) (50 mL/Hr) IV Continuous <Continuous>  dextrose 50% Injectable 12.5 Gram(s) IV Push once  dextrose 50% Injectable 25 Gram(s) IV Push once  dextrose 50% Injectable 25 Gram(s) IV Push once  finasteride 5 milliGRAM(s) Oral daily  insulin lispro (ADMELOG) corrective regimen sliding scale   SubCutaneous three times a day before meals  insulin lispro (ADMELOG) corrective regimen sliding scale   SubCutaneous at bedtime  metoprolol tartrate 25 milliGRAM(s) Oral two times a day  multivitamin 1 Tablet(s) Oral daily  piperacillin/tazobactam IVPB.. 3.375 Gram(s) IV Intermittent every 8 hours  QUEtiapine 50 milliGRAM(s) Oral at bedtime  silver sulfADIAZINE 1% Cream 1 Application(s) Topical two times a day  simvastatin 40 milliGRAM(s) Oral at bedtime  tamsulosin Oral Tab/Cap - Peds 0.4 milliGRAM(s) Oral at bedtime    MEDICATIONS  (PRN):  acetaminophen  Suppository .. 650 milliGRAM(s) Rectal every 6 hours PRN Temp greater or equal to 38C (100.4F)  dextrose 40% Gel 15 Gram(s) Oral once PRN Blood Glucose LESS THAN 70 milliGRAM(s)/deciliter  glucagon  Injectable 1 milliGRAM(s) IntraMuscular once PRN Glucose LESS THAN 70 milligrams/deciliter      LABS: All Labs Reviewed:                        7.5    19.52 )-----------( 266      ( 28 Oct 2020 07:00 )             23.8     10-28    144  |  113<H>  |  27<H>  ----------------------------<  119<H>  4.1   |  27  |  0.70    Ca    7.6<L>      28 Oct 2020 07:00  Phos  3.3     10-27  Mg     1.6     10-27    TPro  4.7<L>  /  Alb  1.8<L>  /  TBili  0.5  /  DBili  x   /  AST  29  /  ALT  29  /  AlkPhos  113  10-28    PT/INR - ( 26 Oct 2020 20:09 )   PT: 13.8 sec;   INR: 1.19 ratio         PTT - ( 26 Oct 2020 20:09 )  PTT:23.6 sec  CARDIAC MARKERS ( 28 Oct 2020 07:00 )  x     / x     / 39 U/L / x     / x          Blood Culture: 10-26 @ 20:52  Organism --  Gram Stain Blood -- Gram Stain --  Specimen Source .Urine None  Culture-Blood --    10-26 @ 20:09  Organism Blood Culture PCR  Gram Stain Blood -- Gram Stain   Growth in aerobic bottle: Gram positive cocci in pairs  Growth in anaerobic bottle: Gram positive cocci in pairs  Specimen Source .Blood Blood-Peripheral  Culture-Blood --      I&O's Summary    27 Oct 2020 07:01  -  28 Oct 2020 07:00  --------------------------------------------------------  IN: 960 mL / OUT: 1200 mL / NET: -240 mL    28 Oct 2020 07:01  -  28 Oct 2020 18:26  --------------------------------------------------------  IN: 0 mL / OUT: 400 mL / NET: -400 mL      CAPILLARY BLOOD GLUCOSE      POCT Blood Glucose.: 205 mg/dL (28 Oct 2020 17:15)  POCT Blood Glucose.: 154 mg/dL (28 Oct 2020 11:33)  POCT Blood Glucose.: 146 mg/dL (28 Oct 2020 08:31)  POCT Blood Glucose.: 205 mg/dL (27 Oct 2020 21:38)      RADIOLOGY/EKG:    RADIOLOGY/EKG:  < from: 12 Lead ECG (10.26.20 @ 20:02) >  Diagnosis Line Sinus rhythm with Premature supraventricular complexes  Right bundle branch block  Cannot rule out Inferior infarct , age undetermined  Cannot rule out Anterior infarct (cited on or before 26-DEC-2019)  Abnormal ECG    < from: CT Head No Cont (10.26.20 @ 21:56) >  MPRESSION:  1. Persistent, large right-sided subdural hematoma that demonstrates components of variable age, including acute/subacute components) and measures up to 2.1 cm in thickness. This results in right-sided mass effect with right to left midline shift of approximately 6 mm. When compared to prior CT brain of 10/9/2020, a similar sized right holohemispheric subdural hematoma was demonstrated with similar appearance of the ventricles, mass effect and right to left midline shift.    < from: CT Abdomen and Pelvis w/ IV Cont (10.27.20 @ 01:42) >  EXAM:  CT ABDOMEN AND PELVIS IC                            PROCEDURE DATE:  10/27/2020          INTERPRETATION:  CLINICAL INFORMATION: Metabolic encephalopathy secondary to likely UTI, gross hematuria, sepsis.    PROCEDURE:  Helical axial images were obtained from the domes of the diaphragm through the pubic symphysis without intravenous or oral contrast. Coronal and sagittal reformats were also obtained.    COMPARISON: 10/4/2020 and 8/3/2010.    FINDINGS: Evaluation of the abdominal/pelvic organs, viscera and vasculature is limited without intravenous contrast. Artifact from the patient's arms degrades images.    LOWER CHEST: Centrilobular emphysema. Suggest nonspecific, interlobular septal thickening. Dependent opacities in the left > right lower lobe. Subsegmental atelectasis. Aortic valve, coronary artery and mitral annular calcification.    LIVER: Fatty infiltration near the fossa ligament again noted.  GALLBLADDER/BILE DUCTS: No intrahepatic or extrahepatic biliary dilatation. Cholelithiasis.  PANCREAS: Unremarkable.  SPLEEN: Again noted, mild contour deformity and punctate calcifications.    ADRENALS: Unremarkable.  KIDNEYS/URETERS: Nonspecific mild bilateral perinephric stranding without hydroureteronephrosis. Question striated bilateral nephrogram. Right renal cyst again noted.  BLADDER: Mildly distended with air and fluid. Question trabeculated versus emphysematous bladder wall.  REPRODUCTIVE ORGANS: Partially visualized Whitmore catheter balloon at the level of the penile urethra.    BOWEL: No bowel obstruction. Focal outpouching of air along the posterior aspect of the terminal ileum, which may represent a diverticulum or appendix. No secondary signs of appendicitis. Rectosigmoid colon wall thickening surrounding inflammatory change. Colon diverticulosis.  PERITONEUM: No drainable fluid collection or free air.  VESSELS: Atherosclerosis. Again noted, ectatic infrarenal aorta containing intramural plaque/thrombus.  RETROPERITONEUM: No lymphadenopathy.  ABDOMINAL WALL/SOFT TISSUES: Post surgical changes along the anterior pelvic wall soft tissue.  BONES: Degenerative changes of the spine. Stable minimal retrolisthesis of L1 on L2, L2 on L3, L3 on L4 and L4 on L5.    IMPRESSION:    Rectosigmoid colitis. No bowelobstruction.    Nonspecific mild bilateral perinephric stranding without hydroureteronephrosis. Question striated bilateral nephrogram. Recommend clinical correlation to assess pyelonephritis. Question emphysematous cystitis versus bladder wall trabeculation.    Malpositioned Whitmore catheter at the level of the penile urethra. Recommend removal.    Dependent opacities in the left > right lower lobe, which may represent atelectasis. Recommend clinical correlation to assess aspiration.    Additionalfindings as described.

## 2020-10-28 NOTE — PROGRESS NOTE ADULT - ASSESSMENT
89 year old male patient with narrative as previously stated with mental status changes and hypotension      #Encephalopathy, complicated cystitis. pyelonephritis. BPH/urinary retention. hematuria. aspiration pna. ? colitis.   -Patient improved today  -Leukocytosis noted   -Blood Cx positive for gram positive cocci in pairs  - IVF hydration d/c'd  -s/p Whitmore replaced by urology   -ID and urology recommendations noted   -Daptomycin 320mg IV q24hr  -Zosyn q8hr    #Leukocytosis  -on IV abx: Daptomycin and Zosyn  -trend cbc    #MÓNICA 2/2 dehydration  -resolved  -Cr nl, GFR wnl    #SDH s/p craniotomy  -Evaluated by Neurosurgery in ED  -No intervention by NSGY due to stable SDH on CTH; rec hold ASA  -Neuro checks q4h    #HTN  -continue metoprolol tartrate 25mg BID oral    #BPH  -cont finasteride  -cont tamsulosin    #CAD/HLD  -EKG reviewed, no acute changes noted  -hold ASA for now in light of hematuria, and hx of craniotomy  -will f/u with NSGY recs to restart ASA  -Cont simvastatin 40mg qhs    #DMII  -Low ISS  -A1C 5.7 (10/5)  -BGM qac qhs    #Stage II pressure ulcers  -L buttock and R sacrum on arrival to ED  -Wound consult  -Silvadene cream    #Dementia with features of agitation  Seroquel 50mg qhs    #DVT PPX  -SCDs  -Hold oral anticoagulation due to active bleeding    #Advance Directives  -Spoke with son, Caleb Finn who is next of kin and making medical decisions for pt as he has no capacity at this time  -DNI- MOSLT in chart    89 year old male patient with narrative as previously stated with mental status changes and hypotension      #Encephalopathy, complicated cystitis. pyelonephritis. BPH/urinary retention. hematuria. aspiration pna. ? colitis.   -Patient improved today  -Leukocytosis noted   -Blood Cx positive for gram positive cocci in pairs  - IVF hydration d/c'd  -s/p Whitmore replaced by urology   -ID and urology recommendations noted   -Daptomycin 320mg IV q24hr- will get daily CPK level  -Zosyn q8hr    #Leukocytosis  -on IV abx: Daptomycin and Zosyn  -trend cbc    #MÓNICA 2/2 dehydration  -resolved  -Cr nl, GFR wnl    #SDH s/p craniotomy  -Evaluated by Neurosurgery in ED  -No intervention by NSGY due to stable SDH on CTH; rec hold ASA  -Neuro checks q4h    #HTN  -continue metoprolol tartrate 25mg BID oral    #BPH  -cont finasteride  -cont tamsulosin    #CAD/HLD  -EKG reviewed, no acute changes noted  -hold ASA for now in light of hematuria, and hx of craniotomy  -will f/u with NSGY recs to restart ASA  -Cont simvastatin 40mg qhs    #DMII  -Low ISS  -A1C 5.7 (10/5)  -BGM qac qhs    #Stage II pressure ulcers  -L buttock and R sacrum on arrival to ED  -Wound consult  -Silvadene cream    #Dementia with features of agitation  Seroquel 50mg qhs    #DVT PPX  -SCDs  -Hold oral anticoagulation due to active bleeding    #Advance Directives  -Spoke with son, Caleb Finn who is next of kin and making medical decisions for pt as he has no capacity at this time  -DNI- MOSLT in chart

## 2020-10-28 NOTE — PROGRESS NOTE ADULT - SUBJECTIVE AND OBJECTIVE BOX
Date of service: 10-28-20 @ 10:29    pt seen and examined  laying in bed  dawson in place  feels weak, no reported abd pain  reported with positive blood cultures     ROS: no fever or chills; denies dizziness, no HA, no SOB or cough, no abdominal pain, no diarrhea or constipation;  no legs pain, no rashes    MEDICATIONS  (STANDING):  bethanechol 50 milliGRAM(s) Oral two times a day  DAPTOmycin IVPB 320 milliGRAM(s) IV Intermittent every 24 hours  dextrose 5%. 1000 milliLiter(s) (50 mL/Hr) IV Continuous <Continuous>  dextrose 50% Injectable 12.5 Gram(s) IV Push once  dextrose 50% Injectable 25 Gram(s) IV Push once  dextrose 50% Injectable 25 Gram(s) IV Push once  finasteride 5 milliGRAM(s) Oral daily  insulin lispro (ADMELOG) corrective regimen sliding scale   SubCutaneous three times a day before meals  insulin lispro (ADMELOG) corrective regimen sliding scale   SubCutaneous at bedtime  metoprolol tartrate 25 milliGRAM(s) Oral two times a day  multivitamin 1 Tablet(s) Oral daily  piperacillin/tazobactam IVPB.. 3.375 Gram(s) IV Intermittent every 8 hours  QUEtiapine 50 milliGRAM(s) Oral at bedtime  silver sulfADIAZINE 1% Cream 1 Application(s) Topical two times a day  simvastatin 40 milliGRAM(s) Oral at bedtime  sodium chloride 0.9%. 1000 milliLiter(s) (70 mL/Hr) IV Continuous <Continuous>  tamsulosin Oral Tab/Cap - Peds 0.4 milliGRAM(s) Oral at bedtime    Vital Signs Last 24 Hrs  T(C): 36.3 (28 Oct 2020 06:13), Max: 36.8 (27 Oct 2020 16:04)  T(F): 97.3 (28 Oct 2020 06:13), Max: 98.2 (27 Oct 2020 16:04)  HR: 73 (28 Oct 2020 06:13) (67 - 92)  BP: 114/53 (28 Oct 2020 06:13) (95/38 - 137/61)  BP(mean): 67 (28 Oct 2020 06:13) (50 - 100)  RR: 19 (27 Oct 2020 18:00) (14 - 19)  SpO2: 96% (28 Oct 2020 06:13) (96% - 100%)    PE:  Constitutional: frail looking  HEENT: NC/AT, EOMI, PERRLA, conjunctivae clear; ears and nose atraumatic; pharynx benign  Neck: supple; thyroid not palpable  Back: no tenderness  Respiratory: respiratory effort normal; clear to auscultation  Cardiovascular: S1S2 regular, no murmurs  Abdomen: soft, not tender, not distended, positive BS; liver and spleen WNL  Genitourinary: no suprapubic tenderness dawson in place  Lymphatic: no LN palpable  Musculoskeletal: no muscle tenderness, no joint swelling or tenderness  Extremities: no pedal edema  Neurological/ Psychiatric:  moving all extremities  Skin: no rashes; no palpable lesions    Labs: all available labs reviewed                                    )-----------( 266      ( 28 Oct 2020 07:00 )             23.8     10-28    144  |  113<H>  |  27<H>  ----------------------------<  119<H>  4.1   |  27  |  0.70    Ca    7.6<L>      28 Oct 2020 07:00  Phos  3.3     10-27  Mg     1.6     10-    TPro  4.7<L>  /  Alb  1.8<L>  /  TBili  0.5  /  DBili  x   /  AST  29  /  ALT  29  /  AlkPhos  113  10-28       LIVER FUNCTIONS - ( 27 Oct 2020 06:41 )  Alb: 1.8 g/dL / Pro: 4.8 gm/dL / ALK PHOS: 122 U/L / ALT: 32 U/L / AST: 33 U/L / GGT: x           Urinalysis Basic - ( 26 Oct 2020 20:52 )    Color: Red / Appearance: Slightly Turbid / S.010 / pH: x  Gluc: x / Ketone: Negative  / Bili: Negative / Urobili: Negative mg/dL   Blood: x / Protein: 100 mg/dL / Nitrite: Negative   Leuk Esterase: Small / RBC: >50 /HPF / WBC 10-15 /HPF   Sq Epi: x / Non Sq Epi: Occasional / Bacteria: Few    Culture - Urine (10.26.20 @ 20:52)   Specimen Source: .Urine None   Culture Results:   >100,000 CFU/ml Gram positive organisms Culture - Blood (10.26.20 @ 20:09)   Gram Stain:   Growth in anaerobic bottle: Gram positive cocci in pairs   Specimen Source: .Blood Blood-Peripheral   Culture Results:   Growth in anaerobic bottle: Gram positive cocci in pairs Culture - Blood (10.26.20 @ 20:09)   - Vancomycin resistant Enterococcus sp.: Detec   Gram Stain:   Growth in aerobic bottle: Gram positive cocci in pairs   Growth in anaerobic bottle: Gram positive cocci in pairs   Specimen Source: .Blood Blood-Peripheral   Organism: Blood Culture PCR   Culture Results:   Growth in aerobic bottle: Gram positive cocci in pairs   Growth in anaerobic bottle: Gram positive cocci in pairs   "Due to technical problems, Proteus sp. will Not be reported as part of   the BCID panel until further notice"   ***Blood Panel PCR results on this specimen are available   approximately 3 hours after the Gram stain result.***   Gram stain, PCR, and/or culture results may not always   correspond due to difference in methodologies.   ************************************************************   This PCR assay was performed using [email or web address].   The following targets are tested for: Enterococcus,   vancomycin resistant enterococci, Listeria monocytogenes,   coagulase negative staphylococci, S. aureus,   methicillin resistant S. aureus,Streptococcus agalactiae   (Group B), S. pneumoniae, S. pyogenes (Group A),   Acinetobacter baumannii, Enterobacter cloacae, E. coli,   Klebsiella oxytoca, K. pneumoniae, Proteus sp.,   Serratia marcescens, Haemophilus influenzae,   Neisseria meningitidis, Pseudomonas aeruginosa, Candida   albicans, C. glabrata, C krusei, C parapsilosis,   C. tropicalis and the KPC resistance gene.   Organism Identification: Blood Culture PCR   Method Type: PCR     Radiology: all available radiological tests reviewed      EXAM:  CT ABDOMEN AND PELVIS IC                            PROCEDURE DATE:  10/27/2020          INTERPRETATION:  CLINICAL INFORMATION: Metabolic encephalopathy secondary to likely UTI, gross hematuria, sepsis.    PROCEDURE:  Helical axial images were obtained from the domes of the diaphragm through the pubic symphysis without intravenous or oral contrast. Coronal and sagittal reformats were also obtained.    COMPARISON: 10/4/2020 and 8/3/2010.    FINDINGS: Evaluation of the abdominal/pelvic organs, viscera and vasculature is limited without intravenous contrast. Artifact from the patient's arms degrades images.    LOWER CHEST: Centrilobular emphysema. Suggest nonspecific, interlobular septal thickening. Dependent opacities in the left > right lower lobe. Subsegmental atelectasis. Aortic valve, coronary artery and mitral annular calcification.    LIVER: Fatty infiltration near the fossa ligament again noted.  GALLBLADDER/BILE DUCTS: No intrahepatic or extrahepatic biliary dilatation. Cholelithiasis.  PANCREAS: Unremarkable.  SPLEEN: Again noted, mild contour deformity and punctate calcifications.    ADRENALS: Unremarkable.  KIDNEYS/URETERS: Nonspecific mild bilateral perinephric stranding without hydroureteronephrosis. Question striated bilateral nephrogram. Right renal cyst again noted.  BLADDER: Mildly distended with air and fluid. Question trabeculated versus emphysematous bladder wall.  REPRODUCTIVE ORGANS: Partially visualized Dawson catheter balloon at the level of the penile urethra.    BOWEL: No bowel obstruction. Focal outpouching of air along the posterior aspect of the terminal ileum, which may represent a diverticulum or appendix. No secondary signs of appendicitis. Rectosigmoid colon wall thickening surrounding inflammatory change. Colon diverticulosis.  PERITONEUM: No drainable fluid collection or free air.  VESSELS: Atherosclerosis. Again noted, ectatic infrarenal aorta containing intramural plaque/thrombus.  RETROPERITONEUM: No lymphadenopathy.  ABDOMINAL WALL/SOFT TISSUES: Post surgical changes along the anterior pelvic wall soft tissue.  BONES: Degenerative changes of the spine. Stable minimal retrolisthesis of L1 on L2, L2 on L3, L3 on L4 and L4 on L5.    IMPRESSION:    Rectosigmoid colitis. No bowelobstruction.    Nonspecific mild bilateral perinephric stranding without hydroureteronephrosis. Question striated bilateral nephrogram. Recommend clinical correlation to assess pyelonephritis. Question emphysematous cystitis versus bladder wall trabeculation.    Malpositioned Dawson catheter at the level of the penile urethra. Recommend removal.    Dependent opacities in the left > right lower lobe, which may represent atelectasis. Recommend clinical correlation to assess aspiration.    Additionalfindings as described.          < end of copied text >  < from: CT Head No Cont (10.26.20 @ 21:56) >  EXAM:  CT BRAIN                            PROCEDURE DATE:  10/26/2020          INTERPRETATION:  CLINICAL INFORMATION:  AMS, prior SDH treated by surgery.    TECHNIQUE:  Axial CT images were acquired through the head.  Intravenous contrast: None  Two-dimensional reformats were generated.    COMPARISON STUDY: CT brain 10/9/2020    FINDINGS: The patient is status post right-sided frontoparietal craniotomy again demonstrated. Once again, there is a large holohemispheric right-sided subdural collection demonstrating variable attenuation, consistent with a subdural hematoma with acute, intermediate and chronic components. This collection measures 2.1 cm in thickness. This results in adjacent mass effect with effacement of the right lateral ventricle and right to left midline shift of approximately 6 mm. Chronic right corona radiata lacunar infarct again seen.    Imaged portions of the orbits, and left mastoid air cells are grossly unremarkable. Tiny retention cyst/polyp left ethmoid sinus. Mild opacification of the inferior right mastoid air cells seen. Calcific atherosclerosis of bilateral cavernous ICAs.    IMPRESSION:  1. Persistent, large right-sided subdural hematoma that demonstrates components of variable age, including acute/subacute components) and measures up to 2.1 cm in thickness. This results in right-sided mass effect with right to left midline shift of approximately 6 mm. When compared to prior CT brain of 10/9/2020, a similar sized right holohemispheric subdural hematoma was demonstrated with similar appearance of the ventricles, mass effect and right to left midline shift.      < end of copied text >    Advanced directives addressed: full resuscitation

## 2020-10-28 NOTE — CHART NOTE - NSCHARTNOTEFT_GEN_A_CORE
Pt seen and examined with house staff.  Plan formulated and reviewed on rounds    Briefly,  89 y/o male with DM and recent SDH underwent evacuation admitted with Toxic Metabolic Encephalopathy secondary to UTI sepsis related to indwelling dawson POA  + VTE in BCx  Awake and alert but not able to fully answer questions  ros Unobtainable due to clinical condition     NAD  Stable vitals.  No fevers    Grossly non focal     Labs reviewed  WBC 18    VRE UTI sepsis   Toxic Metabolic Encephalopathy   Severe Prot Rafa malnutrition     Cont with Pip/Anival (2)--Dapto (1)  Follow up Cxs S  Trend WBC and fever curve  Will need help with feeding  Chronic dawson  Cont with BPH meds  DVT prophy    Med surg

## 2020-10-29 LAB
-  AMPICILLIN: SIGNIFICANT CHANGE UP
-  GENTAMICIN SYNERGY: SIGNIFICANT CHANGE UP
-  LINEZOLID: SIGNIFICANT CHANGE UP
-  VANCOMYCIN: SIGNIFICANT CHANGE UP
ALBUMIN SERPL ELPH-MCNC: 1.9 G/DL — LOW (ref 3.3–5)
ALP SERPL-CCNC: 125 U/L — HIGH (ref 40–120)
ALT FLD-CCNC: 35 U/L — SIGNIFICANT CHANGE UP (ref 12–78)
ANION GAP SERPL CALC-SCNC: 4 MMOL/L — LOW (ref 5–17)
AST SERPL-CCNC: 34 U/L — SIGNIFICANT CHANGE UP (ref 15–37)
BILIRUB SERPL-MCNC: 0.7 MG/DL — SIGNIFICANT CHANGE UP (ref 0.2–1.2)
BUN SERPL-MCNC: 22 MG/DL — SIGNIFICANT CHANGE UP (ref 7–23)
CALCIUM SERPL-MCNC: 7.9 MG/DL — LOW (ref 8.5–10.1)
CHLORIDE SERPL-SCNC: 114 MMOL/L — HIGH (ref 96–108)
CK SERPL-CCNC: 23 U/L — LOW (ref 26–308)
CO2 SERPL-SCNC: 27 MMOL/L — SIGNIFICANT CHANGE UP (ref 22–31)
CREAT SERPL-MCNC: 0.74 MG/DL — SIGNIFICANT CHANGE UP (ref 0.5–1.3)
CULTURE RESULTS: SIGNIFICANT CHANGE UP
GLUCOSE SERPL-MCNC: 92 MG/DL — SIGNIFICANT CHANGE UP (ref 70–99)
HCT VFR BLD CALC: 24.7 % — LOW (ref 39–50)
HGB BLD-MCNC: 7.9 G/DL — LOW (ref 13–17)
MCHC RBC-ENTMCNC: 31.1 PG — SIGNIFICANT CHANGE UP (ref 27–34)
MCHC RBC-ENTMCNC: 32 GM/DL — SIGNIFICANT CHANGE UP (ref 32–36)
MCV RBC AUTO: 97.2 FL — SIGNIFICANT CHANGE UP (ref 80–100)
METHOD TYPE: SIGNIFICANT CHANGE UP
PLATELET # BLD AUTO: 280 K/UL — SIGNIFICANT CHANGE UP (ref 150–400)
POTASSIUM SERPL-MCNC: 3.5 MMOL/L — SIGNIFICANT CHANGE UP (ref 3.5–5.3)
POTASSIUM SERPL-SCNC: 3.5 MMOL/L — SIGNIFICANT CHANGE UP (ref 3.5–5.3)
PROT SERPL-MCNC: 5 GM/DL — LOW (ref 6–8.3)
RBC # BLD: 2.54 M/UL — LOW (ref 4.2–5.8)
RBC # FLD: 15.3 % — HIGH (ref 10.3–14.5)
SODIUM SERPL-SCNC: 145 MMOL/L — SIGNIFICANT CHANGE UP (ref 135–145)
SPECIMEN SOURCE: SIGNIFICANT CHANGE UP
WBC # BLD: 11.05 K/UL — HIGH (ref 3.8–10.5)
WBC # FLD AUTO: 11.05 K/UL — HIGH (ref 3.8–10.5)

## 2020-10-29 PROCEDURE — 99232 SBSQ HOSP IP/OBS MODERATE 35: CPT | Mod: GC

## 2020-10-29 PROCEDURE — 93306 TTE W/DOPPLER COMPLETE: CPT | Mod: 26

## 2020-10-29 RX ORDER — ASPIRIN/CALCIUM CARB/MAGNESIUM 324 MG
81 TABLET ORAL DAILY
Refills: 0 | Status: DISCONTINUED | OUTPATIENT
Start: 2020-10-29 | End: 2020-11-10

## 2020-10-29 RX ORDER — HEPARIN SODIUM 5000 [USP'U]/ML
5000 INJECTION INTRAVENOUS; SUBCUTANEOUS EVERY 12 HOURS
Refills: 0 | Status: DISCONTINUED | OUTPATIENT
Start: 2020-10-29 | End: 2020-11-10

## 2020-10-29 RX ADMIN — PIPERACILLIN AND TAZOBACTAM 25 GRAM(S): 4; .5 INJECTION, POWDER, LYOPHILIZED, FOR SOLUTION INTRAVENOUS at 21:14

## 2020-10-29 RX ADMIN — TAMSULOSIN HYDROCHLORIDE 0.4 MILLIGRAM(S): 0.4 CAPSULE ORAL at 21:14

## 2020-10-29 RX ADMIN — PIPERACILLIN AND TAZOBACTAM 25 GRAM(S): 4; .5 INJECTION, POWDER, LYOPHILIZED, FOR SOLUTION INTRAVENOUS at 15:09

## 2020-10-29 RX ADMIN — SIMVASTATIN 40 MILLIGRAM(S): 20 TABLET, FILM COATED ORAL at 21:14

## 2020-10-29 RX ADMIN — Medication 25 MILLIGRAM(S): at 09:13

## 2020-10-29 RX ADMIN — Medication 50 MILLIGRAM(S): at 09:13

## 2020-10-29 RX ADMIN — DAPTOMYCIN 112.8 MILLIGRAM(S): 500 INJECTION, POWDER, LYOPHILIZED, FOR SOLUTION INTRAVENOUS at 09:13

## 2020-10-29 RX ADMIN — Medication 2: at 11:37

## 2020-10-29 RX ADMIN — HEPARIN SODIUM 5000 UNIT(S): 5000 INJECTION INTRAVENOUS; SUBCUTANEOUS at 21:14

## 2020-10-29 RX ADMIN — QUETIAPINE FUMARATE 50 MILLIGRAM(S): 200 TABLET, FILM COATED ORAL at 21:14

## 2020-10-29 RX ADMIN — Medication 50 MILLIGRAM(S): at 21:14

## 2020-10-29 RX ADMIN — Medication 1 TABLET(S): at 09:14

## 2020-10-29 RX ADMIN — PIPERACILLIN AND TAZOBACTAM 25 GRAM(S): 4; .5 INJECTION, POWDER, LYOPHILIZED, FOR SOLUTION INTRAVENOUS at 05:52

## 2020-10-29 RX ADMIN — Medication 81 MILLIGRAM(S): at 16:45

## 2020-10-29 RX ADMIN — Medication 1 APPLICATION(S): at 21:15

## 2020-10-29 RX ADMIN — Medication 1 APPLICATION(S): at 09:17

## 2020-10-29 RX ADMIN — Medication 2: at 16:45

## 2020-10-29 RX ADMIN — Medication 25 MILLIGRAM(S): at 21:14

## 2020-10-29 RX ADMIN — FINASTERIDE 5 MILLIGRAM(S): 5 TABLET, FILM COATED ORAL at 09:13

## 2020-10-29 NOTE — PROGRESS NOTE ADULT - SUBJECTIVE AND OBJECTIVE BOX
Date of service: 10-29-20 @ 10:59    pt seen and examined  feels better  sitting up in bed eating breakfast  afebrile, no new complaints    ROS: no fever or chills; denies dizziness, no HA, no SOB or cough, no abdominal pain, no diarrhea or constipation;  no legs pain, no rashes      MEDICATIONS  (STANDING):  bethanechol 50 milliGRAM(s) Oral two times a day  DAPTOmycin IVPB 320 milliGRAM(s) IV Intermittent every 24 hours  dextrose 5%. 1000 milliLiter(s) (50 mL/Hr) IV Continuous <Continuous>  dextrose 50% Injectable 12.5 Gram(s) IV Push once  dextrose 50% Injectable 25 Gram(s) IV Push once  dextrose 50% Injectable 25 Gram(s) IV Push once  finasteride 5 milliGRAM(s) Oral daily  insulin lispro (ADMELOG) corrective regimen sliding scale   SubCutaneous three times a day before meals  insulin lispro (ADMELOG) corrective regimen sliding scale   SubCutaneous at bedtime  metoprolol tartrate 25 milliGRAM(s) Oral two times a day  multivitamin 1 Tablet(s) Oral daily  piperacillin/tazobactam IVPB.. 3.375 Gram(s) IV Intermittent every 8 hours  QUEtiapine 50 milliGRAM(s) Oral at bedtime  silver sulfADIAZINE 1% Cream 1 Application(s) Topical two times a day  simvastatin 40 milliGRAM(s) Oral at bedtime  tamsulosin Oral Tab/Cap - Peds 0.4 milliGRAM(s) Oral at bedtime      Vital Signs Last 24 Hrs  T(C): 37.1 (29 Oct 2020 08:46), Max: 37.2 (28 Oct 2020 22:47)  T(F): 98.7 (29 Oct 2020 08:46), Max: 99 (28 Oct 2020 22:47)  HR: 77 (29 Oct 2020 08:46) (70 - 77)  BP: 103/77 (29 Oct 2020 08:46) (103/77 - 116/52)  BP(mean): 69 (28 Oct 2020 13:00) (69 - 69)  RR: 18 (29 Oct 2020 08:46) (15 - 18)  SpO2: 98% (29 Oct 2020 08:46) (98% - 100%)          PE:  Constitutional: frail looking  HEENT: NC/AT, EOMI, PERRLA, conjunctivae clear; ears and nose atraumatic; pharynx benign  Neck: supple; thyroid not palpable  Back: no tenderness  Respiratory: respiratory effort normal; clear to auscultation  Cardiovascular: S1S2 regular, no murmurs  Abdomen: soft, not tender, not distended, positive BS; liver and spleen WNL  Genitourinary: no suprapubic tenderness dawson in place  Lymphatic: no LN palpable  Musculoskeletal: no muscle tenderness, no joint swelling or tenderness  Extremities: no pedal edema  Neurological/ Psychiatric:  moving all extremities  Skin: no rashes; no palpable lesions    Labs: all available labs reviewed                                                 )-----------( 280      ( 29 Oct 2020 08:04 )             24.7     10    145  |  114<H>  |  22  ----------------------------<  92  3.5   |  27  |  0.74    Ca    7.9<L>      29 Oct 2020 08:04    TPro  5.0<L>  /  Alb  1.9<L>  /  TBili  0.7  /  DBili  x   /  AST  34  /  ALT  35  /  AlkPhos  125<H>  10-29          LIVER FUNCTIONS - ( 27 Oct 2020 06:41 )  Alb: 1.8 g/dL / Pro: 4.8 gm/dL / ALK PHOS: 122 U/L / ALT: 32 U/L / AST: 33 U/L / GGT: x           Urinalysis Basic - ( 26 Oct 2020 20:52 )    Color: Red / Appearance: Slightly Turbid / S.010 / pH: x  Gluc: x / Ketone: Negative  / Bili: Negative / Urobili: Negative mg/dL   Blood: x / Protein: 100 mg/dL / Nitrite: Negative   Leuk Esterase: Small / RBC: >50 /HPF / WBC 10-15 /HPF   Sq Epi: x / Non Sq Epi: Occasional / Bacteria: Few    Culture - Urine (10.26.20 @ 20:52)   Specimen Source: .Urine None   Culture Results:   >100,000 CFU/ml Gram positive organisms Culture - Blood (10.26.20 @ 20:09)   Gram Stain:   Growth in anaerobic bottle: Gram positive cocci in pairs   Specimen Source: .Blood Blood-Peripheral   Culture Results:   Growth in anaerobic bottle: Gram positive cocci in pairs Culture - Blood (10.26.20 @ 20:09)   - Vancomycin resistant Enterococcus sp.: Detec   Gram Stain:   Growth in aerobic bottle: Gram positive cocci in pairs   Growth in anaerobic bottle: Gram positive cocci in pairs   Specimen Source: .Blood Blood-Peripheral   Organism: Blood Culture PCR   Culture Results:   Growth in aerobic bottle: Gram positive cocci in pairs   Growth in anaerobic bottle: Gram positive cocci in pairs   "Due to technical problems, Proteus sp. will Not be reported as part of   the BCID panel until further notice"   ***Blood Panel PCR results on this specimen are available   approximately 3 hours after the Gram stain result.***   Gram stain, PCR, and/or culture results may not always   correspond due to difference in methodologies.   ************************************************************   This PCR assay was performed using [employer].   The following targets are tested for: Enterococcus,   vancomycin resistant enterococci, Listeria monocytogenes,   coagulase negative staphylococci, S. aureus,   methicillin resistant S. aureus,Streptococcus agalactiae   (Group B), S. pneumoniae, S. pyogenes (Group A),   Acinetobacter baumannii, Enterobacter cloacae, E. coli,   Klebsiella oxytoca, K. pneumoniae, Proteus sp.,   Serratia marcescens, Haemophilus influenzae,   Neisseria meningitidis, Pseudomonas aeruginosa, Candida   albicans, C. glabrata, C krusei, C parapsilosis,   C. tropicalis and the KPC resistance gene.   Organism Identification: Blood Culture PCR   Method Type: PCR     Radiology: all available radiological tests reviewed      EXAM:  CT ABDOMEN AND PELVIS IC                            PROCEDURE DATE:  10/27/2020          INTERPRETATION:  CLINICAL INFORMATION: Metabolic encephalopathy secondary to likely UTI, gross hematuria, sepsis.    PROCEDURE:  Helical axial images were obtained from the domes of the diaphragm through the pubic symphysis without intravenous or oral contrast. Coronal and sagittal reformats were also obtained.    COMPARISON: 10/4/2020 and 8/3/2010.    FINDINGS: Evaluation of the abdominal/pelvic organs, viscera and vasculature is limited without intravenous contrast. Artifact from the patient's arms degrades images.    LOWER CHEST: Centrilobular emphysema. Suggest nonspecific, interlobular septal thickening. Dependent opacities in the left > right lower lobe. Subsegmental atelectasis. Aortic valve, coronary artery and mitral annular calcification.    LIVER: Fatty infiltration near the fossa ligament again noted.  GALLBLADDER/BILE DUCTS: No intrahepatic or extrahepatic biliary dilatation. Cholelithiasis.  PANCREAS: Unremarkable.  SPLEEN: Again noted, mild contour deformity and punctate calcifications.    ADRENALS: Unremarkable.  KIDNEYS/URETERS: Nonspecific mild bilateral perinephric stranding without hydroureteronephrosis. Question striated bilateral nephrogram. Right renal cyst again noted.  BLADDER: Mildly distended with air and fluid. Question trabeculated versus emphysematous bladder wall.  REPRODUCTIVE ORGANS: Partially visualized Dawson catheter balloon at the level of the penile urethra.    BOWEL: No bowel obstruction. Focal outpouching of air along the posterior aspect of the terminal ileum, which may represent a diverticulum or appendix. No secondary signs of appendicitis. Rectosigmoid colon wall thickening surrounding inflammatory change. Colon diverticulosis.  PERITONEUM: No drainable fluid collection or free air.  VESSELS: Atherosclerosis. Again noted, ectatic infrarenal aorta containing intramural plaque/thrombus.  RETROPERITONEUM: No lymphadenopathy.  ABDOMINAL WALL/SOFT TISSUES: Post surgical changes along the anterior pelvic wall soft tissue.  BONES: Degenerative changes of the spine. Stable minimal retrolisthesis of L1 on L2, L2 on L3, L3 on L4 and L4 on L5.    IMPRESSION:    Rectosigmoid colitis. No bowelobstruction.    Nonspecific mild bilateral perinephric stranding without hydroureteronephrosis. Question striated bilateral nephrogram. Recommend clinical correlation to assess pyelonephritis. Question emphysematous cystitis versus bladder wall trabeculation.    Malpositioned Dawson catheter at the level of the penile urethra. Recommend removal.    Dependent opacities in the left > right lower lobe, which may represent atelectasis. Recommend clinical correlation to assess aspiration.    Additionalfindings as described.          < end of copied text >  < from: CT Head No Cont (10.26.20 @ 21:56) >  EXAM:  CT BRAIN                            PROCEDURE DATE:  10/26/2020          INTERPRETATION:  CLINICAL INFORMATION:  AMS, prior SDH treated by surgery.    TECHNIQUE:  Axial CT images were acquired through the head.  Intravenous contrast: None  Two-dimensional reformats were generated.    COMPARISON STUDY: CT brain 10/9/2020    FINDINGS: The patient is status post right-sided frontoparietal craniotomy again demonstrated. Once again, there is a large holohemispheric right-sided subdural collection demonstrating variable attenuation, consistent with a subdural hematoma with acute, intermediate and chronic components. This collection measures 2.1 cm in thickness. This results in adjacent mass effect with effacement of the right lateral ventricle and right to left midline shift of approximately 6 mm. Chronic right corona radiata lacunar infarct again seen.    Imaged portions of the orbits, and left mastoid air cells are grossly unremarkable. Tiny retention cyst/polyp left ethmoid sinus. Mild opacification of the inferior right mastoid air cells seen. Calcific atherosclerosis of bilateral cavernous ICAs.    IMPRESSION:  1. Persistent, large right-sided subdural hematoma that demonstrates components of variable age, including acute/subacute components) and measures up to 2.1 cm in thickness. This results in right-sided mass effect with right to left midline shift of approximately 6 mm. When compared to prior CT brain of 10/9/2020, a similar sized right holohemispheric subdural hematoma was demonstrated with similar appearance of the ventricles, mass effect and right to left midline shift.      < end of copied text >    Advanced directives addressed: full resuscitation

## 2020-10-29 NOTE — PROGRESS NOTE ADULT - ASSESSMENT
89 y/o M PMHx BPH, CAD, DMII, GERD, HLD, SDH s/p craniotomy and surgical removal, urine retention with Dawson catheter, HTN presenting for [facility] with 1 day h/o hematuria. Dawson was replaced in the NH 10/25 after the pt pulled it out. 10/26 pt was noted to be febrile at the NH Tmax: 104, SBP 90, and hypoxic on RA Sp02 on RA. Also noted to be more confused and lethargic with gross hematuria. Pt not on AC due to recent SDH. Pt was admitted in early October (D/c 10 days ago) s/p fall with AMS and imaging + for SDH requiring craniotomy and evacuation also with aspiration PNA tx with IV abx. Dawson was placed for urinary retention/hematuria, UA with pyuria concerning for UTI. 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 Was given IV vancomycin/cefepime. dawson placed by urology.     1. sepsis with VRE, complicated cystitis. pyelonephritis. BPH/urinary retention. hematuria. aspiration pna. ? colitis. R SDH  - urine culture/blood cultures growing VRE   - leukocytosis improving  - repeat blood cx pending  - on zosyn #3, on daptomycin #2, f/u cpk weekly while on dapto  - continue with IV antibiotic coverage  - urology eval appreciated  - dawson care  - imaging reviewed  - aspiration precautions  - TTE  - tolerating abx well so far; no side effects noted  - reason for abx use and side effects reviewed with patient  - supportive care  - fu cbc    2. other issues - care per medicine

## 2020-10-29 NOTE — CHART NOTE - NSCHARTNOTEFT_GEN_A_CORE
Pt seen and examined with house staff.  Plan formulated and reviewed on rounds    Briefly,  89 y/o male with DM and recent SDH underwent evacuation admitted with Toxic Metabolic Encephalopathy secondary to UTI sepsis related to indwelling dawson POA  + VRE in BCx  Awake and alert  ROS o/w negative     NAD  Stable vitals.  No fevers    Grossly non focal     Labs reviewed  WBC 18    VRE UTI sepsis   Toxic Metabolic Encephalopathy   Severe Prot Rafa malnutrition     Cont with Pip/Anival (3)--Dapto (2)  Will need surveillance Bcx   Trend WBC and fever curve  Chronic dawson  Cont with BPH meds  DVT prophy--?? sqhep--will review with NSGY    Med surg

## 2020-10-29 NOTE — PROGRESS NOTE ADULT - ASSESSMENT
89 year old male patient with narrative as previously stated with mental status changes and hypotension      #Encephalopathy, complicated cystitis. pyelonephritis. BPH/urinary retention. hematuria. aspiration pna. ? colitis.   -Patient improved today  -Leukocytosis noted   -Blood Cx positive for gram positive cocci in pairs  - IVF hydration d/c'd  -s/p Whitmore replaced by urology   -ID and urology recommendations noted   -Daptomycin 320mg IV q24hr- will get daily CPK level  -Zosyn q8hr    #Leukocytosis  -on IV abx: Daptomycin and Zosyn  -trend cbc    #MÓNICA 2/2 dehydration  -resolved  -Cr nl, GFR wnl    #SDH s/p craniotomy  -Evaluated by Neurosurgery in ED  -No intervention by NSGY due to stable SDH on CTH; rec hold ASA  -Neuro checks q4h    #HTN  -continue metoprolol tartrate 25mg BID oral    #BPH  -cont finasteride  -cont tamsulosin    #CAD/HLD  -EKG reviewed, no acute changes noted  -NSGY ok with restarting ASA for the risk vs benefit of a future cardiac event  -will start ASA 81mg  -Cont simvastatin 40mg qhs    #DMII  -Low ISS  -A1C 5.7 (10/5)  -BGM qac qhs    #Stage II pressure ulcers  -L buttock and R sacrum on arrival to ED  -Wound consult  -Silvadene cream    #Dementia with features of agitation  Seroquel 50mg qhs    #DVT PPX  -SCDs  -Hold oral anticoagulation due to active bleeding    #Advance Directives  -Spoke with son, Caleb Finn who is next of kin and making medical decisions for pt as he has no capacity at this time  -DNI- MOSLT in chart    89 year old male patient with narrative as previously stated with mental status changes and hypotension, now AAOx3 with positive blood cultures for VRE, currently being treated with IV Daptomycin and Zosyn      #Encephalopathy, likely due to cysitis from dawson 2/2 VRE  -Patient improved today  -Leukocytosis noted   -Blood Cx positive for gram positive cocci in pairs, VRE (+) which is resistant to Vancomycin  -s/p Dawson replaced by urology   -ID and urology recommendations noted   -Daptomycin 320mg IV q24hr- will get daily CPK level  -Zosyn 3.375mg q8hr  -on contact precautions    #Leukocytosis  - WBC count trending down now 11.05 from 19.52  -on IV abx: Daptomycin 320mg IV q24h and Zosyn 3.375g q8hr  -trend cbc  -Daily CPK to avoid averse effects of daptomycin    #SDH s/p craniotomy  -Evaluated by Neurosurgery in ED  -No intervention by NSGY due to stable SDH on CTH;   -NSGY recs appreciated, can continue ASA and Sub q heparin for dvt prophylaxis at this time    #HTN  -continue metoprolol tartrate 25mg BID oral    #BPH/Urinary retention  -on finasteride 5mg oral qd  -on tamsulosin 0.4mg oral qhs  -on Bethanechol 50mg BID    #CAD/HLD  -EKG reviewed, no acute changes noted  -NSGY ok with restarting ASA for the risk vs benefit of a future cardiac event  -will start ASA 81mg  -Cont simvastatin 40mg qhs    #DMII  -Low ISS-pre-meals  -Bedtime ISS  -A1C 5.7 (10/5)  -BGM qac qhs    #Stage II pressure ulcers  -L buttock and R sacrum on arrival to ED  -Wound consult  -Silvadene cream    #Dementia with features of agitation  Seroquel 50mg qhs    #DVT PPX  -SCDs  -NSGY recs appreciated, able to restart sub q heparin for dvt prophylaxis as discussed with NSGY PA    #Advance Directives  -Spoke with son, Caleb Finn who is next of kin and making medical decisions for pt as he has no capacity at this time  -DNI- MOSLT in chart- needs physician signature     #Dispo  Continued inpatient for IV abx treatement of VRE infection 89 year old male patient with narrative as previously stated with mental status changes and hypotension, now AAOx3 with positive blood cultures for VRE, currently being treated with IV Daptomycin and Zosyn      #Encephalopathy, likely due to cysitis from dawson 2/2 VRE  -Patient improved today  -Leukocytosis noted   -Blood Cx positive for gram positive cocci in pairs, VRE (+) which is resistant to Vancomycin  -s/p Dawson replaced by urology   -ID and urology recommendations noted   -Daptomycin 320mg IV q24hr- will get daily CPK level  -Zosyn 3.375mg q8hr  -on contact precautions    #Leukocytosis  - WBC count trending down now 11.05 from 19.52  -on IV abx: Daptomycin 320mg IV q24h and Zosyn 3.375g q8hr  -trend cbc  -Daily CPK to avoid averse effects of daptomycin    #SDH s/p craniotomy  -Evaluated by Neurosurgery in ED  -No intervention by NSGY due to stable SDH on CTH;   -NSGY recs appreciated, can continue ASA and Sub q heparin for dvt prophylaxis at this time    #HTN  -continue metoprolol tartrate 25mg BID oral    #BPH/Urinary retention  -on finasteride 5mg oral qd  -on tamsulosin 0.4mg oral qhs  -on Bethanechol 50mg BID    #CAD/HLD  -EKG reviewed, no acute changes noted  -NSGY ok with restarting ASA for the risk vs benefit of a future cardiac event  -will start ASA 81mg  -Cont simvastatin 40mg qhs    #DMII  -Low ISS-pre-meals  -Bedtime ISS  -A1C 5.7 (10/5)  -BGM qac qhs    #Stage II pressure ulcers  -L buttock and R sacrum on arrival to ED  -Wound consult  -Silvadene cream    #Dementia with features of agitation  Seroquel 50mg qhs    #DVT PPX  -SCDs  -NSGY recs appreciated, able to restart sub q heparin for dvt prophylaxis as discussed with NSGY PA  -heparin sub q 5,000 units q12    #Advance Directives  -Spoke with son, Caleb Finn who is next of kin and making medical decisions for pt as he has no capacity at this time  -DNI- MOSLT in chart- needs physician signature     #Dispo  Continued inpatient for IV abx treatement of VRE infection

## 2020-10-29 NOTE — PROGRESS NOTE ADULT - SUBJECTIVE AND OBJECTIVE BOX
HPI: 89 y/o M PMHx BPH, CAD, DMII, GERD, HLD, SDH s/p craniotomy and surgical removal, urine rentention with Whitmore catheter, HTN presenting for [facility] with 1 day h/o hematuria. Whitmore was replaced in the NH last night after the pt pulled it out. Today, pt was noted to be febrile at the NH Tmax: 104, SBP 90, and hypoxic on RA Sp02 on RA. Also noted to be more confused and lethargic with gross hematuria. Pt not on AC due to recent SDH.  Pt was admitted in early October (D/c 10 days ago) s/p fall with AMS and imaging + for SDH requiring craniotomy and evacuation also with aspiration PNA tx with IV abx. Whitmore was placed for urinary retention.   Spoke with pt's son Caleb whiteside ESL  this evening. Updated him on pt's status; as the next of kin, he consented to CT w/ contrast imaging. MOLST was reviewed and received verbal consent; Son brought in paperwork and MOLST filled out, awaiting physician signature    S: Patient denies any complaints overnight. Would like to watch TV. AAOx3. Patient seen and examined    O:   REVIEW OF SYSTEMS:  CONSTITUTIONAL: No weakness, fevers or chills  EYES/ENT: No visual changes;  No vertigo or throat pain   NECK: No pain or stiffness  RESPIRATORY: No cough, wheezing, hemoptysis; No shortness of breath  CARDIOVASCULAR: No chest pain or palpitations  GASTROINTESTINAL: No abdominal or epigastric pain. No nausea, vomiting, or hematemesis; No diarrhea or constipation. No melena or hematochezia.  GENITOURINARY: No dysuria, frequency or hematuria, denies penile pain  NEUROLOGICAL: No numbness or weakness  SKIN: No itching, burning, rashes, or lesions   All other review of systems is negative unless indicated above    PHYSICAL EXAM:  Vital Signs Last 24 Hrs  T(C): 37.1 (29 Oct 2020 08:46), Max: 37.2 (28 Oct 2020 22:47)  T(F): 98.7 (29 Oct 2020 08:46), Max: 99 (28 Oct 2020 22:47)  HR: 77 (29 Oct 2020 08:46) (72 - 77)  BP: 103/77 (29 Oct 2020 08:46) (103/77 - 104/76)  BP(mean): --  RR: 18 (29 Oct 2020 08:46) (17 - 18)  SpO2: 98% (29 Oct 2020 08:46) (98% - 99%)    Constitutional: Pt lying in bed, awake and alert, NAD  HEENT: EOMI, normal hearing, moist mucous membranes  Neck: Soft and supple, no JVD  Respiratory: CTABL, No wheezing, rales or rhonchi  Cardiovascular: S1S2+, RRR, no M/G/R  Gastrointestinal: BS+, soft, NT/ND, no guarding, no rebound  : Whitmore in place, no hematuria noted  Extremities: No peripheral edema  Vascular: 2+ peripheral pulses  Neurological: AAOx3, no focal deficits  Musculoskeletal: 5/5 strength b/l upper and lower extremities  Skin: No rashes    MEDICATIONS:  MEDICATIONS  (STANDING):  bethanechol 50 milliGRAM(s) Oral two times a day  DAPTOmycin IVPB 320 milliGRAM(s) IV Intermittent every 24 hours  dextrose 5%. 1000 milliLiter(s) (50 mL/Hr) IV Continuous <Continuous>  dextrose 50% Injectable 12.5 Gram(s) IV Push once  dextrose 50% Injectable 25 Gram(s) IV Push once  dextrose 50% Injectable 25 Gram(s) IV Push once  finasteride 5 milliGRAM(s) Oral daily  insulin lispro (ADMELOG) corrective regimen sliding scale   SubCutaneous three times a day before meals  insulin lispro (ADMELOG) corrective regimen sliding scale   SubCutaneous at bedtime  metoprolol tartrate 25 milliGRAM(s) Oral two times a day  multivitamin 1 Tablet(s) Oral daily  piperacillin/tazobactam IVPB.. 3.375 Gram(s) IV Intermittent every 8 hours  QUEtiapine 50 milliGRAM(s) Oral at bedtime  silver sulfADIAZINE 1% Cream 1 Application(s) Topical two times a day  simvastatin 40 milliGRAM(s) Oral at bedtime  tamsulosin Oral Tab/Cap - Peds 0.4 milliGRAM(s) Oral at bedtime    MEDICATIONS  (PRN):  acetaminophen  Suppository .. 650 milliGRAM(s) Rectal every 6 hours PRN Temp greater or equal to 38C (100.4F)  dextrose 40% Gel 15 Gram(s) Oral once PRN Blood Glucose LESS THAN 70 milliGRAM(s)/deciliter  glucagon  Injectable 1 milliGRAM(s) IntraMuscular once PRN Glucose LESS THAN 70 milligrams/deciliter    LABS: All Labs Reviewed:                        7.9    11.05 )-----------( 280      ( 29 Oct 2020 08:04 )             24.7     10-29    145  |  114<H>  |  22  ----------------------------<  92  3.5   |  27  |  0.74    Ca    7.9<L>      29 Oct 2020 08:04    TPro  5.0<L>  /  Alb  1.9<L>  /  TBili  0.7  /  DBili  x   /  AST  34  /  ALT  35  /  AlkPhos  125<H>  10-29      CARDIAC MARKERS ( 29 Oct 2020 08:04 )  x     / x     / 23 U/L / x     / x      CARDIAC MARKERS ( 28 Oct 2020 07:00 )  x     / x     / 39 U/L / x     / x          Blood Culture: 10-26 @ 20:52  Organism Enterococcus faecium (vancomycin resistant)  Gram Stain Blood -- Gram Stain --  Specimen Source .Urine None  Culture-Blood --    10-26 @ 20:09  Organism Blood Culture PCR  Gram Stain Blood -- Gram Stain   Growth in aerobic bottle: Gram positive cocci in pairs  Growth in anaerobic bottle: Gram positive cocci in pairs  Specimen Source .Blood Blood-Peripheral  Culture-Blood --      I&O's Summary    28 Oct 2020 07:01  -  29 Oct 2020 07:00  --------------------------------------------------------  IN: 0 mL / OUT: 1500 mL / NET: -1500 mL      CAPILLARY BLOOD GLUCOSE      POCT Blood Glucose.: 214 mg/dL (29 Oct 2020 11:36)  POCT Blood Glucose.: 96 mg/dL (29 Oct 2020 08:52)  POCT Blood Glucose.: 101 mg/dL (28 Oct 2020 21:01)  POCT Blood Glucose.: 205 mg/dL (28 Oct 2020 17:15)      RADIOLOGY/EKG:    < from: 12 Lead ECG (10.26.20 @ 20:02) >  Diagnosis Line Sinus rhythm with Premature supraventricular complexes  Right bundle branch block  Cannot rule out Inferior infarct , age undetermined  Cannot rule out Anterior infarct (cited on or before 26-DEC-2019)  Abnormal ECG    < from: CT Head No Cont (10.26.20 @ 21:56) >  MPRESSION:  1. Persistent, large right-sided subdural hematoma that demonstrates components of variable age, including acute/subacute components) and measures up to 2.1 cm in thickness. This results in right-sided mass effect with right to left midline shift of approximately 6 mm. When compared to prior CT brain of 10/9/2020, a similar sized right holohemispheric subdural hematoma was demonstrated with similar appearance of the ventricles, mass effect and right to left midline shift.    < from: CT Abdomen and Pelvis w/ IV Cont (10.27.20 @ 01:42) >  EXAM:  CT ABDOMEN AND PELVIS IC                            PROCEDURE DATE:  10/27/2020          INTERPRETATION:  CLINICAL INFORMATION: Metabolic encephalopathy secondary to likely UTI, gross hematuria, sepsis.    PROCEDURE:  Helical axial images were obtained from the domes of the diaphragm through the pubic symphysis without intravenous or oral contrast. Coronal and sagittal reformats were also obtained.    COMPARISON: 10/4/2020 and 8/3/2010.    FINDINGS: Evaluation of the abdominal/pelvic organs, viscera and vasculature is limited without intravenous contrast. Artifact from the patient's arms degrades images.    LOWER CHEST: Centrilobular emphysema. Suggest nonspecific, interlobular septal thickening. Dependent opacities in the left > right lower lobe. Subsegmental atelectasis. Aortic valve, coronary artery and mitral annular calcification.    LIVER: Fatty infiltration near the fossa ligament again noted.  GALLBLADDER/BILE DUCTS: No intrahepatic or extrahepatic biliary dilatation. Cholelithiasis.  PANCREAS: Unremarkable.  SPLEEN: Again noted, mild contour deformity and punctate calcifications.    ADRENALS: Unremarkable.  KIDNEYS/URETERS: Nonspecific mild bilateral perinephric stranding without hydroureteronephrosis. Question striated bilateral nephrogram. Right renal cyst again noted.  BLADDER: Mildly distended with air and fluid. Question trabeculated versus emphysematous bladder wall.  REPRODUCTIVE ORGANS: Partially visualized Whitmore catheter balloon at the level of the penile urethra.    BOWEL: No bowel obstruction. Focal outpouching of air along the posterior aspect of the terminal ileum, which may represent a diverticulum or appendix. No secondary signs of appendicitis. Rectosigmoid colon wall thickening surrounding inflammatory change. Colon diverticulosis.  PERITONEUM: No drainable fluid collection or free air.  VESSELS: Atherosclerosis. Again noted, ectatic infrarenal aorta containing intramural plaque/thrombus.  RETROPERITONEUM: No lymphadenopathy.  ABDOMINAL WALL/SOFT TISSUES: Post surgical changes along the anterior pelvic wall soft tissue.  BONES: Degenerative changes of the spine. Stable minimal retrolisthesis of L1 on L2, L2 on L3, L3 on L4 and L4 on L5.    IMPRESSION:    Rectosigmoid colitis. No bowel obstruction.    Nonspecific mild bilateral perinephric stranding without hydroureteronephrosis. Question striated bilateral nephrogram. Recommend clinical correlation to assess pyelonephritis. Question emphysematous cystitis versus bladder wall trabeculation.    Malpositioned Whitmore catheter at the level of the penile urethra. Recommend removal.    Dependent opacities in the left > right lower lobe, which may represent atelectasis. Recommend clinical correlation to assess aspiration.    Additional findings as described.

## 2020-10-29 NOTE — PROVIDER CONTACT NOTE (CRITICAL VALUE NOTIFICATION) - TEST AND RESULT REPORTED:
bld cx: prelim growth aerobic and anerobic bottle enterococcus faecium vancomycin resistant. Urine Cx: greather than 100,000 cfu enterococcus faecium vancomycin resistant.

## 2020-10-30 LAB
-  DAPTOMYCIN: SIGNIFICANT CHANGE UP
ALBUMIN SERPL ELPH-MCNC: 1.9 G/DL — LOW (ref 3.3–5)
ALP SERPL-CCNC: 131 U/L — HIGH (ref 40–120)
ALT FLD-CCNC: 31 U/L — SIGNIFICANT CHANGE UP (ref 12–78)
ANION GAP SERPL CALC-SCNC: 6 MMOL/L — SIGNIFICANT CHANGE UP (ref 5–17)
AST SERPL-CCNC: 27 U/L — SIGNIFICANT CHANGE UP (ref 15–37)
BILIRUB SERPL-MCNC: 0.5 MG/DL — SIGNIFICANT CHANGE UP (ref 0.2–1.2)
BUN SERPL-MCNC: 27 MG/DL — HIGH (ref 7–23)
CALCIUM SERPL-MCNC: 8 MG/DL — LOW (ref 8.5–10.1)
CHLORIDE SERPL-SCNC: 111 MMOL/L — HIGH (ref 96–108)
CK SERPL-CCNC: 22 U/L — LOW (ref 26–308)
CO2 SERPL-SCNC: 26 MMOL/L — SIGNIFICANT CHANGE UP (ref 22–31)
CREAT SERPL-MCNC: 0.8 MG/DL — SIGNIFICANT CHANGE UP (ref 0.5–1.3)
CULTURE RESULTS: SIGNIFICANT CHANGE UP
GLUCOSE SERPL-MCNC: 126 MG/DL — HIGH (ref 70–99)
HCT VFR BLD CALC: 26.4 % — LOW (ref 39–50)
HGB BLD-MCNC: 8.2 G/DL — LOW (ref 13–17)
MCHC RBC-ENTMCNC: 30.8 PG — SIGNIFICANT CHANGE UP (ref 27–34)
MCHC RBC-ENTMCNC: 31.1 GM/DL — LOW (ref 32–36)
MCV RBC AUTO: 99.2 FL — SIGNIFICANT CHANGE UP (ref 80–100)
METHOD TYPE: SIGNIFICANT CHANGE UP
ORGANISM # SPEC MICROSCOPIC CNT: SIGNIFICANT CHANGE UP
PLATELET # BLD AUTO: 266 K/UL — SIGNIFICANT CHANGE UP (ref 150–400)
POTASSIUM SERPL-MCNC: 4 MMOL/L — SIGNIFICANT CHANGE UP (ref 3.5–5.3)
POTASSIUM SERPL-SCNC: 4 MMOL/L — SIGNIFICANT CHANGE UP (ref 3.5–5.3)
PROT SERPL-MCNC: 5.1 GM/DL — LOW (ref 6–8.3)
RBC # BLD: 2.66 M/UL — LOW (ref 4.2–5.8)
RBC # FLD: 15.1 % — HIGH (ref 10.3–14.5)
SODIUM SERPL-SCNC: 143 MMOL/L — SIGNIFICANT CHANGE UP (ref 135–145)
SPECIMEN SOURCE: SIGNIFICANT CHANGE UP
WBC # BLD: 10.2 K/UL — SIGNIFICANT CHANGE UP (ref 3.8–10.5)
WBC # FLD AUTO: 10.2 K/UL — SIGNIFICANT CHANGE UP (ref 3.8–10.5)

## 2020-10-30 PROCEDURE — 99232 SBSQ HOSP IP/OBS MODERATE 35: CPT | Mod: GC

## 2020-10-30 RX ORDER — DAPTOMYCIN 500 MG/10ML
440 INJECTION, POWDER, LYOPHILIZED, FOR SOLUTION INTRAVENOUS EVERY 24 HOURS
Refills: 0 | Status: COMPLETED | OUTPATIENT
Start: 2020-10-31 | End: 2020-11-06

## 2020-10-30 RX ADMIN — PIPERACILLIN AND TAZOBACTAM 25 GRAM(S): 4; .5 INJECTION, POWDER, LYOPHILIZED, FOR SOLUTION INTRAVENOUS at 05:53

## 2020-10-30 RX ADMIN — Medication 1 TABLET(S): at 09:44

## 2020-10-30 RX ADMIN — HEPARIN SODIUM 5000 UNIT(S): 5000 INJECTION INTRAVENOUS; SUBCUTANEOUS at 22:02

## 2020-10-30 RX ADMIN — DAPTOMYCIN 112.8 MILLIGRAM(S): 500 INJECTION, POWDER, LYOPHILIZED, FOR SOLUTION INTRAVENOUS at 11:18

## 2020-10-30 RX ADMIN — SIMVASTATIN 40 MILLIGRAM(S): 20 TABLET, FILM COATED ORAL at 22:02

## 2020-10-30 RX ADMIN — TAMSULOSIN HYDROCHLORIDE 0.4 MILLIGRAM(S): 0.4 CAPSULE ORAL at 22:02

## 2020-10-30 RX ADMIN — Medication 25 MILLIGRAM(S): at 09:45

## 2020-10-30 RX ADMIN — PIPERACILLIN AND TAZOBACTAM 25 GRAM(S): 4; .5 INJECTION, POWDER, LYOPHILIZED, FOR SOLUTION INTRAVENOUS at 22:03

## 2020-10-30 RX ADMIN — Medication 50 MILLIGRAM(S): at 09:45

## 2020-10-30 RX ADMIN — PIPERACILLIN AND TAZOBACTAM 25 GRAM(S): 4; .5 INJECTION, POWDER, LYOPHILIZED, FOR SOLUTION INTRAVENOUS at 13:30

## 2020-10-30 RX ADMIN — HEPARIN SODIUM 5000 UNIT(S): 5000 INJECTION INTRAVENOUS; SUBCUTANEOUS at 09:44

## 2020-10-30 RX ADMIN — Medication 1 APPLICATION(S): at 11:22

## 2020-10-30 RX ADMIN — Medication 50 MILLIGRAM(S): at 22:02

## 2020-10-30 RX ADMIN — Medication 1: at 11:50

## 2020-10-30 RX ADMIN — QUETIAPINE FUMARATE 50 MILLIGRAM(S): 200 TABLET, FILM COATED ORAL at 22:02

## 2020-10-30 RX ADMIN — FINASTERIDE 5 MILLIGRAM(S): 5 TABLET, FILM COATED ORAL at 09:45

## 2020-10-30 RX ADMIN — Medication 25 MILLIGRAM(S): at 22:02

## 2020-10-30 RX ADMIN — Medication 81 MILLIGRAM(S): at 09:46

## 2020-10-30 RX ADMIN — Medication 1 APPLICATION(S): at 22:02

## 2020-10-30 NOTE — CHART NOTE - NSCHARTNOTEFT_GEN_A_CORE
Pt seen and examined with house staff.  Plan formulated and reviewed on rounds    Briefly,  89 y/o male with DM and recent SDH underwent evacuation admitted with Toxic Metabolic Encephalopathy secondary to UTI sepsis related to indwelling dawson POA  + VRE in BCx  Awake and alert  ROS o/w negative   Eating    NAD  Stable vitals.  No fevers    Grossly non focal     Labs reviewed  WBC 10    VRE UTI sepsis   Toxic Metabolic Encephalopathy   Severe Prot Rafa malnutrition     Pip/Angi (4)--Cont Dapto (2).  Consider stopping Pip/angi  Will need surveillance Bcx   Trend WBC and fever curve  Chronic dawson  Cont with BPH meds  DVT prophy--sqhep  Cont with ASA     Med surg

## 2020-10-30 NOTE — PROGRESS NOTE ADULT - ASSESSMENT
88 y/o M PMHx BPH, CAD, DMII, GERD, HLD, SDH s/p craniotomy and surgical removal, urine retention with Dawson catheter, HTN presenting for [facility] with 1 day h/o hematuria. Dawson was replaced in the NH 10/25 after the pt pulled it out. 10/26 pt was noted to be febrile at the NH Tmax: 104, SBP 90, and hypoxic on RA Sp02 on RA. Also noted to be more confused and lethargic with gross hematuria. Pt not on AC due to recent SDH. Pt was admitted in early October (D/c 10 days ago) s/p fall with AMS and imaging + for SDH requiring craniotomy and evacuation also with aspiration PNA tx with IV abx. Dawson was placed for urinary retention/hematuria, UA with pyuria concerning for UTI. 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 Was given IV vancomycin/cefepime. dawson placed by urology.     1. sepsis with VRE, complicated cystitis. pyelonephritis. BPH/urinary retention. hematuria. aspiration pna. ? colitis. R SDH  - urine culture/blood cultures growing VRE   - leukocytosis resolved, improving  - repeat blood cx pending  - on zosyn #4, on daptomycin #3, f/u cpk weekly while on dapto  - continue with IV antibiotic coverage  - urology eval appreciated  - dawson care  - imaging reviewed  - aspiration precautions  - TTE no obvious vegetations  - tolerating abx well so far; no side effects noted  - reason for abx use and side effects reviewed with patient  - supportive care  - fu cbc    2. other issues - care per medicine

## 2020-10-30 NOTE — PROGRESS NOTE ADULT - ASSESSMENT
89 year old male patient with narrative as previously stated with mental status changes and hypotension, now AAOx3 with positive blood cultures for VRE, currently being treated with IV Daptomycin and Zosyn      #Encephalopathy, likely due to cysitis from dawson 2/2 VRE  -Patient improved today  -Leukocytosis noted   -Blood Cx positive for gram positive cocci in pairs, VRE (+) which is resistant to Vancomycin  -s/p Dawson replaced by urology   -ID and urology recommendations noted   -Daptomycin 320mg IV q24hr (Day#3)- will get daily CPK level  -on contact precautions    #Leukocytosis  - WBC count trending down now 11.05 from 19.52  -on IV abx: Daptomycin 320mg IV q24h (Day#3) and Zosyn 3.375g q8hr (#4)  -trend cbc  -Daily CPK to avoid averse effects of daptomycin    #Aspiration PNA?  -No current issues  -currently on Zosyn treatment for centrilobular emphysema found on CT Chest with concern of aspiration pna  -Zosyn 3.375mg q8hr currently on Day 4, plan for D/C Zosyn on Day 5 as per ID    #SDH s/p craniotomy  -Evaluated by Neurosurgery in ED  -No intervention by NSGY due to stable SDH on CTH;   -NSGY recs appreciated, can continue ASA and Sub q heparin for dvt prophylaxis at this time    #HTN  -continue metoprolol tartrate 25mg BID oral    #BPH/Urinary retention  -on finasteride 5mg oral qd  -on tamsulosin 0.4mg oral qhs  -on Bethanechol 50mg BID    #CAD/HLD  -EKG reviewed, no acute changes noted  -NSGY ok with restarting ASA for the risk vs benefit of a future cardiac event  -will start ASA 81mg  -Cont simvastatin 40mg qhs    #DMII  -Low ISS-pre-meals  -Bedtime ISS  -A1C 5.7 (10/5)  -BGM qac qhs    #Stage II pressure ulcers  -L buttock and R sacrum on arrival to ED  -Wound consult  -Silvadene cream    #Dementia with features of agitation  Seroquel 50mg qhs    #DVT PPX  -SCDs  -NSGY recs appreciated, able to restart sub q heparin for dvt prophylaxis as discussed with NSGY PA  -heparin sub q 5,000 units q12    #Advance Directives  -Spoke with son, Caleb Finn who is next of kin and making medical decisions for pt as he has no capacity at this time  -DNI- MOLST in chart- complete and signed as according to Living Will    #Dispo  Continued inpatient for IV abx treatement of VRE infection, PT consult

## 2020-10-30 NOTE — PROGRESS NOTE ADULT - SUBJECTIVE AND OBJECTIVE BOX
HPI: 88 y/o M PMHx BPH, CAD, DMII, GERD, HLD, SDH s/p craniotomy and surgical removal, urine rentention with Whitmore catheter, HTN presenting for [facility] with 1 day h/o hematuria. Whitmore was replaced in the NH last night after the pt pulled it out. Today, pt was noted to be febrile at the NH Tmax: 104, SBP 90, and hypoxic on RA Sp02 on RA. Also noted to be more confused and lethargic with gross hematuria. Pt not on AC due to recent SDH.Pt was admitted in early October (D/c 10 days ago) s/p fall with AMS and imaging + for SDH requiring craniotomy and evacuation also with aspiration PNA tx with IV abx. Whitmore was placed for urinary retention. Spoke with pt's son Caleb whiteside ESL  this evening. Updated him on pt's status; as the next of kin, he consented to CT w/ contrast imaging. MOLST was reviewed and received verbal consent; Son brought in paperwork and MOLST filled out, now DNI.     S: Patient reports no acute events overnight.     O:   REVIEW OF SYSTEMS:  CONSTITUTIONAL: No weakness, fevers or chills  EYES/ENT: No visual changes;  No vertigo or throat pain   NECK: No pain or stiffness  RESPIRATORY: No cough, wheezing, hemoptysis; No shortness of breath  CARDIOVASCULAR: No chest pain or palpitations  GASTROINTESTINAL: No abdominal or epigastric pain. No nausea, vomiting, or hematemesis; No diarrhea or constipation. No melena or hematochezia.  GENITOURINARY: No dysuria, frequency or hematuria, denies penile pain  NEUROLOGICAL: No numbness or weakness  SKIN: No itching, burning, rashes, or lesions   All other review of systems is negative unless indicated above    PHYSICAL EXAM:  Vital Signs Last 24 Hrs  T(C): 36.6 (30 Oct 2020 18:09), Max: 37.3 (29 Oct 2020 23:46)  T(F): 97.8 (30 Oct 2020 18:09), Max: 99.1 (29 Oct 2020 23:46)  HR: 60 (30 Oct 2020 15:38) (60 - 69)  BP: 135/69 (30 Oct 2020 15:38) (113/63 - 141/71)  BP(mean): --  RR: 18 (30 Oct 2020 15:38) (18 - 19)  SpO2: 97% (30 Oct 2020 15:38) (97% - 97%)    Constitutional: Pt lying in bed, awake and alert, NAD  HEENT: EOMI, normal hearing, moist mucous membranes  Neck: Soft and supple, no JVD  Respiratory: CTABL, No wheezing, rales or rhonchi  Cardiovascular: S1S2+, RRR, no M/G/R  Gastrointestinal: BS+, soft, NT/ND, no guarding, no rebound  : Whitmore in place, no hematuria noted  Extremities: No peripheral edema  Vascular: 2+ peripheral pulses  Neurological: AAOx3, no focal deficits  Musculoskeletal: 5/5 strength b/l upper and lower extremities  Skin: No rashes    MEDICATIONS:  MEDICATIONS  (STANDING):  aspirin  chewable 81 milliGRAM(s) Oral daily  bethanechol 50 milliGRAM(s) Oral two times a day  dextrose 5%. 1000 milliLiter(s) (50 mL/Hr) IV Continuous <Continuous>  dextrose 50% Injectable 12.5 Gram(s) IV Push once  dextrose 50% Injectable 25 Gram(s) IV Push once  dextrose 50% Injectable 25 Gram(s) IV Push once  finasteride 5 milliGRAM(s) Oral daily  heparin   Injectable 5000 Unit(s) SubCutaneous every 12 hours  insulin lispro (ADMELOG) corrective regimen sliding scale   SubCutaneous three times a day before meals  insulin lispro (ADMELOG) corrective regimen sliding scale   SubCutaneous at bedtime  metoprolol tartrate 25 milliGRAM(s) Oral two times a day  multivitamin 1 Tablet(s) Oral daily  piperacillin/tazobactam IVPB.. 3.375 Gram(s) IV Intermittent every 8 hours  QUEtiapine 50 milliGRAM(s) Oral at bedtime  silver sulfADIAZINE 1% Cream 1 Application(s) Topical two times a day  simvastatin 40 milliGRAM(s) Oral at bedtime  tamsulosin Oral Tab/Cap - Peds 0.4 milliGRAM(s) Oral at bedtime    MEDICATIONS  (PRN):  acetaminophen  Suppository .. 650 milliGRAM(s) Rectal every 6 hours PRN Temp greater or equal to 38C (100.4F)  dextrose 40% Gel 15 Gram(s) Oral once PRN Blood Glucose LESS THAN 70 milliGRAM(s)/deciliter  glucagon  Injectable 1 milliGRAM(s) IntraMuscular once PRN Glucose LESS THAN 70 milligrams/deciliter      LABS: All Labs Reviewed:                        8.2    10.20 )-----------( 266      ( 30 Oct 2020 07:03 )             26.4     10-30    143  |  111<H>  |  27<H>  ----------------------------<  126<H>  4.0   |  26  |  0.80    Ca    8.0<L>      30 Oct 2020 07:03    TPro  5.1<L>  /  Alb  1.9<L>  /  TBili  0.5  /  DBili  x   /  AST  27  /  ALT  31  /  AlkPhos  131<H>  10-30      CARDIAC MARKERS ( 30 Oct 2020 07:03 )  x     / x     / 22 U/L / x     / x      CARDIAC MARKERS ( 29 Oct 2020 08:04 )  x     / x     / 23 U/L / x     / x          Blood Culture: 10-29 @ 08:08  Organism --  Gram Stain Blood -- Gram Stain --  Specimen Source .Blood None  Culture-Blood --    10-29 @ 08:04  Organism --  Gram Stain Blood -- Gram Stain --  Specimen Source .Blood None  Culture-Blood --    10-26 @ 20:52  Organism Enterococcus faecium (vancomycin resistant)  Gram Stain Blood -- Gram Stain --  Specimen Source .Urine None  Culture-Blood --    10-26 @ 20:09  Organism Blood Culture PCR  Gram Stain Blood -- Gram Stain   Growth in aerobic bottle: Gram positive cocci in pairs  Growth in anaerobic bottle: Gram positive cocci in pairs  Specimen Source .Blood Blood-Peripheral  Culture-Blood --      I&O's Summary    29 Oct 2020 07:01  -  30 Oct 2020 07:00  --------------------------------------------------------  IN: 0 mL / OUT: 700 mL / NET: -700 mL    30 Oct 2020 07:01  -  30 Oct 2020 20:09  --------------------------------------------------------  IN: 0 mL / OUT: 700 mL / NET: -700 mL      CAPILLARY BLOOD GLUCOSE      POCT Blood Glucose.: 108 mg/dL (30 Oct 2020 17:18)  POCT Blood Glucose.: 155 mg/dL (30 Oct 2020 11:19)  POCT Blood Glucose.: 133 mg/dL (30 Oct 2020 08:29)  POCT Blood Glucose.: 199 mg/dL (29 Oct 2020 21:13)      RADIOLOGY/EKG:    < from: 12 Lead ECG (10.26.20 @ 20:02) >  Diagnosis Line Sinus rhythm with Premature supraventricular complexes  Right bundle branch block  Cannot rule out Inferior infarct , age undetermined  Cannot rule out Anterior infarct (cited on or before 26-DEC-2019)  Abnormal ECG    < from: CT Head No Cont (10.26.20 @ 21:56) >  MPRESSION:  1. Persistent, large right-sided subdural hematoma that demonstrates components of variable age, including acute/subacute components) and measures up to 2.1 cm in thickness. This results in right-sided mass effect with right to left midline shift of approximately 6 mm. When compared to prior CT brain of 10/9/2020, a similar sized right holohemispheric subdural hematoma was demonstrated with similar appearance of the ventricles, mass effect and right to left midline shift.    < from: CT Abdomen and Pelvis w/ IV Cont (10.27.20 @ 01:42) >  EXAM:  CT ABDOMEN AND PELVIS IC                            PROCEDURE DATE:  10/27/2020          INTERPRETATION:  CLINICAL INFORMATION: Metabolic encephalopathy secondary to likely UTI, gross hematuria, sepsis.    PROCEDURE:  Helical axial images were obtained from the domes of the diaphragm through the pubic symphysis without intravenous or oral contrast. Coronal and sagittal reformats were also obtained.    COMPARISON: 10/4/2020 and 8/3/2010.    FINDINGS: Evaluation of the abdominal/pelvic organs, viscera and vasculature is limited without intravenous contrast. Artifact from the patient's arms degrades images.    LOWER CHEST: Centrilobular emphysema. Suggest nonspecific, interlobular septal thickening. Dependent opacities in the left > right lower lobe. Subsegmental atelectasis. Aortic valve, coronary artery and mitral annular calcification.    LIVER: Fatty infiltration near the fossa ligament again noted.  GALLBLADDER/BILE DUCTS: No intrahepatic or extrahepatic biliary dilatation. Cholelithiasis.  PANCREAS: Unremarkable.  SPLEEN: Again noted, mild contour deformity and punctate calcifications.    ADRENALS: Unremarkable.  KIDNEYS/URETERS: Nonspecific mild bilateral perinephric stranding without hydroureteronephrosis. Question striated bilateral nephrogram. Right renal cyst again noted.  BLADDER: Mildly distended with air and fluid. Question trabeculated versus emphysematous bladder wall.  REPRODUCTIVE ORGANS: Partially visualized Whitmore catheter balloon at the level of the penile urethra.    BOWEL: No bowel obstruction. Focal outpouching of air along the posterior aspect of the terminal ileum, which may represent a diverticulum or appendix. No secondary signs of appendicitis. Rectosigmoid colon wall thickening surrounding inflammatory change. Colon diverticulosis.  PERITONEUM: No drainable fluid collection or free air.  VESSELS: Atherosclerosis. Again noted, ectatic infrarenal aorta containing intramural plaque/thrombus.  RETROPERITONEUM: No lymphadenopathy.  ABDOMINAL WALL/SOFT TISSUES: Post surgical changes along the anterior pelvic wall soft tissue.  BONES: Degenerative changes of the spine. Stable minimal retrolisthesis of L1 on L2, L2 on L3, L3 on L4 and L4 on L5.    IMPRESSION:    Rectosigmoid colitis. No bowel obstruction.    Nonspecific mild bilateral perinephric stranding without hydroureteronephrosis. Question striated bilateral nephrogram. Recommend clinical correlation to assess pyelonephritis. Question emphysematous cystitis versus bladder wall trabeculation.    Malpositioned Whitmore catheter at the level of the penile urethra. Recommend removal.    Dependent opacities in the left > right lower lobe, which may represent atelectasis. Recommend clinical correlation to assess aspiration.    Additional findings as described.

## 2020-10-30 NOTE — PROGRESS NOTE ADULT - SUBJECTIVE AND OBJECTIVE BOX
Date of service: 10-30-20 @ 10:43    pt seen and examined  feels better  sitting in bed  no overnight events  dawson in place    ROS: no fever or chills; denies dizziness, no HA, no SOB or cough, no abdominal pain, no diarrhea or constipation;  no legs pain, no rashes    MEDICATIONS  (STANDING):  aspirin  chewable 81 milliGRAM(s) Oral daily  bethanechol 50 milliGRAM(s) Oral two times a day  DAPTOmycin IVPB 320 milliGRAM(s) IV Intermittent every 24 hours  dextrose 5%. 1000 milliLiter(s) (50 mL/Hr) IV Continuous <Continuous>  dextrose 50% Injectable 12.5 Gram(s) IV Push once  dextrose 50% Injectable 25 Gram(s) IV Push once  dextrose 50% Injectable 25 Gram(s) IV Push once  finasteride 5 milliGRAM(s) Oral daily  heparin   Injectable 5000 Unit(s) SubCutaneous every 12 hours  insulin lispro (ADMELOG) corrective regimen sliding scale   SubCutaneous three times a day before meals  insulin lispro (ADMELOG) corrective regimen sliding scale   SubCutaneous at bedtime  metoprolol tartrate 25 milliGRAM(s) Oral two times a day  multivitamin 1 Tablet(s) Oral daily  piperacillin/tazobactam IVPB.. 3.375 Gram(s) IV Intermittent every 8 hours  QUEtiapine 50 milliGRAM(s) Oral at bedtime  silver sulfADIAZINE 1% Cream 1 Application(s) Topical two times a day  simvastatin 40 milliGRAM(s) Oral at bedtime  tamsulosin Oral Tab/Cap - Peds 0.4 milliGRAM(s) Oral at bedtime    Vital Signs Last 24 Hrs  T(C): 37.3 (30 Oct 2020 08:12), Max: 37.3 (29 Oct 2020 23:46)  T(F): 99.1 (30 Oct 2020 08:12), Max: 99.1 (29 Oct 2020 23:46)  HR: 69 (30 Oct 2020 08:12) (60 - 69)  BP: 113/63 (30 Oct 2020 08:12) (113/63 - 141/71)  BP(mean): --  RR: 19 (30 Oct 2020 08:12) (18 - 19)  SpO2: 97% (30 Oct 2020 08:12) (97% - 97%)      PE:  Constitutional: frail looking  HEENT: NC/AT, EOMI, PERRLA, conjunctivae clear; ears and nose atraumatic; pharynx benign  Neck: supple; thyroid not palpable  Back: no tenderness  Respiratory: respiratory effort normal; clear to auscultation  Cardiovascular: S1S2 regular, no murmurs  Abdomen: soft, not tender, not distended, positive BS; liver and spleen WNL  Genitourinary: no suprapubic tenderness dawson in place  Lymphatic: no LN palpable  Musculoskeletal: no muscle tenderness, no joint swelling or tenderness  Extremities: no pedal edema  Neurological/ Psychiatric:  moving all extremities  Skin: no rashes; no palpable lesions    Labs: all available labs reviewed                                              8.2    10.20 )-----------( 266      ( 30 Oct 2020 07:03 )             26.4     10    143  |  111<H>  |  27<H>  ----------------------------<  126<H>  4.0   |  26  |  0.80    Ca    8.0<L>      30 Oct 2020 07:03    TPro  5.1<L>  /  Alb  1.9<L>  /  TBili  0.5  /  DBili  x   /  AST  27  /  ALT  31  /  AlkPhos  131<H>  10-30          LIVER FUNCTIONS - ( 27 Oct 2020 06:41 )  Alb: 1.8 g/dL / Pro: 4.8 gm/dL / ALK PHOS: 122 U/L / ALT: 32 U/L / AST: 33 U/L / GGT: x           Urinalysis Basic - ( 26 Oct 2020 20:52 )    Color: Red / Appearance: Slightly Turbid / S.010 / pH: x  Gluc: x / Ketone: Negative  / Bili: Negative / Urobili: Negative mg/dL   Blood: x / Protein: 100 mg/dL / Nitrite: Negative   Leuk Esterase: Small / RBC: >50 /HPF / WBC 10-15 /HPF   Sq Epi: x / Non Sq Epi: Occasional / Bacteria: Few      Culture - Urine (10.26.20 @ 20:52)   Specimen Source: .Urine None   Culture Results:   >100,000 CFU/ml Gram positive organisms Culture - Blood (10.26.20 @ 20:09)   Gram Stain:   Growth in anaerobic bottle: Gram positive cocci in pairs   Specimen Source: .Blood Blood-Peripheral   Culture Results:   Growth in anaerobic bottle: Gram positive cocci in pairs Culture - Blood (10.26.20 @ 20:09)   - Vancomycin resistant Enterococcus sp.: Detec   Gram Stain:   Growth in aerobic bottle: Gram positive cocci in pairs   Growth in anaerobic bottle: Gram positive cocci in pairs   Specimen Source: .Blood Blood-Peripheral   Organism: Blood Culture PCR   Culture Results:   Growth in aerobic bottle: Gram positive cocci in pairs   Growth in anaerobic bottle: Gram positive cocci in pairs   "Due to technical problems, Proteus sp. will Not be reported as part of   the BCID panel until further notice"   ***Blood Panel PCR results on this specimen are available   approximately 3 hours after the Gram stain result.***   Gram stain, PCR, and/or culture results may not always   correspond due to difference in methodologies.   ************************************************************   This PCR assay was performed using [employer].   The following targets are tested for: Enterococcus,   vancomycin resistant enterococci, Listeria monocytogenes,   coagulase negative staphylococci, S. aureus,   methicillin resistant S. aureus,Streptococcus agalactiae   (Group B), S. pneumoniae, S. pyogenes (Group A),   Acinetobacter baumannii, Enterobacter cloacae, E. coli,   Klebsiella oxytoca, K. pneumoniae, Proteus sp.,   Serratia marcescens, Haemophilus influenzae,   Neisseria meningitidis, Pseudomonas aeruginosa, Candida   albicans, C. glabrata, C krusei, C parapsilosis,   C. tropicalis and the KPC resistance gene.   Organism Identification: Blood Culture PCR   Method Type: PCR     Radiology: all available radiological tests reviewed      EXAM:  CT ABDOMEN AND PELVIS IC                            PROCEDURE DATE:  10/27/2020          INTERPRETATION:  CLINICAL INFORMATION: Metabolic encephalopathy secondary to likely UTI, gross hematuria, sepsis.    PROCEDURE:  Helical axial images were obtained from the domes of the diaphragm through the pubic symphysis without intravenous or oral contrast. Coronal and sagittal reformats were also obtained.    COMPARISON: 10/4/2020 and 8/3/2010.    FINDINGS: Evaluation of the abdominal/pelvic organs, viscera and vasculature is limited without intravenous contrast. Artifact from the patient's arms degrades images.    LOWER CHEST: Centrilobular emphysema. Suggest nonspecific, interlobular septal thickening. Dependent opacities in the left > right lower lobe. Subsegmental atelectasis. Aortic valve, coronary artery and mitral annular calcification.    LIVER: Fatty infiltration near the fossa ligament again noted.  GALLBLADDER/BILE DUCTS: No intrahepatic or extrahepatic biliary dilatation. Cholelithiasis.  PANCREAS: Unremarkable.  SPLEEN: Again noted, mild contour deformity and punctate calcifications.    ADRENALS: Unremarkable.  KIDNEYS/URETERS: Nonspecific mild bilateral perinephric stranding without hydroureteronephrosis. Question striated bilateral nephrogram. Right renal cyst again noted.  BLADDER: Mildly distended with air and fluid. Question trabeculated versus emphysematous bladder wall.  REPRODUCTIVE ORGANS: Partially visualized Dawson catheter balloon at the level of the penile urethra.    BOWEL: No bowel obstruction. Focal outpouching of air along the posterior aspect of the terminal ileum, which may represent a diverticulum or appendix. No secondary signs of appendicitis. Rectosigmoid colon wall thickening surrounding inflammatory change. Colon diverticulosis.  PERITONEUM: No drainable fluid collection or free air.  VESSELS: Atherosclerosis. Again noted, ectatic infrarenal aorta containing intramural plaque/thrombus.  RETROPERITONEUM: No lymphadenopathy.  ABDOMINAL WALL/SOFT TISSUES: Post surgical changes along the anterior pelvic wall soft tissue.  BONES: Degenerative changes of the spine. Stable minimal retrolisthesis of L1 on L2, L2 on L3, L3 on L4 and L4 on L5.    IMPRESSION:    Rectosigmoid colitis. No bowelobstruction.    Nonspecific mild bilateral perinephric stranding without hydroureteronephrosis. Question striated bilateral nephrogram. Recommend clinical correlation to assess pyelonephritis. Question emphysematous cystitis versus bladder wall trabeculation.    Malpositioned Dawson catheter at the level of the penile urethra. Recommend removal.    Dependent opacities in the left > right lower lobe, which may represent atelectasis. Recommend clinical correlation to assess aspiration.    Additionalfindings as described.          < end of copied text >  < from: CT Head No Cont (10.26.20 @ 21:56) >  EXAM:  CT BRAIN                            PROCEDURE DATE:  10/26/2020          INTERPRETATION:  CLINICAL INFORMATION:  AMS, prior SDH treated by surgery.    TECHNIQUE:  Axial CT images were acquired through the head.  Intravenous contrast: None  Two-dimensional reformats were generated.    COMPARISON STUDY: CT brain 10/9/2020    FINDINGS: The patient is status post right-sided frontoparietal craniotomy again demonstrated. Once again, there is a large holohemispheric right-sided subdural collection demonstrating variable attenuation, consistent with a subdural hematoma with acute, intermediate and chronic components. This collection measures 2.1 cm in thickness. This results in adjacent mass effect with effacement of the right lateral ventricle and right to left midline shift of approximately 6 mm. Chronic right corona radiata lacunar infarct again seen.    Imaged portions of the orbits, and left mastoid air cells are grossly unremarkable. Tiny retention cyst/polyp left ethmoid sinus. Mild opacification of the inferior right mastoid air cells seen. Calcific atherosclerosis of bilateral cavernous ICAs.    IMPRESSION:  1. Persistent, large right-sided subdural hematoma that demonstrates components of variable age, including acute/subacute components) and measures up to 2.1 cm in thickness. This results in right-sided mass effect with right to left midline shift of approximately 6 mm. When compared to prior CT brain of 10/9/2020, a similar sized right holohemispheric subdural hematoma was demonstrated with similar appearance of the ventricles, mass effect and right to left midline shift.      < end of copied text >    Advanced directives addressed: full resuscitation

## 2020-10-31 LAB
ALBUMIN SERPL ELPH-MCNC: 2 G/DL — LOW (ref 3.3–5)
ALP SERPL-CCNC: 123 U/L — HIGH (ref 40–120)
ALT FLD-CCNC: 29 U/L — SIGNIFICANT CHANGE UP (ref 12–78)
ANION GAP SERPL CALC-SCNC: 6 MMOL/L — SIGNIFICANT CHANGE UP (ref 5–17)
AST SERPL-CCNC: 28 U/L — SIGNIFICANT CHANGE UP (ref 15–37)
BILIRUB SERPL-MCNC: 0.6 MG/DL — SIGNIFICANT CHANGE UP (ref 0.2–1.2)
BUN SERPL-MCNC: 19 MG/DL — SIGNIFICANT CHANGE UP (ref 7–23)
CALCIUM SERPL-MCNC: 8.4 MG/DL — LOW (ref 8.5–10.1)
CHLORIDE SERPL-SCNC: 109 MMOL/L — HIGH (ref 96–108)
CK SERPL-CCNC: 25 U/L — LOW (ref 26–308)
CO2 SERPL-SCNC: 24 MMOL/L — SIGNIFICANT CHANGE UP (ref 22–31)
CREAT SERPL-MCNC: 0.71 MG/DL — SIGNIFICANT CHANGE UP (ref 0.5–1.3)
GLUCOSE SERPL-MCNC: 128 MG/DL — HIGH (ref 70–99)
HCT VFR BLD CALC: 28.2 % — LOW (ref 39–50)
HGB BLD-MCNC: 8.9 G/DL — LOW (ref 13–17)
MCHC RBC-ENTMCNC: 31.6 GM/DL — LOW (ref 32–36)
MCHC RBC-ENTMCNC: 32 PG — SIGNIFICANT CHANGE UP (ref 27–34)
MCV RBC AUTO: 101.4 FL — HIGH (ref 80–100)
PLATELET # BLD AUTO: 235 K/UL — SIGNIFICANT CHANGE UP (ref 150–400)
POTASSIUM SERPL-MCNC: 4.3 MMOL/L — SIGNIFICANT CHANGE UP (ref 3.5–5.3)
POTASSIUM SERPL-SCNC: 4.3 MMOL/L — SIGNIFICANT CHANGE UP (ref 3.5–5.3)
PROT SERPL-MCNC: 5.4 GM/DL — LOW (ref 6–8.3)
RBC # BLD: 2.78 M/UL — LOW (ref 4.2–5.8)
RBC # FLD: 14.9 % — HIGH (ref 10.3–14.5)
SODIUM SERPL-SCNC: 139 MMOL/L — SIGNIFICANT CHANGE UP (ref 135–145)
WBC # BLD: 8.37 K/UL — SIGNIFICANT CHANGE UP (ref 3.8–10.5)
WBC # FLD AUTO: 8.37 K/UL — SIGNIFICANT CHANGE UP (ref 3.8–10.5)

## 2020-10-31 PROCEDURE — 99233 SBSQ HOSP IP/OBS HIGH 50: CPT | Mod: GC

## 2020-10-31 RX ADMIN — Medication 1 APPLICATION(S): at 21:12

## 2020-10-31 RX ADMIN — QUETIAPINE FUMARATE 50 MILLIGRAM(S): 200 TABLET, FILM COATED ORAL at 21:10

## 2020-10-31 RX ADMIN — HEPARIN SODIUM 5000 UNIT(S): 5000 INJECTION INTRAVENOUS; SUBCUTANEOUS at 21:11

## 2020-10-31 RX ADMIN — Medication 1 APPLICATION(S): at 09:16

## 2020-10-31 RX ADMIN — Medication 81 MILLIGRAM(S): at 09:16

## 2020-10-31 RX ADMIN — SIMVASTATIN 40 MILLIGRAM(S): 20 TABLET, FILM COATED ORAL at 21:11

## 2020-10-31 RX ADMIN — TAMSULOSIN HYDROCHLORIDE 0.4 MILLIGRAM(S): 0.4 CAPSULE ORAL at 21:10

## 2020-10-31 RX ADMIN — FINASTERIDE 5 MILLIGRAM(S): 5 TABLET, FILM COATED ORAL at 09:16

## 2020-10-31 RX ADMIN — PIPERACILLIN AND TAZOBACTAM 25 GRAM(S): 4; .5 INJECTION, POWDER, LYOPHILIZED, FOR SOLUTION INTRAVENOUS at 05:41

## 2020-10-31 RX ADMIN — DAPTOMYCIN 117.6 MILLIGRAM(S): 500 INJECTION, POWDER, LYOPHILIZED, FOR SOLUTION INTRAVENOUS at 13:18

## 2020-10-31 RX ADMIN — Medication 50 MILLIGRAM(S): at 21:09

## 2020-10-31 RX ADMIN — Medication 50 MILLIGRAM(S): at 09:16

## 2020-10-31 RX ADMIN — HEPARIN SODIUM 5000 UNIT(S): 5000 INJECTION INTRAVENOUS; SUBCUTANEOUS at 09:16

## 2020-10-31 RX ADMIN — Medication 25 MILLIGRAM(S): at 21:10

## 2020-10-31 RX ADMIN — Medication 1 TABLET(S): at 09:16

## 2020-10-31 RX ADMIN — Medication 25 MILLIGRAM(S): at 09:16

## 2020-10-31 NOTE — PROGRESS NOTE ADULT - SUBJECTIVE AND OBJECTIVE BOX
Date of service: 10-31-20 @ 10:19    pt seen and examined  feels better  sitting in bed  dawson in place  no overnight events    ROS: no fever or chills; denies dizziness, no HA, no SOB or cough, no abdominal pain, no diarrhea or constipation;  no legs pain, no rashes    MEDICATIONS  (STANDING):  aspirin  chewable 81 milliGRAM(s) Oral daily  bethanechol 50 milliGRAM(s) Oral two times a day  DAPTOmycin IVPB 440 milliGRAM(s) IV Intermittent every 24 hours  dextrose 5%. 1000 milliLiter(s) (50 mL/Hr) IV Continuous <Continuous>  dextrose 50% Injectable 12.5 Gram(s) IV Push once  dextrose 50% Injectable 25 Gram(s) IV Push once  dextrose 50% Injectable 25 Gram(s) IV Push once  finasteride 5 milliGRAM(s) Oral daily  heparin   Injectable 5000 Unit(s) SubCutaneous every 12 hours  insulin lispro (ADMELOG) corrective regimen sliding scale   SubCutaneous three times a day before meals  insulin lispro (ADMELOG) corrective regimen sliding scale   SubCutaneous at bedtime  metoprolol tartrate 25 milliGRAM(s) Oral two times a day  multivitamin 1 Tablet(s) Oral daily  QUEtiapine 50 milliGRAM(s) Oral at bedtime  silver sulfADIAZINE 1% Cream 1 Application(s) Topical two times a day  simvastatin 40 milliGRAM(s) Oral at bedtime  tamsulosin Oral Tab/Cap - Peds 0.4 milliGRAM(s) Oral at bedtime      Vital Signs Last 24 Hrs  T(C): 36.8 (31 Oct 2020 08:54), Max: 36.8 (31 Oct 2020 06:23)  T(F): 98.2 (31 Oct 2020 08:54), Max: 98.3 (31 Oct 2020 06:23)  HR: 63 (31 Oct 2020 08:54) (60 - 73)  BP: 115/73 (31 Oct 2020 08:54) (115/73 - 135/69)  BP(mean): --  RR: 18 (31 Oct 2020 08:54) (18 - 18)  SpO2: 95% (31 Oct 2020 08:54) (95% - 99%)      PE:  Constitutional: frail looking  HEENT: NC/AT, EOMI, PERRLA, conjunctivae clear; ears and nose atraumatic; pharynx benign  Neck: supple; thyroid not palpable  Back: no tenderness  Respiratory: respiratory effort normal; clear to auscultation  Cardiovascular: S1S2 regular, no murmurs  Abdomen: soft, not tender, not distended, positive BS; liver and spleen WNL  Genitourinary: no suprapubic tenderness dawson in place  Lymphatic: no LN palpable  Musculoskeletal: no muscle tenderness, no joint swelling or tenderness  Extremities: no pedal edema  Neurological/ Psychiatric:  moving all extremities  Skin: no rashes; no palpable lesions    Labs: all available labs reviewed                                                         8.9    8.37  )-----------( 235      ( 31 Oct 2020 07:57 )             28.2     10-31    139  |  109<H>  |  19  ----------------------------<  128<H>  4.3   |  24  |  0.71    Ca    8.4<L>      31 Oct 2020 07:57    TPro  5.4<L>  /  Alb  2.0<L>  /  TBili  0.6  /  DBili  x   /  AST  28  /  ALT  29  /  AlkPhos  123<H>  10-31            LIVER FUNCTIONS - ( 27 Oct 2020 06:41 )  Alb: 1.8 g/dL / Pro: 4.8 gm/dL / ALK PHOS: 122 U/L / ALT: 32 U/L / AST: 33 U/L / GGT: x           Urinalysis Basic - ( 26 Oct 2020 20:52 )    Color: Red / Appearance: Slightly Turbid / S.010 / pH: x  Gluc: x / Ketone: Negative  / Bili: Negative / Urobili: Negative mg/dL   Blood: x / Protein: 100 mg/dL / Nitrite: Negative   Leuk Esterase: Small / RBC: >50 /HPF / WBC 10-15 /HPF   Sq Epi: x / Non Sq Epi: Occasional / Bacteria: Few    Culture - Blood in AM (10.29.20 @ 08:08)   Specimen Source: .Blood None   Culture Results:   No growth to date. Culture - Blood in AM (10.29.20 @ 08:04)   Specimen Source: .Blood None   Culture Results:   No growth to date.   Culture - Urine (10.26.20 @ 20:52)   Specimen Source: .Urine None   Culture Results:   >100,000 CFU/ml Gram positive organisms Culture - Blood (10.26. @ 20:09)   Gram Stain:   Growth in anaerobic bottle: Gram positive cocci in pairs   Specimen Source: .Blood Blood-Peripheral   Culture Results:   Growth in anaerobic bottle: Gram positive cocci in pairs Culture - Blood (10.26.20 @ 20:09)   - Vancomycin resistant Enterococcus sp.: Detec   Gram Stain:   Growth in aerobic bottle: Gram positive cocci in pairs   Growth in anaerobic bottle: Gram positive cocci in pairs   Specimen Source: .Blood Blood-Peripheral   Organism: Blood Culture PCR   Culture Results:   Growth in aerobic bottle: Gram positive cocci in pairs   Growth in anaerobic bottle: Gram positive cocci in pairs   "Due to technical problems, Proteus sp. will Not be reported as part of   the BCID panel until further notice"   ***Blood Panel PCR results on this specimen are available   approximately 3 hours after the Gram stain result.***   Gram stain, PCR, and/or culture results may not always   correspond due to difference in methodologies.   ************************************************************   This PCR assay was performed using [employer]   The following targets are tested for: Enterococcus,   vancomycin resistant enterococci, Listeria monocytogenes,   coagulase negative staphylococci, S. aureus,   methicillin resistant S. aureus,Streptococcus agalactiae   (Group B), S. pneumoniae, S. pyogenes (Group A),   Acinetobacter baumannii, Enterobacter cloacae, E. coli,   Klebsiella oxytoca, K. pneumoniae, Proteus sp.,   Serratia marcescens, Haemophilus influenzae,   Neisseria meningitidis, Pseudomonas aeruginosa, Candida   albicans, C. glabrata, C krusei, C parapsilosis,   C. tropicalis and the KPC resistance gene.   Organism Identification: Blood Culture PCR   Method Type: PCR     Radiology: all available radiological tests reviewed      EXAM:  CT ABDOMEN AND PELVIS IC                            PROCEDURE DATE:  10/27/2020          INTERPRETATION:  CLINICAL INFORMATION: Metabolic encephalopathy secondary to likely UTI, gross hematuria, sepsis.    PROCEDURE:  Helical axial images were obtained from the domes of the diaphragm through the pubic symphysis without intravenous or oral contrast. Coronal and sagittal reformats were also obtained.    COMPARISON: 10/4/2020 and 8/3/2010.    FINDINGS: Evaluation of the abdominal/pelvic organs, viscera and vasculature is limited without intravenous contrast. Artifact from the patient's arms degrades images.    LOWER CHEST: Centrilobular emphysema. Suggest nonspecific, interlobular septal thickening. Dependent opacities in the left > right lower lobe. Subsegmental atelectasis. Aortic valve, coronary artery and mitral annular calcification.    LIVER: Fatty infiltration near the fossa ligament again noted.  GALLBLADDER/BILE DUCTS: No intrahepatic or extrahepatic biliary dilatation. Cholelithiasis.  PANCREAS: Unremarkable.  SPLEEN: Again noted, mild contour deformity and punctate calcifications.    ADRENALS: Unremarkable.  KIDNEYS/URETERS: Nonspecific mild bilateral perinephric stranding without hydroureteronephrosis. Question striated bilateral nephrogram. Right renal cyst again noted.  BLADDER: Mildly distended with air and fluid. Question trabeculated versus emphysematous bladder wall.  REPRODUCTIVE ORGANS: Partially visualized Dawson catheter balloon at the level of the penile urethra.    BOWEL: No bowel obstruction. Focal outpouching of air along the posterior aspect of the terminal ileum, which may represent a diverticulum or appendix. No secondary signs of appendicitis. Rectosigmoid colon wall thickening surrounding inflammatory change. Colon diverticulosis.  PERITONEUM: No drainable fluid collection or free air.  VESSELS: Atherosclerosis. Again noted, ectatic infrarenal aorta containing intramural plaque/thrombus.  RETROPERITONEUM: No lymphadenopathy.  ABDOMINAL WALL/SOFT TISSUES: Post surgical changes along the anterior pelvic wall soft tissue.  BONES: Degenerative changes of the spine. Stable minimal retrolisthesis of L1 on L2, L2 on L3, L3 on L4 and L4 on L5.    IMPRESSION:    Rectosigmoid colitis. No bowelobstruction.    Nonspecific mild bilateral perinephric stranding without hydroureteronephrosis. Question striated bilateral nephrogram. Recommend clinical correlation to assess pyelonephritis. Question emphysematous cystitis versus bladder wall trabeculation.    Malpositioned Dawson catheter at the level of the penile urethra. Recommend removal.    Dependent opacities in the left > right lower lobe, which may represent atelectasis. Recommend clinical correlation to assess aspiration.    Additionalfindings as described.          < end of copied text >  < from: CT Head No Cont (10.26.20 @ 21:56) >  EXAM:  CT BRAIN                            PROCEDURE DATE:  10/26/2020          INTERPRETATION:  CLINICAL INFORMATION:  AMS, prior SDH treated by surgery.    TECHNIQUE:  Axial CT images were acquired through the head.  Intravenous contrast: None  Two-dimensional reformats were generated.    COMPARISON STUDY: CT brain 10/9/2020    FINDINGS: The patient is status post right-sided frontoparietal craniotomy again demonstrated. Once again, there is a large holohemispheric right-sided subdural collection demonstrating variable attenuation, consistent with a subdural hematoma with acute, intermediate and chronic components. This collection measures 2.1 cm in thickness. This results in adjacent mass effect with effacement of the right lateral ventricle and right to left midline shift of approximately 6 mm. Chronic right corona radiata lacunar infarct again seen.    Imaged portions of the orbits, and left mastoid air cells are grossly unremarkable. Tiny retention cyst/polyp left ethmoid sinus. Mild opacification of the inferior right mastoid air cells seen. Calcific atherosclerosis of bilateral cavernous ICAs.    IMPRESSION:  1. Persistent, large right-sided subdural hematoma that demonstrates components of variable age, including acute/subacute components) and measures up to 2.1 cm in thickness. This results in right-sided mass effect with right to left midline shift of approximately 6 mm. When compared to prior CT brain of 10/9/2020, a similar sized right holohemispheric subdural hematoma was demonstrated with similar appearance of the ventricles, mass effect and right to left midline shift.      < end of copied text >    Advanced directives addressed: full resuscitation

## 2020-10-31 NOTE — PROGRESS NOTE ADULT - SUBJECTIVE AND OBJECTIVE BOX
HPI: 89 y/o M PMHx BPH, CAD, DMII, GERD, HLD, SDH s/p craniotomy and surgical removal, urine rentention with Whitmore catheter, HTN presenting for [facility] with 1 day h/o hematuria. Whitmore was replaced in the NH last night after the pt pulled it out. Today, pt was noted to be febrile at the NH Tmax: 104, SBP 90, and hypoxic on RA Sp02 on RA. Also noted to be more confused and lethargic with gross hematuria. Pt not on AC due to recent SDH.Pt was admitted in early October (D/c 10 days ago) s/p fall with AMS and imaging + for SDH requiring craniotomy and evacuation also with aspiration PNA tx with IV abx. Whitmore was placed for urinary retention. Spoke with pt's son Caleb whiteside ESL  this evening. Updated him on pt's status; as the next of kin, he consented to CT w/ contrast imaging. MOLST was reviewed and received verbal consent; Son brought in paperwork and MOLST filled out, now DNI.     S: Pt seen and evaluated at bedside. No acute events overnight. Appears better. Still poor PO intake. Offers no other complaints. Whitmore in place.     O:   REVIEW OF SYSTEMS:  CONSTITUTIONAL: No weakness, fevers or chills  EYES/ENT: No visual changes;  No vertigo or throat pain   NECK: No pain or stiffness  RESPIRATORY: No cough, wheezing, hemoptysis; No shortness of breath  CARDIOVASCULAR: No chest pain or palpitations  GASTROINTESTINAL: No abdominal or epigastric pain. No nausea, vomiting, or hematemesis; No diarrhea or constipation  GENITOURINARY: No dysuria, frequency or hematuria, denies penile pain  NEUROLOGICAL: No numbness or weakness  SKIN: No itching, burning, rashes, or lesions   All other review of systems is negative unless indicated above    PHYSICAL EXAM:  Vital Signs Last 24 Hrs  T(C): 36.8 (31 Oct 2020 08:54), Max: 36.8 (31 Oct 2020 06:23)  T(F): 98.2 (31 Oct 2020 08:54), Max: 98.3 (31 Oct 2020 06:23)  HR: 63 (31 Oct 2020 08:54) (60 - 73)  BP: 115/73 (31 Oct 2020 08:54) (115/73 - 135/69)  RR: 18 (31 Oct 2020 08:54) (18 - 18)  SpO2: 95% (31 Oct 2020 08:54) (95% - 99%)      Gen:  NAD, +weak appearing   HEENT: EOMI, normal hearing, moist mucous membranes  Neck: Soft and supple,   Respiratory: CTABL, No wheezing, rales or rhonchi  Cardiovascular: S1S2+, RRR, no M/G/R  Gastrointestinal: BS+, soft, NT/ND, no guarding, no rebound  : Whitmore in place, +small amt of dried blood noted at urethral opening, no evidence of active bleeding   Extremities: No peripheral edema  Vascular: 2+ peripheral pulses  Neurological: AAOx3, no focal deficits  Skin: No rashes    MEDICATIONS  (STANDING):  aspirin  chewable 81 milliGRAM(s) Oral daily  bethanechol 50 milliGRAM(s) Oral two times a day  DAPTOmycin IVPB 440 milliGRAM(s) IV Intermittent every 24 hours  dextrose 5%. 1000 milliLiter(s) (50 mL/Hr) IV Continuous <Continuous>  dextrose 50% Injectable 12.5 Gram(s) IV Push once  dextrose 50% Injectable 25 Gram(s) IV Push once  dextrose 50% Injectable 25 Gram(s) IV Push once  finasteride 5 milliGRAM(s) Oral daily  heparin   Injectable 5000 Unit(s) SubCutaneous every 12 hours  insulin lispro (ADMELOG) corrective regimen sliding scale   SubCutaneous three times a day before meals  insulin lispro (ADMELOG) corrective regimen sliding scale   SubCutaneous at bedtime  metoprolol tartrate 25 milliGRAM(s) Oral two times a day  multivitamin 1 Tablet(s) Oral daily  QUEtiapine 50 milliGRAM(s) Oral at bedtime  silver sulfADIAZINE 1% Cream 1 Application(s) Topical two times a day  simvastatin 40 milliGRAM(s) Oral at bedtime  tamsulosin Oral Tab/Cap - Peds 0.4 milliGRAM(s) Oral at bedtime    MEDICATIONS  (PRN):  acetaminophen  Suppository .. 650 milliGRAM(s) Rectal every 6 hours PRN Temp greater or equal to 38C (100.4F)  dextrose 40% Gel 15 Gram(s) Oral once PRN Blood Glucose LESS THAN 70 milliGRAM(s)/deciliter  glucagon  Injectable 1 milliGRAM(s) IntraMuscular once PRN Glucose LESS THAN 70 milligrams/deciliter      LABS: All Labs Reviewed:                        8.9    8.37  )-----------( 235      ( 31 Oct 2020 07:57 )             28.2   10-31    139  |  109<H>  |  19  ----------------------------<  128<H>  4.3   |  24  |  0.71    Ca    8.4<L>      31 Oct 2020 07:57    TPro  5.4<L>  /  Alb  2.0<L>  /  TBili  0.6  /  DBili  x   /  AST  28  /  ALT  29  /  AlkPhos  123<H>  10-31        Creatine Kinase, Serum in AM (10.31.20 @ 07:57)    Creatine Kinase, Serum: 25 U/L    Creatine Kinase, Serum in AM (10.30.20 @ 07:03)    Creatine Kinase, Serum: 22 U/L          Blood Culture: 10-29 @ 08:08  Organism --  Gram Stain Blood -- Gram Stain --  Specimen Source .Blood None  Culture-Blood --    10-29 @ 08:04  Organism --  Gram Stain Blood -- Gram Stain --  Specimen Source .Blood None  Culture-Blood --    10-26 @ 20:52  Organism Enterococcus faecium (vancomycin resistant)  Gram Stain Blood -- Gram Stain --  Specimen Source .Urine None  Culture-Blood --    10-26 @ 20:09  Organism Blood Culture PCR  Gram Stain Blood -- Gram Stain   Growth in aerobic bottle: Gram positive cocci in pairs  Growth in anaerobic bottle: Gram positive cocci in pairs  Specimen Source .Blood Blood-Peripheral  Culture-Blood --      I&O's Summary    29 Oct 2020 07:01  -  30 Oct 2020 07:00  --------------------------------------------------------  IN: 0 mL / OUT: 700 mL / NET: -700 mL    30 Oct 2020 07:01  -  30 Oct 2020 20:09  --------------------------------------------------------  IN: 0 mL / OUT: 700 mL / NET: -700 mL      CAPILLARY BLOOD GLUCOSE      POCT Blood Glucose.: 108 mg/dL (30 Oct 2020 17:18)  POCT Blood Glucose.: 155 mg/dL (30 Oct 2020 11:19)  POCT Blood Glucose.: 133 mg/dL (30 Oct 2020 08:29)  POCT Blood Glucose.: 199 mg/dL (29 Oct 2020 21:13)      RADIOLOGY/EKG:    < from: 12 Lead ECG (10.26.20 @ 20:02) >  Diagnosis Line Sinus rhythm with Premature supraventricular complexes  Right bundle branch block  Cannot rule out Inferior infarct , age undetermined  Cannot rule out Anterior infarct (cited on or before 26-DEC-2019)  Abnormal ECG    < from: CT Head No Cont (10.26.20 @ 21:56) >  MPRESSION:  1. Persistent, large right-sided subdural hematoma that demonstrates components of variable age, including acute/subacute components) and measures up to 2.1 cm in thickness. This results in right-sided mass effect with right to left midline shift of approximately 6 mm. When compared to prior CT brain of 10/9/2020, a similar sized right holohemispheric subdural hematoma was demonstrated with similar appearance of the ventricles, mass effect and right to left midline shift.    < from: CT Abdomen and Pelvis w/ IV Cont (10.27.20 @ 01:42) >  EXAM:  CT ABDOMEN AND PELVIS IC                            PROCEDURE DATE:  10/27/2020          INTERPRETATION:  CLINICAL INFORMATION: Metabolic encephalopathy secondary to likely UTI, gross hematuria, sepsis.    PROCEDURE:  Helical axial images were obtained from the domes of the diaphragm through the pubic symphysis without intravenous or oral contrast. Coronal and sagittal reformats were also obtained.    COMPARISON: 10/4/2020 and 8/3/2010.    FINDINGS: Evaluation of the abdominal/pelvic organs, viscera and vasculature is limited without intravenous contrast. Artifact from the patient's arms degrades images.    LOWER CHEST: Centrilobular emphysema. Suggest nonspecific, interlobular septal thickening. Dependent opacities in the left > right lower lobe. Subsegmental atelectasis. Aortic valve, coronary artery and mitral annular calcification.    LIVER: Fatty infiltration near the fossa ligament again noted.  GALLBLADDER/BILE DUCTS: No intrahepatic or extrahepatic biliary dilatation. Cholelithiasis.  PANCREAS: Unremarkable.  SPLEEN: Again noted, mild contour deformity and punctate calcifications.    ADRENALS: Unremarkable.  KIDNEYS/URETERS: Nonspecific mild bilateral perinephric stranding without hydroureteronephrosis. Question striated bilateral nephrogram. Right renal cyst again noted.  BLADDER: Mildly distended with air and fluid. Question trabeculated versus emphysematous bladder wall.  REPRODUCTIVE ORGANS: Partially visualized Whitmore catheter balloon at the level of the penile urethra.    BOWEL: No bowel obstruction. Focal outpouching of air along the posterior aspect of the terminal ileum, which may represent a diverticulum or appendix. No secondary signs of appendicitis. Rectosigmoid colon wall thickening surrounding inflammatory change. Colon diverticulosis.  PERITONEUM: No drainable fluid collection or free air.  VESSELS: Atherosclerosis. Again noted, ectatic infrarenal aorta containing intramural plaque/thrombus.  RETROPERITONEUM: No lymphadenopathy.  ABDOMINAL WALL/SOFT TISSUES: Post surgical changes along the anterior pelvic wall soft tissue.  BONES: Degenerative changes of the spine. Stable minimal retrolisthesis of L1 on L2, L2 on L3, L3 on L4 and L4 on L5.    IMPRESSION:    Rectosigmoid colitis. No bowel obstruction.    Nonspecific mild bilateral perinephric stranding without hydroureteronephrosis. Question striated bilateral nephrogram. Recommend clinical correlation to assess pyelonephritis. Question emphysematous cystitis versus bladder wall trabeculation.    Malpositioned Whitmore catheter at the level of the penile urethra. Recommend removal.    Dependent opacities in the left > right lower lobe, which may represent atelectasis. Recommend clinical correlation to assess aspiration.    Additional findings as described.     HPI: 89 y/o M PMHx BPH, CAD, DMII, GERD, HLD, SDH s/p craniotomy and surgical removal, urine rentention with Whitmore catheter, HTN presenting for [facility] with 1 day h/o hematuria. Whitmore was replaced in the NH last night after the pt pulled it out. Today, pt was noted to be febrile at the NH Tmax: 104, SBP 90, and hypoxic on RA Sp02 on RA. Also noted to be more confused and lethargic with gross hematuria. Pt not on AC due to recent SDH.Pt was admitted in early October (D/c 10 days ago) s/p fall with AMS and imaging + for SDH requiring craniotomy and evacuation also with aspiration PNA tx with IV abx. Whitmore was placed for urinary retention. Spoke with pt's son Caleb whiteside ESL  this evening. Updated him on pt's status; as the next of kin, he consented to CT w/ contrast imaging. MOLST was reviewed and received verbal consent; Son brought in paperwork and MOLST filled out, now DNI.     S: Pt seen and evaluated at bedside. No acute events overnight. Appears better. Still poor PO intake. Offers no other complaints. Whitmore in place.     O:   REVIEW OF SYSTEMS:  CONSTITUTIONAL: No weakness, fevers or chills  EYES/ENT: No visual changes;  No vertigo or throat pain   NECK: No pain or stiffness  RESPIRATORY: No cough, wheezing, hemoptysis; No shortness of breath  CARDIOVASCULAR: No chest pain or palpitations  GASTROINTESTINAL: No abdominal or epigastric pain. No nausea, vomiting, or hematemesis; No diarrhea or constipation  GENITOURINARY: No dysuria, frequency or hematuria, denies penile pain  NEUROLOGICAL: No numbness or weakness  SKIN: No itching, burning, rashes, or lesions   All other review of systems is negative unless indicated above    PHYSICAL EXAM:  Vital Signs Last 24 Hrs  T(C): 36.8 (31 Oct 2020 08:54), Max: 36.8 (31 Oct 2020 06:23)  T(F): 98.2 (31 Oct 2020 08:54), Max: 98.3 (31 Oct 2020 06:23)  HR: 63 (31 Oct 2020 08:54) (60 - 73)  BP: 115/73 (31 Oct 2020 08:54) (115/73 - 135/69)  RR: 18 (31 Oct 2020 08:54) (18 - 18)  SpO2: 95% (31 Oct 2020 08:54) (95% - 99%)      Gen:  NAD, +weak appearing   HEENT: EOMI, normal hearing, moist mucous membranes  Neck: Soft and supple,   Respiratory: CTABL, No wheezing, rales or rhonchi  Cardiovascular: S1S2+, RRR, no M/G/R  Gastrointestinal: BS+, soft, NT/ND, no guarding, no rebound  : Whitmore in place, +small amt of dried blood noted at urethral opening, no evidence of active bleeding   Extremities: No peripheral edema  Vascular: 2+ peripheral pulses  Neurological: AAOx3, no focal deficits  Skin: No rashes    MEDICATIONS  (STANDING):  aspirin  chewable 81 milliGRAM(s) Oral daily  bethanechol 50 milliGRAM(s) Oral two times a day  DAPTOmycin IVPB 440 milliGRAM(s) IV Intermittent every 24 hours  dextrose 5%. 1000 milliLiter(s) (50 mL/Hr) IV Continuous <Continuous>  dextrose 50% Injectable 12.5 Gram(s) IV Push once  dextrose 50% Injectable 25 Gram(s) IV Push once  dextrose 50% Injectable 25 Gram(s) IV Push once  finasteride 5 milliGRAM(s) Oral daily  heparin   Injectable 5000 Unit(s) SubCutaneous every 12 hours  insulin lispro (ADMELOG) corrective regimen sliding scale   SubCutaneous three times a day before meals  insulin lispro (ADMELOG) corrective regimen sliding scale   SubCutaneous at bedtime  metoprolol tartrate 25 milliGRAM(s) Oral two times a day  multivitamin 1 Tablet(s) Oral daily  QUEtiapine 50 milliGRAM(s) Oral at bedtime  silver sulfADIAZINE 1% Cream 1 Application(s) Topical two times a day  simvastatin 40 milliGRAM(s) Oral at bedtime  tamsulosin Oral Tab/Cap - Peds 0.4 milliGRAM(s) Oral at bedtime    MEDICATIONS  (PRN):  acetaminophen  Suppository .. 650 milliGRAM(s) Rectal every 6 hours PRN Temp greater or equal to 38C (100.4F)  dextrose 40% Gel 15 Gram(s) Oral once PRN Blood Glucose LESS THAN 70 milliGRAM(s)/deciliter  glucagon  Injectable 1 milliGRAM(s) IntraMuscular once PRN Glucose LESS THAN 70 milligrams/deciliter      LABS: All Labs Reviewed:                        8.9    8.37  )-----------( 235      ( 31 Oct 2020 07:57 )             28.2   10-31    139  |  109<H>  |  19  ----------------------------<  128<H>  4.3   |  24  |  0.71    Ca    8.4<L>      31 Oct 2020 07:57    TPro  5.4<L>  /  Alb  2.0<L>  /  TBili  0.6  /  DBili  x   /  AST  28  /  ALT  29  /  AlkPhos  123<H>  10-31        Creatine Kinase, Serum in AM (10.31.20 @ 07:57)    Creatine Kinase, Serum: 25 U/L    Creatine Kinase, Serum in AM (10.30.20 @ 07:03)    Creatine Kinase, Serum: 22 U/L          Blood Culture: 10-29 @ 08:08  Organism --  Gram Stain Blood -- Gram Stain --  Specimen Source .Blood None  Culture-Blood --    10-29 @ 08:04  Organism --  Gram Stain Blood -- Gram Stain --  Specimen Source .Blood None  Culture-Blood --    10-26 @ 20:52  Organism Enterococcus faecium (vancomycin resistant)  Gram Stain Blood -- Gram Stain --  Specimen Source .Urine None  Culture-Blood --    10-26 @ 20:09  Organism Blood Culture PCR  Gram Stain Blood -- Gram Stain   Growth in aerobic bottle: Gram positive cocci in pairs  Growth in anaerobic bottle: Gram positive cocci in pairs  Specimen Source .Blood Blood-Peripheral  Culture-Blood --      I&O's Summary    29 Oct 2020 07:01  -  30 Oct 2020 07:00  --------------------------------------------------------  IN: 0 mL / OUT: 700 mL / NET: -700 mL    30 Oct 2020 07:01  -  30 Oct 2020 20:09  --------------------------------------------------------  IN: 0 mL / OUT: 700 mL / NET: -700 mL      CAPILLARY BLOOD GLUCOSE      POCT Blood Glucose.: 108 mg/dL (30 Oct 2020 17:18)  POCT Blood Glucose.: 155 mg/dL (30 Oct 2020 11:19)  POCT Blood Glucose.: 133 mg/dL (30 Oct 2020 08:29)  POCT Blood Glucose.: 199 mg/dL (29 Oct 2020 21:13)      RADIOLOGY/EKG:    < from: 12 Lead ECG (10.26.20 @ 20:02) >  Diagnosis Line Sinus rhythm with Premature supraventricular complexes  Right bundle branch block  Cannot rule out Inferior infarct , age undetermined  Cannot rule out Anterior infarct (cited on or before 26-DEC-2019)  Abnormal ECG    < from: CT Head No Cont (10.26.20 @ 21:56) >  MPRESSION:  1. Persistent, large right-sided subdural hematoma that demonstrates components of variable age, including acute/subacute components) and measures up to 2.1 cm in thickness. This results in right-sided mass effect with right to left midline shift of approximately 6 mm. When compared to prior CT brain of 10/9/2020, a similar sized right holohemispheric subdural hematoma was demonstrated with similar appearance of the ventricles, mass effect and right to left midline shift.    < from: CT Abdomen and Pelvis w/ IV Cont (10.27.20 @ 01:42) >  EXAM:  CT ABDOMEN AND PELVIS IC                            PROCEDURE DATE:  10/27/2020          INTERPRETATION:  CLINICAL INFORMATION: Metabolic encephalopathy secondary to likely UTI, gross hematuria, sepsis.    PROCEDURE:  Helical axial images were obtained from the domes of the diaphragm through the pubic symphysis without intravenous or oral contrast. Coronal and sagittal reformats were also obtained.    COMPARISON: 10/4/2020 and 8/3/2010.    FINDINGS: Evaluation of the abdominal/pelvic organs, viscera and vasculature is limited without intravenous contrast. Artifact from the patient's arms degrades images.    LOWER CHEST: Centrilobular emphysema. Suggest nonspecific, interlobular septal thickening. Dependent opacities in the left > right lower lobe. Subsegmental atelectasis. Aortic valve, coronary artery and mitral annular calcification.    LIVER: Fatty infiltration near the fossa ligament again noted.  GALLBLADDER/BILE DUCTS: No intrahepatic or extrahepatic biliary dilatation. Cholelithiasis.  PANCREAS: Unremarkable.  SPLEEN: Again noted, mild contour deformity and punctate calcifications.    ADRENALS: Unremarkable.  KIDNEYS/URETERS: Nonspecific mild bilateral perinephric stranding without hydroureteronephrosis. Question striated bilateral nephrogram. Right renal cyst again noted.  BLADDER: Mildly distended with air and fluid. Question trabeculated versus emphysematous bladder wall.  REPRODUCTIVE ORGANS: Partially visualized Whitmore catheter balloon at the level of the penile urethra.    BOWEL: No bowel obstruction. Focal outpouching of air along the posterior aspect of the terminal ileum, which may represent a diverticulum or appendix. No secondary signs of appendicitis. Rectosigmoid colon wall thickening surrounding inflammatory change. Colon diverticulosis.  PERITONEUM: No drainable fluid collection or free air.  VESSELS: Atherosclerosis. Again noted, ectatic infrarenal aorta containing intramural plaque/thrombus.  RETROPERITONEUM: No lymphadenopathy.  ABDOMINAL WALL/SOFT TISSUES: Post surgical changes along the anterior pelvic wall soft tissue.  BONES: Degenerative changes of the spine. Stable minimal retrolisthesis of L1 on L2, L2 on L3, L3 on L4 and L4 on L5.    IMPRESSION:    Rectosigmoid colitis. No bowel obstruction.    Nonspecific mild bilateral perinephric stranding without hydroureteronephrosis. Question striated bilateral nephrogram. Recommend clinical correlation to assess pyelonephritis. Question emphysematous cystitis versus bladder wall trabeculation.    Malpositioned Whitmore catheter at the level of the penile urethra. Recommend removal.    Dependent opacities in the left > right lower lobe, which may represent atelectasis. Recommend clinical correlation to assess aspiration.    Additional findings as described.    < from: TTE Echo Complete w/o Contrast w/ Doppler (10.29.20 @ 14:21) >   EXAM:  ECHO TTE WO CON COMP W DOPP         PROCEDURE DATE:  10/29/2020        INTERPRETATION:  Transthoracic Echocardiography Report (TTE)     Demographics     Patient name          CAM VILLAREAL       Age           89 year(s)     Med Rec #        [id number]              Gender        Male     Account #             [id number]           Date of Birth 01/05/1931     Interpreting          Odalys Carson,   Room Number   0266   Physician             MD     Referring Physician   Hubert Coburn           Sonographer   Temp Tech     Date of study         10/29/2020 01:53 PM     Height                68.9 in                Weight        119.05 pounds    Type of Study:     TTE procedure: ECHO TTE WO CON COMP W DOP     BP: 130/75 mmHg     Study Location: 2STechnical Quality: Fair    M-Mode Measurements (cm)     LVEDd: 3.76 cm            LVESd: 2.03 cm   IVSEd: 1.38 cm   LVPWd: 1.38 cm            AO Root Dimension: 4.4 cm                             ACS: 2.7 cm      LA: 3.8 cm    Doppler Measurements:                                    MV Peak E-Wave: 69.6 cm/s   TR Velocity:189 cm/s           MV Peak A-Wave: 109 cm/s   TR Gradient:14.2884 mmHg       MV E/A Ratio: 0.64 %   Estimated RAP:5 mmHg           MV Peak Gradient: 1.94 mmHg     Findings     Mitral Valve   EA reversal of the mitral inflow consistent with reduced compliance of the   left ventricle.   Trace mitral regurgitation is present.   Mild mitral annular calcification is present.     Aortic Valve   Mild aortic sclerosis is present with normal valvular opening.     Tricuspid Valve   Trace tricuspid valve regurgitation is present.     Pulmonic Valve   Trace pulmonic valvular regurgitation is present.     Left Atrium   Normal appearing left atrium.     Left Ventricle   The left ventricle has normal wall motion and contractility.   Mild concentric left ventricular hypertrophy is present.   Estimated left ventricular ejection fraction is 60-65 %.     Right Atrium   Normal appearing right atrium.     Right Ventricle   Normal appearing right ventricle structure and function.     Pericardial Effusion   No evidence of pericardial effusion.     Pleural Effusion   No evidence of pleural effusion.     Miscellaneous   All visualized extra cardiac structures appears to be normal.     Impression     Summary     EA reversal of the mitral inflow consistent with reduced compliance of the   left ventricle.   Trace mitral regurgitation is present.   Mild mitral annular calcification is present.   Mild aortic sclerosis is present with normal valvular opening.   Trace tricuspid valve regurgitation is present.   Trace pulmonic valvular regurgitation is present.   Normal appearing left atrium.   The left ventricle has normal wall motion and contractility.   Mild concentric left ventricular hypertrophy is present.   Estimated left ventricular ejection fraction is 60-65 %.   Normal appearing right atrium.   Normal appearing right ventricle structure and function.     Signature     ----------------------------------------------------------------   Electronically signed by Odalys Carson MD(Interpreting   physician) on 10/29/2020 04:18 PM   ----------------------------------------------------------------    Valves     Mitral Valve     Peak E-Wave: 69.6 cm/s   Peak A-Wave: 109 cm/s   Peak Gradient: 1.94 mmHg                                                 E/A Ratio: 0.64     Tissue Doppler     E' Velocity: 9.16 cm/s     Aortic Valve     Cusp Separation: 2.7 cm     Tricuspid Valve     TR Velocity: 189 cm/s                    Estimated RAP: 5 mmHg   TR Gradient: 14.2884 mmHg     Pulmonic Valve              Estimated PASP: 19.29 mmHg    Structures     Left Atrium     LA Dimension: 3.8 cm   LA/Aorta: 0.86     Left Ventricle     Diastolic Dimension: 3.76 cm          Systolic Dimension: 2.03 cm   Septum Diastolic: 1.38 cm   PW Diastolic: 1.38 cm     FS: 46.01 %     Right Ventricle              RV Systolic Pressure: 19.29 mmHg     Miscellaneous     Aorta     Aortic Root: 4.4 cm    < end of copied text >

## 2020-10-31 NOTE — PROGRESS NOTE ADULT - ASSESSMENT
89 year old male patient with narrative as previously stated with mental status changes and hypotension, now AAOx3 with positive blood cultures for VRE,    #Encephalopathy, likely due to cysitis from dawson 2/2 VRE  -Appears better  -Leukocytosis resolved   -Initial blood cultures: VRE   -repeat blood cultures: NGTD   -TTE: no vegetations   -s/p Dawson replaced by urology   -On Daptomycin #4, will require 14 days IV daptomycin until -- 11/11  -daily CPK  -on contact precautions       #?Aspiration PNA?  -No current issues  -s/p Zosyn       #SDH s/p craniotomy  -Evaluated by Neurosurgery in ED  -No intervention by NSGY due to stable SDH on CTH;   -can restart ASA for CAD and Heparin for DVT prophylaxis   -neurosurgery recommendations noted     #HTN  -continue metoprolol tartrate 25mg BID oral    #BPH/Urinary retention  -on finasteride 5mg oral qd  -on tamsulosin 0.4mg oral qhs  -on Bethanechol 50mg BID  -Dawson repositioned  -C/w catheter change every 4 weeks (should be done through the NH)  -Patient is a poor candidate to consider outlet surgery  -urology recommendations noted     #CAD/HLD  -EKG reviewed, no acute changes noted  -ASA 81  -Simvastatin 40mg   -Metoprolol Tartrate 25mg BID     #DMII  -Low ISS-pre-meals  -Bedtime ISS  -A1C 5.7 (10/5)  -BGM qac qhs    #Stage II pressure ulcers  -L buttock and R sacrum on arrival to ED  -Wound consult  -Silvadene cream    #Dementia with features of agitation  Seroquel 50mg qhs    #DVT PPX  -SCDS     #Advance Directives  -Son: Caleb Finn who is next of kin and making medical decisions for pt as he has no capacity at this time  -DNI- MOL[facility] in chart- complete and signed as according to Living Will    #Dispo     89 year old male patient with narrative as previously stated with mental status changes and hypotension, now AAOx3 with positive blood cultures for VRE,    #Encephalopathy, likely due to cysitis from dawson 2/2 VRE  -Appears better  -Leukocytosis resolved   -Initial blood cultures: VRE   -repeat blood cultures: NGTD   -TTE: no vegetations   -s/p Dawson replaced by urology   -On Daptomycin #4, will require 14 days IV daptomycin until -- 11/11  -daily CPK  -on contact precautions       #?Aspiration PNA?  -No current issues  -s/p Zosyn       #SDH s/p craniotomy  -Evaluated by Neurosurgery in ED  -No intervention by NSGY due to stable SDH on CTH;   -can restart ASA for CAD and Heparin for DVT prophylaxis   -neurosurgery recommendations noted     #HTN  -continue metoprolol tartrate 25mg BID oral    #BPH/Urinary retention  -on finasteride 5mg oral qd  -on tamsulosin 0.4mg oral qhs  -on Bethanechol 50mg BID  -Dawson repositioned  -C/w catheter change every 4 weeks (should be done through the NH)  -Patient is a poor candidate to consider outlet surgery  -urology recommendations noted     #CAD/HLD  -EKG reviewed, no acute changes noted  -ASA 81  -Simvastatin 40mg   -Metoprolol Tartrate 25mg BID     #DMII  -Low ISS-pre-meals  -Bedtime ISS  -A1C 5.7 (10/5)  -BGM qac qhs    #Stage II pressure ulcers  -L buttock and R sacrum on arrival to ED  -Wound consult  -Silvadene cream    #Dementia with features of agitation  Seroquel 50mg qhs    #DVT PPX  -SCDS     #Advance Directives  -Son: Caleb Finn who is next of kin and making medical decisions for pt as he has no capacity at this time  -DNI- MOLST in chart- complete and signed as according to Living Will    #Dispo:  -PT consult   -Was previously at [facility]      89 year old male patient with narrative as previously stated with mental status changes and hypotension, now AAOx3 with positive blood cultures for VRE,    #Encephalopathy, likely due to cysitis from dawson 2/2 VRE  -Appears better  -Leukocytosis resolved   -Initial blood cultures: VRE   -repeat blood cultures: NGTD   -TTE: no vegetations   -s/p Dawson replaced by urology   -On Daptomycin #4, will require 14 days IV daptomycin until -- 11/11  -daily CPK  -on contact precautions       #?Aspiration PNA?  -No current issues  -s/p Zosyn       #SDH s/p craniotomy  -Evaluated by Neurosurgery in ED  -No intervention by NSGY due to stable SDH on CTH;   -can restart ASA for CAD and Heparin for DVT prophylaxis   -neurosurgery recommendations noted     #HTN  -continue metoprolol tartrate 25mg BID oral    #BPH/Urinary retention  -on finasteride 5mg oral qd  -on tamsulosin 0.4mg oral qhs  -on Bethanechol 50mg BID  -Dawson repositioned  -C/w catheter change every 4 weeks (should be done through the NH)  -Patient is a poor candidate to consider outlet surgery  -urology recommendations noted     #CAD/HLD  -EKG reviewed, no acute changes noted  -ASA 81  -Simvastatin 40mg   -Metoprolol Tartrate 25mg BID     #DMII  -Low ISS-pre-meals  -Bedtime ISS  -A1C 5.7 (10/5)  -BGM qac qhs    #Stage II pressure ulcers  -L buttock and R sacrum on arrival to ED  -Wound consult  -Silvadene cream    #Dementia with features of agitation  Seroquel 50mg qhs    #DVT PPX  -SCDS     #Advance Directives  -Son: Caleb Finn who is next of kin and making medical decisions for pt as he has no capacity at this time  -DNI- MOLST in chart- complete and signed as according to Living Will    #Dispo:  -PT consult: rehabilitation facility, 3:1 commode; rolling walker (5 inch wheels), balance training; bed mobility training; gait training; strengthening; transfer training  -Was previously at [facility]

## 2020-10-31 NOTE — PROGRESS NOTE ADULT - ASSESSMENT
88 y/o M PMHx BPH, CAD, DMII, GERD, HLD, SDH s/p craniotomy and surgical removal, urine retention with Dawson catheter, HTN presenting for [facility] with 1 day h/o hematuria. Dawson was replaced in the NH 10/25 after the pt pulled it out. 10/26 pt was noted to be febrile at the NH Tmax: 104, SBP 90, and hypoxic on RA Sp02 on RA. Also noted to be more confused and lethargic with gross hematuria. Pt not on AC due to recent SDH. Pt was admitted in early October (D/c 10 days ago) s/p fall with AMS and imaging + for SDH requiring craniotomy and evacuation also with aspiration PNA tx with IV abx. Dawson was placed for urinary retention/hematuria, UA with pyuria concerning for UTI. 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 Was given IV vancomycin/cefepime. dawson placed by urology.     1. sepsis with VRE, complicated cystitis. pyelonephritis. BPH/urinary retention. aspiration pna s/p abx course. R SDH  - urine culture/blood cultures growing VRE   - leukocytosis resolved, improving   - repeat blood cx 10/29 no growth   - on daptomycin #4, f/u cpk weekly while on dapto  - continue with IV antibiotic coverage  - completed IV zosyn #4-5 --> 10/30 for asp pna   - urology eval appreciated  - dawson care  - imaging reviewed  - aspiration precautions  - TTE no obvious vegetations  - tolerating abx well so far; no side effects noted  - reason for abx use and side effects reviewed with patient  - will require 14 days IV daptomycin until -- 11/11  - supportive care  - fu cbc    2. other issues - care per medicine

## 2020-11-01 LAB — CK SERPL-CCNC: 17 U/L — LOW (ref 26–308)

## 2020-11-01 PROCEDURE — 99232 SBSQ HOSP IP/OBS MODERATE 35: CPT | Mod: GC

## 2020-11-01 RX ADMIN — Medication 50 MILLIGRAM(S): at 09:34

## 2020-11-01 RX ADMIN — Medication 25 MILLIGRAM(S): at 21:12

## 2020-11-01 RX ADMIN — Medication 50 MILLIGRAM(S): at 21:11

## 2020-11-01 RX ADMIN — Medication 1 APPLICATION(S): at 21:13

## 2020-11-01 RX ADMIN — HEPARIN SODIUM 5000 UNIT(S): 5000 INJECTION INTRAVENOUS; SUBCUTANEOUS at 21:13

## 2020-11-01 RX ADMIN — Medication 81 MILLIGRAM(S): at 09:34

## 2020-11-01 RX ADMIN — FINASTERIDE 5 MILLIGRAM(S): 5 TABLET, FILM COATED ORAL at 09:33

## 2020-11-01 RX ADMIN — Medication 25 MILLIGRAM(S): at 09:33

## 2020-11-01 RX ADMIN — QUETIAPINE FUMARATE 50 MILLIGRAM(S): 200 TABLET, FILM COATED ORAL at 21:12

## 2020-11-01 RX ADMIN — SIMVASTATIN 40 MILLIGRAM(S): 20 TABLET, FILM COATED ORAL at 21:12

## 2020-11-01 RX ADMIN — DAPTOMYCIN 117.6 MILLIGRAM(S): 500 INJECTION, POWDER, LYOPHILIZED, FOR SOLUTION INTRAVENOUS at 12:48

## 2020-11-01 RX ADMIN — Medication 1 TABLET(S): at 09:33

## 2020-11-01 RX ADMIN — HEPARIN SODIUM 5000 UNIT(S): 5000 INJECTION INTRAVENOUS; SUBCUTANEOUS at 09:33

## 2020-11-01 RX ADMIN — TAMSULOSIN HYDROCHLORIDE 0.4 MILLIGRAM(S): 0.4 CAPSULE ORAL at 21:13

## 2020-11-01 RX ADMIN — Medication 1 APPLICATION(S): at 09:34

## 2020-11-01 NOTE — PROGRESS NOTE ADULT - SUBJECTIVE AND OBJECTIVE BOX
HPI:  89 y/o M PMHx BPH, CAD, DMII, GERD, HLD, SDH s/p craniotomy and surgical removal, urine rentention with Whitmore catheter, HTN presenting for [facility] with 1 day h/o hematuria. Whitmore was replaced in the NH last night after the pt pulled it out. Today, pt was noted to be febrile at the NH Tmax: 104, SBP 90, and hypoxic on RA Sp02 on RA. Also noted to be more confused and lethargic with gross hematuria. Pt not on AC due to recent SDH.  Pt was admitted in early October (D/c 10 days ago) s/p fall with AMS and imaging + for SDH requiring craniotomy and evacuation also with aspiration PNA tx with IV abx. Whitmore was placed for urinary retention.   Spoke with pt's son Caleb whiteside ESL  this evening. Updated him on pt's status; as the next of kin, he consented to CT w/ contrast imaging. MOLST was reviewed and received verbal consent; FULL CODE for now but son will bring in paperwork tomorrow to discuss with the day team.    SUBJECTIVE:   11/1/2020    Pt seen this morning eating breakfast. Pt reports having bowel movements as well. Denies having pain overnight.  No acute issues reported overnight. During  bedside rounds pt reports occasionally having b/l chest pain. He denies having chest pain while we were at bedside. No SOB or coughing reported and patient appears comfortable, not in any distress. Pt told to alert nurse if he begins having chest pain again. He denies having this pain in the past and states that it does not last long.       REVIEW OF SYSTEMS:    CONSTITUTIONAL: No weakness, fevers or chills  EYES/ENT: No visual changes;  No vertigo or throat pain   NECK: No pain or stiffness  RESPIRATORY: No cough, wheezing, hemoptysis; No shortness of breath  CARDIOVASCULAR: No chest pain or palpitations  GASTROINTESTINAL: No abdominal or epigastric pain. No nausea, vomiting, or hematemesis; No diarrhea or constipation. No melena or hematochezia.  GENITOURINARY: No dysuria or frequency   NEUROLOGICAL: No numbness or weakness  SKIN: No itching, burning, rashes, or lesions   All other review of systems is negative unless indicated above    Vital Signs Last 24 Hrs  T(C): 36.8 (01 Nov 2020 08:49), Max: 36.8 (31 Oct 2020 23:35)  T(F): 98.3 (01 Nov 2020 08:49), Max: 98.3 (01 Nov 2020 08:49)  HR: 72 (01 Nov 2020 08:49) (61 - 76)  BP: 128/73 (01 Nov 2020 08:49) (113/58 - 131/66)  BP(mean): --  RR: 16 (01 Nov 2020 08:49) (15 - 16)  SpO2: 97% (01 Nov 2020 08:49) (97% - 98%)    I&O's Summary    01 Nov 2020 07:01  -  01 Nov 2020 14:25  --------------------------------------------------------  IN: 0 mL / OUT: 150 mL / NET: -150 mL        CAPILLARY BLOOD GLUCOSE      POCT Blood Glucose.: 105 mg/dL (01 Nov 2020 08:44)  POCT Blood Glucose.: 133 mg/dL (31 Oct 2020 20:59)  POCT Blood Glucose.: 148 mg/dL (31 Oct 2020 17:58)      PHYSICAL EXAM:    Constitutional: NAD, awake and alert, +cachetic   HEENT: EOMI, Normal Hearing  Neck: Soft and supple  Respiratory: Breath sounds are clear bilaterally  Cardiovascular: S1 and S2, regular rate and rhythm, no Murmurs, gallops or rubs, no chest tenderness with palpation   Gastrointestinal: Bowel Sounds present, soft, nontender, nondistended, no guarding, no rebound  Extremities: No peripheral edema  Vascular: 2+ peripheral pulses  Neurological: A/O x 3  Skin: No rashes    MEDICATIONS:  MEDICATIONS  (STANDING):  aspirin  chewable 81 milliGRAM(s) Oral daily  bethanechol 50 milliGRAM(s) Oral two times a day  DAPTOmycin IVPB 440 milliGRAM(s) IV Intermittent every 24 hours  dextrose 5%. 1000 milliLiter(s) (50 mL/Hr) IV Continuous <Continuous>  dextrose 50% Injectable 12.5 Gram(s) IV Push once  dextrose 50% Injectable 25 Gram(s) IV Push once  dextrose 50% Injectable 25 Gram(s) IV Push once  finasteride 5 milliGRAM(s) Oral daily  heparin   Injectable 5000 Unit(s) SubCutaneous every 12 hours  insulin lispro (ADMELOG) corrective regimen sliding scale   SubCutaneous three times a day before meals  insulin lispro (ADMELOG) corrective regimen sliding scale   SubCutaneous at bedtime  metoprolol tartrate 25 milliGRAM(s) Oral two times a day  multivitamin 1 Tablet(s) Oral daily  QUEtiapine 50 milliGRAM(s) Oral at bedtime  silver sulfADIAZINE 1% Cream 1 Application(s) Topical two times a day  simvastatin 40 milliGRAM(s) Oral at bedtime  tamsulosin Oral Tab/Cap - Peds 0.4 milliGRAM(s) Oral at bedtime      LABS: All Labs Reviewed:                        8.9    8.37  )-----------( 235      ( 31 Oct 2020 07:57 )             28.2     10-31    139  |  109<H>  |  19  ----------------------------<  128<H>  4.3   |  24  |  0.71    Ca    8.4<L>      31 Oct 2020 07:57    TPro  5.4<L>  /  Alb  2.0<L>  /  TBili  0.6  /  DBili  x   /  AST  28  /  ALT  29  /  AlkPhos  123<H>  10-31      CARDIAC MARKERS ( 01 Nov 2020 08:23 )  x     / x     / 17 U/L / x     / x      CARDIAC MARKERS ( 31 Oct 2020 07:57 )  x     / x     / 25 U/L / x     / x          Blood Culture: 10-29 @ 08:08  Organism --  Gram Stain Blood -- Gram Stain --  Specimen Source .Blood None  Culture-Blood --    10-29 @ 08:04  Organism --  Gram Stain Blood -- Gram Stain --  Specimen Source .Blood None  Culture-Blood --        EXAM:  CT BRAIN                            PROCEDURE DATE:  10/26/2020        IMPRESSION:  1. Persistent, large right-sided subdural hematoma that demonstrates components of variable age, including acute/subacute components) and measures up to 2.1 cm in thickness. This results in right-sided mass effect with right to left midline shift of approximately 6 mm. When compared to prior CT brain of 10/9/2020, a similar sized right holohemispheric subdural hematoma was demonstrated with similar appearance of the ventricles, mass effect and right to left midline shift.        EXAM:  CT ABDOMEN AND PELVIS IC                            PROCEDURE DATE:  10/27/2020      IMPRESSION:    Rectosigmoid colitis. No bowelobstruction.    Nonspecific mild bilateral perinephric stranding without hydroureteronephrosis. Question striated bilateral nephrogram. Recommend clinical correlation to assess pyelonephritis. Question emphysematous cystitis versus bladder wall trabeculation.    Malpositioned Whitmore catheter at the level of the penile urethra. Recommend removal.    Dependent opacities in the left > right lower lobe, which may represent atelectasis. Recommend clinical correlation to assess aspiration.    Additionalfindings as described.

## 2020-11-01 NOTE — PROGRESS NOTE ADULT - ASSESSMENT
89 year old male patient with narrative as previously stated with mental status changes and hypotension, now AAOx3 with positive blood cultures for VRE,    #Encephalopathy, likely 2/2 infectious etiology  -Cystitis/ VRE   -No Leukocytosis   -Initial blood cultures: VRE 10/26  -repeat blood cultures: NGTD   -TTE: no vegetations   -s/p Dawson replaced by urology   -On Daptomycin #5, will require 14 days IV daptomycin until -- 11/11  -daily CPK  -Continue contact precautions     #Aspiration PNA  -s/p Zosyn   -Stable on RA  -Afebrile  -Aspiration precautions recommended    #SDH s/p craniotomy  -Evaluated by Neurosurgery in ED  -No intervention by NSGY due to stable SDH on CTH;   -Restarted ASA for CAD and Heparin for DVT prophylaxis   -Neurosurgery recommendations appreciated    #HTN  -Stable  -continue metoprolol tartrate 25mg PO BID     #BPH/Urinary retention  #Chronic dawson   -Ucx- VRE  -on finasteride 5mg oral qd  -on tamsulosin 0.4mg oral qhs  -on Bethanechol 50mg BID  -Dawson repositioned  -C/w catheter change every 4 weeks (should be done through the NH)  -Patient is a poor candidate to consider outlet surgery  -urology recommendations noted     #CAD  -EKG reviewed, no acute changes noted  -Continue aspirin 81mg QD  -Continue Simvastatin 40mg qhs  -Metoprolol Tartrate 25mg BID     #DMII  -Low ISS-pre-meals  -Bedtime ISS  -A1C 5.7 (10/5)  -BGM qD    #HLD  -Continue Simvastatin 40mg qhs    #Stage II pressure ulcers  -L buttock and R sacrum on arrival to ED  -Wound consult  -Silvadene cream    #Dementia with features of agitation  Seroquel 50mg qhs    #DVT PPX  -SCDS   -Hep SQ q12h    #Advance Directives  -Son: Caleb Finn who is next of kin and making medical decisions for pt as he has no capacity at this time  -DNI- MOLST in chart- complete and signed as according to Living Will  -Pt not DNR as discussed with RN this morning, will need further clarification     #Dispo  -PT consult: rehabilitation facility, 3:1 commode; rolling walker (5 inch wheels), balance training; bed mobility training; gait training; strengthening; transfer training  -Was previously at [facility]

## 2020-11-02 ENCOUNTER — TRANSCRIPTION ENCOUNTER (OUTPATIENT)
Age: 85
End: 2020-11-02

## 2020-11-02 LAB
CK SERPL-CCNC: 19 U/L — LOW (ref 26–308)
SARS-COV-2 RNA SPEC QL NAA+PROBE: SIGNIFICANT CHANGE UP

## 2020-11-02 PROCEDURE — 99239 HOSP IP/OBS DSCHRG MGMT >30: CPT | Mod: GC

## 2020-11-02 RX ORDER — DAPTOMYCIN 500 MG/10ML
440 INJECTION, POWDER, LYOPHILIZED, FOR SOLUTION INTRAVENOUS
Qty: 0 | Refills: 0 | DISCHARGE
Start: 2020-11-02

## 2020-11-02 RX ORDER — ASPIRIN/CALCIUM CARB/MAGNESIUM 324 MG
1 TABLET ORAL
Qty: 0 | Refills: 0 | DISCHARGE
Start: 2020-11-02

## 2020-11-02 RX ADMIN — Medication 1 APPLICATION(S): at 21:56

## 2020-11-02 RX ADMIN — QUETIAPINE FUMARATE 50 MILLIGRAM(S): 200 TABLET, FILM COATED ORAL at 21:49

## 2020-11-02 RX ADMIN — Medication 50 MILLIGRAM(S): at 11:30

## 2020-11-02 RX ADMIN — SIMVASTATIN 40 MILLIGRAM(S): 20 TABLET, FILM COATED ORAL at 21:49

## 2020-11-02 RX ADMIN — Medication 25 MILLIGRAM(S): at 21:49

## 2020-11-02 RX ADMIN — HEPARIN SODIUM 5000 UNIT(S): 5000 INJECTION INTRAVENOUS; SUBCUTANEOUS at 21:49

## 2020-11-02 RX ADMIN — DAPTOMYCIN 117.6 MILLIGRAM(S): 500 INJECTION, POWDER, LYOPHILIZED, FOR SOLUTION INTRAVENOUS at 11:29

## 2020-11-02 RX ADMIN — Medication 25 MILLIGRAM(S): at 11:30

## 2020-11-02 RX ADMIN — Medication 81 MILLIGRAM(S): at 11:30

## 2020-11-02 RX ADMIN — Medication 1 APPLICATION(S): at 11:31

## 2020-11-02 RX ADMIN — Medication 1 TABLET(S): at 11:30

## 2020-11-02 RX ADMIN — FINASTERIDE 5 MILLIGRAM(S): 5 TABLET, FILM COATED ORAL at 11:30

## 2020-11-02 RX ADMIN — Medication 50 MILLIGRAM(S): at 21:49

## 2020-11-02 RX ADMIN — HEPARIN SODIUM 5000 UNIT(S): 5000 INJECTION INTRAVENOUS; SUBCUTANEOUS at 11:30

## 2020-11-02 RX ADMIN — TAMSULOSIN HYDROCHLORIDE 0.4 MILLIGRAM(S): 0.4 CAPSULE ORAL at 21:49

## 2020-11-02 NOTE — PHYSICAL THERAPY INITIAL EVALUATION ADULT - DISCHARGE DISPOSITION, PT EVAL
Recommend continued skilled PT and [facility] when medically stable for discharge./rehabilitation facility

## 2020-11-02 NOTE — PHYSICAL THERAPY INITIAL EVALUATION ADULT - GENERAL OBSERVATIONS, REHAB EVAL
pt received in bed on 2S, on contact isolation precautions, +eunice. MD gave RN verbal OK for OOB. pt confused but cooperative with PT, no complaints. post session pt left sitting in bedside chair, call bell in reach, chair alarm on, oriented to call bell system.

## 2020-11-02 NOTE — PROGRESS NOTE ADULT - ASSESSMENT
88 y/o M PMHx BPH, CAD, DMII, GERD, HLD, SDH s/p craniotomy and surgical removal, urine retention with Dawson catheter, HTN presenting for [facility] with 1 day h/o hematuria. Dawson was replaced in the NH 10/25 after the pt pulled it out. 10/26 pt was noted to be febrile at the NH Tmax: 104, SBP 90, and hypoxic on RA Sp02 on RA. Also noted to be more confused and lethargic with gross hematuria. Pt not on AC due to recent SDH. Pt was admitted in early October (D/c 10 days ago) s/p fall with AMS and imaging + for SDH requiring craniotomy and evacuation also with aspiration PNA tx with IV abx. Dawson was placed for urinary retention/hematuria, UA with pyuria concerning for UTI. 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 Was given IV vancomycin/cefepime. dawson placed by urology.     1. sepsis with VRE, complicated cystitis. pyelonephritis. BPH/urinary retention. aspiration pna s/p abx course. R SDH  - urine culture/blood cultures growing VRE   - leukocytosis resolved, improving   - repeat blood cx 10/29 no growth   - on daptomycin #5, f/u cpk weekly while on dapto  - continue with IV antibiotic coverage  - completed IV zosyn #4-5 --> 10/30 for asp pna   - urology eval appreciated  - dawson care  - imaging reviewed  - aspiration precautions  - TTE no obvious vegetations  - tolerating abx well so far; no side effects noted  - reason for abx use and side effects reviewed with patient  - will require 14 days IV daptomycin 440mg daily until -- 11/11 via midline   - supportive care  - fu cbc    2. other issues - care per medicine

## 2020-11-02 NOTE — PHYSICAL THERAPY INITIAL EVALUATION ADULT - ADDITIONAL COMMENTS
pt with recently hospitalizations 10/5/2020 and 10/12/2020. prior to those hospitalizations, pt was independent, has cane/standard walker at home but was not using AD. pt was d/c from hospital on 10/16/2020 to [facility] for [facility].

## 2020-11-02 NOTE — PROGRESS NOTE ADULT - SUBJECTIVE AND OBJECTIVE BOX
Date of service: 20 @ 12:36    pt seen and examined  feels better  sitting in bed  dawson in place    ROS: no fever or chills; denies dizziness, no HA, no SOB or cough, no abdominal pain, no diarrhea or constipation;  no legs pain, no rashes    MEDICATIONS  (STANDING):  aspirin  chewable 81 milliGRAM(s) Oral daily  bethanechol 50 milliGRAM(s) Oral two times a day  DAPTOmycin IVPB 440 milliGRAM(s) IV Intermittent every 24 hours  dextrose 5%. 1000 milliLiter(s) (50 mL/Hr) IV Continuous <Continuous>  dextrose 50% Injectable 12.5 Gram(s) IV Push once  dextrose 50% Injectable 25 Gram(s) IV Push once  dextrose 50% Injectable 25 Gram(s) IV Push once  finasteride 5 milliGRAM(s) Oral daily  heparin   Injectable 5000 Unit(s) SubCutaneous every 12 hours  insulin lispro (ADMELOG) corrective regimen sliding scale   SubCutaneous three times a day before meals  insulin lispro (ADMELOG) corrective regimen sliding scale   SubCutaneous at bedtime  metoprolol tartrate 25 milliGRAM(s) Oral two times a day  multivitamin 1 Tablet(s) Oral daily  QUEtiapine 50 milliGRAM(s) Oral at bedtime  silver sulfADIAZINE 1% Cream 1 Application(s) Topical two times a day  simvastatin 40 milliGRAM(s) Oral at bedtime  tamsulosin Oral Tab/Cap - Peds 0.4 milliGRAM(s) Oral at bedtime      Vital Signs Last 24 Hrs  T(C): 36.9 (2020 07:41), Max: 36.9 (2020 07:41)  T(F): 98.5 (2020 07:41), Max: 98.5 (2020 07:41)  HR: 69 (2020 07:41) (69 - 75)  BP: 117/62 (2020 07:41) (114/65 - 128/67)  BP(mean): --  RR: 19 (2020 07:41) (17 - 20)  SpO2: 99% (2020 07:41) (96% - 100%)      PE:  Constitutional: frail looking  HEENT: NC/AT, EOMI, PERRLA, conjunctivae clear; ears and nose atraumatic; pharynx benign  Neck: supple; thyroid not palpable  Back: no tenderness  Respiratory: respiratory effort normal; clear to auscultation  Cardiovascular: S1S2 regular, no murmurs  Abdomen: soft, not tender, not distended, positive BS; liver and spleen WNL  Genitourinary: no suprapubic tenderness dawson in place  Lymphatic: no LN palpable  Musculoskeletal: no muscle tenderness, no joint swelling or tenderness  Extremities: no pedal edema  Neurological/ Psychiatric:  moving all extremities  Skin: no rashes; no palpable lesions    Labs: all available labs reviewed                            LIVER FUNCTIONS - ( 27 Oct 2020 06:41 )  Alb: 1.8 g/dL / Pro: 4.8 gm/dL / ALK PHOS: 122 U/L / ALT: 32 U/L / AST: 33 U/L / GGT: x           Urinalysis Basic - ( 26 Oct 2020 20:52 )    Color: Red / Appearance: Slightly Turbid / S.010 / pH: x  Gluc: x / Ketone: Negative  / Bili: Negative / Urobili: Negative mg/dL   Blood: x / Protein: 100 mg/dL / Nitrite: Negative   Leuk Esterase: Small / RBC: >50 /HPF / WBC 10-15 /HPF   Sq Epi: x / Non Sq Epi: Occasional / Bacteria: Few    Culture - Blood in AM (10.29.20 @ 08:08)   Specimen Source: .Blood None   Culture Results:   No growth to date. Culture - Blood in AM (10.29.20 @ 08:04)   Specimen Source: .Blood None   Culture Results:   No growth to date.   Culture - Urine (10.26.20 @ 20:52)   Specimen Source: .Urine None   Culture Results:   >100,000 CFU/ml Gram positive organisms Culture - Blood (10.26.20 @ 20:09)   Gram Stain:   Growth in anaerobic bottle: Gram positive cocci in pairs   Specimen Source: .Blood Blood-Peripheral   Culture Results:   Growth in anaerobic bottle: Gram positive cocci in pairs Culture - Blood (10.26.20 @ 20:09)   - Vancomycin resistant Enterococcus sp.: Detec   Gram Stain:   Growth in aerobic bottle: Gram positive cocci in pairs   Growth in anaerobic bottle: Gram positive cocci in pairs   Specimen Source: .Blood Blood-Peripheral   Organism: Blood Culture PCR   Culture Results:   Growth in aerobic bottle: Gram positive cocci in pairs   Growth in anaerobic bottle: Gram positive cocci in pairs   "Due to technical problems, Proteus sp. will Not be reported as part of   the BCID panel until further notice"   ***Blood Panel PCR results on this specimen are available   approximately 3 hours after the Gram stain result.***   Gram stain, PCR, and/or culture results may not always   correspond due to difference in methodologies.   ************************************************************   This PCR assay was performed using [employer].   The following targets are tested for: Enterococcus,   vancomycin resistant enterococci, Listeria monocytogenes,   coagulase negative staphylococci, S. aureus,   methicillin resistant S. aureus,Streptococcus agalactiae   (Group B), S. pneumoniae, S. pyogenes (Group A),   Acinetobacter baumannii, Enterobacter cloacae, E. coli,   Klebsiella oxytoca, K. pneumoniae, Proteus sp.,   Serratia marcescens, Haemophilus influenzae,   Neisseria meningitidis, Pseudomonas aeruginosa, Candida   albicans, C. glabrata, C krusei, C parapsilosis,   C. tropicalis and the KPC resistance gene.   Organism Identification: Blood Culture PCR   Method Type: PCR     Radiology: all available radiological tests reviewed      EXAM:  CT ABDOMEN AND PELVIS IC                            PROCEDURE DATE:  10/27/2020          INTERPRETATION:  CLINICAL INFORMATION: Metabolic encephalopathy secondary to likely UTI, gross hematuria, sepsis.    PROCEDURE:  Helical axial images were obtained from the domes of the diaphragm through the pubic symphysis without intravenous or oral contrast. Coronal and sagittal reformats were also obtained.    COMPARISON: 10/4/2020 and 8/3/2010.    FINDINGS: Evaluation of the abdominal/pelvic organs, viscera and vasculature is limited without intravenous contrast. Artifact from the patient's arms degrades images.    LOWER CHEST: Centrilobular emphysema. Suggest nonspecific, interlobular septal thickening. Dependent opacities in the left > right lower lobe. Subsegmental atelectasis. Aortic valve, coronary artery and mitral annular calcification.    LIVER: Fatty infiltration near the fossa ligament again noted.  GALLBLADDER/BILE DUCTS: No intrahepatic or extrahepatic biliary dilatation. Cholelithiasis.  PANCREAS: Unremarkable.  SPLEEN: Again noted, mild contour deformity and punctate calcifications.    ADRENALS: Unremarkable.  KIDNEYS/URETERS: Nonspecific mild bilateral perinephric stranding without hydroureteronephrosis. Question striated bilateral nephrogram. Right renal cyst again noted.  BLADDER: Mildly distended with air and fluid. Question trabeculated versus emphysematous bladder wall.  REPRODUCTIVE ORGANS: Partially visualized Dawson catheter balloon at the level of the penile urethra.    BOWEL: No bowel obstruction. Focal outpouching of air along the posterior aspect of the terminal ileum, which may represent a diverticulum or appendix. No secondary signs of appendicitis. Rectosigmoid colon wall thickening surrounding inflammatory change. Colon diverticulosis.  PERITONEUM: No drainable fluid collection or free air.  VESSELS: Atherosclerosis. Again noted, ectatic infrarenal aorta containing intramural plaque/thrombus.  RETROPERITONEUM: No lymphadenopathy.  ABDOMINAL WALL/SOFT TISSUES: Post surgical changes along the anterior pelvic wall soft tissue.  BONES: Degenerative changes of the spine. Stable minimal retrolisthesis of L1 on L2, L2 on L3, L3 on L4 and L4 on L5.    IMPRESSION:    Rectosigmoid colitis. No bowelobstruction.    Nonspecific mild bilateral perinephric stranding without hydroureteronephrosis. Question striated bilateral nephrogram. Recommend clinical correlation to assess pyelonephritis. Question emphysematous cystitis versus bladder wall trabeculation.    Malpositioned Dawson catheter at the level of the penile urethra. Recommend removal.    Dependent opacities in the left > right lower lobe, which may represent atelectasis. Recommend clinical correlation to assess aspiration.    Additionalfindings as described.          < end of copied text >  < from: CT Head No Cont (10.26.20 @ 21:56) >  EXAM:  CT BRAIN                            PROCEDURE DATE:  10/26/2020          INTERPRETATION:  CLINICAL INFORMATION:  AMS, prior SDH treated by surgery.    TECHNIQUE:  Axial CT images were acquired through the head.  Intravenous contrast: None  Two-dimensional reformats were generated.    COMPARISON STUDY: CT brain 10/9/2020    FINDINGS: The patient is status post right-sided frontoparietal craniotomy again demonstrated. Once again, there is a large holohemispheric right-sided subdural collection demonstrating variable attenuation, consistent with a subdural hematoma with acute, intermediate and chronic components. This collection measures 2.1 cm in thickness. This results in adjacent mass effect with effacement of the right lateral ventricle and right to left midline shift of approximately 6 mm. Chronic right corona radiata lacunar infarct again seen.    Imaged portions of the orbits, and left mastoid air cells are grossly unremarkable. Tiny retention cyst/polyp left ethmoid sinus. Mild opacification of the inferior right mastoid air cells seen. Calcific atherosclerosis of bilateral cavernous ICAs.    IMPRESSION:  1. Persistent, large right-sided subdural hematoma that demonstrates components of variable age, including acute/subacute components) and measures up to 2.1 cm in thickness. This results in right-sided mass effect with right to left midline shift of approximately 6 mm. When compared to prior CT brain of 10/9/2020, a similar sized right holohemispheric subdural hematoma was demonstrated with similar appearance of the ventricles, mass effect and right to left midline shift.      < end of copied text >    Advanced directives addressed: full resuscitation

## 2020-11-02 NOTE — PROGRESS NOTE ADULT - ASSESSMENT
Pt has been seen and examined with FP resident, resident supervised agree with a/p       Patient is a 89y old  Male who presents with a chief complaint of hematuria, fever, hypotension (01 Nov 2020 10:05)          PHYSICAL EXAM:  Vital Signs Last 24 Hrs  T(C): 36.9 (02 Nov 2020 07:41), Max: 36.9 (02 Nov 2020 07:41)  T(F): 98.5 (02 Nov 2020 07:41), Max: 98.5 (02 Nov 2020 07:41)  HR: 69 (02 Nov 2020 07:41) (69 - 75)  BP: 117/62 (02 Nov 2020 07:41) (114/65 - 128/67)  BP(mean): --  RR: 19 (02 Nov 2020 07:41) (17 - 20)  SpO2: 99% (02 Nov 2020 07:41) (96% - 100%)  -rs-aeeb, cta  -cvs-s1s2 normal   -p/a-soft,bs+      A/P    #d/c today with further management as an outpt, time spent 45 minutes

## 2020-11-02 NOTE — PHYSICAL THERAPY INITIAL EVALUATION ADULT - PERTINENT HX OF CURRENT PROBLEM, REHAB EVAL
89M presents from [facility] with a chief complaint of hematuria, fever, hypotension.

## 2020-11-02 NOTE — DISCHARGE NOTE PROVIDER - NSDCCPCAREPLAN_GEN_ALL_CORE_FT
PRINCIPAL DISCHARGE DIAGNOSIS  Diagnosis: Metabolic encephalopathy  Assessment and Plan of Treatment: -Cystitis/ VRE   -No Leukocytosis   -Initial blood cultures: VRE 10/26  -repeat blood cultures: NGTD   -TTE: no vegetations   -s/p Whitmore replaced by urology   -On Daptomycin #6, will require 14 days IV daptomycin until -- 11/11  Continue daptomycin as an outpatient         SECONDARY DISCHARGE DIAGNOSES  Diagnosis: Urinary retention  Assessment and Plan of Treatment: -Continue finasteride 5mg oral qd  -Continue tamsulosin 0.4mg oral qhs  -On Bethanechol 50mg BID  -Whitmore repositioned  -C/w catheter change every 4 weeks (should be done through the NH)  -Patient is a poor candidate to consider outlet surgery  -urology recommendations noted       Diagnosis: Hypotension, unspecified hypotension type  Assessment and Plan of Treatment: BP stable    Diagnosis: Subdural hematoma  Assessment and Plan of Treatment: -Evaluated by Neurosurgery in ED  -No intervention by NSGY due to stable SDH on CTH;   -Restarted ASA for CAD and Heparin for DVT prophylaxis   -Neurosurgery recommendations appreciated      Diagnosis: Gross hematuria  Assessment and Plan of Treatment: Stable    Diagnosis: Sepsis  Assessment and Plan of Treatment: due to UTI  Continue daptomycin     PRINCIPAL DISCHARGE DIAGNOSIS  Diagnosis: Metabolic encephalopathy  Assessment and Plan of Treatment: During your hospital stay, you were found to have metabolic encephalopathy.  Infectious disease doctor started a treatment of daptomycin for 14 days, during your stay you were given 6 days, so you will need to continue with an additional 8 days outpatient.    Please follow up with your PCP outpatient for further management and treatemnt.  If the symptoms return or you feel shortness of breath, palpitations, chest pain or fever please go to the ER or call 911.        SECONDARY DISCHARGE DIAGNOSES  Diagnosis: Urinary retention  Assessment and Plan of Treatment: During your stay you had problems urinating so we started a treatmetn with finasteride 5mg oral, tamsulosin 0.4mg oral and Bethanechol 50mg twice a day.  Pleas follow up with your PCP outpatient for further treatment and management.      Diagnosis: Hypotension, unspecified hypotension type  Assessment and Plan of Treatment: BP stable. Follow up with you PCP outpatient for further managment and treatment.    Diagnosis: Subdural hematoma  Assessment and Plan of Treatment: You were evaluated by Neurosurgery in ED for a subdural hematoma that was found on your CT at the moment no intervention was done, please follow up with your PCP outpatient for further management and treatemnt.      Diagnosis: Gross hematuria  Assessment and Plan of Treatment: Stable    Diagnosis: Sepsis  Assessment and Plan of Treatment: due to your Urinary infection  Continue daptomycin outpatient and follow up with your PCP when you finish the treatment.     PRINCIPAL DISCHARGE DIAGNOSIS  Diagnosis: Metabolic encephalopathy  Assessment and Plan of Treatment: During your hospital stay, you were found to have metabolic encephalopathy.  Infectious disease doctor started a treatment of daptomycin for 14 days, during your stay you were given 6 days, so you will need to continue with an additional 8 days outpatient.    Please follow up with your PCP outpatient for further management and treatemnt.  If the symptoms return or you feel shortness of breath, palpitations, chest pain or fever please go to the ER or call 911.        SECONDARY DISCHARGE DIAGNOSES  Diagnosis: Pressure ulcer of sacral region, stage 2  Assessment and Plan of Treatment: Continue Silvadene cream.  Follow up with your PCP outpatient for futher management and treatment.    Diagnosis: Type 2 diabetes mellitus  Assessment and Plan of Treatment: Follow up with your PCP outpatient for further management and treatment.    Diagnosis: BPH (benign prostatic hyperplasia)  Assessment and Plan of Treatment: During your stay you had problems urinating so we started a treatmetn with finasteride 5mg oral, tamsulosin 0.4mg oral and Bethanechol 50mg twice a day.  Pleas follow up with your PCP outpatient for further treatment and management.    Diagnosis: Urinary retention  Assessment and Plan of Treatment: During your stay you had problems urinating so we started a treatmetn with finasteride 5mg oral, tamsulosin 0.4mg oral and Bethanechol 50mg twice a day.  Pleas follow up with your PCP outpatient for further treatment and management.      Diagnosis: Hypotension, unspecified hypotension type  Assessment and Plan of Treatment: BP stable. Follow up with you PCP outpatient for further managment and treatment.    Diagnosis: Subdural hematoma  Assessment and Plan of Treatment: You were evaluated by Neurosurgery in ED for a subdural hematoma that was found on your CT at the moment no intervention was done, please follow up with your PCP outpatient for further management and treatemnt.      Diagnosis: Gross hematuria  Assessment and Plan of Treatment: Stable    Diagnosis: Sepsis  Assessment and Plan of Treatment: due to your Urinary infection  Continue daptomycin outpatient and follow up with your PCP when you finish the treatment.     PRINCIPAL DISCHARGE DIAGNOSIS  Diagnosis: Metabolic encephalopathy  Assessment and Plan of Treatment: During your hospital stay, you were found to have metabolic encephalopathy.  Infectious disease doctor started a treatment of daptomycin for 14 days, during your stay you were given 6 days, so you will need to continue with an additional 8 days outpatient.    Please follow up with your PCP outpatient for further management and treatemnt.  If the symptoms return or you feel shortness of breath, palpitations, chest pain or fever please go to the ER or call 911.        SECONDARY DISCHARGE DIAGNOSES  Diagnosis: Pressure ulcer of sacral region, stage 2  Assessment and Plan of Treatment: Continue Silvadene cream.  Follow up with your PCP outpatient for futher management and treatment.    Diagnosis: Type 2 diabetes mellitus  Assessment and Plan of Treatment: Follow up with your PCP outpatient for further management and treatment.    Diagnosis: BPH (benign prostatic hyperplasia)  Assessment and Plan of Treatment: During your stay you had problems urinating so we started a treatmetn with finasteride 5mg oral, tamsulosin 0.4mg oral and Bethanechol 50mg twice a day.  Pleas follow up with your PCP outpatient for further treatment and management.    Diagnosis: Urinary retention  Assessment and Plan of Treatment: During your stay you had problems urinating so we started a treatmetn with finasteride 5mg oral, tamsulosin 0.4mg oral and Bethanechol 50mg twice a day.  Pleas follow up with your PCP outpatient for further treatment and management.      Diagnosis: Hypotension, unspecified hypotension type  Assessment and Plan of Treatment: BP stable. Follow up with you PCP outpatient for further managment and treatment.    Diagnosis: Subdural hematoma  Assessment and Plan of Treatment: You were evaluated by Neurosurgery in ED for a subdural hematoma that was found on your CT at the moment no intervention was done, please follow up with your PCP outpatient for further management and treatemnt.      Diagnosis: Gross hematuria  Assessment and Plan of Treatment: Stable    Diagnosis: Paraphimosis  Assessment and Plan of Treatment: Keep our penis every day around the tip.    Everytime your foreskin is pulled back, make sure it returns to its original position.    Diagnosis: Sepsis  Assessment and Plan of Treatment: due to your Urinary infection  Continue daptomycin outpatient and follow up with your PCP when you finish the treatment.     PRINCIPAL DISCHARGE DIAGNOSIS  Diagnosis: Metabolic encephalopathy  Assessment and Plan of Treatment: During your hospital stay, you were found to have metabolic encephalopathy.  Infectious disease doctor started a treatment of daptomycin for 14 days, during your stay you were given 7 days, so you will need to continue with an additional 7 days outpatient.    Please follow up with your PCP outpatient for further management and treatemnt.  If the symptoms return or you feel shortness of breath, palpitations, chest pain or fever please go to the ER or call 911.        SECONDARY DISCHARGE DIAGNOSES  Diagnosis: Pressure ulcer of sacral region, stage 2  Assessment and Plan of Treatment: Continue Silvadene cream.  Follow up with your PCP outpatient for futher management and treatment.    Diagnosis: Type 2 diabetes mellitus  Assessment and Plan of Treatment: Follow up with your PCP outpatient for further management and treatment.    Diagnosis: BPH (benign prostatic hyperplasia)  Assessment and Plan of Treatment: During your stay you had problems urinating so we started a treatmetn with finasteride 5mg oral, tamsulosin 0.4mg oral and Bethanechol 50mg twice a day.  Pleas follow up with your PCP outpatient for further treatment and management.    Diagnosis: Urinary retention  Assessment and Plan of Treatment: During your stay you had problems urinating so we started a treatmetn with finasteride 5mg oral, tamsulosin 0.4mg oral and Bethanechol 50mg twice a day.  Pleas follow up with your PCP outpatient for further treatment and management.      Diagnosis: Hypotension, unspecified hypotension type  Assessment and Plan of Treatment: BP stable. Follow up with you PCP outpatient for further managment and treatment.    Diagnosis: Subdural hematoma  Assessment and Plan of Treatment: You were evaluated by Neurosurgery in ED for a subdural hematoma that was found on your CT at the moment no intervention was done, please follow up with your PCP outpatient for further management and treatemnt.      Diagnosis: Gross hematuria  Assessment and Plan of Treatment: Stable    Diagnosis: Paraphimosis  Assessment and Plan of Treatment: Keep our penis every day around the tip.    Everytime your foreskin is pulled back, make sure it returns to its original position.    Diagnosis: Sepsis  Assessment and Plan of Treatment: due to your Urinary infection  Continue daptomycin outpatient and follow up with your PCP when you finish the treatment.

## 2020-11-02 NOTE — PROGRESS NOTE ADULT - ASSESSMENT
89 year old male patient with narrative as previously stated with mental status changes and hypotension, now AAOx3 with positive blood cultures for VRE,    #Encephalopathy, likely 2/2 infectious etiology  -Cystitis/ VRE   -No Leukocytosis   -Initial blood cultures: VRE 10/26  -repeat blood cultures: NGTD   -TTE: no vegetations   -s/p Dawson replaced by urology   -On Daptomycin #5, will require 14 days IV daptomycin until -- 11/11  -daily CPK  -Continue contact precautions     #Aspiration PNA  -s/p Zosyn   -Stable on RA  -Afebrile  -Aspiration precautions recommended    #SDH s/p craniotomy  -Evaluated by Neurosurgery in ED  -No intervention by NSGY due to stable SDH on CTH;   -Restarted ASA for CAD and Heparin for DVT prophylaxis   -Neurosurgery recommendations appreciated    #HTN  -Stable  -continue metoprolol tartrate 25mg PO BID     #BPH/Urinary retention  #Chronic dawson   -Ucx- VRE  -on finasteride 5mg oral qd  -on tamsulosin 0.4mg oral qhs  -on Bethanechol 50mg BID  -Dawson repositioned  -C/w catheter change every 4 weeks (should be done through the NH)  -Patient is a poor candidate to consider outlet surgery  -urology recommendations noted     #CAD  -EKG reviewed, no acute changes noted  -Continue aspirin 81mg QD  -Continue Simvastatin 40mg qhs  -Metoprolol Tartrate 25mg BID     #DMII  -Low ISS-pre-meals  -Bedtime ISS  -A1C 5.7 (10/5)  -BGM qD    #HLD  -Continue Simvastatin 40mg qhs    #Stage II pressure ulcers  -L buttock and R sacrum on arrival to ED  -Wound consult  -Silvadene cream    #Dementia with features of agitation  Seroquel 50mg qhs    #DVT PPX  -SCDS   -Hep SQ q12h    #Advance Directives  -Son: Caleb Finn who is next of kin and making medical decisions for pt as he has no capacity at this time  -DNI- MOLST in chart- complete and signed as according to Living Will  -Pt not DNR as discussed with RN this morning, will need further clarification     #Dispo  -PT consult: rehabilitation facility, 3:1 commode; rolling walker (5 inch wheels), balance training; bed mobility training; gait training; strengthening; transfer training  -Was previously at [facility]      89 year old male patient with narrative as previously stated with mental status changes and hypotension, now AAOx3 with positive blood cultures for VRE,    #Encephalopathy, likely 2/2 infectious etiology  -Cystitis/ VRE   -No Leukocytosis   -Initial blood cultures: VRE 10/26  -repeat blood cultures: NGTD   -TTE: no vegetations   -s/p Dawson replaced by urology   -On Daptomycin #6, will require 14 days IV daptomycin until -- 11/11  -daily CPK  -Continue contact precautions     #Aspiration PNA  -s/p Zosyn   -Stable on RA  -Afebrile  -Aspiration precautions recommended    #SDH s/p craniotomy  -Evaluated by Neurosurgery in ED  -No intervention by NSGY due to stable SDH on CTH;   -Restarted ASA for CAD and Heparin for DVT prophylaxis   -Neurosurgery recommendations appreciated    #HTN  -Stable  -continue metoprolol tartrate 25mg PO BID     #BPH/Urinary retention  #Chronic dawson   -Ucx- VRE  -on finasteride 5mg oral qd  -on tamsulosin 0.4mg oral qhs  -on Bethanechol 50mg BID  -Dawson repositioned  -C/w catheter change every 4 weeks (should be done through the NH)  -Patient is a poor candidate to consider outlet surgery  -urology recommendations noted     #CAD  -EKG reviewed, no acute changes noted  -Continue aspirin 81mg QD  -Continue Simvastatin 40mg qhs  -Continue Metoprolol Tartrate 25mg BID     #DMII  -A1C 5.7 (10/5)  -POCT glucose once a day    #HLD  -Continue Simvastatin 40mg qhs    #Stage II pressure ulcers  -L buttock and R sacrum on arrival to ED  -Wound care consulted  -Silvadene cream    #Dementia with features of agitation  -Continue Seroquel 50mg qhs    #DVT PPX  -SCDS   -Heparin SQ q12h    #Advance Directives  -Son: Caleb Finn who is next of kin and making medical decisions for pt as he has no capacity at this time  -DNI- MOLST in chart- complete and signed as according to Living Will  -Pt not DNR as discussed with RN this morning, will need further clarification     #Dispo  -Pending negative Covid-19 PCR test for d/c  -Facility concern of daptomycin price.  Will contact the facility regarding patient daptomycin treatment.   -PT consult: rehabilitation facility, 3:1 commode; rolling walker (5 inch wheels), balance training; bed mobility training; gait training; strengthening; transfer training  -Was previously at [facility]

## 2020-11-02 NOTE — PROGRESS NOTE ADULT - SUBJECTIVE AND OBJECTIVE BOX
HPI:  89 y/o M PMHx BPH, CAD, DMII, GERD, HLD, SDH s/p craniotomy and surgical removal, urine rentention with Whitmore catheter, HTN presenting for [facility] with 1 day h/o hematuria. Whitmore was replaced in the NH last night after the pt pulled it out. Today, pt was noted to be febrile at the NH Tmax: 104, SBP 90, and hypoxic on RA Sp02 on RA. Also noted to be more confused and lethargic with gross hematuria. Pt not on AC due to recent SDH.  Pt was admitted in early October (D/c 10 days ago) s/p fall with AMS and imaging + for SDH requiring craniotomy and evacuation also with aspiration PNA tx with IV abx. Whitmore was placed for urinary retention.   Spoke with pt's son Caleb whiteside ESL  this evening. Updated him on pt's status; as the next of kin, he consented to CT w/ contrast imaging. MOLST was reviewed and received verbal consent; FULL CODE for now but son will bring in paperwork tomorrow to discuss with the day team.    SUBJECTIVE:   11/2/2020    Patient seen and examined at bedside. No events overnight.  Patient mentions the chest pain he was having before is getting better.  He denies any SOB, coughing, palpitations, diaphoresis or headache.   Patient mentions he wants to go home with his son.  Plan was discussed with the patient and we explained that he needs another negative test so he can go back to the facility.     REVIEW OF SYSTEMS:  CONSTITUTIONAL: No weakness, fevers or chills  EYES/ENT: No visual changes;  No vertigo or throat pain   NECK: No pain or stiffness  RESPIRATORY: No cough, wheezing, hemoptysis; No shortness of breath  CARDIOVASCULAR: +Chest discomfort, no palpitations.  GASTROINTESTINAL: No abdominal or epigastric pain. No nausea, vomiting, or hematemesis; No diarrhea or constipation. No melena or hematochezia.  GENITOURINARY: No dysuria or frequency   NEUROLOGICAL: No numbness or weakness  SKIN: No itching, burning, rashes, or lesions   All other review of systems is negative unless indicated above    Vital Signs Last 24 Hrs  T(C): 36.9 (02 Nov 2020 07:41), Max: 36.9 (02 Nov 2020 07:41)  T(F): 98.5 (02 Nov 2020 07:41), Max: 98.5 (02 Nov 2020 07:41)  HR: 69 (02 Nov 2020 07:41) (69 - 75)  BP: 117/62 (02 Nov 2020 07:41) (114/65 - 128/67)  RR: 19 (02 Nov 2020 07:41) (17 - 20)  SpO2: 99% (02 Nov 2020 07:41) (96% - 100%)    I&O's Summary  01 Nov 2020 07:01  -  02 Nov 2020 07:00  --------------------------------------------------------  IN: 0 mL / OUT: 675 mL / NET: -675 mL    CAPILLARY BLOOD GLUCOSE  POCT Blood Glucose.: 134 mg/dL (02 Nov 2020 08:46)    PHYSICAL EXAM:  Constitutional: NAD, awake and alert, +cachetic   HEENT: EOMI, Normal Hearing  Neck: Soft and supple  Respiratory: Breath sounds are clear bilaterally  Cardiovascular: S1 and S2, regular rate and rhythm, no Murmurs, gallops or rubs, no chest tenderness with palpation   Gastrointestinal: Bowel Sounds present, soft, nontender, nondistended, no guarding, no rebound  Extremities: No peripheral edema  Vascular: 2+ peripheral pulses  Neurological: A/O x 3  Skin: No rashes    MEDICATIONS  (STANDING):  aspirin  chewable 81 milliGRAM(s) Oral daily  bethanechol 50 milliGRAM(s) Oral two times a day  DAPTOmycin IVPB 440 milliGRAM(s) IV Intermittent every 24 hours  dextrose 5%. 1000 milliLiter(s) (50 mL/Hr) IV Continuous <Continuous>  dextrose 50% Injectable 12.5 Gram(s) IV Push once  dextrose 50% Injectable 25 Gram(s) IV Push once  dextrose 50% Injectable 25 Gram(s) IV Push once  finasteride 5 milliGRAM(s) Oral daily  heparin   Injectable 5000 Unit(s) SubCutaneous every 12 hours  insulin lispro (ADMELOG) corrective regimen sliding scale   SubCutaneous three times a day before meals  insulin lispro (ADMELOG) corrective regimen sliding scale   SubCutaneous at bedtime  metoprolol tartrate 25 milliGRAM(s) Oral two times a day  multivitamin 1 Tablet(s) Oral daily  QUEtiapine 50 milliGRAM(s) Oral at bedtime  silver sulfADIAZINE 1% Cream 1 Application(s) Topical two times a day  simvastatin 40 milliGRAM(s) Oral at bedtime  tamsulosin Oral Tab/Cap - Peds 0.4 milliGRAM(s) Oral at bedtime    MEDICATIONS  (PRN):  acetaminophen  Suppository .. 650 milliGRAM(s) Rectal every 6 hours PRN Temp greater or equal to 38C (100.4F)  dextrose 40% Gel 15 Gram(s) Oral once PRN Blood Glucose LESS THAN 70 milliGRAM(s)/deciliter  glucagon  Injectable 1 milliGRAM(s) IntraMuscular once PRN Glucose LESS THAN 70 milligrams/deciliter      LABS: All Labs Reviewed:                        8.9    8.37  )-----------( 235      ( 31 Oct 2020 07:57 )             28.2     10-31    139  |  109<H>  |  19  ----------------------------<  128<H>  4.3   |  24  |  0.71    Ca    8.4<L>      31 Oct 2020 07:57    TPro  5.4<L>  /  Alb  2.0<L>  /  TBili  0.6  /  DBili  x   /  AST  28  /  ALT  29  /  AlkPhos  123<H>  10-31      CARDIAC MARKERS ( 01 Nov 2020 08:23 )  x     / x     / 17 U/L / x     / x      CARDIAC MARKERS ( 31 Oct 2020 07:57 )  x     / x     / 25 U/L / x     / x          Blood Culture: 10-29 @ 08:08  Organism --  Gram Stain Blood -- Gram Stain --  Specimen Source .Blood None  Culture-Blood --    10-29 @ 08:04  Organism --  Gram Stain Blood -- Gram Stain --  Specimen Source .Blood None  Culture-Blood --        EXAM:  CT BRAIN                            PROCEDURE DATE:  10/26/2020        IMPRESSION:  1. Persistent, large right-sided subdural hematoma that demonstrates components of variable age, including acute/subacute components) and measures up to 2.1 cm in thickness. This results in right-sided mass effect with right to left midline shift of approximately 6 mm. When compared to prior CT brain of 10/9/2020, a similar sized right holohemispheric subdural hematoma was demonstrated with similar appearance of the ventricles, mass effect and right to left midline shift.        EXAM:  CT ABDOMEN AND PELVIS IC                            PROCEDURE DATE:  10/27/2020      IMPRESSION:    Rectosigmoid colitis. No bowelobstruction.    Nonspecific mild bilateral perinephric stranding without hydroureteronephrosis. Question striated bilateral nephrogram. Recommend clinical correlation to assess pyelonephritis. Question emphysematous cystitis versus bladder wall trabeculation.    Malpositioned Whitmore catheter at the level of the penile urethra. Recommend removal.    Dependent opacities in the left > right lower lobe, which may represent atelectasis. Recommend clinical correlation to assess aspiration.    Additionalfindings as described.       HPI:  88 y/o M PMHx BPH, CAD, DMII, GERD, HLD, SDH s/p craniotomy and surgical removal, urine rentention with Whitmore catheter, HTN [facility] with [facility] day h/o hematuria. Whitmore was replaced in the NH last night after the pt pulled it out. Today, pt was noted to be febrile at the NH Tmax: 104, SBP 90, and hypoxic on RA Sp02 on RA. Also noted to be more confused and lethargic with gross hematuria. Pt not on AC due to recent SDH.  Pt was admitted in early October (D/c 10 days ago) s/p fall with AMS and imaging + for SDH requiring craniotomy and evacuation also with aspiration PNA tx with IV abx. Whitmore was placed for urinary retention.   Spoke with pt's son Caleb whiteside ESL  this evening. Updated him on pt's status; as the next of kin, he consented to CT w/ contrast imaging. MOLST was reviewed and received verbal consent; FULL CODE for now but son will bring in paperwork tomorrow to discuss with the day team.    SUBJECTIVE:   11/2/2020  Patient seen and examined at bedside. No events overnight.  Patient mentions the chest pain he was having before is getting better.  He denies any SOB, cough, palpitations, diaphoresis or headache.   Patient mentions he wants to go.  Plan was discussed with the patient and we explained that he needs another COVID-19 negative test so he can go back to the facility.     REVIEW OF SYSTEMS:  CONSTITUTIONAL: No weakness, fevers or chills  EYES/ENT: No visual changes;  No vertigo or throat pain   NECK: No pain or stiffness  RESPIRATORY: No cough, wheezing, hemoptysis; No shortness of breath  CARDIOVASCULAR: +Chest discomfort, no palpitations.  GASTROINTESTINAL: No abdominal or epigastric pain. No nausea, vomiting, or hematemesis; No diarrhea or constipation. No melena or hematochezia.  GENITOURINARY: No dysuria or frequency   NEUROLOGICAL: No numbness or weakness  SKIN: No itching, burning, rashes, or lesions   All other review of systems is negative unless indicated above    Vital Signs Last 24 Hrs  T(C): 36.9 (02 Nov 2020 07:41), Max: 36.9 (02 Nov 2020 07:41)  T(F): 98.5 (02 Nov 2020 07:41), Max: 98.5 (02 Nov 2020 07:41)  HR: 69 (02 Nov 2020 07:41) (69 - 75)  BP: 117/62 (02 Nov 2020 07:41) (114/65 - 128/67)  RR: 19 (02 Nov 2020 07:41) (17 - 20)  SpO2: 99% (02 Nov 2020 07:41) (96% - 100%)    I&O's Summary  01 Nov 2020 07:01  -  02 Nov 2020 07:00  --------------------------------------------------------  IN: 0 mL / OUT: 675 mL / NET: -675 mL    CAPILLARY BLOOD GLUCOSE  POCT Blood Glucose.: 134 mg/dL (02 Nov 2020 08:46)    PHYSICAL EXAM:  Constitutional: NAD, awake and alert, +cachetic   HEENT: Normal Hearing  Respiratory: Breath sounds are clear bilaterally  Cardiovascular: S1 and S2, regular rate and rhythm, no Murmurs, gallops or rubs, no chest tenderness with palpation   Gastrointestinal: Bowel Sounds present, soft, nontender, nondistended, no guarding, no rebound  Extremities: No peripheral edema  Vascular: 2+ peripheral pulses  Neurological: A/O x 3    MEDICATIONS  (STANDING):  aspirin  chewable 81 milliGRAM(s) Oral daily  bethanechol 50 milliGRAM(s) Oral two times a day  DAPTOmycin IVPB 440 milliGRAM(s) IV Intermittent every 24 hours  dextrose 5%. 1000 milliLiter(s) (50 mL/Hr) IV Continuous <Continuous>  dextrose 50% Injectable 12.5 Gram(s) IV Push once  dextrose 50% Injectable 25 Gram(s) IV Push once  dextrose 50% Injectable 25 Gram(s) IV Push once  finasteride 5 milliGRAM(s) Oral daily  heparin   Injectable 5000 Unit(s) SubCutaneous every 12 hours  insulin lispro (ADMELOG) corrective regimen sliding scale   SubCutaneous three times a day before meals  insulin lispro (ADMELOG) corrective regimen sliding scale   SubCutaneous at bedtime  metoprolol tartrate 25 milliGRAM(s) Oral two times a day  multivitamin 1 Tablet(s) Oral daily  QUEtiapine 50 milliGRAM(s) Oral at bedtime  silver sulfADIAZINE 1% Cream 1 Application(s) Topical two times a day  simvastatin 40 milliGRAM(s) Oral at bedtime  tamsulosin Oral Tab/Cap - Peds 0.4 milliGRAM(s) Oral at bedtime    MEDICATIONS  (PRN):  acetaminophen  Suppository .. 650 milliGRAM(s) Rectal every 6 hours PRN Temp greater or equal to 38C (100.4F)  dextrose 40% Gel 15 Gram(s) Oral once PRN Blood Glucose LESS THAN 70 milliGRAM(s)/deciliter  glucagon  Injectable 1 milliGRAM(s) IntraMuscular once PRN Glucose LESS THAN 70 milligrams/deciliter      LABS: All Labs Reviewed:                        8.9    8.37  )-----------( 235      ( 31 Oct 2020 07:57 )             28.2     10-31    139  |  109<H>  |  19  ----------------------------<  128<H>  4.3   |  24  |  0.71    Ca    8.4<L>      31 Oct 2020 07:57    TPro  5.4<L>  /  Alb  2.0<L>  /  TBili  0.6  /  DBili  x   /  AST  28  /  ALT  29  /  AlkPhos  123<H>  10-31      CARDIAC MARKERS ( 01 Nov 2020 08:23 )  x     / x     / 17 U/L / x     / x      CARDIAC MARKERS ( 31 Oct 2020 07:57 )  x     / x     / 25 U/L / x     / x          Blood Culture: 10-29 @ 08:08  Organism --  Gram Stain Blood -- Gram Stain --  Specimen Source .Blood None  Culture-Blood --    10-29 @ 08:04  Organism --  Gram Stain Blood -- Gram Stain --  Specimen Source .Blood None  Culture-Blood --    EXAM:  CT BRAIN                          PROCEDURE DATE:  10/26/2020      IMPRESSION:  1. Persistent, large right-sided subdural hematoma that demonstrates components of variable age, including acute/subacute components) and measures up to 2.1 cm in thickness. This results in right-sided mass effect with right to left midline shift of approximately 6 mm. When compared to prior CT brain of 10/9/2020, a similar sized right holohemispheric subdural hematoma was demonstrated with similar appearance of the ventricles, mass effect and right to left midline shift.    EXAM:  CT ABDOMEN AND PELVIS IC                          PROCEDURE DATE:  10/27/2020      IMPRESSION:    Rectosigmoid colitis. No bowelobstruction.    Nonspecific mild bilateral perinephric stranding without hydroureteronephrosis. Question striated bilateral nephrogram. Recommend clinical correlation to assess pyelonephritis. Question emphysematous cystitis versus bladder wall trabeculation.    Malpositioned Whitmore catheter at the level of the penile urethra. Recommend removal.    Dependent opacities in the left > right lower lobe, which may represent atelectasis. Recommend clinical correlation to assess aspiration.    Additionalfindings as described.

## 2020-11-02 NOTE — PHYSICAL THERAPY INITIAL EVALUATION ADULT - LIVES WITH, PROFILE
alone/alone; Lives alone has a neighbor that checks on him regularly., pt has 2 steps to enter house, [place] house

## 2020-11-02 NOTE — DISCHARGE NOTE PROVIDER - NSDCMRMEDTOKEN_GEN_ALL_CORE_FT
acetaminophen 325 mg oral tablet: 2 tab(s) orally every 6 hours, As needed  ascorbic acid 500 mg oral tablet: 1 tab(s) orally once a day  aspirin 81 mg oral tablet, chewable: 1 tab(s) orally once a day  bethanechol 50 mg oral tablet: 1 tab(s) orally 2 times a day  bisacodyl 10 mg rectal suppository: 1 suppository(ies) rectal once a day, As Needed if no BM from MOM at 5am next day  DAPTOmycin 500 mg intravenous injection: 440 milligram(s) intravenous every 24 hours till 11/11   finasteride 5 mg oral tablet: 1 tab(s) orally once a day  gabapentin 300 mg oral capsule: 1 cap(s) orally 3 times a day  metoprolol tartrate 25 mg oral tablet: 1 tab(s) orally 2 times a day  ***hold for sbp &lt; 100 or apical &lt; 60***  Milk of Magnesia 8% oral suspension: 30 milliliter(s) orally once a day (at bedtime) as neded if no BM in 3 days  Multiple Vitamins oral tablet: 1 tab(s) orally once a day  NovoLIN R 100 units/mL injectable solution: Inject subcutaneously 4 times daily as per sliding scale:   &lt; 60 = Call md  180-250 = 2 units  251-300 = 4 units  301-350 = 6 units  351-400 = 8 units  &gt; 400 = 10 units and call md  QUEtiapine 50 mg oral tablet: 1 tab(s) orally once a day (at bedtime)  silver sulfADIAZINE 1% topical cream: Apply topically to stage 2 left buttock and stage 2 left sacrum after cleansing with skintegrity and then cover with dry dressing every shift and as needed  simvastatin 40 mg oral tablet: 1 tab(s) orally once a day (at bedtime)  tamsulosin 0.4 mg oral capsule: 1 cap(s) orally once a day (at bedtime)

## 2020-11-02 NOTE — DISCHARGE NOTE PROVIDER - HOSPITAL COURSE
88 y/o M PMHx BPH, CAD, DMII, GERD, HLD, SDH s/p craniotomy and surgical removal, urine rentention with Whitmore catheter, HTN presenting for [facility] with 1 day h/o hematuria. Whitmore was replaced in NH after the pt pulled it out. Patient was noted to be febrile at the NH Tmax: 104, SBP 90, and hypoxic on RA. Also noted to be more confused and lethargic with gross hematuria. Pt not on AC due to recent SDH.  Pt was admitted in early October (D/c 10 days ago) s/p fall with AMS and imaging + for SDH requiring craniotomy and evacuation also with aspiration PNA tx with IV abx. Whitmore was placed for urinary retention.   Patient was admitted on 10/26/2020 for Sepsis 2/2 UTI. Patient was found to be bacteremic, VRE. Midline was placed and patient to continue IV Daptomycin as an outpatient.   89 y/o M PMHx BPH, CAD, DMII, GERD, HLD, SDH s/p craniotomy and surgical removal, urine rentention with Whitmore catheter, HTN presenting for [facility] with 1 day h/o hematuria. Whitmore was replaced in NH after the pt pulled it out. Patient was noted to be febrile at the NH Tmax: 104, SBP 90, and hypoxic on RA. Also noted to be more confused and lethargic with gross hematuria. Pt not on AC due to recent SDH.  Pt was admitted in early October (D/c 10 days ago) s/p fall with AMS and imaging + for SDH requiring craniotomy and evacuation also with aspiration PNA tx with IV abx. Whitmore was placed for urinary retention.   Patient was admitted on 10/26/2020 for Sepsis 2/2 UTI. Patient was found to be bacteremic, VRE. Midline was placed and patient to continue IV Daptomycin as an outpatient.  Patient is medically stable for discharge from [facility] on 11/2/2020. 88 y/o M PMHx BPH, CAD, DMII, GERD, HLD, SDH s/p craniotomy and surgical removal, urine rentention with Whitmore catheter, HTN presenting for [facility] with 1 day h/o hematuria. Whitmore was replaced in NH after the pt pulled it out. Patient was noted to be febrile at the NH Tmax: 104, SBP 90, and hypoxic on RA. Also noted to be more confused and lethargic with gross hematuria. Pt not on AC due to recent SDH.  Pt was admitted in early October (D/c 10 days ago) s/p fall with AMS and imaging + for SDH requiring craniotomy and evacuation also with aspiration PNA tx with IV abx. Whitmore was placed for urinary retention.   Patient was admitted on 10/26/2020 for Sepsis 2/2 UTI. Patient was found to be bacteremic 2/2 VRE (10/29/2020) likely source urinary tract (urine cx grew VRE). Midline was placed and patient to continue IV Daptomycin as an outpatient. Repeat blood cultures were negative on 10/29/2020.   Patient is medically stable for discharge from [facility] on 11/2/2020. 88 y/o M PMHx BPH, CAD, DMII, GERD, HLD, SDH s/p craniotomy and surgical removal, urine rentention with Whitmore catheter, HTN presenting for [facility] with 1 day h/o hematuria. Whitmore was replaced in NH after the pt pulled it out. Patient was noted to be febrile at the NH Tmax: 104, SBP 90, and hypoxic on RA. Also noted to be more confused and lethargic with gross hematuria. Pt not on AC due to recent SDH.  Pt was admitted in early October (D/c 10 days ago) s/p fall with AMS and imaging + for SDH requiring craniotomy and evacuation also with aspiration PNA tx with IV abx. Whitmore was placed for urinary retention.   Patient was admitted on 10/26/2020 for Sepsis 2/2 UTI. Patient was found to be bacteremic 2/2 VRE (10/29/2020) likely source urinary tract (urine cx grew VRE). Midline was placed and patient to continue IV Daptomycin as an outpatient. Repeat blood cultures were negative on 10/29/2020.   Patient is medically stable for discharge from [facility] on 11/3/2020. 89 y/o M PMHx BPH, CAD, DMII, GERD, HLD, SDH s/p craniotomy and surgical removal, urine rentention with Whitmore catheter, HTN presenting for [facility] with 1 day h/o hematuria. Whitmore was replaced in NH after the pt pulled it out. Patient was noted to be febrile at the NH Tmax: 104, SBP 90, and hypoxic on RA. Also noted to be more confused and lethargic with gross hematuria. Pt not on AC due to recent SDH.  Pt was admitted in early October (D/c 10 days ago) s/p fall with AMS and imaging + for SDH requiring craniotomy and evacuation also with aspiration PNA tx with IV abx. Whitmore was placed for urinary retention.   Patient was admitted on 10/26/2020 for Sepsis 2/2 UTI. Patient was found to be bacteremic 2/2 VRE (10/29/2020) likely source urinary tract (urine cx grew VRE). Midline was placed and patient to continue IV Daptomycin as an outpatient. Repeat blood cultures were negative on 10/29/2020.   Patient is medically stable for discharge from [facility] on 11/3/2020.    dc time: 49 min 88 y/o M PMHx BPH, CAD, DMII, GERD, HLD, SDH s/p craniotomy and surgical removal, urine rentention with Whitmore catheter, HTN presenting for [facility] with 1 day h/o hematuria. Whitmore was replaced in NH after the pt pulled it out. Patient was noted to be febrile at the NH Tmax: 104, SBP 90, and hypoxic on RA. Also noted to be more confused and lethargic with gross hematuria. Pt not on AC due to recent SDH.  Pt was admitted in early October (D/c 10 days ago) s/p fall with AMS and imaging + for SDH requiring craniotomy and evacuation also with aspiration PNA tx with IV abx. Whitmore was placed for urinary retention.   Patient was admitted on 10/26/2020 for Sepsis 2/2 UTI. Patient was found to be bacteremic 2/2 VRE (10/29/2020) likely source urinary tract (urine cx grew VRE). Midline was placed and patient to continue IV Daptomycin as an outpatient. Repeat blood cultures were negative on 10/29/2020.   Patient is medically stable for discharge from [facility] on 11/3/2020. Dapt to be completed on 11/11.     dc time: 49 min

## 2020-11-03 ENCOUNTER — TRANSCRIPTION ENCOUNTER (OUTPATIENT)
Age: 85
End: 2020-11-03

## 2020-11-03 LAB
CK SERPL-CCNC: 20 U/L — LOW (ref 26–308)
CULTURE RESULTS: SIGNIFICANT CHANGE UP
CULTURE RESULTS: SIGNIFICANT CHANGE UP
SPECIMEN SOURCE: SIGNIFICANT CHANGE UP
SPECIMEN SOURCE: SIGNIFICANT CHANGE UP

## 2020-11-03 PROCEDURE — 99231 SBSQ HOSP IP/OBS SF/LOW 25: CPT | Mod: GC

## 2020-11-03 RX ADMIN — TAMSULOSIN HYDROCHLORIDE 0.4 MILLIGRAM(S): 0.4 CAPSULE ORAL at 21:30

## 2020-11-03 RX ADMIN — HEPARIN SODIUM 5000 UNIT(S): 5000 INJECTION INTRAVENOUS; SUBCUTANEOUS at 09:27

## 2020-11-03 RX ADMIN — Medication 1 APPLICATION(S): at 13:36

## 2020-11-03 RX ADMIN — DAPTOMYCIN 117.6 MILLIGRAM(S): 500 INJECTION, POWDER, LYOPHILIZED, FOR SOLUTION INTRAVENOUS at 09:27

## 2020-11-03 RX ADMIN — Medication 25 MILLIGRAM(S): at 21:30

## 2020-11-03 RX ADMIN — Medication 50 MILLIGRAM(S): at 09:28

## 2020-11-03 RX ADMIN — FINASTERIDE 5 MILLIGRAM(S): 5 TABLET, FILM COATED ORAL at 09:28

## 2020-11-03 RX ADMIN — Medication 1 TABLET(S): at 09:28

## 2020-11-03 RX ADMIN — Medication 50 MILLIGRAM(S): at 21:30

## 2020-11-03 RX ADMIN — Medication 1 APPLICATION(S): at 22:36

## 2020-11-03 RX ADMIN — HEPARIN SODIUM 5000 UNIT(S): 5000 INJECTION INTRAVENOUS; SUBCUTANEOUS at 21:30

## 2020-11-03 RX ADMIN — Medication 81 MILLIGRAM(S): at 09:27

## 2020-11-03 RX ADMIN — SIMVASTATIN 40 MILLIGRAM(S): 20 TABLET, FILM COATED ORAL at 21:30

## 2020-11-03 RX ADMIN — Medication 25 MILLIGRAM(S): at 09:28

## 2020-11-03 RX ADMIN — QUETIAPINE FUMARATE 50 MILLIGRAM(S): 200 TABLET, FILM COATED ORAL at 21:30

## 2020-11-03 NOTE — PROGRESS NOTE ADULT - ASSESSMENT
Pt has been seen and examined with FP resident, resident supervised agree with a/p       Patient is a 89y old  Male who presents with a chief complaint of hematuria, fever, hypotension (02 Nov 2020 21:45)          PHYSICAL EXAM:  Vital Signs Last 24 Hrs  T(C): 36.7 (03 Nov 2020 07:40), Max: 36.7 (02 Nov 2020 15:00)  T(F): 98 (03 Nov 2020 07:40), Max: 98.1 (02 Nov 2020 23:49)  HR: 71 (03 Nov 2020 07:40) (70 - 76)  BP: 128/63 (03 Nov 2020 07:40) (109/74 - 128/63)  BP(mean): --  RR: 18 (03 Nov 2020 07:40) (18 - 18)  SpO2: 99% (03 Nov 2020 07:40) (99% - 100%)  -rs-aeeb, cta  -cvs-s1s2 normal   -p/a-soft,bs+      A/P    #d/c as per plan in d/c summary with further management as an outpt, time spent 45 minutes

## 2020-11-03 NOTE — CHART NOTE - NSCHARTNOTEFT_GEN_A_CORE
Tried contacting PCP Dr. Chris Berger [phone], but they did not answer.  Will try to call another time to inform him on the hospital stay and treatment plan of the patient.

## 2020-11-03 NOTE — DISCHARGE NOTE NURSING/CASE MANAGEMENT/SOCIAL WORK - PATIENT PORTAL LINK FT
You can access the FollowMyHealth Patient Portal offered by [facility] by registering at the following website: [email or web address]. By joining [facility]’s FollowMyHealth portal, you will also be able to view your health information using other applications (apps) compatible with our system.

## 2020-11-03 NOTE — PROGRESS NOTE ADULT - ASSESSMENT
89 year old male patient with narrative as previously stated with mental status changes and hypotension, now AAOx3 with positive blood cultures for VRE,    #Encephalopathy, likely 2/2 infectious etiology  -Cystitis/ VRE   -No Leukocytosis   -Initial blood cultures: VRE 10/26  -repeat blood cultures: NGTD   -TTE: no vegetations   -s/p Dawson replaced by urology   -On Daptomycin #7, will require 14 days IV daptomycin until -- 11/11  -daily CPK  -Continue contact precautions   -Patient will continue Daptomycin treatment outpatient at the facility for 7 more days.     #Aspiration PNA  -s/p Zosyn   -Stable on RA  -Afebrile  -Aspiration precautions recommended    #SDH s/p craniotomy  -Evaluated by Neurosurgery in ED  -No intervention by NSGY due to stable SDH on CTH;   -Restarted ASA for CAD and Heparin for DVT prophylaxis   -Neurosurgery recommendations appreciated    #HTN  -Stable  -continue metoprolol tartrate 25mg PO BID     #BPH/Urinary retention  #Chronic dawson   -Ucx- VRE  -on finasteride 5mg oral qd  -on tamsulosin 0.4mg oral qhs  -on Bethanechol 50mg BID  -Dawson repositioned  -C/w catheter change every 4 weeks (should be done through the NH)  -Patient is a poor candidate to consider outlet surgery  -urology recommendations noted     #CAD  -EKG reviewed, no acute changes noted  -Continue aspirin 81mg QD  -Continue Simvastatin 40mg qhs  -Continue Metoprolol Tartrate 25mg BID     #DMII  -A1C 5.7 (10/5)  -POCT glucose once a day    #HLD  -Continue Simvastatin 40mg qhs    #Stage II pressure ulcers  -L buttock and R sacrum on arrival to ED  -Wound care consulted  -Continue Silvadene cream    #Dementia with features of agitation  -Continue Seroquel 50mg qhs    #DVT PPX  -SCDS   -Heparin SQ q12h    #Advance Directives  -Son: Caleb Finn who is next of kin and making medical decisions for pt as he has no capacity at this time  -DNI- MOLST in chart- complete and signed as according to Living Will     #Dispo  -negative Covid-19 PCR test  -Patient will continue his daptomycin treatment for 7 more days.   -PT consult: rehabilitation facility, 3:1 commode; rolling walker (5 inch wheels), balance training; bed mobility training; gait training; strengthening; transfer training  -Was previously at [facility]     *Discharge management and plan discussed with Dr. Asencio. 89 year old male patient with narrative as previously stated with mental status changes and hypotension, now AAOx3 with positive blood cultures for VRE,    #Metabolic encephalopathy, likely 2/2 infectious etiology  -Cystitis/ VRE   -No Leukocytosis   -Initial blood cultures: VRE 10/26  -repeat blood cultures: NGTD   -TTE: no vegetations   -s/p Dawson replaced by urology   -On Daptomycin #7, will require 14 days IV daptomycin until -- 11/11  -daily CPK  -Continue contact precautions   -Patient will continue Daptomycin treatment outpatient at the facility for 7 more days.     #Aspiration PNA  -s/p Zosyn   -Stable on RA  -Afebrile  -Aspiration precautions recommended    #SDH s/p craniotomy  -Evaluated by Neurosurgery in ED  -No intervention by NSGY due to stable SDH on CTH;   -Restarted ASA for CAD and Heparin for DVT prophylaxis   -Neurosurgery recommendations appreciated    #HTN  -Stable  -continue metoprolol tartrate 25mg PO BID     #BPH/Urinary retention  #Chronic dawson   -Ucx- VRE  -on finasteride 5mg oral qd  -on tamsulosin 0.4mg oral qhs  -on Bethanechol 50mg BID  -Dawson repositioned  -C/w catheter change every 4 weeks (should be done through the NH)  -Patient is a poor candidate to consider outlet surgery  -urology recommendations noted     #CAD  -EKG reviewed, no acute changes noted  -Continue aspirin 81mg QD  -Continue Simvastatin 40mg qhs  -Continue Metoprolol Tartrate 25mg BID     #DMII  -A1C 5.7 (10/5)  -POCT glucose once a day    #HLD  -Continue Simvastatin 40mg qhs    #Stage II pressure ulcers  -L buttock and R sacrum on arrival to ED  -Wound care consulted  -Continue Silvadene cream    #Dementia with features of agitation  -Continue Seroquel 50mg qhs    #DVT PPX  -SCDS   -Heparin SQ q12h    #Advance Directives  -Son: Caleb Finn who is next of kin and making medical decisions for pt as he has no capacity at this time  -DNI- MOLST in chart- complete and signed as according to Living Will     #Dispo  -negative Covid-19 PCR test  -Patient will continue his daptomycin treatment for 7 more days.   -PT consult: rehabilitation facility, 3:1 commode; rolling walker (5 inch wheels), balance training; bed mobility training; gait training; strengthening; transfer training  -Was previously at [facility]     *Discharge management and plan discussed with Dr. Asencio.

## 2020-11-03 NOTE — PROGRESS NOTE ADULT - SUBJECTIVE AND OBJECTIVE BOX
HPI:  89 y/o M PMHx BPH, CAD, DMII, GERD, HLD, SDH s/p craniotomy and surgical removal, urine rentention with Whitmore catheter, HTN presenting for [facility] with 1 day h/o hematuria. Whitmore was replaced in the NH last night after the pt pulled it out. Today, pt was noted to be febrile at the NH Tmax: 104, SBP 90, and hypoxic on RA Sp02 on RA. Also noted to be more confused and lethargic with gross hematuria. Pt not on AC due to recent SDH.  Pt was admitted in early October (D/c 10 days ago) s/p fall with AMS and imaging + for SDH requiring craniotomy and evacuation also with aspiration PNA tx with IV abx. Whitmore was placed for urinary retention.   Spoke with pt's son Caleb whiteside ESL  this evening. Updated him on pt's status; as the next of kin, he consented to CT w/ contrast imaging. MOLST was reviewed and received verbal consent; FULL CODE for now but son will bring in paperwork tomorrow to discuss with the day team.    SUBJECTIVE:   11/3/2020  Patient seen and examined at bedside. No events overnight.  Patient  denies any SOB, cough, palpitations, diaphoresis or headache.   Discharge plan was discussed with the patient.  COVID-19 PCR was negative so he can go back to the facility.     REVIEW OF SYSTEMS:  CONSTITUTIONAL: No weakness, fevers or chills  EYES/ENT: No visual changes;  No vertigo or throat pain   NECK: No pain or stiffness  RESPIRATORY: No cough, wheezing, hemoptysis; No shortness of breath  CARDIOVASCULAR: +Chest discomfort, no palpitations.  GASTROINTESTINAL: No abdominal or epigastric pain. No nausea, vomiting, or hematemesis; No diarrhea or constipation. No melena or hematochezia.  GENITOURINARY: No dysuria or frequency   NEUROLOGICAL: No numbness or weakness  SKIN: No itching, burning, rashes, or lesions   All other review of systems is negative unless indicated above    Vital Signs Last 24 Hrs  T(C): 36.7 (03 Nov 2020 07:40), Max: 36.7 (02 Nov 2020 15:00)  T(F): 98 (03 Nov 2020 07:40), Max: 98.1 (02 Nov 2020 23:49)  HR: 71 (03 Nov 2020 07:40) (70 - 76)  BP: 128/63 (03 Nov 2020 07:40) (109/74 - 128/63)  RR: 18 (03 Nov 2020 07:40) (18 - 18)  SpO2: 99% (03 Nov 2020 07:40) (99% - 100%)    I&O's Summary    02 Nov 2020 07:01  -  03 Nov 2020 07:00  --------------------------------------------------------  IN: 0 mL / OUT: 700 mL / NET: -700 mL    CAPILLARY BLOOD GLUCOSE  POCT Blood Glucose.: 129 mg/dL (03 Nov 2020 08:20)    PHYSICAL EXAM:  Constitutional: NAD, awake and alert, +cachetic   HEENT: Normal Hearing  Respiratory: Breath sounds are clear bilaterally  Cardiovascular: S1 and S2, regular rate and rhythm, no Murmurs, gallops or rubs, no chest tenderness with palpation   Gastrointestinal: Bowel Sounds present, soft, nontender, nondistended, no guarding, no rebound  Extremities: No peripheral edema  Vascular: 2+ peripheral pulses  Neurological: A/O x 3    MEDICATIONS  (STANDING):  aspirin  chewable 81 milliGRAM(s) Oral daily  bethanechol 50 milliGRAM(s) Oral two times a day  DAPTOmycin IVPB 440 milliGRAM(s) IV Intermittent every 24 hours  dextrose 5%. 1000 milliLiter(s) (50 mL/Hr) IV Continuous <Continuous>  dextrose 50% Injectable 12.5 Gram(s) IV Push once  dextrose 50% Injectable 25 Gram(s) IV Push once  dextrose 50% Injectable 25 Gram(s) IV Push once  finasteride 5 milliGRAM(s) Oral daily  heparin   Injectable 5000 Unit(s) SubCutaneous every 12 hours  metoprolol tartrate 25 milliGRAM(s) Oral two times a day  multivitamin 1 Tablet(s) Oral daily  QUEtiapine 50 milliGRAM(s) Oral at bedtime  silver sulfADIAZINE 1% Cream 1 Application(s) Topical two times a day  simvastatin 40 milliGRAM(s) Oral at bedtime  tamsulosin Oral Tab/Cap - Peds 0.4 milliGRAM(s) Oral at bedtime    MEDICATIONS  (PRN):  acetaminophen  Suppository .. 650 milliGRAM(s) Rectal every 6 hours PRN Temp greater or equal to 38C (100.4F)  dextrose 40% Gel 15 Gram(s) Oral once PRN Blood Glucose LESS THAN 70 milliGRAM(s)/deciliter  glucagon  Injectable 1 milliGRAM(s) IntraMuscular once PRN Glucose LESS THAN 70 milligrams/deciliter    LABS: All Labs Reviewed:                        8.9    8.37  )-----------( 235      ( 31 Oct 2020 07:57 )             28.2     10-31    139  |  109<H>  |  19  ----------------------------<  128<H>  4.3   |  24  |  0.71    Ca    8.4<L>      31 Oct 2020 07:57    TPro  5.4<L>  /  Alb  2.0<L>  /  TBili  0.6  /  DBili  x   /  AST  28  /  ALT  29  /  AlkPhos  123<H>  10-31      CARDIAC MARKERS ( 01 Nov 2020 08:23 )  x     / x     / 17 U/L / x     / x      CARDIAC MARKERS ( 31 Oct 2020 07:57 )  x     / x     / 25 U/L / x     / x          Blood Culture: 10-29 @ 08:08  Organism --  Gram Stain Blood -- Gram Stain --  Specimen Source .Blood None  Culture-Blood --    10-29 @ 08:04  Organism --  Gram Stain Blood -- Gram Stain --  Specimen Source .Blood None  Culture-Blood --    EXAM:  CT BRAIN                          PROCEDURE DATE:  10/26/2020      IMPRESSION:  1. Persistent, large right-sided subdural hematoma that demonstrates components of variable age, including acute/subacute components) and measures up to 2.1 cm in thickness. This results in right-sided mass effect with right to left midline shift of approximately 6 mm. When compared to prior CT brain of 10/9/2020, a similar sized right holohemispheric subdural hematoma was demonstrated with similar appearance of the ventricles, mass effect and right to left midline shift.    EXAM:  CT ABDOMEN AND PELVIS IC                          PROCEDURE DATE:  10/27/2020      IMPRESSION:    Rectosigmoid colitis. No bowelobstruction.  Nonspecific mild bilateral perinephric stranding without hydroureteronephrosis. Question striated bilateral nephrogram. Recommend clinical correlation to assess pyelonephritis. Question emphysematous cystitis versus bladder wall trabeculation.  Malpositioned Whitmore catheter at the level of the penile urethra. Recommend removal.  Dependent opacities in the left > right lower lobe, which may represent atelectasis. Recommend clinical correlation to assess aspiration.  Additionalfindings as described.       HPI:  89 y/o M PMHx BPH, CAD, DMII, GERD, HLD, SDH s/p craniotomy and surgical removal, urine rentention with Whitmore catheter, HTN presenting for [facility] with 1 day h/o hematuria. Whitmore was replaced in the NH last night after the pt pulled it out. Today, pt was noted to be febrile at the NH Tmax: 104, SBP 90, and hypoxic on RA Sp02 on RA. Also noted to be more confused and lethargic with gross hematuria. Pt not on AC due to recent SDH.  Pt was admitted in early October (D/c 10 days ago) s/p fall with AMS and imaging + for SDH requiring craniotomy and evacuation also with aspiration PNA tx with IV abx. Whitmore was placed for urinary retention.   Spoke with pt's son Caleb whiteside ESL  this evening. Updated him on pt's status; as the next of kin, he consented to CT w/ contrast imaging. MOLST was reviewed and received verbal consent; FULL CODE for now but son will bring in paperwork tomorrow to discuss with the day team.    SUBJECTIVE:   11/3/2020  Patient seen and examined at bedside. No events overnight.  Patient  denies any SOB, cough, palpitations, diaphoresis or headache.  Patient is still feeling chest discomfort/tightness but he mentions its unchanged from yesterday.   Discharge plan was discussed with the patient.  COVID-19 PCR was negative so he can go back to the facility.     REVIEW OF SYSTEMS:  CONSTITUTIONAL: No weakness, fevers or chills  EYES/ENT: No visual changes;  No vertigo or throat pain   NECK: No pain or stiffness  RESPIRATORY: No cough, wheezing, hemoptysis; No shortness of breath  CARDIOVASCULAR: +Chest discomfort, no palpitations.  GASTROINTESTINAL: No abdominal or epigastric pain. No nausea, vomiting, or hematemesis; No diarrhea or constipation. No melena or hematochezia.  GENITOURINARY: No dysuria or frequency   NEUROLOGICAL: No numbness or weakness  SKIN: No itching, burning, rashes, or lesions   All other review of systems is negative unless indicated above    Vital Signs Last 24 Hrs  T(C): 36.7 (03 Nov 2020 07:40), Max: 36.7 (02 Nov 2020 15:00)  T(F): 98 (03 Nov 2020 07:40), Max: 98.1 (02 Nov 2020 23:49)  HR: 71 (03 Nov 2020 07:40) (70 - 76)  BP: 128/63 (03 Nov 2020 07:40) (109/74 - 128/63)  RR: 18 (03 Nov 2020 07:40) (18 - 18)  SpO2: 99% (03 Nov 2020 07:40) (99% - 100%)    I&O's Summary    02 Nov 2020 07:01  -  03 Nov 2020 07:00  --------------------------------------------------------  IN: 0 mL / OUT: 700 mL / NET: -700 mL    CAPILLARY BLOOD GLUCOSE  POCT Blood Glucose.: 129 mg/dL (03 Nov 2020 08:20)    PHYSICAL EXAM:  Constitutional: NAD, awake and alert, +cachetic   HEENT: Normal Hearing  Respiratory: Breath sounds are clear bilaterally  Cardiovascular: S1 and S2, regular rate and rhythm, no Murmurs, gallops or rubs, no chest tenderness with palpation   Gastrointestinal: Bowel Sounds present, soft, nontender, nondistended, no guarding, no rebound  Extremities: No peripheral edema  Vascular: 2+ peripheral pulses  Neurological: A/O x 3    MEDICATIONS  (STANDING):  aspirin  chewable 81 milliGRAM(s) Oral daily  bethanechol 50 milliGRAM(s) Oral two times a day  DAPTOmycin IVPB 440 milliGRAM(s) IV Intermittent every 24 hours  dextrose 5%. 1000 milliLiter(s) (50 mL/Hr) IV Continuous <Continuous>  dextrose 50% Injectable 12.5 Gram(s) IV Push once  dextrose 50% Injectable 25 Gram(s) IV Push once  dextrose 50% Injectable 25 Gram(s) IV Push once  finasteride 5 milliGRAM(s) Oral daily  heparin   Injectable 5000 Unit(s) SubCutaneous every 12 hours  metoprolol tartrate 25 milliGRAM(s) Oral two times a day  multivitamin 1 Tablet(s) Oral daily  QUEtiapine 50 milliGRAM(s) Oral at bedtime  silver sulfADIAZINE 1% Cream 1 Application(s) Topical two times a day  simvastatin 40 milliGRAM(s) Oral at bedtime  tamsulosin Oral Tab/Cap - Peds 0.4 milliGRAM(s) Oral at bedtime    MEDICATIONS  (PRN):  acetaminophen  Suppository .. 650 milliGRAM(s) Rectal every 6 hours PRN Temp greater or equal to 38C (100.4F)  dextrose 40% Gel 15 Gram(s) Oral once PRN Blood Glucose LESS THAN 70 milliGRAM(s)/deciliter  glucagon  Injectable 1 milliGRAM(s) IntraMuscular once PRN Glucose LESS THAN 70 milligrams/deciliter    LABS: All Labs Reviewed:                No recent labs performed.      Blood Culture: 10-29 @ 08:08  Organism --  Gram Stain Blood -- Gram Stain --  Specimen Source .Blood None  Culture-Blood --    10-29 @ 08:04  Organism --  Gram Stain Blood -- Gram Stain --  Specimen Source .Blood None  Culture-Blood --    EXAM:  CT BRAIN                          PROCEDURE DATE:  10/26/2020      IMPRESSION:  1. Persistent, large right-sided subdural hematoma that demonstrates components of variable age, including acute/subacute components) and measures up to 2.1 cm in thickness. This results in right-sided mass effect with right to left midline shift of approximately 6 mm. When compared to prior CT brain of 10/9/2020, a similar sized right holohemispheric subdural hematoma was demonstrated with similar appearance of the ventricles, mass effect and right to left midline shift.    EXAM:  CT ABDOMEN AND PELVIS IC                          PROCEDURE DATE:  10/27/2020      IMPRESSION:    Rectosigmoid colitis. No bowelobstruction.  Nonspecific mild bilateral perinephric stranding without hydroureteronephrosis. Question striated bilateral nephrogram. Recommend clinical correlation to assess pyelonephritis. Question emphysematous cystitis versus bladder wall trabeculation.  Malpositioned Whitmore catheter at the level of the penile urethra. Recommend removal.  Dependent opacities in the left > right lower lobe, which may represent atelectasis. Recommend clinical correlation to assess aspiration.  Additionalfindings as described.

## 2020-11-03 NOTE — PROGRESS NOTE ADULT - SUBJECTIVE AND OBJECTIVE BOX
Date of service: 20 @ 10:29    pt seen and examined  feels better  sitting in bed  wants to eat breakfast   afebrile    ROS: no fever or chills; denies dizziness, no HA, no SOB or cough, no abdominal pain, no diarrhea or constipation;  no legs pain, no rashes    MEDICATIONS  (STANDING):  aspirin  chewable 81 milliGRAM(s) Oral daily  bethanechol 50 milliGRAM(s) Oral two times a day  DAPTOmycin IVPB 440 milliGRAM(s) IV Intermittent every 24 hours  dextrose 5%. 1000 milliLiter(s) (50 mL/Hr) IV Continuous <Continuous>  dextrose 50% Injectable 12.5 Gram(s) IV Push once  dextrose 50% Injectable 25 Gram(s) IV Push once  dextrose 50% Injectable 25 Gram(s) IV Push once  finasteride 5 milliGRAM(s) Oral daily  heparin   Injectable 5000 Unit(s) SubCutaneous every 12 hours  metoprolol tartrate 25 milliGRAM(s) Oral two times a day  multivitamin 1 Tablet(s) Oral daily  QUEtiapine 50 milliGRAM(s) Oral at bedtime  silver sulfADIAZINE 1% Cream 1 Application(s) Topical two times a day  simvastatin 40 milliGRAM(s) Oral at bedtime  tamsulosin Oral Tab/Cap - Peds 0.4 milliGRAM(s) Oral at bedtime    Vital Signs Last 24 Hrs  T(C): 36.7 (2020 07:40), Max: 36.7 (2020 15:00)  T(F): 98 (2020 07:40), Max: 98.1 (2020 23:49)  HR: 71 (2020 07:40) (70 - 76)  BP: 128/63 (2020 07:40) (109/74 - 128/63)  BP(mean): --  RR: 18 (2020 07:40) (18 - 18)  SpO2: 99% (2020 07:40) (99% - 100%)      PE:  Constitutional: frail looking  HEENT: NC/AT, EOMI, PERRLA, conjunctivae clear; ears and nose atraumatic; pharynx benign  Neck: supple; thyroid not palpable  Back: no tenderness  Respiratory: respiratory effort normal; clear to auscultation  Cardiovascular: S1S2 regular, no murmurs  Abdomen: soft, not tender, not distended, positive BS; liver and spleen WNL  Genitourinary: no suprapubic tenderness dawson in place  Lymphatic: no LN palpable  Musculoskeletal: no muscle tenderness, no joint swelling or tenderness  Extremities: no pedal edema  Neurological/ Psychiatric:  moving all extremities  Skin: no rashes; no palpable lesions    Labs: all available labs reviewed             LIVER FUNCTIONS - ( 27 Oct 2020 06:41 )  Alb: 1.8 g/dL / Pro: 4.8 gm/dL / ALK PHOS: 122 U/L / ALT: 32 U/L / AST: 33 U/L / GGT: x           Urinalysis Basic - ( 26 Oct 2020 20:52 )    Color: Red / Appearance: Slightly Turbid / S.010 / pH: x  Gluc: x / Ketone: Negative  / Bili: Negative / Urobili: Negative mg/dL   Blood: x / Protein: 100 mg/dL / Nitrite: Negative   Leuk Esterase: Small / RBC: >50 /HPF / WBC 10-15 /HPF   Sq Epi: x / Non Sq Epi: Occasional / Bacteria: Few    Culture - Blood in AM (10.29.20 @ 08:08)   Specimen Source: .Blood None   Culture Results:   No growth to date. Culture - Blood in AM (10.29.20 @ 08:04)   Specimen Source: .Blood None   Culture Results:   No growth to date.   Culture - Urine (10.26.20 @ 20:52)   Specimen Source: .Urine None   Culture Results:   >100,000 CFU/ml Gram positive organisms Culture - Blood (10.26.20 @ 20:09)   Gram Stain:   Growth in anaerobic bottle: Gram positive cocci in pairs   Specimen Source: .Blood Blood-Peripheral   Culture Results:   Growth in anaerobic bottle: Gram positive cocci in pairs Culture - Blood (10.26.20 @ 20:09)   - Vancomycin resistant Enterococcus sp.: Detec   Gram Stain:   Growth in aerobic bottle: Gram positive cocci in pairs   Growth in anaerobic bottle: Gram positive cocci in pairs   Specimen Source: .Blood Blood-Peripheral   Organism: Blood Culture PCR   Culture Results:   Growth in aerobic bottle: Gram positive cocci in pairs   Growth in anaerobic bottle: Gram positive cocci in pairs   "Due to technical problems, Proteus sp. will Not be reported as part of   the BCID panel until further notice"   ***Blood Panel PCR results on this specimen are available   approximately 3 hours after the Gram stain result.***   Gram stain, PCR, and/or culture results may not always   correspond due to difference in methodologies.   ************************************************************   This PCR assay was performed using [employer].   The following targets are tested for: Enterococcus,   vancomycin resistant enterococci, Listeria monocytogenes,   coagulase negative staphylococci, S. aureus,   methicillin resistant S. aureus,Streptococcus agalactiae   (Group B), S. pneumoniae, S. pyogenes (Group A),   Acinetobacter baumannii, Enterobacter cloacae, E. coli,   Klebsiella oxytoca, K. pneumoniae, Proteus sp.,   Serratia marcescens, Haemophilus influenzae,   Neisseria meningitidis, Pseudomonas aeruginosa, Candida   albicans, C. glabrata, C krusei, C parapsilosis,   C. tropicalis and the KPC resistance gene.   Organism Identification: Blood Culture PCR   Method Type: PCR     Radiology: all available radiological tests reviewed      EXAM:  CT ABDOMEN AND PELVIS IC                            PROCEDURE DATE:  10/27/2020          INTERPRETATION:  CLINICAL INFORMATION: Metabolic encephalopathy secondary to likely UTI, gross hematuria, sepsis.    PROCEDURE:  Helical axial images were obtained from the domes of the diaphragm through the pubic symphysis without intravenous or oral contrast. Coronal and sagittal reformats were also obtained.    COMPARISON: 10/4/2020 and 8/3/2010.    FINDINGS: Evaluation of the abdominal/pelvic organs, viscera and vasculature is limited without intravenous contrast. Artifact from the patient's arms degrades images.    LOWER CHEST: Centrilobular emphysema. Suggest nonspecific, interlobular septal thickening. Dependent opacities in the left > right lower lobe. Subsegmental atelectasis. Aortic valve, coronary artery and mitral annular calcification.    LIVER: Fatty infiltration near the fossa ligament again noted.  GALLBLADDER/BILE DUCTS: No intrahepatic or extrahepatic biliary dilatation. Cholelithiasis.  PANCREAS: Unremarkable.  SPLEEN: Again noted, mild contour deformity and punctate calcifications.    ADRENALS: Unremarkable.  KIDNEYS/URETERS: Nonspecific mild bilateral perinephric stranding without hydroureteronephrosis. Question striated bilateral nephrogram. Right renal cyst again noted.  BLADDER: Mildly distended with air and fluid. Question trabeculated versus emphysematous bladder wall.  REPRODUCTIVE ORGANS: Partially visualized Dawson catheter balloon at the level of the penile urethra.    BOWEL: No bowel obstruction. Focal outpouching of air along the posterior aspect of the terminal ileum, which may represent a diverticulum or appendix. No secondary signs of appendicitis. Rectosigmoid colon wall thickening surrounding inflammatory change. Colon diverticulosis.  PERITONEUM: No drainable fluid collection or free air.  VESSELS: Atherosclerosis. Again noted, ectatic infrarenal aorta containing intramural plaque/thrombus.  RETROPERITONEUM: No lymphadenopathy.  ABDOMINAL WALL/SOFT TISSUES: Post surgical changes along the anterior pelvic wall soft tissue.  BONES: Degenerative changes of the spine. Stable minimal retrolisthesis of L1 on L2, L2 on L3, L3 on L4 and L4 on L5.    IMPRESSION:    Rectosigmoid colitis. No bowelobstruction.    Nonspecific mild bilateral perinephric stranding without hydroureteronephrosis. Question striated bilateral nephrogram. Recommend clinical correlation to assess pyelonephritis. Question emphysematous cystitis versus bladder wall trabeculation.    Malpositioned Dawson catheter at the level of the penile urethra. Recommend removal.    Dependent opacities in the left > right lower lobe, which may represent atelectasis. Recommend clinical correlation to assess aspiration.    Additionalfindings as described.          < end of copied text >  < from: CT Head No Cont (10.. @ 21:56) >  EXAM:  CT BRAIN                            PROCEDURE DATE:  10/26/2020          INTERPRETATION:  CLINICAL INFORMATION:  AMS, prior SDH treated by surgery.    TECHNIQUE:  Axial CT images were acquired through the head.  Intravenous contrast: None  Two-dimensional reformats were generated.    COMPARISON STUDY: CT brain 10/9/2020    FINDINGS: The patient is status post right-sided frontoparietal craniotomy again demonstrated. Once again, there is a large holohemispheric right-sided subdural collection demonstrating variable attenuation, consistent with a subdural hematoma with acute, intermediate and chronic components. This collection measures 2.1 cm in thickness. This results in adjacent mass effect with effacement of the right lateral ventricle and right to left midline shift of approximately 6 mm. Chronic right corona radiata lacunar infarct again seen.    Imaged portions of the orbits, and left mastoid air cells are grossly unremarkable. Tiny retention cyst/polyp left ethmoid sinus. Mild opacification of the inferior right mastoid air cells seen. Calcific atherosclerosis of bilateral cavernous ICAs.    IMPRESSION:  1. Persistent, large right-sided subdural hematoma that demonstrates components of variable age, including acute/subacute components) and measures up to 2.1 cm in thickness. This results in right-sided mass effect with right to left midline shift of approximately 6 mm. When compared to prior CT brain of 10/9/2020, a similar sized right holohemispheric subdural hematoma was demonstrated with similar appearance of the ventricles, mass effect and right to left midline shift.      < end of copied text >    Advanced directives addressed: full resuscitation

## 2020-11-03 NOTE — DISCHARGE NOTE NURSING/CASE MANAGEMENT/SOCIAL WORK - NSDCPETBCESMAN_GEN_ALL_CORE
If you are a smoker, it is important for your health to stop smoking. Please be aware that second hand smoke is also harmful. Family/Spouse/Self

## 2020-11-03 NOTE — ADVANCED PRACTICE NURSE CONSULT - ASSESSMENT
Pt awake and alert. Risks and benefits of midline catheter explained. Verbal consent obtained. ARROW endurance extended dwell midline catheter 20g, 8cm, lot # [id number] placed in left forearm under sterile precautions. Brisk blood return, flushes well with 20ml normal saline. OK to use.

## 2020-11-04 PROCEDURE — 99231 SBSQ HOSP IP/OBS SF/LOW 25: CPT | Mod: GC

## 2020-11-04 RX ADMIN — Medication 1 APPLICATION(S): at 21:15

## 2020-11-04 RX ADMIN — Medication 50 MILLIGRAM(S): at 21:14

## 2020-11-04 RX ADMIN — DAPTOMYCIN 117.6 MILLIGRAM(S): 500 INJECTION, POWDER, LYOPHILIZED, FOR SOLUTION INTRAVENOUS at 11:14

## 2020-11-04 RX ADMIN — Medication 1 TABLET(S): at 11:05

## 2020-11-04 RX ADMIN — FINASTERIDE 5 MILLIGRAM(S): 5 TABLET, FILM COATED ORAL at 11:05

## 2020-11-04 RX ADMIN — Medication 25 MILLIGRAM(S): at 11:05

## 2020-11-04 RX ADMIN — SIMVASTATIN 40 MILLIGRAM(S): 20 TABLET, FILM COATED ORAL at 21:14

## 2020-11-04 RX ADMIN — QUETIAPINE FUMARATE 50 MILLIGRAM(S): 200 TABLET, FILM COATED ORAL at 21:14

## 2020-11-04 RX ADMIN — HEPARIN SODIUM 5000 UNIT(S): 5000 INJECTION INTRAVENOUS; SUBCUTANEOUS at 11:05

## 2020-11-04 RX ADMIN — Medication 1 APPLICATION(S): at 11:06

## 2020-11-04 RX ADMIN — Medication 81 MILLIGRAM(S): at 11:06

## 2020-11-04 RX ADMIN — HEPARIN SODIUM 5000 UNIT(S): 5000 INJECTION INTRAVENOUS; SUBCUTANEOUS at 21:15

## 2020-11-04 RX ADMIN — Medication 50 MILLIGRAM(S): at 11:06

## 2020-11-04 RX ADMIN — Medication 25 MILLIGRAM(S): at 21:14

## 2020-11-04 RX ADMIN — TAMSULOSIN HYDROCHLORIDE 0.4 MILLIGRAM(S): 0.4 CAPSULE ORAL at 21:14

## 2020-11-04 NOTE — PROGRESS NOTE ADULT - SUBJECTIVE AND OBJECTIVE BOX
HPI:  89 y/o M PMHx BPH, CAD, DMII, GERD, HLD, SDH s/p craniotomy and surgical removal, urine rentention with Whitmore catheter, HTN presenting for [facility] with 1 day h/o hematuria. Whitmore was replaced in the NH last night after the pt pulled it out. Today, pt was noted to be febrile at the NH Tmax: 104, SBP 90, and hypoxic on RA Sp02 on RA. Also noted to be more confused and lethargic with gross hematuria. Pt not on AC due to recent SDH.  Pt was admitted in early October (D/c 10 days ago) s/p fall with AMS and imaging + for SDH requiring craniotomy and evacuation also with aspiration PNA tx with IV abx. Whitmore was placed for urinary retention.   Spoke with pt's son Caleb whiteside ESL  this evening. Updated him on pt's status; as the next of kin, he consented to CT w/ contrast imaging. MOLST was reviewed and received verbal consent.    SUBJECTIVE:   11/4/2020  Patient seen and examined at bedside. No events overnight.  Patient  denies any SOB, cough, palpitations, diaphoresis or headache.    Discharge plan was discussed with the patient.  COVID-19 PCR was negative so he can go back to the facility.  Patient's son wants the patient to get the rest of his abx doses at [facility] and not at the facility, we will continue to follow up with  for any updates.    REVIEW OF SYSTEMS:  CONSTITUTIONAL: No weakness, fevers or chills  RESPIRATORY: No cough, wheezing, hemoptysis; No shortness of breath  CARDIOVASCULAR: +Chest discomfort, no palpitations.  GASTROINTESTINAL: No abdominal or epigastric pain. No nausea, vomiting, or hematemesis; No diarrhea or constipation. No melena or hematochezia.  NEUROLOGICAL: No numbness or weakness  All other review of systems is negative unless indicated above    Vital Signs Last 24 Hrs  T(C): 36.8 (04 Nov 2020 15:30), Max: 36.8 (04 Nov 2020 07:49)  T(F): 98.3 (04 Nov 2020 15:30), Max: 98.3 (04 Nov 2020 15:30)  HR: 64 (04 Nov 2020 15:30) (64 - 73)  BP: 115/63 (04 Nov 2020 15:30) (111/65 - 136/72)  BP(mean): --  RR: 18 (04 Nov 2020 15:30) (18 - 18)  SpO2: 100% (04 Nov 2020 15:30) (100% - 100%)    I&O's Summary  03 Nov 2020 07:01  -  04 Nov 2020 07:00  --------------------------------------------------------  IN: 0 mL / OUT: 700 mL / NET: -700 mL    CAPILLARY BLOOD GLUCOSE  POCT Blood Glucose.: 135 mg/dL (04 Nov 2020 08:10)    PHYSICAL EXAM:  Constitutional: NAD, awake and alert, +cachetic   HEENT: Normal Hearing  Respiratory: Breath sounds are clear bilaterally  Cardiovascular: S1 and S2, regular rate and rhythm, no Murmurs, gallops or rubs, no chest tenderness with palpation   Gastrointestinal: Bowel Sounds present, soft, nontender, nondistended, no guarding, no rebound  Extremities: No peripheral edema  Vascular: 2+ peripheral pulses  Neurological: A/O x 3    MEDICATIONS  (STANDING):  aspirin  chewable 81 milliGRAM(s) Oral daily  bethanechol 50 milliGRAM(s) Oral two times a day  DAPTOmycin IVPB 440 milliGRAM(s) IV Intermittent every 24 hours  dextrose 5%. 1000 milliLiter(s) (50 mL/Hr) IV Continuous <Continuous>  dextrose 50% Injectable 12.5 Gram(s) IV Push once  dextrose 50% Injectable 25 Gram(s) IV Push once  dextrose 50% Injectable 25 Gram(s) IV Push once  finasteride 5 milliGRAM(s) Oral daily  heparin   Injectable 5000 Unit(s) SubCutaneous every 12 hours  metoprolol tartrate 25 milliGRAM(s) Oral two times a day  multivitamin 1 Tablet(s) Oral daily  QUEtiapine 50 milliGRAM(s) Oral at bedtime  silver sulfADIAZINE 1% Cream 1 Application(s) Topical two times a day  simvastatin 40 milliGRAM(s) Oral at bedtime  tamsulosin Oral Tab/Cap - Peds 0.4 milliGRAM(s) Oral at bedtime    MEDICATIONS  (PRN):  acetaminophen  Suppository .. 650 milliGRAM(s) Rectal every 6 hours PRN Temp greater or equal to 38C (100.4F)  dextrose 40% Gel 15 Gram(s) Oral once PRN Blood Glucose LESS THAN 70 milliGRAM(s)/deciliter  glucagon  Injectable 1 milliGRAM(s) IntraMuscular once PRN Glucose LESS THAN 70 milligrams/deciliter      LABS: All Labs Reviewed:     No recent labs performed.      Blood Culture: 10-29 @ 08:08  Organism --  Gram Stain Blood -- Gram Stain --  Specimen Source .Blood None  Culture-Blood --    10-29 @ 08:04  Organism --  Gram Stain Blood -- Gram Stain --  Specimen Source .Blood None  Culture-Blood --    EXAM:  CT BRAIN                          PROCEDURE DATE:  10/26/2020      IMPRESSION:  1. Persistent, large right-sided subdural hematoma that demonstrates components of variable age, including acute/subacute components) and measures up to 2.1 cm in thickness. This results in right-sided mass effect with right to left midline shift of approximately 6 mm. When compared to prior CT brain of 10/9/2020, a similar sized right holohemispheric subdural hematoma was demonstrated with similar appearance of the ventricles, mass effect and right to left midline shift.    EXAM:  CT ABDOMEN AND PELVIS IC                          PROCEDURE DATE:  10/27/2020      IMPRESSION:    Rectosigmoid colitis. No bowel obstruction.  Nonspecific mild bilateral perinephric stranding without hydroureteronephrosis. Question striated bilateral nephrogram. Recommend clinical correlation to assess pyelonephritis. Question emphysematous cystitis versus bladder wall trabeculation.  Malpositioned Whitmore catheter at the level of the penile urethra. Recommend removal.  Dependent opacities in the left > right lower lobe, which may represent atelectasis. Recommend clinical correlation to assess aspiration.  Additional findings as described.

## 2020-11-04 NOTE — PROGRESS NOTE ADULT - ASSESSMENT
89 year old male patient with narrative as previously stated with mental status changes and hypotension, now AAOx3 with positive blood cultures for VRE,    #Metabolic encephalopathy, likely 2/2 infectious etiology  -Cystitis/ VRE   -No Leukocytosis   -Initial blood cultures: VRE 10/26  -repeat blood cultures: NGTD   -TTE: no vegetations   -s/p Dawson replaced by urology   -On Daptomycin #8, will require 14 days IV daptomycin until -- 11/11  -daily CPK  -Continue contact precautions   -Patient will continue Daptomycin treatment outpatient at the facility for 6 more days.     #Aspiration PNA  -s/p Zosyn   -Stable on RA  -Afebrile  -Aspiration precautions recommended    #SDH s/p craniotomy  -Evaluated by Neurosurgery in ED  -No intervention by NSGY due to stable SDH on CTH;   -Continue ASA for CAD and Heparin for DVT prophylaxis   -Neurosurgery recommendations appreciated    #HTN  -Stable  -continue metoprolol tartrate 25mg PO BID     #BPH/Urinary retention  #Chronic dawson   -Ucx- VRE  -on finasteride 5mg oral qd  -on tamsulosin 0.4mg oral qhs  -on Bethanechol 50mg BID  -Dawson repositioned  -C/w catheter change every 4 weeks  -Patient is a poor candidate to consider outlet surgery  -urology recommendations noted     #CAD  -EKG reviewed, no acute changes noted  -Continue aspirin 81mg QD  -Continue Simvastatin 40mg qhs  -Continue Metoprolol Tartrate 25mg BID     #DMII  -A1C 5.7 (10/5)  -POCT glucose once a day    #HLD  -Continue Simvastatin 40mg qhs    #Stage II pressure ulcers  -L buttock and R sacrum on arrival to ED  -Wound care consulted  -Continue Silvadene cream    #Dementia with features of agitation  -Continue Seroquel 50mg qhs    #DVT PPX  -SCDS   -Heparin SQ q12h    #Advance Directives  -Son: Caleb Finn who is next of kin and making medical decisions for pt as he has no capacity at this time  -DNI- MOLST in chart- complete and signed as according to Living Will     #Dispo  -negative Covid-19 PCR test  -Patient will continue his daptomycin treatment for 6 more days.   -PT consult: rehabilitation facility, 3:1 commode; rolling walker (5 inch wheels), balance training; bed mobility training; gait training; strengthening; transfer training  -Was previously at [facility]   -Pending discharge, we will follow up with  for further updates.    *Discharge management and plan discussed with Dr. Asencio.

## 2020-11-04 NOTE — PROGRESS NOTE ADULT - ASSESSMENT
Pt has been seen and examined with FP resident, resident supervised agree with a/p       Patient is a 89y old  Male who presents with a chief complaint of hematuria, fever, hypotension (03 Nov 2020 13:25)          PHYSICAL EXAM:  Vital Signs Last 24 Hrs  T(C): 36.8 (04 Nov 2020 07:49), Max: 36.8 (04 Nov 2020 07:49)  T(F): 98.2 (04 Nov 2020 07:49), Max: 98.2 (04 Nov 2020 07:49)  HR: 71 (04 Nov 2020 07:49) (71 - 73)  BP: 111/65 (04 Nov 2020 07:49) (111/65 - 136/72)  BP(mean): --  RR: 18 (04 Nov 2020 07:49) (18 - 18)  SpO2: 100% (04 Nov 2020 07:49) (97% - 100%)  -rs-aeeb, cta  -cvs-s1s2 normal   -p/a-soft,bs+      A/P    #d/c as mentioned in d/c summary. Time spent 45 minutes

## 2020-11-05 LAB
CK SERPL-CCNC: 31 U/L — SIGNIFICANT CHANGE UP (ref 26–308)
TROPONIN I SERPL-MCNC: <0.015 NG/ML — SIGNIFICANT CHANGE UP (ref 0.01–0.04)

## 2020-11-05 PROCEDURE — 93010 ELECTROCARDIOGRAM REPORT: CPT | Mod: 76

## 2020-11-05 PROCEDURE — 99231 SBSQ HOSP IP/OBS SF/LOW 25: CPT | Mod: GC

## 2020-11-05 RX ADMIN — Medication 25 MILLIGRAM(S): at 09:10

## 2020-11-05 RX ADMIN — QUETIAPINE FUMARATE 50 MILLIGRAM(S): 200 TABLET, FILM COATED ORAL at 21:18

## 2020-11-05 RX ADMIN — DAPTOMYCIN 117.6 MILLIGRAM(S): 500 INJECTION, POWDER, LYOPHILIZED, FOR SOLUTION INTRAVENOUS at 10:30

## 2020-11-05 RX ADMIN — Medication 1 APPLICATION(S): at 21:19

## 2020-11-05 RX ADMIN — Medication 81 MILLIGRAM(S): at 09:09

## 2020-11-05 RX ADMIN — FINASTERIDE 5 MILLIGRAM(S): 5 TABLET, FILM COATED ORAL at 09:09

## 2020-11-05 RX ADMIN — Medication 1 TABLET(S): at 09:09

## 2020-11-05 RX ADMIN — TAMSULOSIN HYDROCHLORIDE 0.4 MILLIGRAM(S): 0.4 CAPSULE ORAL at 21:18

## 2020-11-05 RX ADMIN — Medication 25 MILLIGRAM(S): at 21:18

## 2020-11-05 RX ADMIN — HEPARIN SODIUM 5000 UNIT(S): 5000 INJECTION INTRAVENOUS; SUBCUTANEOUS at 09:09

## 2020-11-05 RX ADMIN — HEPARIN SODIUM 5000 UNIT(S): 5000 INJECTION INTRAVENOUS; SUBCUTANEOUS at 21:17

## 2020-11-05 RX ADMIN — Medication 1 APPLICATION(S): at 09:14

## 2020-11-05 RX ADMIN — SIMVASTATIN 40 MILLIGRAM(S): 20 TABLET, FILM COATED ORAL at 21:17

## 2020-11-05 RX ADMIN — Medication 50 MILLIGRAM(S): at 09:09

## 2020-11-05 RX ADMIN — Medication 50 MILLIGRAM(S): at 21:17

## 2020-11-05 NOTE — PROGRESS NOTE ADULT - ASSESSMENT
89 year old male patient with narrative as previously stated with mental status changes and hypotension, now AAOx3 with positive blood cultures for VRE.    #Metabolic encephalopathy, likely 2/2 infectious etiology  -Cystitis/ VRE   -No Leukocytosis   -Initial blood cultures: VRE 10/26  -repeat blood cultures: NGTD   -TTE: no vegetations   -s/p Dawson replaced by urology   -On Daptomycin #9, will require 14 days IV daptomycin until -- 11/11  -daily CPK  -Continue contact precautions   -Patient will continue Daptomycin treatment outpatient at the facility for 5 more days.     #Aspiration PNA  -s/p Zosyn   -Stable on RA  -Afebrile  -Aspiration precautions recommended    #SDH s/p craniotomy  -Evaluated by Neurosurgery in ED  -No intervention by NSGY due to stable SDH on CTH;   -Continue ASA for CAD and Heparin for DVT prophylaxis   -Neurosurgery recommendations appreciated    #HTN  -Stable  -continue metoprolol tartrate 25mg PO BID     #BPH/Urinary retention  #Chronic dawson   -Ucx- VRE  -on finasteride 5mg oral qd  -on tamsulosin 0.4mg oral qhs  -on Bethanechol 50mg BID  -Dawson in place  -C/w catheter change every 4 weeks  -Patient is a poor candidate to consider outlet surgery  -urology recommendations noted     #CAD  -EKG reviewed, no acute changes noted  -Continue aspirin 81mg QD  -Continue Simvastatin 40mg qhs  -Continue Metoprolol Tartrate 25mg BID     #DMII  -A1C 5.7 (10/5)  -POCT glucose once a day    #HLD  -Continue Simvastatin 40mg qhs    #Stage II pressure ulcers  -L buttock and R sacrum on arrival to ED  -Wound care consulted  -Continue Silvadene cream on both ulcers    #Dementia with features of agitation  -Continue Seroquel 50mg qhs    #DVT PPX  -SCDS   -Heparin SQ q12h    #Advance Directives  -Son: Caleb Finn who is next of kin and making medical decisions for pt as he has no capacity at this time.   -DNI- MOLST in chart- complete and signed as according to Living Will     #Dispo  -negative Covid-19 PCR test on 11/02/2020  -Patient will continue his daptomycin treatment for 5 more days.   -PT consult: rehabilitation facility, 3:1 commode; rolling walker (5 inch wheels), balance training; bed mobility training; gait training; strengthening; transfer training  -Was previously at [facility]   -Pending discharge, we will follow up with  for further updates on the agreement with the patient's son Caleb Finn and the .    *Discharge management and plan discussed with Dr. Asencio.

## 2020-11-05 NOTE — PROGRESS NOTE ADULT - SUBJECTIVE AND OBJECTIVE BOX
HPI:  88 y/o M PMHx BPH, CAD, DMII, GERD, HLD, SDH s/p craniotomy and surgical removal, urine rentention with Whitmore catheter, HTN presenting for [facility] with 1 day h/o hematuria. Whitmore was replaced in the NH last night after the pt pulled it out. Today, pt was noted to be febrile at the NH Tmax: 104, SBP 90, and hypoxic on RA Sp02 on RA. Also noted to be more confused and lethargic with gross hematuria. Pt not on AC due to recent SDH.  Pt was admitted in early October (D/c 10 days ago) s/p fall with AMS and imaging + for SDH requiring craniotomy and evacuation also with aspiration PNA tx with IV abx. Whitmore was placed for urinary retention.   Spoke with pt's son Caleb whiteside ESL  this evening. Updated him on pt's status; as the next of kin, he consented to CT w/ contrast imaging. MOLST was reviewed and received verbal consent.    SUBJECTIVE:   11/5/2020  Patient seen and examined at bedside. No events overnight.  Patient  denies any SOB, cough, palpitations, diaphoresis or headache.    Discharge plan was discussed with the patient.   is working with the patient's son for an agreement on the patient's daptomycin doses.    REVIEW OF SYSTEMS:  CONSTITUTIONAL: No weakness, fevers or chills  RESPIRATORY: No cough, wheezing, hemoptysis; No shortness of breath  CARDIOVASCULAR: No chest pain, no palpitations.  GASTROINTESTINAL: No abdominal or epigastric pain. No nausea, vomiting, or hematemesis; No diarrhea or constipation. No melena or hematochezia.  NEUROLOGICAL: No numbness or weakness  All other review of systems is negative unless indicated above    Vital Signs Last 24 Hrs  Vital Signs Last 24 Hrs  T(C): 36.7 (05 Nov 2020 08:05), Max: 36.8 (04 Nov 2020 15:30)  T(F): 98.1 (05 Nov 2020 08:05), Max: 98.3 (04 Nov 2020 15:30)  HR: 73 (05 Nov 2020 08:05) (64 - 73)  BP: 118/63 (05 Nov 2020 08:05) (110/65 - 127/72)  RR: 18 (05 Nov 2020 08:05) (18 - 18)  SpO2: 100% (05 Nov 2020 08:05) (100% - 100%)    I&O's Summary  04 Nov 2020 07:01  -  05 Nov 2020 07:00  --------------------------------------------------------  IN: 0 mL / OUT: 400 mL / NET: -400 mL    CAPILLARY BLOOD GLUCOSE  CAPILLARY BLOOD GLUCOSE    PHYSICAL EXAM:  Constitutional: NAD, awake and alert, +cachetic   HEENT: Normal Hearing  Respiratory: Breath sounds are clear bilaterally  Cardiovascular: S1 and S2, regular rate and rhythm, no Murmurs, gallops or rubs, no chest tenderness with palpation   Gastrointestinal: Bowel Sounds present, soft, nontender, nondistended, no guarding, no rebound  Extremities: No peripheral edema  Vascular: 2+ peripheral pulses  Neurological: A/O x 3    MEDICATIONS  (STANDING):  aspirin  chewable 81 milliGRAM(s) Oral daily  bethanechol 50 milliGRAM(s) Oral two times a day  DAPTOmycin IVPB 440 milliGRAM(s) IV Intermittent every 24 hours  dextrose 5%. 1000 milliLiter(s) (50 mL/Hr) IV Continuous <Continuous>  dextrose 50% Injectable 12.5 Gram(s) IV Push once  dextrose 50% Injectable 25 Gram(s) IV Push once  dextrose 50% Injectable 25 Gram(s) IV Push once  finasteride 5 milliGRAM(s) Oral daily  heparin   Injectable 5000 Unit(s) SubCutaneous every 12 hours  metoprolol tartrate 25 milliGRAM(s) Oral two times a day  multivitamin 1 Tablet(s) Oral daily  QUEtiapine 50 milliGRAM(s) Oral at bedtime  silver sulfADIAZINE 1% Cream 1 Application(s) Topical two times a day  simvastatin 40 milliGRAM(s) Oral at bedtime  tamsulosin Oral Tab/Cap - Peds 0.4 milliGRAM(s) Oral at bedtime    MEDICATIONS  (PRN):  acetaminophen  Suppository .. 650 milliGRAM(s) Rectal every 6 hours PRN Temp greater or equal to 38C (100.4F)  dextrose 40% Gel 15 Gram(s) Oral once PRN Blood Glucose LESS THAN 70 milliGRAM(s)/deciliter  glucagon  Injectable 1 milliGRAM(s) IntraMuscular once PRN Glucose LESS THAN 70 milligrams/deciliter      LABS: All Labs Reviewed:     No recent labs performed.      Blood Culture: 10-29 @ 08:08  Organism --  Gram Stain Blood -- Gram Stain --  Specimen Source .Blood None  Culture-Blood --    10-29 @ 08:04  Organism --  Gram Stain Blood -- Gram Stain --  Specimen Source .Blood None  Culture-Blood --    EXAM:  CT BRAIN                          PROCEDURE DATE:  10/26/2020      IMPRESSION:  1. Persistent, large right-sided subdural hematoma that demonstrates components of variable age, including acute/subacute components) and measures up to 2.1 cm in thickness. This results in right-sided mass effect with right to left midline shift of approximately 6 mm. When compared to prior CT brain of 10/9/2020, a similar sized right holohemispheric subdural hematoma was demonstrated with similar appearance of the ventricles, mass effect and right to left midline shift.    EXAM:  CT ABDOMEN AND PELVIS IC                          PROCEDURE DATE:  10/27/2020      IMPRESSION:    Rectosigmoid colitis. No bowel obstruction.  Nonspecific mild bilateral perinephric stranding without hydroureteronephrosis. Question striated bilateral nephrogram. Recommend clinical correlation to assess pyelonephritis. Question emphysematous cystitis versus bladder wall trabeculation.  Malpositioned Whitmore catheter at the level of the penile urethra. Recommend removal.  Dependent opacities in the left > right lower lobe, which may represent atelectasis. Recommend clinical correlation to assess aspiration.  Additional findings as described.

## 2020-11-06 LAB — CK SERPL-CCNC: 20 U/L — LOW (ref 26–308)

## 2020-11-06 PROCEDURE — 99231 SBSQ HOSP IP/OBS SF/LOW 25: CPT | Mod: GC

## 2020-11-06 RX ADMIN — FINASTERIDE 5 MILLIGRAM(S): 5 TABLET, FILM COATED ORAL at 11:12

## 2020-11-06 RX ADMIN — SIMVASTATIN 40 MILLIGRAM(S): 20 TABLET, FILM COATED ORAL at 21:51

## 2020-11-06 RX ADMIN — Medication 50 MILLIGRAM(S): at 11:11

## 2020-11-06 RX ADMIN — QUETIAPINE FUMARATE 50 MILLIGRAM(S): 200 TABLET, FILM COATED ORAL at 21:51

## 2020-11-06 RX ADMIN — Medication 25 MILLIGRAM(S): at 11:12

## 2020-11-06 RX ADMIN — Medication 81 MILLIGRAM(S): at 11:11

## 2020-11-06 RX ADMIN — Medication 50 MILLIGRAM(S): at 21:51

## 2020-11-06 RX ADMIN — HEPARIN SODIUM 5000 UNIT(S): 5000 INJECTION INTRAVENOUS; SUBCUTANEOUS at 21:51

## 2020-11-06 RX ADMIN — DAPTOMYCIN 117.6 MILLIGRAM(S): 500 INJECTION, POWDER, LYOPHILIZED, FOR SOLUTION INTRAVENOUS at 11:11

## 2020-11-06 RX ADMIN — TAMSULOSIN HYDROCHLORIDE 0.4 MILLIGRAM(S): 0.4 CAPSULE ORAL at 21:51

## 2020-11-06 RX ADMIN — Medication 1 TABLET(S): at 11:11

## 2020-11-06 RX ADMIN — HEPARIN SODIUM 5000 UNIT(S): 5000 INJECTION INTRAVENOUS; SUBCUTANEOUS at 11:11

## 2020-11-06 RX ADMIN — Medication 1 APPLICATION(S): at 13:52

## 2020-11-06 NOTE — PROGRESS NOTE ADULT - ASSESSMENT
Pt has been seen and examined with FP resident, resident supervised agree with a/p       Patient is a 89y old  Male who presents with a chief complaint of hematuria, fever, hypotension (05 Nov 2020 09:10)          PHYSICAL EXAM:  Vital Signs Last 24 Hrs  T(C): 36.5 (06 Nov 2020 09:14), Max: 36.7 (05 Nov 2020 23:47)  T(F): 97.7 (06 Nov 2020 09:14), Max: 98 (05 Nov 2020 23:47)  HR: 70 (06 Nov 2020 09:14) (66 - 75)  BP: 129/63 (06 Nov 2020 09:14) (112/62 - 140/81)  BP(mean): --  RR: 16 (06 Nov 2020 09:14) (16 - 18)  SpO2: 100% (06 Nov 2020 09:14) (98% - 100%)  -rs-aeeb, cta  -cvs-s1s2 normal   -p/a-soft,bs+      A/P    #ct abx, supportive care     #dvt pr     #discharge once there is no social issue

## 2020-11-06 NOTE — PROGRESS NOTE ADULT - SUBJECTIVE AND OBJECTIVE BOX
HPI:  88 y/o M PMHx BPH, CAD, DMII, GERD, HLD, SDH s/p craniotomy and surgical removal, urine rentention with Whitmore catheter, HTN presenting for [facility] with 1 day h/o hematuria. Whitmore was replaced in the NH last night after the pt pulled it out. Today, pt was noted to be febrile at the NH Tmax: 104, SBP 90, and hypoxic on RA Sp02 on RA. Also noted to be more confused and lethargic with gross hematuria. Pt not on AC due to recent SDH.  Pt was admitted in early October (D/c 10 days ago) s/p fall with AMS and imaging + for SDH requiring craniotomy and evacuation also with aspiration PNA tx with IV abx. Whitmore was placed for urinary retention.   Spoke with pt's son Caleb whiteside ESL  this evening. Updated him on pt's status; as the next of kin, he consented to CT w/ contrast imaging. MOLST was reviewed and received verbal consent.    SUBJECTIVE:   11/6/2020  Patient seen and examined at bedside. No events overnight.  Patient was eating breakfast, mentions he is feeling good.  He also denies any SOB, cough, palpitations, diaphoresis or headache.   is currently working with the patient's son for an agreement on the patient's daptomycin doses.    REVIEW OF SYSTEMS:  CONSTITUTIONAL: No weakness, fevers or chills  RESPIRATORY: No cough, wheezing, hemoptysis; No shortness of breath  CARDIOVASCULAR: No chest pain, no palpitations.  GASTROINTESTINAL: No abdominal or epigastric pain. No nausea, vomiting, or hematemesis; No diarrhea or constipation. No melena or hematochezia.  NEUROLOGICAL: No numbness or weakness  All other review of systems is negative unless indicated above    Vital Signs Last 24 Hrs  T(C): 36.5 (06 Nov 2020 09:14), Max: 36.7 (05 Nov 2020 23:47)  T(F): 97.7 (06 Nov 2020 09:14), Max: 98 (05 Nov 2020 23:47)  HR: 70 (06 Nov 2020 09:14) (66 - 75)  BP: 129/63 (06 Nov 2020 09:14) (112/62 - 140/81)  RR: 16 (06 Nov 2020 09:14) (16 - 18)  SpO2: 100% (06 Nov 2020 09:14) (98% - 100%)      CAPILLARY BLOOD GLUCOSE  POCT Blood Glucose.: 128 mg/dL (06 Nov 2020 07:54)    PHYSICAL EXAM:  Constitutional: NAD, awake and alert, +cachetic   HEENT: Normal Hearing  Respiratory: Breath sounds are clear bilaterally  Cardiovascular: S1 and S2, regular rate and rhythm, no Murmurs, gallops or rubs, no chest tenderness with palpation   Gastrointestinal: Bowel Sounds present, soft, nontender, nondistended, no guarding, no rebound  Extremities: No peripheral edema  Vascular: 2+ peripheral pulses  Neurological: A/O x 2    MEDICATIONS  (STANDING):  aspirin  chewable 81 milliGRAM(s) Oral daily  bethanechol 50 milliGRAM(s) Oral two times a day  dextrose 5%. 1000 milliLiter(s) (50 mL/Hr) IV Continuous <Continuous>  dextrose 50% Injectable 12.5 Gram(s) IV Push once  dextrose 50% Injectable 25 Gram(s) IV Push once  dextrose 50% Injectable 25 Gram(s) IV Push once  finasteride 5 milliGRAM(s) Oral daily  heparin   Injectable 5000 Unit(s) SubCutaneous every 12 hours  metoprolol tartrate 25 milliGRAM(s) Oral two times a day  multivitamin 1 Tablet(s) Oral daily  QUEtiapine 50 milliGRAM(s) Oral at bedtime  silver sulfADIAZINE 1% Cream 1 Application(s) Topical two times a day  simvastatin 40 milliGRAM(s) Oral at bedtime  tamsulosin Oral Tab/Cap - Peds 0.4 milliGRAM(s) Oral at bedtime    MEDICATIONS  (PRN):  acetaminophen  Suppository .. 650 milliGRAM(s) Rectal every 6 hours PRN Temp greater or equal to 38C (100.4F)  dextrose 40% Gel 15 Gram(s) Oral once PRN Blood Glucose LESS THAN 70 milliGRAM(s)/deciliter  glucagon  Injectable 1 milliGRAM(s) IntraMuscular once PRN Glucose LESS THAN 70 milligrams/deciliter      LABS: All Labs Reviewed:     No recent labs performed.      Blood Culture: 10-29 @ 08:08  Organism --  Gram Stain Blood -- Gram Stain --  Specimen Source .Blood None  Culture-Blood --    10-29 @ 08:04  Organism --  Gram Stain Blood -- Gram Stain --  Specimen Source .Blood None  Culture-Blood --    EXAM:  CT BRAIN                          PROCEDURE DATE:  10/26/2020      IMPRESSION:  1. Persistent, large right-sided subdural hematoma that demonstrates components of variable age, including acute/subacute components) and measures up to 2.1 cm in thickness. This results in right-sided mass effect with right to left midline shift of approximately 6 mm. When compared to prior CT brain of 10/9/2020, a similar sized right holohemispheric subdural hematoma was demonstrated with similar appearance of the ventricles, mass effect and right to left midline shift.    EXAM:  CT ABDOMEN AND PELVIS IC                          PROCEDURE DATE:  10/27/2020      IMPRESSION:    Rectosigmoid colitis. No bowel obstruction.  Nonspecific mild bilateral perinephric stranding without hydroureteronephrosis. Question striated bilateral nephrogram. Recommend clinical correlation to assess pyelonephritis. Question emphysematous cystitis versus bladder wall trabeculation.  Malpositioned Whitmore catheter at the level of the penile urethra. Recommend removal.  Dependent opacities in the left > right lower lobe, which may represent atelectasis. Recommend clinical correlation to assess aspiration.  Additional findings as described.

## 2020-11-06 NOTE — PROGRESS NOTE ADULT - ASSESSMENT
89 year old male patient with narrative as previously stated with mental status changes and hypotension, now AAOx3 with positive blood cultures for VRE.    #Metabolic encephalopathy, likely 2/2 infectious etiology  -Cystitis/ VRE   -No Leukocytosis   -Initial blood cultures: VRE 10/26  -repeat blood cultures: NGTD   -TTE: no vegetations   -s/p Dawson replaced by urology   -On Daptomycin #10, will require 14 days IV daptomycin until -- 11/11  -daily CPK  -Continue contact precautions   -Patient will continue Daptomycin treatment outpatient at the facility for 4 more days.     #Aspiration PNA  -s/p Zosyn   -Stable on RA  -Afebrile  -Aspiration precautions recommended    #SDH s/p craniotomy  -Evaluated by Neurosurgery in ED  -No intervention by NSGY due to stable SDH on CTH;   -Continue ASA for CAD and Heparin for DVT prophylaxis   -Neurosurgery recommendations appreciated    #HTN  -Stable  -continue metoprolol tartrate 25mg PO BID     #BPH/Urinary retention  #Chronic dawson   -Ucx- VRE  -on finasteride 5mg oral qd  -on tamsulosin 0.4mg oral qhs  -on Bethanechol 50mg BID  -Dawson in place  -C/w catheter change every 4 weeks  -Patient is a poor candidate to consider outlet surgery  -urology recommendations noted     #CAD  -EKG reviewed, no acute changes noted  -Continue aspirin 81mg QD  -Continue Simvastatin 40mg qhs  -Continue Metoprolol Tartrate 25mg BID     #DMII  -A1C 5.7 (10/5)  -POCT glucose once a day    #HLD  -Continue Simvastatin 40mg qhs    #Stage II pressure ulcers  -L buttock and R sacrum on arrival to ED  -Wound care consult appreciated  -Continue Silvadene cream on both ulcers    #Dementia with features of agitation  -Continue Seroquel 50mg qhs    #DVT PPX  -SCDS   -Heparin SQ q12h    #Advance Directives  -Son: Caleb Finn who is next of kin and making medical decisions for pt as he has no capacity at this time.   -DNI- MOLST in chart- complete and signed as according to Living Will     #Dispo  -negative Covid-19 PCR test on 11/02/2020  -Patient will continue his daptomycin treatment for 4 more days.   -PT consult: rehabilitation facility, 3:1 commode; rolling walker (5 inch wheels), balance training; bed mobility training; gait training; strengthening; transfer training  -Was previously at [facility]   -Pending discharge, we will follow up with  for further updates on the agreement with the patient's son Caleb Finn and the .  -Patient is medically stable and optimized to be discharge.     *Discharge management and plan discussed with Dr. Asencio.

## 2020-11-07 LAB — CK SERPL-CCNC: 22 U/L — LOW (ref 26–308)

## 2020-11-07 PROCEDURE — 99231 SBSQ HOSP IP/OBS SF/LOW 25: CPT | Mod: GC

## 2020-11-07 RX ORDER — DAPTOMYCIN 500 MG/10ML
440 INJECTION, POWDER, LYOPHILIZED, FOR SOLUTION INTRAVENOUS EVERY 24 HOURS
Refills: 0 | Status: COMPLETED | OUTPATIENT
Start: 2020-11-07 | End: 2020-11-10

## 2020-11-07 RX ADMIN — HEPARIN SODIUM 5000 UNIT(S): 5000 INJECTION INTRAVENOUS; SUBCUTANEOUS at 21:04

## 2020-11-07 RX ADMIN — Medication 81 MILLIGRAM(S): at 09:39

## 2020-11-07 RX ADMIN — HEPARIN SODIUM 5000 UNIT(S): 5000 INJECTION INTRAVENOUS; SUBCUTANEOUS at 09:39

## 2020-11-07 RX ADMIN — Medication 50 MILLIGRAM(S): at 09:39

## 2020-11-07 RX ADMIN — Medication 50 MILLIGRAM(S): at 21:03

## 2020-11-07 RX ADMIN — FINASTERIDE 5 MILLIGRAM(S): 5 TABLET, FILM COATED ORAL at 09:39

## 2020-11-07 RX ADMIN — QUETIAPINE FUMARATE 50 MILLIGRAM(S): 200 TABLET, FILM COATED ORAL at 21:04

## 2020-11-07 RX ADMIN — Medication 1 APPLICATION(S): at 09:40

## 2020-11-07 RX ADMIN — Medication 25 MILLIGRAM(S): at 21:04

## 2020-11-07 RX ADMIN — Medication 1 APPLICATION(S): at 21:06

## 2020-11-07 RX ADMIN — Medication 1 TABLET(S): at 09:40

## 2020-11-07 RX ADMIN — TAMSULOSIN HYDROCHLORIDE 0.4 MILLIGRAM(S): 0.4 CAPSULE ORAL at 21:03

## 2020-11-07 RX ADMIN — SIMVASTATIN 40 MILLIGRAM(S): 20 TABLET, FILM COATED ORAL at 21:04

## 2020-11-07 RX ADMIN — Medication 25 MILLIGRAM(S): at 09:39

## 2020-11-07 RX ADMIN — DAPTOMYCIN 117.6 MILLIGRAM(S): 500 INJECTION, POWDER, LYOPHILIZED, FOR SOLUTION INTRAVENOUS at 11:51

## 2020-11-07 NOTE — PROGRESS NOTE ADULT - SUBJECTIVE AND OBJECTIVE BOX
HPI:  89 y/o M PMHx BPH, CAD, DMII, GERD, HLD, SDH s/p craniotomy and surgical removal, urine rentention with Whitmore catheter, HTN presenting for [facility] with 1 day h/o hematuria. Whitmore was replaced in the NH last night after the pt pulled it out. Today, pt was noted to be febrile at the NH Tmax: 104, SBP 90, and hypoxic on RA Sp02 on RA. Also noted to be more confused and lethargic with gross hematuria. Pt not on AC due to recent SDH.  Pt was admitted in early October (D/c 10 days ago) s/p fall with AMS and imaging + for SDH requiring craniotomy and evacuation also with aspiration PNA tx with IV abx. Whitmore was placed for urinary retention.   Spoke with pt's son Caleb whiteside ESL  this evening. Updated him on pt's status; as the next of kin, he consented to CT w/ contrast imaging. MOLST was reviewed and received verbal consent.    SUBJECTIVE:   11/7/2020  Patient seen and examined at bedside. No events overnight. Patient says he is feeling the same as yesterday, no changes.    is currently working placement for the patient for rehab.    REVIEW OF SYSTEMS:  CONSTITUTIONAL: No weakness, fevers or chills  RESPIRATORY: No cough, wheezing, hemoptysis; No shortness of breath  CARDIOVASCULAR: No chest pain, no palpitations.  GASTROINTESTINAL: No abdominal or epigastric pain. No nausea, vomiting, or hematemesis; No diarrhea or constipation. No melena or hematochezia.  NEUROLOGICAL: No numbness or weakness  All other review of systems is negative unless indicated above    Vital Signs Last 24 Hrs  T(C): 36.3 (07 Nov 2020 07:56), Max: 36.4 (07 Nov 2020 01:16)  T(F): 97.3 (07 Nov 2020 07:56), Max: 97.6 (07 Nov 2020 01:16)  HR: 71 (07 Nov 2020 07:56) (62 - 71)  BP: 127/65 (07 Nov 2020 07:56) (127/65 - 148/67)  RR: 18 (07 Nov 2020 07:56) (18 - 18)  SpO2: 100% (07 Nov 2020 07:56) (99% - 100%)      CAPILLARY BLOOD GLUCOSE  POCT Blood Glucose.: 140 mg/dL (07 Nov 2020 08:11)    PHYSICAL EXAM:  Constitutional: NAD, awake and alert, +cachetic   HEENT: Normal Hearing  Respiratory: Breath sounds are clear bilaterally  Cardiovascular: S1 and S2, regular rate and rhythm, no Murmurs, gallops or rubs, no chest tenderness with palpation   Gastrointestinal: Bowel Sounds present, soft, nontender, nondistended, no guarding, no rebound  Extremities: No peripheral edema  Vascular: 2+ peripheral pulses  Neurological: A/O x 2    MEDICATIONS  (STANDING):  aspirin  chewable 81 milliGRAM(s) Oral daily  bethanechol 50 milliGRAM(s) Oral two times a day  DAPTOmycin IVPB 440 milliGRAM(s) IV Intermittent every 24 hours  dextrose 5%. 1000 milliLiter(s) (50 mL/Hr) IV Continuous <Continuous>  dextrose 50% Injectable 12.5 Gram(s) IV Push once  dextrose 50% Injectable 25 Gram(s) IV Push once  dextrose 50% Injectable 25 Gram(s) IV Push once  finasteride 5 milliGRAM(s) Oral daily  heparin   Injectable 5000 Unit(s) SubCutaneous every 12 hours  metoprolol tartrate 25 milliGRAM(s) Oral two times a day  multivitamin 1 Tablet(s) Oral daily  QUEtiapine 50 milliGRAM(s) Oral at bedtime  silver sulfADIAZINE 1% Cream 1 Application(s) Topical two times a day  simvastatin 40 milliGRAM(s) Oral at bedtime  tamsulosin Oral Tab/Cap - Peds 0.4 milliGRAM(s) Oral at bedtime    MEDICATIONS  (PRN):  acetaminophen  Suppository .. 650 milliGRAM(s) Rectal every 6 hours PRN Temp greater or equal to 38C (100.4F)  dextrose 40% Gel 15 Gram(s) Oral once PRN Blood Glucose LESS THAN 70 milliGRAM(s)/deciliter  glucagon  Injectable 1 milliGRAM(s) IntraMuscular once PRN Glucose LESS THAN 70 milligrams/deciliter    LABS: All Labs Reviewed:     No recent labs performed.      Blood Culture: 10-29 @ 08:08  Organism --  Gram Stain Blood -- Gram Stain --  Specimen Source .Blood None  Culture-Blood --    10-29 @ 08:04  Organism --  Gram Stain Blood -- Gram Stain --  Specimen Source .Blood None  Culture-Blood --    EXAM:  CT BRAIN                          PROCEDURE DATE:  10/26/2020      IMPRESSION:  1. Persistent, large right-sided subdural hematoma that demonstrates components of variable age, including acute/subacute components) and measures up to 2.1 cm in thickness. This results in right-sided mass effect with right to left midline shift of approximately 6 mm. When compared to prior CT brain of 10/9/2020, a similar sized right holohemispheric subdural hematoma was demonstrated with similar appearance of the ventricles, mass effect and right to left midline shift.    EXAM:  CT ABDOMEN AND PELVIS IC                          PROCEDURE DATE:  10/27/2020      IMPRESSION:    Rectosigmoid colitis. No bowel obstruction.  Nonspecific mild bilateral perinephric stranding without hydroureteronephrosis. Question striated bilateral nephrogram. Recommend clinical correlation to assess pyelonephritis. Question emphysematous cystitis versus bladder wall trabeculation.  Malpositioned Whitmore catheter at the level of the penile urethra. Recommend removal.  Dependent opacities in the left > right lower lobe, which may represent atelectasis. Recommend clinical correlation to assess aspiration.  Additional findings as described.

## 2020-11-07 NOTE — PROGRESS NOTE ADULT - ASSESSMENT
89 year old male patient with narrative as previously stated with mental status changes and hypotension, now AAOx3 with positive blood cultures for VRE.    #Metabolic encephalopathy, likely 2/2 infectious etiology  -Cystitis/ VRE   -No Leukocytosis   -Initial blood cultures: VRE 10/26  -repeat blood cultures: NGTD   -TTE: no vegetations   -s/p Dawson replaced by urology   -On Daptomycin #10, will require 14 days IV daptomycin until -- 11/11  -daily CPK  -Continue contact precautions   -Patient will continue Daptomycin treatment outpatient at the facility for 3 more days.     #Aspiration PNA  -s/p Zosyn   -Stable on RA  -Afebrile  -Aspiration precautions recommended    #SDH s/p craniotomy  -Evaluated by Neurosurgery in ED  -No intervention by NSGY due to stable SDH on CTH;   -Continue ASA for CAD and Heparin for DVT prophylaxis   -Neurosurgery recommendations appreciated    #HTN  -Stable  -continue metoprolol tartrate 25mg PO BID     #BPH/Urinary retention  #Chronic dawson   -Ucx- VRE  -on finasteride 5mg oral qd  -on tamsulosin 0.4mg oral qhs  -on Bethanechol 50mg BID  -Dawson in place  -C/w catheter change every 4 weeks  -Patient is a poor candidate to consider outlet surgery  -urology recommendations noted     #CAD  -EKG reviewed, no acute changes noted  -Continue aspirin 81mg QD  -Continue Simvastatin 40mg qhs  -Continue Metoprolol Tartrate 25mg BID     #DMII  -A1C 5.7 (10/5)  -POCT glucose once a day    #HLD  -Continue Simvastatin 40mg qhs    #Stage II pressure ulcers  -L buttock and R sacrum on arrival to ED  -Wound care consult appreciated  -Continue Silvadene cream on both ulcers    #Dementia with features of agitation  -Continue Seroquel 50mg qhs    #DVT PPX  -SCDS   -Heparin SQ q12h    #Advance Directives  -Son: Caleb Finn who is next of kin and making medical decisions for pt as he has no capacity at this time.   -DNI- MOLST in chart- complete and signed as according to Living Will     #Dispo  -negative Covid-19 PCR test on 11/02/2020  -Patient will continue his daptomycin treatment for 3 more days.   -PT consult: rehabilitation facility, 3:1 commode; rolling walker (5 inch wheels), balance training; bed mobility training; gait training; strengthening; transfer training  -Was previously at [facility]   -Pending discharge, we will follow up with  for further updates on the agreement with the patient's son Caleb Finn and the .  -Patient is medically stable and optimized to be discharge to the rehab center.    *Discharge management and plan discussed with Dr. Asencio.

## 2020-11-08 PROCEDURE — 99232 SBSQ HOSP IP/OBS MODERATE 35: CPT | Mod: GC

## 2020-11-08 RX ADMIN — FINASTERIDE 5 MILLIGRAM(S): 5 TABLET, FILM COATED ORAL at 09:32

## 2020-11-08 RX ADMIN — Medication 25 MILLIGRAM(S): at 09:31

## 2020-11-08 RX ADMIN — Medication 25 MILLIGRAM(S): at 21:16

## 2020-11-08 RX ADMIN — Medication 1 APPLICATION(S): at 09:32

## 2020-11-08 RX ADMIN — TAMSULOSIN HYDROCHLORIDE 0.4 MILLIGRAM(S): 0.4 CAPSULE ORAL at 21:15

## 2020-11-08 RX ADMIN — Medication 50 MILLIGRAM(S): at 21:15

## 2020-11-08 RX ADMIN — SIMVASTATIN 40 MILLIGRAM(S): 20 TABLET, FILM COATED ORAL at 21:16

## 2020-11-08 RX ADMIN — DAPTOMYCIN 117.6 MILLIGRAM(S): 500 INJECTION, POWDER, LYOPHILIZED, FOR SOLUTION INTRAVENOUS at 09:31

## 2020-11-08 RX ADMIN — Medication 81 MILLIGRAM(S): at 09:31

## 2020-11-08 RX ADMIN — QUETIAPINE FUMARATE 50 MILLIGRAM(S): 200 TABLET, FILM COATED ORAL at 21:15

## 2020-11-08 RX ADMIN — Medication 1 TABLET(S): at 09:31

## 2020-11-08 RX ADMIN — Medication 50 MILLIGRAM(S): at 09:32

## 2020-11-08 RX ADMIN — Medication 1 APPLICATION(S): at 21:46

## 2020-11-08 RX ADMIN — HEPARIN SODIUM 5000 UNIT(S): 5000 INJECTION INTRAVENOUS; SUBCUTANEOUS at 21:16

## 2020-11-08 RX ADMIN — HEPARIN SODIUM 5000 UNIT(S): 5000 INJECTION INTRAVENOUS; SUBCUTANEOUS at 09:32

## 2020-11-08 NOTE — PROGRESS NOTE ADULT - ASSESSMENT
89 year old male patient with narrative as previously stated with mental status changes and hypotension, now AAOx3 with positive blood cultures for VRE,    #Encephalopathy, likely 2/2 infectious etiology  -Cystitis/ VRE   -No Leukocytosis   -Initial blood cultures: VRE 10/26, repeat blood cultures: NGTD   -TTE: no vegetations   -s/p Dawson replaced by urology   -On Daptomycin, will require 14 days IV daptomycin until -- 11/11  -daily CPK  -Continue contact precautions     #Aspiration PNA  -s/p Zosyn   -Stable on RA  -Afebrile  -Aspiration precautions    #SDH s/p craniotomy  -Evaluated by Neurosurgery in ED  -No intervention by NSGY due to stable SDH on CTH;   -Restarted ASA for CAD and Heparin for DVT prophylaxis   -Neurosurgery recommendations appreciated    #HTN  -Stable  -continue metoprolol tartrate 25mg PO BID     #BPH/Urinary retention  #Chronic dawson   -Ucx- VRE  -on finasteride 5mg oral qd  -on tamsulosin 0.4mg oral qhs  -on Bethanechol 50mg BID  -Dawson repositioned  -C/w catheter change every 4 weeks (should be done through the NH)  -Patient is a poor candidate to consider outlet surgery  -urology recommendations noted     #CAD  -Continue aspirin 81mg QD  -Continue Simvastatin 40mg qhs  -Metoprolol Tartrate 25mg BID     #DMII  -Low ISS-pre-meals  -Bedtime ISS  -A1C 5.7 (10/5)  -BGM qD    #HLD  -Continue Simvastatin 40mg qhs    #Stage II pressure ulcers  -L buttock and R sacrum on arrival to ED  -Turn and position q2h  -Wound consult  -Silvadene cream    #Dementia with features of agitation  Seroquel 50mg qhs    #DVT PPX  -SCDS   -Hep SQ q12h    #Advance Directives  -Son: Caleb Finn who is next of kin and making medical decisions for pt as he has no capacity at this time      #Dispo  -PT consult: rehabilitation facility, 3:1 commode; rolling walker (5 inch wheels), balance training; bed mobility training; gait training; strengthening; transfer training  [facility]

## 2020-11-08 NOTE — PROGRESS NOTE ADULT - ATTENDING COMMENTS
-rs-aeeb, cta  -p/a-soft, bs+  -cvs-s1s2 normal     A/P    #ct abx, supportive care
-rs-aeeb, cta  -p/a-soft, bs+  -cvs-s1s2 normal     A/P    #ct supportive care    #dvty pr
-rs-aeeb, cta  -p/a-soft, bs+  -cvs-s1s2 normal     A/P    #d/c once there is no social issue with further management as an outpt, time spent 45 minutes
As above, pt seen and examined with house staff and treatment plan formulated on rounds.
As above, pt seen and examined with house staff and treatment plan formulated on rounds.
Patient seen and examined with Family Medicine Residents Stanton Bowen and Myron Watt on the Family Medicine Teaching Service.  Agree with history, physical, labs and plan which were reviewed in detail after a face to face encounter with the patient.
As above, pt seen and examined with house staff and treatment plan formulated on rounds.
As above, pt seen and examined with house staff and treatment plan formulated on rounds.
Patient seen and examined with Family Medicine Residents Stanton Lang and Karl Alvarez on the Family Medicine Teaching Service.  Agree with history, physical, labs and plan which were reviewed in detail after a face to face encounter with the patient.

## 2020-11-08 NOTE — PROGRESS NOTE ADULT - SUBJECTIVE AND OBJECTIVE BOX
HPI:  89 y/o M PMHx BPH, CAD, DMII, GERD, HLD, SDH s/p craniotomy and surgical removal, urine rentention with Whitmore catheter, HTN presenting for [facility] with 1 day h/o hematuria. Whitmore was replaced in the NH last night after the pt pulled it out. Prior to admission pt was noted to be febrile at the NH Tmax: 104, SBP 90, and hypoxic on RA Sp02 on RA. Also noted to be more confused and lethargic with gross hematuria. Pt not on AC due to recent SDH.  Pt was admitted in early October (D/c 10 days ago) s/p fall with AMS and imaging + for SDH requiring craniotomy and evacuation also with aspiration PNA tx with IV abx. Whitmore was placed for urinary retention.       SUBJECTIVE:   11/8/2020    Pt seen this morning with no acute complaints. Receiving daptomycin until 11/11/2020. Discussed with uzma to [facility] in AM.      REVIEW OF SYSTEMS:    CONSTITUTIONAL: No weakness, fevers or chills  EYES/ENT: No visual changes;  No vertigo or throat pain   NECK: No pain or stiffness  RESPIRATORY: No cough, wheezing, hemoptysis; No shortness of breath  CARDIOVASCULAR: No chest pain or palpitations  GASTROINTESTINAL: No abdominal or epigastric pain. No nausea, vomiting, or hematemesis; No diarrhea or constipation.  Patient poor historian therefore difficult to assess full ROS      Vital Signs Last 24 Hrs  T(C): 36.7 (08 Nov 2020 08:45), Max: 36.8 (07 Nov 2020 23:55)  T(F): 98 (08 Nov 2020 08:45), Max: 98.3 (07 Nov 2020 23:55)  HR: 72 (08 Nov 2020 08:45) (63 - 72)  BP: 109/74 (08 Nov 2020 08:45) (108/60 - 129/61)  BP(mean): --  RR: 18 (08 Nov 2020 08:45) (18 - 18)  SpO2: 100% (08 Nov 2020 08:45) (97% - 100%)    I&O's Summary    07 Nov 2020 07:01  -  08 Nov 2020 07:00  --------------------------------------------------------  IN: 0 mL / OUT: 600 mL / NET: -600 mL      CAPILLARY BLOOD GLUCOSE      POCT Blood Glucose.: 129 mg/dL (08 Nov 2020 07:32)      PHYSICAL EXAM:    Constitutional: NAD, awake and alert, +cachetic   HEENT: EOMI, Normal Hearing  Neck: Soft and supple  Respiratory: Breath sounds are clear bilaterally  Cardiovascular: S1 and S2, regular rate and rhythm, no Murmurs, gallops or rubs  Gastrointestinal: Bowel Sounds present, soft, nontender, nondistended, no guarding, no rebound  Extremities: No peripheral edema  Vascular: 2+ peripheral pulses  Neurological: A/O x 3  Skin: No rashes    MEDICATIONS:  MEDICATIONS  (STANDING):  aspirin  chewable 81 milliGRAM(s) Oral daily  bethanechol 50 milliGRAM(s) Oral two times a day  DAPTOmycin IVPB 440 milliGRAM(s) IV Intermittent every 24 hours  dextrose 5%. 1000 milliLiter(s) (50 mL/Hr) IV Continuous <Continuous>  dextrose 50% Injectable 12.5 Gram(s) IV Push once  dextrose 50% Injectable 25 Gram(s) IV Push once  dextrose 50% Injectable 25 Gram(s) IV Push once  finasteride 5 milliGRAM(s) Oral daily  heparin   Injectable 5000 Unit(s) SubCutaneous every 12 hours  insulin lispro (ADMELOG) corrective regimen sliding scale   SubCutaneous three times a day before meals  insulin lispro (ADMELOG) corrective regimen sliding scale   SubCutaneous at bedtime  metoprolol tartrate 25 milliGRAM(s) Oral two times a day  multivitamin 1 Tablet(s) Oral daily  QUEtiapine 50 milliGRAM(s) Oral at bedtime  silver sulfADIAZINE 1% Cream 1 Application(s) Topical two times a day  simvastatin 40 milliGRAM(s) Oral at bedtime  tamsulosin Oral Tab/Cap - Peds 0.4 milliGRAM(s) Oral at bedtime      LABS: All Labs Reviewed:    See previous labs     Blood Culture: 10-29 @ 08:08  Organism --  Gram Stain Blood -- Gram Stain --  Specimen Source .Blood None  Culture-Blood --    10-29 @ 08:04  Organism --  Gram Stain Blood -- Gram Stain --  Specimen Source .Blood None  Culture-Blood --        EXAM:  CT BRAIN                            PROCEDURE DATE:  10/26/2020        IMPRESSION:  1. Persistent, large right-sided subdural hematoma that demonstrates components of variable age, including acute/subacute components) and measures up to 2.1 cm in thickness. This results in right-sided mass effect with right to left midline shift of approximately 6 mm. When compared to prior CT brain of 10/9/2020, a similar sized right holohemispheric subdural hematoma was demonstrated with similar appearance of the ventricles, mass effect and right to left midline shift.        EXAM:  CT ABDOMEN AND PELVIS IC                            PROCEDURE DATE:  10/27/2020      IMPRESSION:    Rectosigmoid colitis. No bowelobstruction.    Nonspecific mild bilateral perinephric stranding without hydroureteronephrosis. Question striated bilateral nephrogram. Recommend clinical correlation to assess pyelonephritis. Question emphysematous cystitis versus bladder wall trabeculation.    Malpositioned Whitmore catheter at the level of the penile urethra. Recommend removal.    Dependent opacities in the left > right lower lobe, which may represent atelectasis. Recommend clinical correlation to assess aspiration.    Additionalfindings as described.

## 2020-11-09 LAB — CK SERPL-CCNC: 18 U/L — LOW (ref 26–308)

## 2020-11-09 RX ADMIN — Medication 25 MILLIGRAM(S): at 09:54

## 2020-11-09 RX ADMIN — DAPTOMYCIN 117.6 MILLIGRAM(S): 500 INJECTION, POWDER, LYOPHILIZED, FOR SOLUTION INTRAVENOUS at 10:13

## 2020-11-09 RX ADMIN — SIMVASTATIN 40 MILLIGRAM(S): 20 TABLET, FILM COATED ORAL at 22:09

## 2020-11-09 RX ADMIN — QUETIAPINE FUMARATE 50 MILLIGRAM(S): 200 TABLET, FILM COATED ORAL at 22:09

## 2020-11-09 RX ADMIN — Medication 1 APPLICATION(S): at 22:10

## 2020-11-09 RX ADMIN — Medication 81 MILLIGRAM(S): at 09:54

## 2020-11-09 RX ADMIN — Medication 25 MILLIGRAM(S): at 22:09

## 2020-11-09 RX ADMIN — Medication 1 TABLET(S): at 09:54

## 2020-11-09 RX ADMIN — TAMSULOSIN HYDROCHLORIDE 0.4 MILLIGRAM(S): 0.4 CAPSULE ORAL at 22:09

## 2020-11-09 RX ADMIN — Medication 1 APPLICATION(S): at 09:55

## 2020-11-09 RX ADMIN — Medication 50 MILLIGRAM(S): at 09:54

## 2020-11-09 RX ADMIN — FINASTERIDE 5 MILLIGRAM(S): 5 TABLET, FILM COATED ORAL at 09:54

## 2020-11-09 RX ADMIN — Medication 50 MILLIGRAM(S): at 22:09

## 2020-11-09 RX ADMIN — HEPARIN SODIUM 5000 UNIT(S): 5000 INJECTION INTRAVENOUS; SUBCUTANEOUS at 09:54

## 2020-11-09 RX ADMIN — HEPARIN SODIUM 5000 UNIT(S): 5000 INJECTION INTRAVENOUS; SUBCUTANEOUS at 22:10

## 2020-11-10 VITALS
HEART RATE: 79 BPM | DIASTOLIC BLOOD PRESSURE: 58 MMHG | OXYGEN SATURATION: 100 % | SYSTOLIC BLOOD PRESSURE: 142 MMHG | RESPIRATION RATE: 18 BRPM | TEMPERATURE: 98 F

## 2020-11-10 LAB — SARS-COV-2 RNA SPEC QL NAA+PROBE: SIGNIFICANT CHANGE UP

## 2020-11-10 PROCEDURE — 99239 HOSP IP/OBS DSCHRG MGMT >30: CPT

## 2020-11-10 RX ORDER — DAPTOMYCIN 500 MG/10ML
500 INJECTION, POWDER, LYOPHILIZED, FOR SOLUTION INTRAVENOUS ONCE
Refills: 0 | Status: DISCONTINUED | OUTPATIENT
Start: 2020-11-11 | End: 2020-11-10

## 2020-11-10 RX ADMIN — HEPARIN SODIUM 5000 UNIT(S): 5000 INJECTION INTRAVENOUS; SUBCUTANEOUS at 11:28

## 2020-11-10 RX ADMIN — Medication 1 APPLICATION(S): at 11:36

## 2020-11-10 RX ADMIN — Medication 81 MILLIGRAM(S): at 11:28

## 2020-11-10 RX ADMIN — FINASTERIDE 5 MILLIGRAM(S): 5 TABLET, FILM COATED ORAL at 11:28

## 2020-11-10 RX ADMIN — DAPTOMYCIN 117.6 MILLIGRAM(S): 500 INJECTION, POWDER, LYOPHILIZED, FOR SOLUTION INTRAVENOUS at 11:29

## 2020-11-10 RX ADMIN — Medication 25 MILLIGRAM(S): at 11:28

## 2020-11-10 RX ADMIN — Medication 50 MILLIGRAM(S): at 11:27

## 2020-11-10 RX ADMIN — Medication 1 TABLET(S): at 11:28

## 2020-11-10 NOTE — PROGRESS NOTE ADULT - ASSESSMENT
89 year old male patient with narrative as previously stated with mental status changes and hypotension, now AAOx3 with positive blood cultures for VRE,    #Encephalopathy, likely 2/2 infectious etiology  -Cystitis/ VRE   -No Leukocytosis   -Initial blood cultures: VRE 10/26, repeat blood cultures: NGTD   -TTE: no vegetations   -s/p Dawson replaced by urology   -On Daptomycin, will require 14 days IV daptomycin until -- 11/11-> [facility] when abx course complete. Was refused by [facility] d/t cost of abx. Will need to complete course here    #Aspiration PNA  -99% on RA  -s/p Zosyn   -Stable on RA  -Afebrile  -Aspiration precautions    #SDH s/p craniotomy  -Evaluated by Neurosurgery in ED  -No intervention by NSGY due to stable SDH on CTH;   -Restarted ASA for CAD and Heparin for DVT prophylaxis   -Neurosurgery recommendations appreciated    #HTN  -Stable  -continue metoprolol tartrate 25mg PO BID     #BPH/Urinary retention  #Chronic dawson   -Ucx- VRE  -on finasteride 5mg oral qd  -on tamsulosin 0.4mg oral qhs  -on Bethanechol 50mg BID  -Dawson repositioned  -C/w catheter change every 4 weeks (should be done through the NH)  -Patient is a poor candidate to consider outlet surgery  -urology recommendations noted     #CAD  -Continue aspirin 81mg QD  -Continue Simvastatin 40mg qhs  -Metoprolol Tartrate 25mg BID     #DMII  -Low ISS-pre-meals  -Bedtime ISS  -A1C 5.7 (10/5)  -BGM qD    #HLD  -Continue Simvastatin 40mg qhs    #Stage II pressure ulcers  -L buttock and R sacrum on arrival to ED  -Turn and position q2h  -Wound consult  -Silvadene cream    #Dementia with features of agitation  Seroquel 50mg qhs    #DVT PPX  -SCDS   -Hep SQ q12h    #Advance Directives  -Son: Caleb Finn who is next of kin and making medical decisions for pt as he has no capacity at this time      #Dispo  -PT consult: rehabilitation facility, 3:1 commode; rolling walker (5 inch wheels), balance training; bed mobility training; gait training; strengthening; transfer training  -Was previously at [facility]

## 2020-11-10 NOTE — PROGRESS NOTE ADULT - SUBJECTIVE AND OBJECTIVE BOX
Patient is a 89y old  Male who presents with a chief complaint of hematuria, fever, hypotension (08 Nov 2020 15:53)      SUBJECTIVE:   HPI:  88 y/o M PMHx BPH, CAD, DMII, GERD, HLD, SDH s/p craniotomy and surgical removal, urine rentention with Whitmore catheter, HTN presenting for [facility] with 1 day h/o hematuria. Whitmore was replaced in the NH last night after the pt pulled it out. Today, pt was noted to be febrile at the NH Tmax: 104, SBP 90, and hypoxic on RA Sp02 on RA. Also noted to be more confused and lethargic with gross hematuria. Pt not on AC due to recent SDH.  Pt was admitted in early October (D/c 10 days ago) s/p fall with AMS and imaging + for SDH requiring craniotomy and evacuation also with aspiration PNA tx with IV abx. Whitmore was placed for urinary retention.   Spoke with pt's son Caleb whiteside ESL  this evening. Updated him on pt's status; as the next of kin, he consented to CT w/ contrast imaging. MOLST was reviewed and received verbal consent; FULL CODE for now but son will bring in paperwork tomorrow to discuss with the day team. (26 Oct 2020 23:32)    sub: no o/n events        REVIEW OF SYSTEMS:    CONSTITUTIONAL: No weakness, fevers or chills  EYES/ENT: No visual changes;  No vertigo or throat pain   NECK: No pain or stiffness  RESPIRATORY: No cough, wheezing, hemoptysis; No shortness of breath  CARDIOVASCULAR: No chest pain or palpitations  GASTROINTESTINAL: No abdominal or epigastric pain. No nausea, vomiting, or hematemesis; No diarrhea or constipation. No melena or hematochezia.  GENITOURINARY: No dysuria, frequency or hematuria  NEUROLOGICAL: No numbness or weakness  SKIN: No itching, burning, rashes, or lesions   All other review of systems is negative unless indicated above        Vital Signs Last 24 Hrs  T(C): 36.6 (10 Nov 2020 07:34), Max: 36.7 (09 Nov 2020 15:15)  T(F): 97.8 (10 Nov 2020 07:34), Max: 98.1 (09 Nov 2020 15:15)  HR: 79 (10 Nov 2020 07:34) (75 - 79)  BP: 100/58 (10 Nov 2020 07:34) (100/58 - 133/80)  BP(mean): --  RR: 18 (10 Nov 2020 07:34) (18 - 18)  SpO2: 99% (10 Nov 2020 07:34) (99% - 100%)    I&O's Summary    09 Nov 2020 07:01  -  10 Nov 2020 07:00  --------------------------------------------------------  IN: 0 mL / OUT: 100 mL / NET: -100 mL        CAPILLARY BLOOD GLUCOSE      POCT Blood Glucose.: 124 mg/dL (10 Nov 2020 08:15)      PHYSICAL EXAM:    Constitutional: NAD, awake and alert, well-developed  HEENT: PERR, EOMI, Normal Hearing, MMM  Neck: Soft and supple, No LAD, No JVD  Respiratory: Breath sounds are clear bilaterally, No wheezing, rales or rhonchi  Cardiovascular: S1 and S2, regular rate and rhythm, no Murmurs, gallops or rubs  Gastrointestinal: Bowel Sounds present, soft, nontender, nondistended, no guarding, no rebound  Extremities: No peripheral edema  Vascular: 2+ peripheral pulses  Neurological: A/O x 3, no focal deficits  Musculoskeletal: 5/5 strength b/l upper and lower extremities  Skin: No rashes    MEDICATIONS:  MEDICATIONS  (STANDING):  aspirin  chewable 81 milliGRAM(s) Oral daily  bethanechol 50 milliGRAM(s) Oral two times a day  dextrose 5%. 1000 milliLiter(s) (50 mL/Hr) IV Continuous <Continuous>  dextrose 50% Injectable 12.5 Gram(s) IV Push once  dextrose 50% Injectable 25 Gram(s) IV Push once  dextrose 50% Injectable 25 Gram(s) IV Push once  finasteride 5 milliGRAM(s) Oral daily  heparin   Injectable 5000 Unit(s) SubCutaneous every 12 hours  metoprolol tartrate 25 milliGRAM(s) Oral two times a day  multivitamin 1 Tablet(s) Oral daily  QUEtiapine 50 milliGRAM(s) Oral at bedtime  silver sulfADIAZINE 1% Cream 1 Application(s) Topical two times a day  simvastatin 40 milliGRAM(s) Oral at bedtime  tamsulosin Oral Tab/Cap - Peds 0.4 milliGRAM(s) Oral at bedtime      LABS: All Labs Reviewed:            CARDIAC MARKERS ( 09 Nov 2020 07:20 )  x     / x     / 18 U/L / x     / x              Blood Culture:     RADIOLOGY/EKG: reviewed

## 2020-11-10 NOTE — PROGRESS NOTE ADULT - REASON FOR ADMISSION
hematuria, fever, hypotension

## 2020-11-10 NOTE — PROGRESS NOTE ADULT - NUTRITIONAL ASSESSMENT
This patient has been assessed with a concern for Malnutrition and has been determined to have a diagnosis/diagnoses of Severe protein-calorie malnutrition and Underweight/BMI < 19.    This patient is being managed with:   Diet Dysphagia 1 Pureed-Nectar Consistency Fluid-  Entered: Oct 27 2020  4:59PM

## 2020-11-11 RX ORDER — DAPTOMYCIN 500 MG/10ML
500 INJECTION, POWDER, LYOPHILIZED, FOR SOLUTION INTRAVENOUS
Qty: 0 | Refills: 0 | DISCHARGE
Start: 2020-11-11 | End: 2020-11-11

## 2020-11-13 DIAGNOSIS — E86.0 DEHYDRATION: ICD-10-CM

## 2020-11-13 DIAGNOSIS — E87.2 ACIDOSIS: ICD-10-CM
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
## 2020-11-13 DIAGNOSIS — R31.0 GROSS HEMATURIA: ICD-10-CM

## 2020-11-13 DIAGNOSIS — L89.322 PRESSURE ULCER OF LEFT BUTTOCK, STAGE 2: ICD-10-CM

## 2020-11-13 DIAGNOSIS — T83.511A INFECTION AND INFLAMMATORY REACTION DUE TO INDWELLING URETHRAL CATHETER, INITIAL ENCOUNTER: ICD-10-CM

## 2020-11-13 DIAGNOSIS — R33.8 OTHER RETENTION OF URINE: ICD-10-CM

## 2020-11-13 DIAGNOSIS — G92 TOXIC ENCEPHALOPATHY: ICD-10-CM

## 2020-11-13 DIAGNOSIS — I62.00 NONTRAUMATIC SUBDURAL HEMORRHAGE, UNSPECIFIED: ICD-10-CM

## 2020-11-13 DIAGNOSIS — Z16.21 RESISTANCE TO VANCOMYCIN: ICD-10-CM

## 2020-11-13 DIAGNOSIS — E11.9 TYPE 2 DIABETES MELLITUS WITHOUT COMPLICATIONS: ICD-10-CM

## 2020-11-13 DIAGNOSIS — E78.5 HYPERLIPIDEMIA, UNSPECIFIED: ICD-10-CM

## 2020-11-13 DIAGNOSIS — N12 TUBULO-INTERSTITIAL NEPHRITIS, NOT SPECIFIED AS ACUTE OR CHRONIC: ICD-10-CM

## 2020-11-13 DIAGNOSIS — N47.2 PARAPHIMOSIS: ICD-10-CM

## 2020-11-13 DIAGNOSIS — I10 ESSENTIAL (PRIMARY) HYPERTENSION: ICD-10-CM

## 2020-11-13 DIAGNOSIS — N17.9 ACUTE KIDNEY FAILURE, UNSPECIFIED: ICD-10-CM

## 2020-11-13 DIAGNOSIS — K21.9 GASTRO-ESOPHAGEAL REFLUX DISEASE WITHOUT ESOPHAGITIS: ICD-10-CM

## 2020-11-13 DIAGNOSIS — L89.152 PRESSURE ULCER OF SACRAL REGION, STAGE 2: ICD-10-CM
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
## 2020-11-13 DIAGNOSIS — F03.90 UNSPECIFIED DEMENTIA, UNSPECIFIED SEVERITY, WITHOUT BEHAVIORAL DISTURBANCE, PSYCHOTIC DISTURBANCE, MOOD DISTURBANCE, AND ANXIETY: ICD-10-CM

## 2020-11-13 DIAGNOSIS — N28.1 CYST OF KIDNEY, ACQUIRED: ICD-10-CM

## 2020-11-13 DIAGNOSIS — N40.1 BENIGN PROSTATIC HYPERPLASIA WITH LOWER URINARY TRACT SYMPTOMS: ICD-10-CM
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
## 2020-11-13 DIAGNOSIS — J98.11 ATELECTASIS: ICD-10-CM

## 2020-11-13 DIAGNOSIS — A41.9 SEPSIS, UNSPECIFIED ORGANISM: ICD-10-CM

## 2020-11-13 DIAGNOSIS — E43 UNSPECIFIED SEVERE PROTEIN-CALORIE MALNUTRITION: ICD-10-CM

## 2020-11-13 DIAGNOSIS — S37.30XA UNSPECIFIED INJURY OF URETHRA, INITIAL ENCOUNTER: ICD-10-CM

## 2020-11-13 DIAGNOSIS — K52.9 NONINFECTIVE GASTROENTERITIS AND COLITIS, UNSPECIFIED: ICD-10-CM

## 2020-11-13 DIAGNOSIS — I25.10 ATHEROSCLEROTIC HEART DISEASE OF NATIVE CORONARY ARTERY WITHOUT ANGINA PECTORIS: ICD-10-CM
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
## 2021-03-03 NOTE — PROGRESS NOTE ADULT - NEUROLOGICAL
detailed exam Imiquimod Pregnancy And Lactation Text: This medication is Pregnancy Category C. It is unknown if this medication is excreted in breast milk.

## 2022-01-01 ENCOUNTER — INPATIENT (INPATIENT)
Facility: HOSPITAL | Age: 87
LOS: 11 days | DRG: 871 | End: 2022-03-25
Attending: HOSPITALIST | Admitting: INTERNAL MEDICINE
Payer: MEDICARE

## 2022-01-01 VITALS
SYSTOLIC BLOOD PRESSURE: 104 MMHG | WEIGHT: 125 LBS | TEMPERATURE: 99 F | DIASTOLIC BLOOD PRESSURE: 60 MMHG | HEIGHT: 69 IN | HEART RATE: 57 BPM

## 2022-01-01 VITALS
HEART RATE: 107 BPM | TEMPERATURE: 96 F | DIASTOLIC BLOOD PRESSURE: 60 MMHG | OXYGEN SATURATION: 96 % | RESPIRATION RATE: 22 BRPM | SYSTOLIC BLOOD PRESSURE: 119 MMHG

## 2022-01-01 DIAGNOSIS — A41.9 SEPSIS, UNSPECIFIED ORGANISM: ICD-10-CM

## 2022-01-01 DIAGNOSIS — S06.5X9A TRAUMATIC SUBDURAL HEMORRHAGE WITH LOSS OF CONSCIOUSNESS OF UNSPECIFIED DURATION, INITIAL ENCOUNTER: Chronic | ICD-10-CM

## 2022-01-01 LAB
-  AMIKACIN: SIGNIFICANT CHANGE UP
-  AZTREONAM: SIGNIFICANT CHANGE UP
-  CEFEPIME: SIGNIFICANT CHANGE UP
-  CEFTAZIDIME: SIGNIFICANT CHANGE UP
-  CIPROFLOXACIN: SIGNIFICANT CHANGE UP
-  GENTAMICIN: SIGNIFICANT CHANGE UP
-  IMIPENEM: SIGNIFICANT CHANGE UP
-  LEVOFLOXACIN: SIGNIFICANT CHANGE UP
-  MEROPENEM: SIGNIFICANT CHANGE UP
-  PIPERACILLIN/TAZOBACTAM: SIGNIFICANT CHANGE UP
-  TOBRAMYCIN: SIGNIFICANT CHANGE UP
A1C WITH ESTIMATED AVERAGE GLUCOSE RESULT: 5.9 % — HIGH (ref 4–5.6)
ADD ON TEST-SPECIMEN IN LAB: SIGNIFICANT CHANGE UP
ADD ON TEST-SPECIMEN IN LAB: SIGNIFICANT CHANGE UP
ALBUMIN SERPL ELPH-MCNC: 1.2 G/DL — LOW (ref 3.3–5)
ALBUMIN SERPL ELPH-MCNC: 1.6 G/DL — LOW (ref 3.3–5)
ALBUMIN SERPL ELPH-MCNC: 1.7 G/DL — LOW (ref 3.3–5)
ALBUMIN SERPL ELPH-MCNC: 1.9 G/DL — LOW (ref 3.3–5)
ALBUMIN SERPL ELPH-MCNC: 2.1 G/DL — LOW (ref 3.3–5)
ALBUMIN SERPL ELPH-MCNC: 2.7 G/DL — LOW (ref 3.3–5)
ALP SERPL-CCNC: 100 U/L — SIGNIFICANT CHANGE UP (ref 40–120)
ALP SERPL-CCNC: 103 U/L — SIGNIFICANT CHANGE UP (ref 40–120)
ALP SERPL-CCNC: 130 U/L — HIGH (ref 40–120)
ALP SERPL-CCNC: 144 U/L — HIGH (ref 40–120)
ALP SERPL-CCNC: 166 U/L — HIGH (ref 40–120)
ALP SERPL-CCNC: 61 U/L — SIGNIFICANT CHANGE UP (ref 40–120)
ALP SERPL-CCNC: 66 U/L — SIGNIFICANT CHANGE UP (ref 40–120)
ALP SERPL-CCNC: 79 U/L — SIGNIFICANT CHANGE UP (ref 40–120)
ALT FLD-CCNC: 16 U/L — SIGNIFICANT CHANGE UP (ref 12–78)
ALT FLD-CCNC: 20 U/L — SIGNIFICANT CHANGE UP (ref 12–78)
ALT FLD-CCNC: 29 U/L — SIGNIFICANT CHANGE UP (ref 12–78)
ALT FLD-CCNC: 32 U/L — SIGNIFICANT CHANGE UP (ref 12–78)
ALT FLD-CCNC: 34 U/L — SIGNIFICANT CHANGE UP (ref 12–78)
ALT FLD-CCNC: 37 U/L — SIGNIFICANT CHANGE UP (ref 12–78)
ALT FLD-CCNC: 41 U/L — SIGNIFICANT CHANGE UP (ref 12–78)
ALT FLD-CCNC: 42 U/L — SIGNIFICANT CHANGE UP (ref 12–78)
ANION GAP SERPL CALC-SCNC: 12 MMOL/L — SIGNIFICANT CHANGE UP (ref 5–17)
ANION GAP SERPL CALC-SCNC: 3 MMOL/L — LOW (ref 5–17)
ANION GAP SERPL CALC-SCNC: 4 MMOL/L — LOW (ref 5–17)
ANION GAP SERPL CALC-SCNC: 5 MMOL/L — SIGNIFICANT CHANGE UP (ref 5–17)
ANION GAP SERPL CALC-SCNC: 6 MMOL/L — SIGNIFICANT CHANGE UP (ref 5–17)
ANION GAP SERPL CALC-SCNC: 7 MMOL/L — SIGNIFICANT CHANGE UP (ref 5–17)
ANION GAP SERPL CALC-SCNC: 7 MMOL/L — SIGNIFICANT CHANGE UP (ref 5–17)
ANION GAP SERPL CALC-SCNC: 8 MMOL/L — SIGNIFICANT CHANGE UP (ref 5–17)
ANISOCYTOSIS BLD QL: SLIGHT — SIGNIFICANT CHANGE UP
ANISOCYTOSIS BLD QL: SLIGHT — SIGNIFICANT CHANGE UP
APPEARANCE UR: ABNORMAL
APPEARANCE UR: ABNORMAL
APTT BLD: 32.2 SEC — SIGNIFICANT CHANGE UP (ref 27.5–35.5)
AST SERPL-CCNC: 14 U/L — LOW (ref 15–37)
AST SERPL-CCNC: 28 U/L — SIGNIFICANT CHANGE UP (ref 15–37)
AST SERPL-CCNC: 29 U/L — SIGNIFICANT CHANGE UP (ref 15–37)
AST SERPL-CCNC: 33 U/L — SIGNIFICANT CHANGE UP (ref 15–37)
AST SERPL-CCNC: 42 U/L — HIGH (ref 15–37)
AST SERPL-CCNC: 57 U/L — HIGH (ref 15–37)
AST SERPL-CCNC: 88 U/L — HIGH (ref 15–37)
AST SERPL-CCNC: 89 U/L — HIGH (ref 15–37)
BACTERIA # UR AUTO: ABNORMAL
BACTERIA # UR AUTO: ABNORMAL
BASE EXCESS BLDA CALC-SCNC: -0.7 MMOL/L — SIGNIFICANT CHANGE UP (ref -2–3)
BASE EXCESS BLDA CALC-SCNC: -2.6 MMOL/L — LOW (ref -2–3)
BASE EXCESS BLDA CALC-SCNC: -6.5 MMOL/L — LOW (ref -2–3)
BASE EXCESS BLDV CALC-SCNC: 3.2 MMOL/L — SIGNIFICANT CHANGE UP
BASO STIPL BLD QL SMEAR: PRESENT — SIGNIFICANT CHANGE UP
BASOPHILS # BLD AUTO: 0 K/UL — SIGNIFICANT CHANGE UP (ref 0–0.2)
BASOPHILS # BLD AUTO: 0.02 K/UL — SIGNIFICANT CHANGE UP (ref 0–0.2)
BASOPHILS NFR BLD AUTO: 0 % — SIGNIFICANT CHANGE UP (ref 0–2)
BASOPHILS NFR BLD AUTO: 0.1 % — SIGNIFICANT CHANGE UP (ref 0–2)
BILIRUB SERPL-MCNC: 0.7 MG/DL — SIGNIFICANT CHANGE UP (ref 0.2–1.2)
BILIRUB SERPL-MCNC: 0.9 MG/DL — SIGNIFICANT CHANGE UP (ref 0.2–1.2)
BILIRUB SERPL-MCNC: 1.1 MG/DL — SIGNIFICANT CHANGE UP (ref 0.2–1.2)
BILIRUB SERPL-MCNC: 1.2 MG/DL — SIGNIFICANT CHANGE UP (ref 0.2–1.2)
BILIRUB SERPL-MCNC: 1.2 MG/DL — SIGNIFICANT CHANGE UP (ref 0.2–1.2)
BILIRUB SERPL-MCNC: 1.3 MG/DL — HIGH (ref 0.2–1.2)
BILIRUB UR-MCNC: NEGATIVE — SIGNIFICANT CHANGE UP
BILIRUB UR-MCNC: NEGATIVE — SIGNIFICANT CHANGE UP
BIZARRE PLATELETS BLD QL SMEAR: PRESENT — SIGNIFICANT CHANGE UP
BLD GP AB SCN SERPL QL: SIGNIFICANT CHANGE UP
BLOOD GAS COMMENTS ARTERIAL: SIGNIFICANT CHANGE UP
BUN SERPL-MCNC: 20 MG/DL — SIGNIFICANT CHANGE UP (ref 7–23)
BUN SERPL-MCNC: 23 MG/DL — SIGNIFICANT CHANGE UP (ref 7–23)
BUN SERPL-MCNC: 27 MG/DL — HIGH (ref 7–23)
BUN SERPL-MCNC: 29 MG/DL — HIGH (ref 7–23)
BUN SERPL-MCNC: 30 MG/DL — HIGH (ref 7–23)
BUN SERPL-MCNC: 31 MG/DL — HIGH (ref 7–23)
BUN SERPL-MCNC: 33 MG/DL — HIGH (ref 7–23)
BUN SERPL-MCNC: 34 MG/DL — HIGH (ref 7–23)
BUN SERPL-MCNC: 35 MG/DL — HIGH (ref 7–23)
BUN SERPL-MCNC: 35 MG/DL — HIGH (ref 7–23)
BUN SERPL-MCNC: 36 MG/DL — HIGH (ref 7–23)
BUN SERPL-MCNC: 37 MG/DL — HIGH (ref 7–23)
BUN SERPL-MCNC: 38 MG/DL — HIGH (ref 7–23)
BUN SERPL-MCNC: 41 MG/DL — HIGH (ref 7–23)
BUN SERPL-MCNC: 56 MG/DL — HIGH (ref 7–23)
BURR CELLS BLD QL SMEAR: PRESENT — SIGNIFICANT CHANGE UP
CALCIUM SERPL-MCNC: 7.8 MG/DL — LOW (ref 8.5–10.1)
CALCIUM SERPL-MCNC: 8 MG/DL — LOW (ref 8.5–10.1)
CALCIUM SERPL-MCNC: 8 MG/DL — LOW (ref 8.5–10.1)
CALCIUM SERPL-MCNC: 8.1 MG/DL — LOW (ref 8.5–10.1)
CALCIUM SERPL-MCNC: 8.2 MG/DL — LOW (ref 8.5–10.1)
CALCIUM SERPL-MCNC: 8.4 MG/DL — LOW (ref 8.5–10.1)
CALCIUM SERPL-MCNC: 8.5 MG/DL — SIGNIFICANT CHANGE UP (ref 8.5–10.1)
CALCIUM SERPL-MCNC: 8.5 MG/DL — SIGNIFICANT CHANGE UP (ref 8.5–10.1)
CALCIUM SERPL-MCNC: 8.6 MG/DL — SIGNIFICANT CHANGE UP (ref 8.5–10.1)
CALCIUM SERPL-MCNC: 8.7 MG/DL — SIGNIFICANT CHANGE UP (ref 8.5–10.1)
CALCIUM SERPL-MCNC: 8.7 MG/DL — SIGNIFICANT CHANGE UP (ref 8.5–10.1)
CALCIUM SERPL-MCNC: 9 MG/DL — SIGNIFICANT CHANGE UP (ref 8.5–10.1)
CHLORIDE SERPL-SCNC: 104 MMOL/L — SIGNIFICANT CHANGE UP (ref 96–108)
CHLORIDE SERPL-SCNC: 111 MMOL/L — HIGH (ref 96–108)
CHLORIDE SERPL-SCNC: 112 MMOL/L — HIGH (ref 96–108)
CHLORIDE SERPL-SCNC: 113 MMOL/L — HIGH (ref 96–108)
CHLORIDE SERPL-SCNC: 113 MMOL/L — HIGH (ref 96–108)
CHLORIDE SERPL-SCNC: 114 MMOL/L — HIGH (ref 96–108)
CHLORIDE SERPL-SCNC: 115 MMOL/L — HIGH (ref 96–108)
CHLORIDE SERPL-SCNC: 115 MMOL/L — HIGH (ref 96–108)
CHLORIDE SERPL-SCNC: 116 MMOL/L — HIGH (ref 96–108)
CHLORIDE SERPL-SCNC: 116 MMOL/L — HIGH (ref 96–108)
CHLORIDE SERPL-SCNC: 117 MMOL/L — HIGH (ref 96–108)
CHLORIDE SERPL-SCNC: 117 MMOL/L — HIGH (ref 96–108)
CHLORIDE SERPL-SCNC: 118 MMOL/L — HIGH (ref 96–108)
CK SERPL-CCNC: 1058 U/L — HIGH (ref 26–308)
CK SERPL-CCNC: 2000 U/L — HIGH (ref 26–308)
CK SERPL-CCNC: 260 U/L — SIGNIFICANT CHANGE UP (ref 26–308)
CO2 BLDA-SCNC: 21 MMOL/L — SIGNIFICANT CHANGE UP (ref 19–24)
CO2 BLDA-SCNC: 25 MMOL/L — HIGH (ref 19–24)
CO2 BLDA-SCNC: 28 MMOL/L — HIGH (ref 19–24)
CO2 BLDV-SCNC: 28 MMOL/L — HIGH (ref 22–26)
CO2 SERPL-SCNC: 22 MMOL/L — SIGNIFICANT CHANGE UP (ref 22–31)
CO2 SERPL-SCNC: 23 MMOL/L — SIGNIFICANT CHANGE UP (ref 22–31)
CO2 SERPL-SCNC: 23 MMOL/L — SIGNIFICANT CHANGE UP (ref 22–31)
CO2 SERPL-SCNC: 25 MMOL/L — SIGNIFICANT CHANGE UP (ref 22–31)
CO2 SERPL-SCNC: 26 MMOL/L — SIGNIFICANT CHANGE UP (ref 22–31)
CO2 SERPL-SCNC: 27 MMOL/L — SIGNIFICANT CHANGE UP (ref 22–31)
CO2 SERPL-SCNC: 28 MMOL/L — SIGNIFICANT CHANGE UP (ref 22–31)
CO2 SERPL-SCNC: 30 MMOL/L — SIGNIFICANT CHANGE UP (ref 22–31)
COLOR SPEC: ABNORMAL
COLOR SPEC: YELLOW — SIGNIFICANT CHANGE UP
COMMENT - URINE: SIGNIFICANT CHANGE UP
CREAT SERPL-MCNC: 0.74 MG/DL — SIGNIFICANT CHANGE UP (ref 0.5–1.3)
CREAT SERPL-MCNC: 0.76 MG/DL — SIGNIFICANT CHANGE UP (ref 0.5–1.3)
CREAT SERPL-MCNC: 0.8 MG/DL — SIGNIFICANT CHANGE UP (ref 0.5–1.3)
CREAT SERPL-MCNC: 0.82 MG/DL — SIGNIFICANT CHANGE UP (ref 0.5–1.3)
CREAT SERPL-MCNC: 0.84 MG/DL — SIGNIFICANT CHANGE UP (ref 0.5–1.3)
CREAT SERPL-MCNC: 0.84 MG/DL — SIGNIFICANT CHANGE UP (ref 0.5–1.3)
CREAT SERPL-MCNC: 0.91 MG/DL — SIGNIFICANT CHANGE UP (ref 0.5–1.3)
CREAT SERPL-MCNC: 0.94 MG/DL — SIGNIFICANT CHANGE UP (ref 0.5–1.3)
CREAT SERPL-MCNC: 0.98 MG/DL — SIGNIFICANT CHANGE UP (ref 0.5–1.3)
CREAT SERPL-MCNC: 0.99 MG/DL — SIGNIFICANT CHANGE UP (ref 0.5–1.3)
CREAT SERPL-MCNC: 1.03 MG/DL — SIGNIFICANT CHANGE UP (ref 0.5–1.3)
CREAT SERPL-MCNC: 1.04 MG/DL — SIGNIFICANT CHANGE UP (ref 0.5–1.3)
CREAT SERPL-MCNC: 1.19 MG/DL — SIGNIFICANT CHANGE UP (ref 0.5–1.3)
CREAT SERPL-MCNC: 1.22 MG/DL — SIGNIFICANT CHANGE UP (ref 0.5–1.3)
CREAT SERPL-MCNC: 1.47 MG/DL — HIGH (ref 0.5–1.3)
CREAT SERPL-MCNC: 1.63 MG/DL — HIGH (ref 0.5–1.3)
CREAT SERPL-MCNC: 2.02 MG/DL — HIGH (ref 0.5–1.3)
CULTURE RESULTS: NO GROWTH — SIGNIFICANT CHANGE UP
CULTURE RESULTS: SIGNIFICANT CHANGE UP
DACRYOCYTES BLD QL SMEAR: SLIGHT — SIGNIFICANT CHANGE UP
DIFF PNL FLD: ABNORMAL
DIFF PNL FLD: ABNORMAL
EGFR: 31 ML/MIN/1.73M2 — LOW
EGFR: 40 ML/MIN/1.73M2 — LOW
EGFR: 45 ML/MIN/1.73M2 — LOW
EGFR: 56 ML/MIN/1.73M2 — LOW
EGFR: 58 ML/MIN/1.73M2 — LOW
EGFR: 68 ML/MIN/1.73M2 — SIGNIFICANT CHANGE UP
EGFR: 69 ML/MIN/1.73M2 — SIGNIFICANT CHANGE UP
EGFR: 72 ML/MIN/1.73M2 — SIGNIFICANT CHANGE UP
EGFR: 73 ML/MIN/1.73M2 — SIGNIFICANT CHANGE UP
EGFR: 77 ML/MIN/1.73M2 — SIGNIFICANT CHANGE UP
EGFR: 80 ML/MIN/1.73M2 — SIGNIFICANT CHANGE UP
EGFR: 82 ML/MIN/1.73M2 — SIGNIFICANT CHANGE UP
EGFR: 82 ML/MIN/1.73M2 — SIGNIFICANT CHANGE UP
EGFR: 83 ML/MIN/1.73M2 — SIGNIFICANT CHANGE UP
EGFR: 84 ML/MIN/1.73M2 — SIGNIFICANT CHANGE UP
EGFR: 85 ML/MIN/1.73M2 — SIGNIFICANT CHANGE UP
EGFR: 86 ML/MIN/1.73M2 — SIGNIFICANT CHANGE UP
ELLIPTOCYTES BLD QL SMEAR: SLIGHT — SIGNIFICANT CHANGE UP
EOSINOPHIL # BLD AUTO: 0 K/UL — SIGNIFICANT CHANGE UP (ref 0–0.5)
EOSINOPHIL NFR BLD AUTO: 0 % — SIGNIFICANT CHANGE UP (ref 0–6)
EPI CELLS # UR: SIGNIFICANT CHANGE UP
EPI CELLS # UR: SIGNIFICANT CHANGE UP
ESTIMATED AVERAGE GLUCOSE: 123 MG/DL — HIGH (ref 68–114)
GAS PNL BLDA: SIGNIFICANT CHANGE UP
GAS PNL BLDV: SIGNIFICANT CHANGE UP
GLUCOSE BLDC GLUCOMTR-MCNC: 131 MG/DL — HIGH (ref 70–99)
GLUCOSE BLDC GLUCOMTR-MCNC: 154 MG/DL — HIGH (ref 70–99)
GLUCOSE BLDC GLUCOMTR-MCNC: 180 MG/DL — HIGH (ref 70–99)
GLUCOSE BLDC GLUCOMTR-MCNC: 198 MG/DL — HIGH (ref 70–99)
GLUCOSE BLDC GLUCOMTR-MCNC: 205 MG/DL — HIGH (ref 70–99)
GLUCOSE BLDC GLUCOMTR-MCNC: 211 MG/DL — HIGH (ref 70–99)
GLUCOSE BLDC GLUCOMTR-MCNC: 228 MG/DL — HIGH (ref 70–99)
GLUCOSE BLDC GLUCOMTR-MCNC: 237 MG/DL — HIGH (ref 70–99)
GLUCOSE BLDC GLUCOMTR-MCNC: 268 MG/DL — HIGH (ref 70–99)
GLUCOSE BLDC GLUCOMTR-MCNC: 268 MG/DL — HIGH (ref 70–99)
GLUCOSE BLDC GLUCOMTR-MCNC: 273 MG/DL — HIGH (ref 70–99)
GLUCOSE BLDC GLUCOMTR-MCNC: 53 MG/DL — CRITICAL LOW (ref 70–99)
GLUCOSE BLDC GLUCOMTR-MCNC: 57 MG/DL — LOW (ref 70–99)
GLUCOSE BLDC GLUCOMTR-MCNC: 92 MG/DL — SIGNIFICANT CHANGE UP (ref 70–99)
GLUCOSE BLDC GLUCOMTR-MCNC: 96 MG/DL — SIGNIFICANT CHANGE UP (ref 70–99)
GLUCOSE SERPL-MCNC: 125 MG/DL — HIGH (ref 70–99)
GLUCOSE SERPL-MCNC: 132 MG/DL — HIGH (ref 70–99)
GLUCOSE SERPL-MCNC: 133 MG/DL — HIGH (ref 70–99)
GLUCOSE SERPL-MCNC: 144 MG/DL — HIGH (ref 70–99)
GLUCOSE SERPL-MCNC: 149 MG/DL — HIGH (ref 70–99)
GLUCOSE SERPL-MCNC: 156 MG/DL — HIGH (ref 70–99)
GLUCOSE SERPL-MCNC: 156 MG/DL — HIGH (ref 70–99)
GLUCOSE SERPL-MCNC: 158 MG/DL — HIGH (ref 70–99)
GLUCOSE SERPL-MCNC: 175 MG/DL — HIGH (ref 70–99)
GLUCOSE SERPL-MCNC: 177 MG/DL — HIGH (ref 70–99)
GLUCOSE SERPL-MCNC: 177 MG/DL — HIGH (ref 70–99)
GLUCOSE SERPL-MCNC: 183 MG/DL — HIGH (ref 70–99)
GLUCOSE SERPL-MCNC: 198 MG/DL — HIGH (ref 70–99)
GLUCOSE SERPL-MCNC: 224 MG/DL — HIGH (ref 70–99)
GLUCOSE SERPL-MCNC: 234 MG/DL — HIGH (ref 70–99)
GLUCOSE SERPL-MCNC: 236 MG/DL — HIGH (ref 70–99)
GLUCOSE SERPL-MCNC: 237 MG/DL — HIGH (ref 70–99)
GLUCOSE SERPL-MCNC: 58 MG/DL — LOW (ref 70–99)
GLUCOSE UR QL: NEGATIVE — SIGNIFICANT CHANGE UP
GLUCOSE UR QL: NEGATIVE — SIGNIFICANT CHANGE UP
GRAM STN FLD: SIGNIFICANT CHANGE UP
HCO3 BLDA-SCNC: 20 MMOL/L — LOW (ref 21–28)
HCO3 BLDA-SCNC: 24 MMOL/L — SIGNIFICANT CHANGE UP (ref 21–28)
HCO3 BLDA-SCNC: 27 MMOL/L — SIGNIFICANT CHANGE UP (ref 21–28)
HCO3 BLDV-SCNC: 27 MMOL/L — SIGNIFICANT CHANGE UP (ref 22–29)
HCT VFR BLD CALC: 28 % — LOW (ref 39–50)
HCT VFR BLD CALC: 29.1 % — LOW (ref 39–50)
HCT VFR BLD CALC: 29.5 % — LOW (ref 39–50)
HCT VFR BLD CALC: 30.4 % — LOW (ref 39–50)
HCT VFR BLD CALC: 31.2 % — LOW (ref 39–50)
HCT VFR BLD CALC: 31.4 % — LOW (ref 39–50)
HCT VFR BLD CALC: 32.1 % — LOW (ref 39–50)
HCT VFR BLD CALC: 32.3 % — LOW (ref 39–50)
HCT VFR BLD CALC: 32.5 % — LOW (ref 39–50)
HCT VFR BLD CALC: 33.6 % — LOW (ref 39–50)
HCT VFR BLD CALC: 33.7 % — LOW (ref 39–50)
HCT VFR BLD CALC: 34 % — LOW (ref 39–50)
HCT VFR BLD CALC: 43.8 % — SIGNIFICANT CHANGE UP (ref 39–50)
HGB BLD-MCNC: 10.3 G/DL — LOW (ref 13–17)
HGB BLD-MCNC: 10.5 G/DL — LOW (ref 13–17)
HGB BLD-MCNC: 10.6 G/DL — LOW (ref 13–17)
HGB BLD-MCNC: 11 G/DL — LOW (ref 13–17)
HGB BLD-MCNC: 11.2 G/DL — LOW (ref 13–17)
HGB BLD-MCNC: 11.3 G/DL — LOW (ref 13–17)
HGB BLD-MCNC: 11.3 G/DL — LOW (ref 13–17)
HGB BLD-MCNC: 14.5 G/DL — SIGNIFICANT CHANGE UP (ref 13–17)
HGB BLD-MCNC: 9 G/DL — LOW (ref 13–17)
HGB BLD-MCNC: 9.1 G/DL — LOW (ref 13–17)
HGB BLD-MCNC: 9.7 G/DL — LOW (ref 13–17)
HGB BLD-MCNC: 9.8 G/DL — LOW (ref 13–17)
HGB BLD-MCNC: 9.9 G/DL — LOW (ref 13–17)
HYALINE CASTS # UR AUTO: ABNORMAL /LPF
HYPOCHROMIA BLD QL: SLIGHT — SIGNIFICANT CHANGE UP
IMM GRANULOCYTES NFR BLD AUTO: 1 % — SIGNIFICANT CHANGE UP (ref 0–1.5)
INR BLD: 1.14 RATIO — SIGNIFICANT CHANGE UP (ref 0.88–1.16)
KETONES UR-MCNC: ABNORMAL
KETONES UR-MCNC: NEGATIVE — SIGNIFICANT CHANGE UP
LACTATE SERPL-SCNC: 1.1 MMOL/L — SIGNIFICANT CHANGE UP (ref 0.7–2)
LACTATE SERPL-SCNC: 2.4 MMOL/L — HIGH (ref 0.7–2)
LACTATE SERPL-SCNC: 2.4 MMOL/L — HIGH (ref 0.7–2)
LACTATE SERPL-SCNC: 2.6 MMOL/L — HIGH (ref 0.7–2)
LACTATE SERPL-SCNC: 3.2 MMOL/L — HIGH (ref 0.7–2)
LACTATE SERPL-SCNC: 3.8 MMOL/L — HIGH (ref 0.7–2)
LACTATE SERPL-SCNC: 3.9 MMOL/L — HIGH (ref 0.7–2)
LACTATE SERPL-SCNC: 8 MMOL/L — CRITICAL HIGH (ref 0.7–2)
LACTATE SERPL-SCNC: 8.5 MMOL/L — CRITICAL HIGH (ref 0.7–2)
LEUKOCYTE ESTERASE UR-ACNC: ABNORMAL
LEUKOCYTE ESTERASE UR-ACNC: ABNORMAL
LG PLATELETS BLD QL AUTO: SLIGHT — SIGNIFICANT CHANGE UP
LIDOCAIN IGE QN: 20 U/L — LOW (ref 73–393)
LYMPHOCYTES # BLD AUTO: 0.56 K/UL — LOW (ref 1–3.3)
LYMPHOCYTES # BLD AUTO: 0.58 K/UL — LOW (ref 1–3.3)
LYMPHOCYTES # BLD AUTO: 1.37 K/UL — SIGNIFICANT CHANGE UP (ref 1–3.3)
LYMPHOCYTES # BLD AUTO: 1.94 K/UL — SIGNIFICANT CHANGE UP (ref 1–3.3)
LYMPHOCYTES # BLD AUTO: 12 % — LOW (ref 13–44)
LYMPHOCYTES # BLD AUTO: 2 % — LOW (ref 13–44)
LYMPHOCYTES # BLD AUTO: 4 % — LOW (ref 13–44)
LYMPHOCYTES # BLD AUTO: 9 % — LOW (ref 13–44)
MACROCYTES BLD QL: SLIGHT — SIGNIFICANT CHANGE UP
MAGNESIUM SERPL-MCNC: 1.5 MG/DL — LOW (ref 1.6–2.6)
MAGNESIUM SERPL-MCNC: 1.8 MG/DL — SIGNIFICANT CHANGE UP (ref 1.6–2.6)
MAGNESIUM SERPL-MCNC: 1.9 MG/DL — SIGNIFICANT CHANGE UP (ref 1.6–2.6)
MAGNESIUM SERPL-MCNC: 1.9 MG/DL — SIGNIFICANT CHANGE UP (ref 1.6–2.6)
MAGNESIUM SERPL-MCNC: 2 MG/DL — SIGNIFICANT CHANGE UP (ref 1.6–2.6)
MAGNESIUM SERPL-MCNC: 2 MG/DL — SIGNIFICANT CHANGE UP (ref 1.6–2.6)
MAGNESIUM SERPL-MCNC: 2.1 MG/DL — SIGNIFICANT CHANGE UP (ref 1.6–2.6)
MAGNESIUM SERPL-MCNC: 2.1 MG/DL — SIGNIFICANT CHANGE UP (ref 1.6–2.6)
MAGNESIUM SERPL-MCNC: 2.2 MG/DL — SIGNIFICANT CHANGE UP (ref 1.6–2.6)
MAGNESIUM SERPL-MCNC: 2.2 MG/DL — SIGNIFICANT CHANGE UP (ref 1.6–2.6)
MAGNESIUM SERPL-MCNC: 2.3 MG/DL — SIGNIFICANT CHANGE UP (ref 1.6–2.6)
MAGNESIUM SERPL-MCNC: 2.3 MG/DL — SIGNIFICANT CHANGE UP (ref 1.6–2.6)
MANUAL SMEAR VERIFICATION: SIGNIFICANT CHANGE UP
MCHC RBC-ENTMCNC: 30.5 GM/DL — LOW (ref 32–36)
MCHC RBC-ENTMCNC: 30.6 PG — SIGNIFICANT CHANGE UP (ref 27–34)
MCHC RBC-ENTMCNC: 30.9 GM/DL — LOW (ref 32–36)
MCHC RBC-ENTMCNC: 31 PG — SIGNIFICANT CHANGE UP (ref 27–34)
MCHC RBC-ENTMCNC: 31.1 PG — SIGNIFICANT CHANGE UP (ref 27–34)
MCHC RBC-ENTMCNC: 31.1 PG — SIGNIFICANT CHANGE UP (ref 27–34)
MCHC RBC-ENTMCNC: 31.5 PG — SIGNIFICANT CHANGE UP (ref 27–34)
MCHC RBC-ENTMCNC: 31.6 PG — SIGNIFICANT CHANGE UP (ref 27–34)
MCHC RBC-ENTMCNC: 31.6 PG — SIGNIFICANT CHANGE UP (ref 27–34)
MCHC RBC-ENTMCNC: 31.8 PG — SIGNIFICANT CHANGE UP (ref 27–34)
MCHC RBC-ENTMCNC: 31.9 GM/DL — LOW (ref 32–36)
MCHC RBC-ENTMCNC: 31.9 PG — SIGNIFICANT CHANGE UP (ref 27–34)
MCHC RBC-ENTMCNC: 32 PG — SIGNIFICANT CHANGE UP (ref 27–34)
MCHC RBC-ENTMCNC: 32.2 GM/DL — SIGNIFICANT CHANGE UP (ref 32–36)
MCHC RBC-ENTMCNC: 32.2 PG — SIGNIFICANT CHANGE UP (ref 27–34)
MCHC RBC-ENTMCNC: 32.5 GM/DL — SIGNIFICANT CHANGE UP (ref 32–36)
MCHC RBC-ENTMCNC: 32.6 GM/DL — SIGNIFICANT CHANGE UP (ref 32–36)
MCHC RBC-ENTMCNC: 33 GM/DL — SIGNIFICANT CHANGE UP (ref 32–36)
MCHC RBC-ENTMCNC: 33.1 GM/DL — SIGNIFICANT CHANGE UP (ref 32–36)
MCHC RBC-ENTMCNC: 33.2 GM/DL — SIGNIFICANT CHANGE UP (ref 32–36)
MCHC RBC-ENTMCNC: 33.6 GM/DL — SIGNIFICANT CHANGE UP (ref 32–36)
MCHC RBC-ENTMCNC: 33.7 GM/DL — SIGNIFICANT CHANGE UP (ref 32–36)
MCHC RBC-ENTMCNC: 34 GM/DL — SIGNIFICANT CHANGE UP (ref 32–36)
MCHC RBC-ENTMCNC: 34.5 GM/DL — SIGNIFICANT CHANGE UP (ref 32–36)
MCV RBC AUTO: 101.9 FL — HIGH (ref 80–100)
MCV RBC AUTO: 103.5 FL — HIGH (ref 80–100)
MCV RBC AUTO: 93.4 FL — SIGNIFICANT CHANGE UP (ref 80–100)
MCV RBC AUTO: 93.6 FL — SIGNIFICANT CHANGE UP (ref 80–100)
MCV RBC AUTO: 93.6 FL — SIGNIFICANT CHANGE UP (ref 80–100)
MCV RBC AUTO: 94.7 FL — SIGNIFICANT CHANGE UP (ref 80–100)
MCV RBC AUTO: 94.9 FL — SIGNIFICANT CHANGE UP (ref 80–100)
MCV RBC AUTO: 95.1 FL — SIGNIFICANT CHANGE UP (ref 80–100)
MCV RBC AUTO: 95.6 FL — SIGNIFICANT CHANGE UP (ref 80–100)
MCV RBC AUTO: 95.8 FL — SIGNIFICANT CHANGE UP (ref 80–100)
MCV RBC AUTO: 95.8 FL — SIGNIFICANT CHANGE UP (ref 80–100)
MCV RBC AUTO: 96.3 FL — SIGNIFICANT CHANGE UP (ref 80–100)
MCV RBC AUTO: 96.5 FL — SIGNIFICANT CHANGE UP (ref 80–100)
METAMYELOCYTES # FLD: 1.5 % — HIGH (ref 0–0)
METAMYELOCYTES # FLD: 6 % — HIGH (ref 0–0)
METHOD TYPE: SIGNIFICANT CHANGE UP
MONOCYTES # BLD AUTO: 0.56 K/UL — SIGNIFICANT CHANGE UP (ref 0–0.9)
MONOCYTES # BLD AUTO: 0.58 K/UL — SIGNIFICANT CHANGE UP (ref 0–0.9)
MONOCYTES # BLD AUTO: 0.71 K/UL — SIGNIFICANT CHANGE UP (ref 0–0.9)
MONOCYTES # BLD AUTO: 0.76 K/UL — SIGNIFICANT CHANGE UP (ref 0–0.9)
MONOCYTES NFR BLD AUTO: 2 % — SIGNIFICANT CHANGE UP (ref 2–14)
MONOCYTES NFR BLD AUTO: 4 % — SIGNIFICANT CHANGE UP (ref 2–14)
MONOCYTES NFR BLD AUTO: 4.4 % — SIGNIFICANT CHANGE UP (ref 2–14)
MONOCYTES NFR BLD AUTO: 5 % — SIGNIFICANT CHANGE UP (ref 2–14)
NEUTROPHILS # BLD AUTO: 12.2 K/UL — HIGH (ref 1.8–7.4)
NEUTROPHILS # BLD AUTO: 13.18 K/UL — HIGH (ref 1.8–7.4)
NEUTROPHILS # BLD AUTO: 13.36 K/UL — HIGH (ref 1.8–7.4)
NEUTROPHILS # BLD AUTO: 26.7 K/UL — HIGH (ref 1.8–7.4)
NEUTROPHILS NFR BLD AUTO: 76 % — SIGNIFICANT CHANGE UP (ref 43–77)
NEUTROPHILS NFR BLD AUTO: 78 % — HIGH (ref 43–77)
NEUTROPHILS NFR BLD AUTO: 82 % — HIGH (ref 43–77)
NEUTROPHILS NFR BLD AUTO: 82.5 % — HIGH (ref 43–77)
NEUTS BAND # BLD: 12.5 % — HIGH (ref 0–8)
NEUTS BAND # BLD: 14 % — HIGH (ref 0–8)
NEUTS BAND # BLD: 4 % — SIGNIFICANT CHANGE UP (ref 0–8)
NITRITE UR-MCNC: NEGATIVE — SIGNIFICANT CHANGE UP
NITRITE UR-MCNC: NEGATIVE — SIGNIFICANT CHANGE UP
NRBC # BLD: 0 /100 — SIGNIFICANT CHANGE UP (ref 0–0)
NRBC # BLD: SIGNIFICANT CHANGE UP /100 WBCS (ref 0–0)
NT-PROBNP SERPL-SCNC: HIGH PG/ML (ref 0–450)
ORGANISM # SPEC MICROSCOPIC CNT: SIGNIFICANT CHANGE UP
ORGANISM # SPEC MICROSCOPIC CNT: SIGNIFICANT CHANGE UP
PCO2 BLDA: 41 MMHG — SIGNIFICANT CHANGE UP (ref 35–48)
PCO2 BLDA: 46 MMHG — SIGNIFICANT CHANGE UP (ref 35–48)
PCO2 BLDA: 54 MMHG — HIGH (ref 35–48)
PCO2 BLDV: 37 MMHG — LOW (ref 42–55)
PH BLDA: 7.29 — LOW (ref 7.35–7.45)
PH BLDA: 7.3 — LOW (ref 7.35–7.45)
PH BLDA: 7.32 — LOW (ref 7.35–7.45)
PH BLDV: 7.47 — HIGH (ref 7.32–7.43)
PH UR: 5 — SIGNIFICANT CHANGE UP (ref 5–8)
PH UR: 5 — SIGNIFICANT CHANGE UP (ref 5–8)
PHOSPHATE SERPL-MCNC: 1.1 MG/DL — LOW (ref 2.5–4.5)
PHOSPHATE SERPL-MCNC: 1.9 MG/DL — LOW (ref 2.5–4.5)
PHOSPHATE SERPL-MCNC: 2.2 MG/DL — LOW (ref 2.5–4.5)
PHOSPHATE SERPL-MCNC: 2.5 MG/DL — SIGNIFICANT CHANGE UP (ref 2.5–4.5)
PHOSPHATE SERPL-MCNC: 2.7 MG/DL — SIGNIFICANT CHANGE UP (ref 2.5–4.5)
PHOSPHATE SERPL-MCNC: 2.7 MG/DL — SIGNIFICANT CHANGE UP (ref 2.5–4.5)
PHOSPHATE SERPL-MCNC: 2.9 MG/DL — SIGNIFICANT CHANGE UP (ref 2.5–4.5)
PHOSPHATE SERPL-MCNC: 3 MG/DL — SIGNIFICANT CHANGE UP (ref 2.5–4.5)
PHOSPHATE SERPL-MCNC: 3.2 MG/DL — SIGNIFICANT CHANGE UP (ref 2.5–4.5)
PHOSPHATE SERPL-MCNC: 4.1 MG/DL — SIGNIFICANT CHANGE UP (ref 2.5–4.5)
PHOSPHATE SERPL-MCNC: 5.4 MG/DL — HIGH (ref 2.5–4.5)
PLAT MORPH BLD: ABNORMAL
PLAT MORPH BLD: NORMAL — SIGNIFICANT CHANGE UP
PLAT MORPH BLD: NORMAL — SIGNIFICANT CHANGE UP
PLATELET # BLD AUTO: 150 K/UL — SIGNIFICANT CHANGE UP (ref 150–400)
PLATELET # BLD AUTO: 172 K/UL — SIGNIFICANT CHANGE UP (ref 150–400)
PLATELET # BLD AUTO: 178 K/UL — SIGNIFICANT CHANGE UP (ref 150–400)
PLATELET # BLD AUTO: 180 K/UL — SIGNIFICANT CHANGE UP (ref 150–400)
PLATELET # BLD AUTO: 200 K/UL — SIGNIFICANT CHANGE UP (ref 150–400)
PLATELET # BLD AUTO: 211 K/UL — SIGNIFICANT CHANGE UP (ref 150–400)
PLATELET # BLD AUTO: 258 K/UL — SIGNIFICANT CHANGE UP (ref 150–400)
PLATELET # BLD AUTO: 323 K/UL — SIGNIFICANT CHANGE UP (ref 150–400)
PLATELET # BLD AUTO: 356 K/UL — SIGNIFICANT CHANGE UP (ref 150–400)
PLATELET # BLD AUTO: 372 K/UL — SIGNIFICANT CHANGE UP (ref 150–400)
PLATELET # BLD AUTO: 403 K/UL — HIGH (ref 150–400)
PLATELET # BLD AUTO: 457 K/UL — HIGH (ref 150–400)
PLATELET # BLD AUTO: 463 K/UL — HIGH (ref 150–400)
PO2 BLDA: 232 MMHG — HIGH (ref 83–108)
PO2 BLDA: 61 MMHG — LOW (ref 83–108)
PO2 BLDA: 88 MMHG — SIGNIFICANT CHANGE UP (ref 83–108)
PO2 BLDV: 65 MMHG — SIGNIFICANT CHANGE UP
POIKILOCYTOSIS BLD QL AUTO: SLIGHT — SIGNIFICANT CHANGE UP
POLYCHROMASIA BLD QL SMEAR: SLIGHT — SIGNIFICANT CHANGE UP
POTASSIUM SERPL-MCNC: 2.7 MMOL/L — CRITICAL LOW (ref 3.5–5.3)
POTASSIUM SERPL-MCNC: 2.8 MMOL/L — CRITICAL LOW (ref 3.5–5.3)
POTASSIUM SERPL-MCNC: 3.2 MMOL/L — LOW (ref 3.5–5.3)
POTASSIUM SERPL-MCNC: 3.3 MMOL/L — LOW (ref 3.5–5.3)
POTASSIUM SERPL-MCNC: 3.4 MMOL/L — LOW (ref 3.5–5.3)
POTASSIUM SERPL-MCNC: 3.5 MMOL/L — SIGNIFICANT CHANGE UP (ref 3.5–5.3)
POTASSIUM SERPL-MCNC: 3.7 MMOL/L — SIGNIFICANT CHANGE UP (ref 3.5–5.3)
POTASSIUM SERPL-MCNC: 3.7 MMOL/L — SIGNIFICANT CHANGE UP (ref 3.5–5.3)
POTASSIUM SERPL-MCNC: 3.8 MMOL/L — SIGNIFICANT CHANGE UP (ref 3.5–5.3)
POTASSIUM SERPL-MCNC: 3.8 MMOL/L — SIGNIFICANT CHANGE UP (ref 3.5–5.3)
POTASSIUM SERPL-MCNC: 4.1 MMOL/L — SIGNIFICANT CHANGE UP (ref 3.5–5.3)
POTASSIUM SERPL-MCNC: 4.4 MMOL/L — SIGNIFICANT CHANGE UP (ref 3.5–5.3)
POTASSIUM SERPL-MCNC: 4.4 MMOL/L — SIGNIFICANT CHANGE UP (ref 3.5–5.3)
POTASSIUM SERPL-MCNC: 5.3 MMOL/L — SIGNIFICANT CHANGE UP (ref 3.5–5.3)
POTASSIUM SERPL-MCNC: 5.4 MMOL/L — HIGH (ref 3.5–5.3)
POTASSIUM SERPL-SCNC: 2.7 MMOL/L — CRITICAL LOW (ref 3.5–5.3)
POTASSIUM SERPL-SCNC: 2.8 MMOL/L — CRITICAL LOW (ref 3.5–5.3)
POTASSIUM SERPL-SCNC: 3.2 MMOL/L — LOW (ref 3.5–5.3)
POTASSIUM SERPL-SCNC: 3.3 MMOL/L — LOW (ref 3.5–5.3)
POTASSIUM SERPL-SCNC: 3.4 MMOL/L — LOW (ref 3.5–5.3)
POTASSIUM SERPL-SCNC: 3.5 MMOL/L — SIGNIFICANT CHANGE UP (ref 3.5–5.3)
POTASSIUM SERPL-SCNC: 3.7 MMOL/L — SIGNIFICANT CHANGE UP (ref 3.5–5.3)
POTASSIUM SERPL-SCNC: 3.7 MMOL/L — SIGNIFICANT CHANGE UP (ref 3.5–5.3)
POTASSIUM SERPL-SCNC: 3.8 MMOL/L — SIGNIFICANT CHANGE UP (ref 3.5–5.3)
POTASSIUM SERPL-SCNC: 3.8 MMOL/L — SIGNIFICANT CHANGE UP (ref 3.5–5.3)
POTASSIUM SERPL-SCNC: 4.1 MMOL/L — SIGNIFICANT CHANGE UP (ref 3.5–5.3)
POTASSIUM SERPL-SCNC: 4.4 MMOL/L — SIGNIFICANT CHANGE UP (ref 3.5–5.3)
POTASSIUM SERPL-SCNC: 4.4 MMOL/L — SIGNIFICANT CHANGE UP (ref 3.5–5.3)
POTASSIUM SERPL-SCNC: 5.3 MMOL/L — SIGNIFICANT CHANGE UP (ref 3.5–5.3)
POTASSIUM SERPL-SCNC: 5.4 MMOL/L — HIGH (ref 3.5–5.3)
PROT SERPL-MCNC: 4.6 GM/DL — LOW (ref 6–8.3)
PROT SERPL-MCNC: 5.2 GM/DL — LOW (ref 6–8.3)
PROT SERPL-MCNC: 5.2 GM/DL — LOW (ref 6–8.3)
PROT SERPL-MCNC: 5.3 GM/DL — LOW (ref 6–8.3)
PROT SERPL-MCNC: 5.5 GM/DL — LOW (ref 6–8.3)
PROT SERPL-MCNC: 5.6 GM/DL — LOW (ref 6–8.3)
PROT SERPL-MCNC: 5.6 GM/DL — LOW (ref 6–8.3)
PROT SERPL-MCNC: 7 GM/DL — SIGNIFICANT CHANGE UP (ref 6–8.3)
PROT UR-MCNC: 100
PROT UR-MCNC: 15
PROTHROM AB SERPL-ACNC: 13.2 SEC — SIGNIFICANT CHANGE UP (ref 10.5–13.4)
RBC # BLD: 2.85 M/UL — LOW (ref 4.2–5.8)
RBC # BLD: 2.93 M/UL — LOW (ref 4.2–5.8)
RBC # BLD: 3.08 M/UL — LOW (ref 4.2–5.8)
RBC # BLD: 3.11 M/UL — LOW (ref 4.2–5.8)
RBC # BLD: 3.15 M/UL — LOW (ref 4.2–5.8)
RBC # BLD: 3.28 M/UL — LOW (ref 4.2–5.8)
RBC # BLD: 3.35 M/UL — LOW (ref 4.2–5.8)
RBC # BLD: 3.37 M/UL — LOW (ref 4.2–5.8)
RBC # BLD: 3.48 M/UL — LOW (ref 4.2–5.8)
RBC # BLD: 3.55 M/UL — LOW (ref 4.2–5.8)
RBC # BLD: 3.59 M/UL — LOW (ref 4.2–5.8)
RBC # BLD: 3.59 M/UL — LOW (ref 4.2–5.8)
RBC # BLD: 4.55 M/UL — SIGNIFICANT CHANGE UP (ref 4.2–5.8)
RBC # FLD: 13.3 % — SIGNIFICANT CHANGE UP (ref 10.3–14.5)
RBC # FLD: 13.4 % — SIGNIFICANT CHANGE UP (ref 10.3–14.5)
RBC # FLD: 13.7 % — SIGNIFICANT CHANGE UP (ref 10.3–14.5)
RBC # FLD: 13.8 % — SIGNIFICANT CHANGE UP (ref 10.3–14.5)
RBC # FLD: 14 % — SIGNIFICANT CHANGE UP (ref 10.3–14.5)
RBC # FLD: 14.2 % — SIGNIFICANT CHANGE UP (ref 10.3–14.5)
RBC # FLD: 14.4 % — SIGNIFICANT CHANGE UP (ref 10.3–14.5)
RBC # FLD: 14.4 % — SIGNIFICANT CHANGE UP (ref 10.3–14.5)
RBC # FLD: 14.6 % — HIGH (ref 10.3–14.5)
RBC # FLD: 14.7 % — HIGH (ref 10.3–14.5)
RBC # FLD: 15 % — HIGH (ref 10.3–14.5)
RBC # FLD: 15.2 % — HIGH (ref 10.3–14.5)
RBC # FLD: 15.3 % — HIGH (ref 10.3–14.5)
RBC BLD AUTO: ABNORMAL
RBC BLD AUTO: ABNORMAL
RBC BLD AUTO: SIGNIFICANT CHANGE UP
RBC CASTS # UR COMP ASSIST: ABNORMAL /HPF (ref 0–4)
RBC CASTS # UR COMP ASSIST: ABNORMAL /HPF (ref 0–4)
SAO2 % BLDA: 92 % — SIGNIFICANT CHANGE UP
SAO2 % BLDA: 98 % — SIGNIFICANT CHANGE UP
SAO2 % BLDA: 99 % — SIGNIFICANT CHANGE UP
SAO2 % BLDV: 94 % — SIGNIFICANT CHANGE UP
SARS-COV-2 RNA SPEC QL NAA+PROBE: SIGNIFICANT CHANGE UP
SCHISTOCYTES BLD QL AUTO: SLIGHT — SIGNIFICANT CHANGE UP
SODIUM SERPL-SCNC: 141 MMOL/L — SIGNIFICANT CHANGE UP (ref 135–145)
SODIUM SERPL-SCNC: 142 MMOL/L — SIGNIFICANT CHANGE UP (ref 135–145)
SODIUM SERPL-SCNC: 143 MMOL/L — SIGNIFICANT CHANGE UP (ref 135–145)
SODIUM SERPL-SCNC: 144 MMOL/L — SIGNIFICANT CHANGE UP (ref 135–145)
SODIUM SERPL-SCNC: 145 MMOL/L — SIGNIFICANT CHANGE UP (ref 135–145)
SODIUM SERPL-SCNC: 145 MMOL/L — SIGNIFICANT CHANGE UP (ref 135–145)
SODIUM SERPL-SCNC: 146 MMOL/L — HIGH (ref 135–145)
SODIUM SERPL-SCNC: 146 MMOL/L — HIGH (ref 135–145)
SODIUM SERPL-SCNC: 147 MMOL/L — HIGH (ref 135–145)
SODIUM SERPL-SCNC: 147 MMOL/L — HIGH (ref 135–145)
SODIUM SERPL-SCNC: 148 MMOL/L — HIGH (ref 135–145)
SODIUM SERPL-SCNC: 149 MMOL/L — HIGH (ref 135–145)
SODIUM SERPL-SCNC: 150 MMOL/L — HIGH (ref 135–145)
SODIUM SERPL-SCNC: 151 MMOL/L — HIGH (ref 135–145)
SODIUM SERPL-SCNC: 152 MMOL/L — HIGH (ref 135–145)
SP GR SPEC: 1.02 — SIGNIFICANT CHANGE UP (ref 1.01–1.02)
SP GR SPEC: 1.02 — SIGNIFICANT CHANGE UP (ref 1.01–1.02)
SPECIMEN SOURCE: SIGNIFICANT CHANGE UP
T4 FREE SERPL-MCNC: 1.09 NG/DL — SIGNIFICANT CHANGE UP (ref 0.76–1.46)
TROPONIN I, HIGH SENSITIVITY RESULT: 202.16 NG/L — HIGH
TROPONIN I, HIGH SENSITIVITY RESULT: 232.63 NG/L — HIGH
TROPONIN I, HIGH SENSITIVITY RESULT: 463.86 NG/L — HIGH
TROPONIN I, HIGH SENSITIVITY RESULT: 907.26 NG/L — HIGH
TSH SERPL-MCNC: 0.98 UU/ML — SIGNIFICANT CHANGE UP (ref 0.34–4.82)
UROBILINOGEN FLD QL: NEGATIVE — SIGNIFICANT CHANGE UP
UROBILINOGEN FLD QL: NEGATIVE — SIGNIFICANT CHANGE UP
VANCOMYCIN TROUGH SERPL-MCNC: 11.9 UG/ML — SIGNIFICANT CHANGE UP (ref 10–20)
WBC # BLD: 11.66 K/UL — HIGH (ref 3.8–10.5)
WBC # BLD: 12.51 K/UL — HIGH (ref 3.8–10.5)
WBC # BLD: 12.82 K/UL — HIGH (ref 3.8–10.5)
WBC # BLD: 13.45 K/UL — HIGH (ref 3.8–10.5)
WBC # BLD: 14.56 K/UL — HIGH (ref 3.8–10.5)
WBC # BLD: 14.77 K/UL — HIGH (ref 3.8–10.5)
WBC # BLD: 15.25 K/UL — HIGH (ref 3.8–10.5)
WBC # BLD: 16.19 K/UL — HIGH (ref 3.8–10.5)
WBC # BLD: 17.77 K/UL — HIGH (ref 3.8–10.5)
WBC # BLD: 18.9 K/UL — HIGH (ref 3.8–10.5)
WBC # BLD: 19.61 K/UL — HIGH (ref 3.8–10.5)
WBC # BLD: 25.12 K/UL — HIGH (ref 3.8–10.5)
WBC # BLD: 27.81 K/UL — HIGH (ref 3.8–10.5)
WBC # FLD AUTO: 11.66 K/UL — HIGH (ref 3.8–10.5)
WBC # FLD AUTO: 12.51 K/UL — HIGH (ref 3.8–10.5)
WBC # FLD AUTO: 12.82 K/UL — HIGH (ref 3.8–10.5)
WBC # FLD AUTO: 13.45 K/UL — HIGH (ref 3.8–10.5)
WBC # FLD AUTO: 14.56 K/UL — HIGH (ref 3.8–10.5)
WBC # FLD AUTO: 14.77 K/UL — HIGH (ref 3.8–10.5)
WBC # FLD AUTO: 15.25 K/UL — HIGH (ref 3.8–10.5)
WBC # FLD AUTO: 16.19 K/UL — HIGH (ref 3.8–10.5)
WBC # FLD AUTO: 17.77 K/UL — HIGH (ref 3.8–10.5)
WBC # FLD AUTO: 18.9 K/UL — HIGH (ref 3.8–10.5)
WBC # FLD AUTO: 19.61 K/UL — HIGH (ref 3.8–10.5)
WBC # FLD AUTO: 25.12 K/UL — HIGH (ref 3.8–10.5)
WBC # FLD AUTO: 27.81 K/UL — HIGH (ref 3.8–10.5)
WBC UR QL: >50
WBC UR QL: ABNORMAL

## 2022-01-01 PROCEDURE — 92523 SPEECH SOUND LANG COMPREHEN: CPT | Mod: GN

## 2022-01-01 PROCEDURE — 93306 TTE W/DOPPLER COMPLETE: CPT

## 2022-01-01 PROCEDURE — 99498 ADVNCD CARE PLAN ADDL 30 MIN: CPT

## 2022-01-01 PROCEDURE — 99233 SBSQ HOSP IP/OBS HIGH 50: CPT

## 2022-01-01 PROCEDURE — 80202 ASSAY OF VANCOMYCIN: CPT

## 2022-01-01 PROCEDURE — 99292 CRITICAL CARE ADDL 30 MIN: CPT

## 2022-01-01 PROCEDURE — 99222 1ST HOSP IP/OBS MODERATE 55: CPT

## 2022-01-01 PROCEDURE — 71045 X-RAY EXAM CHEST 1 VIEW: CPT | Mod: 26

## 2022-01-01 PROCEDURE — 81001 URINALYSIS AUTO W/SCOPE: CPT

## 2022-01-01 PROCEDURE — 93010 ELECTROCARDIOGRAM REPORT: CPT

## 2022-01-01 PROCEDURE — 82803 BLOOD GASES ANY COMBINATION: CPT

## 2022-01-01 PROCEDURE — 74176 CT ABD & PELVIS W/O CONTRAST: CPT | Mod: 26

## 2022-01-01 PROCEDURE — 76604 US EXAM CHEST: CPT | Mod: 26

## 2022-01-01 PROCEDURE — 92610 EVALUATE SWALLOWING FUNCTION: CPT | Mod: GN

## 2022-01-01 PROCEDURE — 36600 WITHDRAWAL OF ARTERIAL BLOOD: CPT

## 2022-01-01 PROCEDURE — 99291 CRITICAL CARE FIRST HOUR: CPT

## 2022-01-01 PROCEDURE — 87070 CULTURE OTHR SPECIMN AEROBIC: CPT

## 2022-01-01 PROCEDURE — 83605 ASSAY OF LACTIC ACID: CPT

## 2022-01-01 PROCEDURE — 83880 ASSAY OF NATRIURETIC PEPTIDE: CPT

## 2022-01-01 PROCEDURE — 36415 COLL VENOUS BLD VENIPUNCTURE: CPT

## 2022-01-01 PROCEDURE — 83735 ASSAY OF MAGNESIUM: CPT

## 2022-01-01 PROCEDURE — 85025 COMPLETE CBC W/AUTO DIFF WBC: CPT

## 2022-01-01 PROCEDURE — 71045 X-RAY EXAM CHEST 1 VIEW: CPT | Mod: 26,77

## 2022-01-01 PROCEDURE — 80048 BASIC METABOLIC PNL TOTAL CA: CPT

## 2022-01-01 PROCEDURE — 99291 CRITICAL CARE FIRST HOUR: CPT | Mod: 25

## 2022-01-01 PROCEDURE — 85027 COMPLETE CBC AUTOMATED: CPT

## 2022-01-01 PROCEDURE — 84484 ASSAY OF TROPONIN QUANT: CPT

## 2022-01-01 PROCEDURE — 74018 RADEX ABDOMEN 1 VIEW: CPT | Mod: 26

## 2022-01-01 PROCEDURE — 70450 CT HEAD/BRAIN W/O DYE: CPT | Mod: 26,MA

## 2022-01-01 PROCEDURE — 83036 HEMOGLOBIN GLYCOSYLATED A1C: CPT

## 2022-01-01 PROCEDURE — 87635 SARS-COV-2 COVID-19 AMP PRB: CPT

## 2022-01-01 PROCEDURE — 74018 RADEX ABDOMEN 1 VIEW: CPT

## 2022-01-01 PROCEDURE — 99497 ADVNCD CARE PLAN 30 MIN: CPT | Mod: 25

## 2022-01-01 PROCEDURE — 87086 URINE CULTURE/COLONY COUNT: CPT

## 2022-01-01 PROCEDURE — 71045 X-RAY EXAM CHEST 1 VIEW: CPT

## 2022-01-01 PROCEDURE — 82550 ASSAY OF CK (CPK): CPT

## 2022-01-01 PROCEDURE — 82962 GLUCOSE BLOOD TEST: CPT

## 2022-01-01 PROCEDURE — 94003 VENT MGMT INPAT SUBQ DAY: CPT

## 2022-01-01 PROCEDURE — 74176 CT ABD & PELVIS W/O CONTRAST: CPT | Mod: MA

## 2022-01-01 PROCEDURE — 93005 ELECTROCARDIOGRAM TRACING: CPT

## 2022-01-01 PROCEDURE — 99232 SBSQ HOSP IP/OBS MODERATE 35: CPT

## 2022-01-01 PROCEDURE — C9113: CPT

## 2022-01-01 PROCEDURE — 84443 ASSAY THYROID STIM HORMONE: CPT

## 2022-01-01 PROCEDURE — 87040 BLOOD CULTURE FOR BACTERIA: CPT

## 2022-01-01 PROCEDURE — 84100 ASSAY OF PHOSPHORUS: CPT

## 2022-01-01 PROCEDURE — 97163 PT EVAL HIGH COMPLEX 45 MIN: CPT | Mod: GP

## 2022-01-01 PROCEDURE — 93306 TTE W/DOPPLER COMPLETE: CPT | Mod: 26

## 2022-01-01 PROCEDURE — 99292 CRITICAL CARE ADDL 30 MIN: CPT | Mod: 25

## 2022-01-01 PROCEDURE — 82947 ASSAY GLUCOSE BLOOD QUANT: CPT

## 2022-01-01 PROCEDURE — 80053 COMPREHEN METABOLIC PANEL: CPT

## 2022-01-01 PROCEDURE — 84439 ASSAY OF FREE THYROXINE: CPT

## 2022-01-01 PROCEDURE — 99221 1ST HOSP IP/OBS SF/LOW 40: CPT

## 2022-01-01 PROCEDURE — 93308 TTE F-UP OR LMTD: CPT | Mod: 26

## 2022-01-01 RX ORDER — CHLORHEXIDINE GLUCONATE 213 G/1000ML
15 SOLUTION TOPICAL EVERY 12 HOURS
Refills: 0 | Status: DISCONTINUED | OUTPATIENT
Start: 2022-01-01 | End: 2022-01-01

## 2022-01-01 RX ORDER — METOPROLOL TARTRATE 50 MG
2.5 TABLET ORAL ONCE
Refills: 0 | Status: COMPLETED | OUTPATIENT
Start: 2022-01-01 | End: 2022-01-01

## 2022-01-01 RX ORDER — DAPTOMYCIN 500 MG/10ML
250 INJECTION, POWDER, LYOPHILIZED, FOR SOLUTION INTRAVENOUS ONCE
Refills: 0 | Status: DISCONTINUED | OUTPATIENT
Start: 2022-01-01 | End: 2022-01-01

## 2022-01-01 RX ORDER — MAGNESIUM SULFATE 500 MG/ML
2 VIAL (ML) INJECTION ONCE
Refills: 0 | Status: COMPLETED | OUTPATIENT
Start: 2022-01-01 | End: 2022-01-01

## 2022-01-01 RX ORDER — DEXTROSE 50 % IN WATER 50 %
15 SYRINGE (ML) INTRAVENOUS ONCE
Refills: 0 | Status: DISCONTINUED | OUTPATIENT
Start: 2022-01-01 | End: 2022-01-01

## 2022-01-01 RX ORDER — MORPHINE SULFATE 50 MG/1
2 CAPSULE, EXTENDED RELEASE ORAL EVERY 12 HOURS
Refills: 0 | Status: DISCONTINUED | OUTPATIENT
Start: 2022-01-01 | End: 2022-01-01

## 2022-01-01 RX ORDER — METOPROLOL TARTRATE 50 MG
5 TABLET ORAL ONCE
Refills: 0 | Status: COMPLETED | OUTPATIENT
Start: 2022-01-01 | End: 2022-01-01

## 2022-01-01 RX ORDER — SODIUM CHLORIDE 9 MG/ML
1000 INJECTION, SOLUTION INTRAVENOUS
Refills: 0 | Status: DISCONTINUED | OUTPATIENT
Start: 2022-01-01 | End: 2022-01-01

## 2022-01-01 RX ORDER — HEPARIN SODIUM 5000 [USP'U]/ML
5000 INJECTION INTRAVENOUS; SUBCUTANEOUS EVERY 8 HOURS
Refills: 0 | Status: DISCONTINUED | OUTPATIENT
Start: 2022-01-01 | End: 2022-01-01

## 2022-01-01 RX ORDER — DEXTROSE 50 % IN WATER 50 %
25 SYRINGE (ML) INTRAVENOUS ONCE
Refills: 0 | Status: DISCONTINUED | OUTPATIENT
Start: 2022-01-01 | End: 2022-01-01

## 2022-01-01 RX ORDER — INSULIN LISPRO 100/ML
VIAL (ML) SUBCUTANEOUS AT BEDTIME
Refills: 0 | Status: DISCONTINUED | OUTPATIENT
Start: 2022-01-01 | End: 2022-01-01

## 2022-01-01 RX ORDER — MAGNESIUM SULFATE 500 MG/ML
1 VIAL (ML) INJECTION ONCE
Refills: 0 | Status: COMPLETED | OUTPATIENT
Start: 2022-01-01 | End: 2022-01-01

## 2022-01-01 RX ORDER — PIPERACILLIN AND TAZOBACTAM 4; .5 G/20ML; G/20ML
3.38 INJECTION, POWDER, LYOPHILIZED, FOR SOLUTION INTRAVENOUS ONCE
Refills: 0 | Status: COMPLETED | OUTPATIENT
Start: 2022-01-01 | End: 2022-01-01

## 2022-01-01 RX ORDER — METOPROLOL TARTRATE 50 MG
5 TABLET ORAL EVERY 6 HOURS
Refills: 0 | Status: DISCONTINUED | OUTPATIENT
Start: 2022-01-01 | End: 2022-01-01

## 2022-01-01 RX ORDER — GLUCAGON INJECTION, SOLUTION 0.5 MG/.1ML
1 INJECTION, SOLUTION SUBCUTANEOUS ONCE
Refills: 0 | Status: DISCONTINUED | OUTPATIENT
Start: 2022-01-01 | End: 2022-01-01

## 2022-01-01 RX ORDER — MIDODRINE HYDROCHLORIDE 2.5 MG/1
5 TABLET ORAL EVERY 8 HOURS
Refills: 0 | Status: DISCONTINUED | OUTPATIENT
Start: 2022-01-01 | End: 2022-01-01

## 2022-01-01 RX ORDER — VASOPRESSIN 20 [USP'U]/ML
0.04 INJECTION INTRAVENOUS
Qty: 50 | Refills: 0 | Status: DISCONTINUED | OUTPATIENT
Start: 2022-01-01 | End: 2022-01-01

## 2022-01-01 RX ORDER — METOPROLOL TARTRATE 50 MG
25 TABLET ORAL
Refills: 0 | Status: DISCONTINUED | OUTPATIENT
Start: 2022-01-01 | End: 2022-01-01

## 2022-01-01 RX ORDER — NOREPINEPHRINE BITARTRATE/D5W 8 MG/250ML
0.1 PLASTIC BAG, INJECTION (ML) INTRAVENOUS
Qty: 8 | Refills: 0 | Status: DISCONTINUED | OUTPATIENT
Start: 2022-01-01 | End: 2022-01-01

## 2022-01-01 RX ORDER — POTASSIUM PHOSPHATE, MONOBASIC POTASSIUM PHOSPHATE, DIBASIC 236; 224 MG/ML; MG/ML
30 INJECTION, SOLUTION INTRAVENOUS ONCE
Refills: 0 | Status: COMPLETED | OUTPATIENT
Start: 2022-01-01 | End: 2022-01-01

## 2022-01-01 RX ORDER — PANTOPRAZOLE SODIUM 20 MG/1
40 TABLET, DELAYED RELEASE ORAL DAILY
Refills: 0 | Status: DISCONTINUED | OUTPATIENT
Start: 2022-01-01 | End: 2022-01-01

## 2022-01-01 RX ORDER — CHLORHEXIDINE GLUCONATE 213 G/1000ML
1 SOLUTION TOPICAL
Refills: 0 | Status: DISCONTINUED | OUTPATIENT
Start: 2022-01-01 | End: 2022-01-01

## 2022-01-01 RX ORDER — MORPHINE SULFATE 50 MG/1
2 CAPSULE, EXTENDED RELEASE ORAL EVERY 6 HOURS
Refills: 0 | Status: DISCONTINUED | OUTPATIENT
Start: 2022-01-01 | End: 2022-01-01

## 2022-01-01 RX ORDER — CEFEPIME 1 G/1
1000 INJECTION, POWDER, FOR SOLUTION INTRAMUSCULAR; INTRAVENOUS ONCE
Refills: 0 | Status: COMPLETED | OUTPATIENT
Start: 2022-01-01 | End: 2022-01-01

## 2022-01-01 RX ORDER — MIDAZOLAM HYDROCHLORIDE 1 MG/ML
1 INJECTION, SOLUTION INTRAMUSCULAR; INTRAVENOUS
Refills: 0 | Status: DISCONTINUED | OUTPATIENT
Start: 2022-01-01 | End: 2022-01-01

## 2022-01-01 RX ORDER — POTASSIUM CHLORIDE 20 MEQ
10 PACKET (EA) ORAL
Refills: 0 | Status: COMPLETED | OUTPATIENT
Start: 2022-01-01 | End: 2022-01-01

## 2022-01-01 RX ORDER — DEXTROSE 50 % IN WATER 50 %
25 SYRINGE (ML) INTRAVENOUS ONCE
Refills: 0 | Status: COMPLETED | OUTPATIENT
Start: 2022-01-01 | End: 2022-01-01

## 2022-01-01 RX ORDER — POTASSIUM PHOSPHATE, MONOBASIC POTASSIUM PHOSPHATE, DIBASIC 236; 224 MG/ML; MG/ML
15 INJECTION, SOLUTION INTRAVENOUS ONCE
Refills: 0 | Status: DISCONTINUED | OUTPATIENT
Start: 2022-01-01 | End: 2022-01-01

## 2022-01-01 RX ORDER — MINERAL OIL
133 OIL (ML) MISCELLANEOUS ONCE
Refills: 0 | Status: COMPLETED | OUTPATIENT
Start: 2022-01-01 | End: 2022-01-01

## 2022-01-01 RX ORDER — VANCOMYCIN HCL 1 G
750 VIAL (EA) INTRAVENOUS ONCE
Refills: 0 | Status: COMPLETED | OUTPATIENT
Start: 2022-01-01 | End: 2022-01-01

## 2022-01-01 RX ORDER — MORPHINE SULFATE 50 MG/1
1 CAPSULE, EXTENDED RELEASE ORAL ONCE
Refills: 0 | Status: DISCONTINUED | OUTPATIENT
Start: 2022-01-01 | End: 2022-01-01

## 2022-01-01 RX ORDER — INSULIN LISPRO 100/ML
VIAL (ML) SUBCUTANEOUS EVERY 6 HOURS
Refills: 0 | Status: DISCONTINUED | OUTPATIENT
Start: 2022-01-01 | End: 2022-01-01

## 2022-01-01 RX ORDER — THIAMINE MONONITRATE (VIT B1) 100 MG
100 TABLET ORAL DAILY
Refills: 0 | Status: DISCONTINUED | OUTPATIENT
Start: 2022-01-01 | End: 2022-01-01

## 2022-01-01 RX ORDER — FENTANYL CITRATE 50 UG/ML
50 INJECTION INTRAVENOUS ONCE
Refills: 0 | Status: DISCONTINUED | OUTPATIENT
Start: 2022-01-01 | End: 2022-01-01

## 2022-01-01 RX ORDER — INSULIN LISPRO 100/ML
VIAL (ML) SUBCUTANEOUS
Refills: 0 | Status: DISCONTINUED | OUTPATIENT
Start: 2022-01-01 | End: 2022-01-01

## 2022-01-01 RX ORDER — METOPROLOL TARTRATE 50 MG
12.5 TABLET ORAL
Refills: 0 | Status: DISCONTINUED | OUTPATIENT
Start: 2022-01-01 | End: 2022-01-01

## 2022-01-01 RX ORDER — CEFEPIME 1 G/1
INJECTION, POWDER, FOR SOLUTION INTRAMUSCULAR; INTRAVENOUS
Refills: 0 | Status: DISCONTINUED | OUTPATIENT
Start: 2022-01-01 | End: 2022-01-01

## 2022-01-01 RX ORDER — DEXTROSE 50 % IN WATER 50 %
50 SYRINGE (ML) INTRAVENOUS ONCE
Refills: 0 | Status: COMPLETED | OUTPATIENT
Start: 2022-01-01 | End: 2022-01-01

## 2022-01-01 RX ORDER — ACETAMINOPHEN 500 MG
650 TABLET ORAL EVERY 6 HOURS
Refills: 0 | Status: DISCONTINUED | OUTPATIENT
Start: 2022-01-01 | End: 2022-01-01

## 2022-01-01 RX ORDER — MAGNESIUM HYDROXIDE 400 MG/1
30 TABLET, CHEWABLE ORAL ONCE
Refills: 0 | Status: DISCONTINUED | OUTPATIENT
Start: 2022-01-01 | End: 2022-01-01

## 2022-01-01 RX ORDER — FUROSEMIDE 40 MG
20 TABLET ORAL ONCE
Refills: 0 | Status: COMPLETED | OUTPATIENT
Start: 2022-01-01 | End: 2022-01-01

## 2022-01-01 RX ORDER — ASCORBIC ACID 60 MG
1 TABLET,CHEWABLE ORAL
Qty: 0 | Refills: 0 | DISCHARGE

## 2022-01-01 RX ORDER — SENNA PLUS 8.6 MG/1
2 TABLET ORAL AT BEDTIME
Refills: 0 | Status: DISCONTINUED | OUTPATIENT
Start: 2022-01-01 | End: 2022-01-01

## 2022-01-01 RX ORDER — SODIUM CHLORIDE 9 MG/ML
500 INJECTION, SOLUTION INTRAVENOUS ONCE
Refills: 0 | Status: COMPLETED | OUTPATIENT
Start: 2022-01-01 | End: 2022-01-01

## 2022-01-01 RX ORDER — MAGNESIUM HYDROXIDE 400 MG/1
30 TABLET, CHEWABLE ORAL
Qty: 0 | Refills: 0 | DISCHARGE

## 2022-01-01 RX ORDER — ROBINUL 0.2 MG/ML
0.2 INJECTION INTRAMUSCULAR; INTRAVENOUS EVERY 8 HOURS
Refills: 0 | Status: DISCONTINUED | OUTPATIENT
Start: 2022-01-01 | End: 2022-01-01

## 2022-01-01 RX ORDER — VANCOMYCIN HCL 1 G
1000 VIAL (EA) INTRAVENOUS ONCE
Refills: 0 | Status: COMPLETED | OUTPATIENT
Start: 2022-01-01 | End: 2022-01-01

## 2022-01-01 RX ORDER — POTASSIUM CHLORIDE 20 MEQ
10 PACKET (EA) ORAL
Refills: 0 | Status: DISCONTINUED | OUTPATIENT
Start: 2022-01-01 | End: 2022-01-01

## 2022-01-01 RX ORDER — TAMSULOSIN HYDROCHLORIDE 0.4 MG/1
0.4 CAPSULE ORAL AT BEDTIME
Refills: 0 | Status: DISCONTINUED | OUTPATIENT
Start: 2022-01-01 | End: 2022-01-01

## 2022-01-01 RX ORDER — DEXTROSE 50 % IN WATER 50 %
12.5 SYRINGE (ML) INTRAVENOUS ONCE
Refills: 0 | Status: DISCONTINUED | OUTPATIENT
Start: 2022-01-01 | End: 2022-01-01

## 2022-01-01 RX ORDER — FINASTERIDE 5 MG/1
5 TABLET, FILM COATED ORAL DAILY
Refills: 0 | Status: DISCONTINUED | OUTPATIENT
Start: 2022-01-01 | End: 2022-01-01

## 2022-01-01 RX ORDER — SODIUM CHLORIDE 9 MG/ML
1000 INJECTION INTRAMUSCULAR; INTRAVENOUS; SUBCUTANEOUS ONCE
Refills: 0 | Status: COMPLETED | OUTPATIENT
Start: 2022-01-01 | End: 2022-01-01

## 2022-01-01 RX ORDER — FENTANYL CITRATE 50 UG/ML
50 INJECTION INTRAVENOUS EVERY 4 HOURS
Refills: 0 | Status: DISCONTINUED | OUTPATIENT
Start: 2022-01-01 | End: 2022-01-01

## 2022-01-01 RX ORDER — POTASSIUM CHLORIDE 20 MEQ
20 PACKET (EA) ORAL ONCE
Refills: 0 | Status: COMPLETED | OUTPATIENT
Start: 2022-01-01 | End: 2022-01-01

## 2022-01-01 RX ORDER — POTASSIUM CHLORIDE 20 MEQ
40 PACKET (EA) ORAL EVERY 4 HOURS
Refills: 0 | Status: COMPLETED | OUTPATIENT
Start: 2022-01-01 | End: 2022-01-01

## 2022-01-01 RX ORDER — ZINC SULFATE TAB 220 MG (50 MG ZINC EQUIVALENT) 220 (50 ZN) MG
220 TAB ORAL DAILY
Refills: 0 | Status: COMPLETED | OUTPATIENT
Start: 2022-01-01 | End: 2022-01-01

## 2022-01-01 RX ORDER — SODIUM CHLORIDE 9 MG/ML
250 INJECTION, SOLUTION INTRAVENOUS ONCE
Refills: 0 | Status: COMPLETED | OUTPATIENT
Start: 2022-01-01 | End: 2022-01-01

## 2022-01-01 RX ORDER — NOREPINEPHRINE BITARTRATE/D5W 8 MG/250ML
0.7 PLASTIC BAG, INJECTION (ML) INTRAVENOUS
Qty: 16 | Refills: 0 | Status: DISCONTINUED | OUTPATIENT
Start: 2022-01-01 | End: 2022-01-01

## 2022-01-01 RX ORDER — HEPARIN SODIUM 5000 [USP'U]/ML
5000 INJECTION INTRAVENOUS; SUBCUTANEOUS EVERY 12 HOURS
Refills: 0 | Status: DISCONTINUED | OUTPATIENT
Start: 2022-01-01 | End: 2022-01-01

## 2022-01-01 RX ORDER — ASCORBIC ACID 60 MG
500 TABLET,CHEWABLE ORAL
Refills: 0 | Status: DISCONTINUED | OUTPATIENT
Start: 2022-01-01 | End: 2022-01-01

## 2022-01-01 RX ORDER — PIPERACILLIN AND TAZOBACTAM 4; .5 G/20ML; G/20ML
3.38 INJECTION, POWDER, LYOPHILIZED, FOR SOLUTION INTRAVENOUS EVERY 8 HOURS
Refills: 0 | Status: COMPLETED | OUTPATIENT
Start: 2022-01-01 | End: 2022-01-01

## 2022-01-01 RX ORDER — CEFEPIME 1 G/1
1000 INJECTION, POWDER, FOR SOLUTION INTRAMUSCULAR; INTRAVENOUS EVERY 12 HOURS
Refills: 0 | Status: DISCONTINUED | OUTPATIENT
Start: 2022-01-01 | End: 2022-01-01

## 2022-01-01 RX ORDER — SODIUM CHLORIDE 9 MG/ML
1000 INJECTION, SOLUTION INTRAVENOUS ONCE
Refills: 0 | Status: COMPLETED | OUTPATIENT
Start: 2022-01-01 | End: 2022-01-01

## 2022-01-01 RX ORDER — MORPHINE SULFATE 50 MG/1
2 CAPSULE, EXTENDED RELEASE ORAL ONCE
Refills: 0 | Status: DISCONTINUED | OUTPATIENT
Start: 2022-01-01 | End: 2022-01-01

## 2022-01-01 RX ORDER — DOXAZOSIN MESYLATE 4 MG
1 TABLET ORAL AT BEDTIME
Refills: 0 | Status: DISCONTINUED | OUTPATIENT
Start: 2022-01-01 | End: 2022-01-01

## 2022-01-01 RX ORDER — METOPROLOL TARTRATE 50 MG
2.5 TABLET ORAL EVERY 6 HOURS
Refills: 0 | Status: DISCONTINUED | OUTPATIENT
Start: 2022-01-01 | End: 2022-01-01

## 2022-01-01 RX ORDER — SODIUM CHLORIDE 9 MG/ML
10 INJECTION INTRAMUSCULAR; INTRAVENOUS; SUBCUTANEOUS
Refills: 0 | Status: DISCONTINUED | OUTPATIENT
Start: 2022-01-01 | End: 2022-01-01

## 2022-01-01 RX ORDER — BETHANECHOL CHLORIDE 25 MG
50 TABLET ORAL
Refills: 0 | Status: DISCONTINUED | OUTPATIENT
Start: 2022-01-01 | End: 2022-01-01

## 2022-01-01 RX ORDER — MULTIVIT-MIN/FERROUS GLUCONATE 9 MG/15 ML
1 LIQUID (ML) ORAL DAILY
Refills: 0 | Status: DISCONTINUED | OUTPATIENT
Start: 2022-01-01 | End: 2022-01-01

## 2022-01-01 RX ORDER — DEXTROSE MONOHYDRATE, SODIUM CHLORIDE, AND POTASSIUM CHLORIDE 50; .745; 4.5 G/1000ML; G/1000ML; G/1000ML
1000 INJECTION, SOLUTION INTRAVENOUS
Refills: 0 | Status: DISCONTINUED | OUTPATIENT
Start: 2022-01-01 | End: 2022-01-01

## 2022-01-01 RX ORDER — METOPROLOL TARTRATE 50 MG
25 TABLET ORAL EVERY 8 HOURS
Refills: 0 | Status: DISCONTINUED | OUTPATIENT
Start: 2022-01-01 | End: 2022-01-01

## 2022-01-01 RX ORDER — DILTIAZEM HCL 120 MG
5 CAPSULE, EXT RELEASE 24 HR ORAL
Qty: 125 | Refills: 0 | Status: DISCONTINUED | OUTPATIENT
Start: 2022-01-01 | End: 2022-01-01

## 2022-01-01 RX ORDER — ENOXAPARIN SODIUM 100 MG/ML
50 INJECTION SUBCUTANEOUS EVERY 12 HOURS
Refills: 0 | Status: DISCONTINUED | OUTPATIENT
Start: 2022-01-01 | End: 2022-01-01

## 2022-01-01 RX ORDER — GABAPENTIN 400 MG/1
100 CAPSULE ORAL
Refills: 0 | Status: DISCONTINUED | OUTPATIENT
Start: 2022-01-01 | End: 2022-01-01

## 2022-01-01 RX ORDER — MORPHINE SULFATE 50 MG/1
2 CAPSULE, EXTENDED RELEASE ORAL
Refills: 0 | Status: DISCONTINUED | OUTPATIENT
Start: 2022-01-01 | End: 2022-01-01

## 2022-01-01 RX ORDER — INSULIN HUMAN 100 [IU]/ML
0 INJECTION, SOLUTION SUBCUTANEOUS
Qty: 0 | Refills: 0 | DISCHARGE

## 2022-01-01 RX ORDER — NOREPINEPHRINE BITARTRATE/D5W 8 MG/250ML
0.05 PLASTIC BAG, INJECTION (ML) INTRAVENOUS
Qty: 8 | Refills: 0 | Status: DISCONTINUED | OUTPATIENT
Start: 2022-01-01 | End: 2022-01-01

## 2022-01-01 RX ORDER — POLYETHYLENE GLYCOL 3350 17 G/17G
17 POWDER, FOR SOLUTION ORAL AT BEDTIME
Refills: 0 | Status: DISCONTINUED | OUTPATIENT
Start: 2022-01-01 | End: 2022-01-01

## 2022-01-01 RX ORDER — MIDODRINE HYDROCHLORIDE 2.5 MG/1
10 TABLET ORAL EVERY 8 HOURS
Refills: 0 | Status: DISCONTINUED | OUTPATIENT
Start: 2022-01-01 | End: 2022-01-01

## 2022-01-01 RX ORDER — FUROSEMIDE 40 MG
40 TABLET ORAL ONCE
Refills: 0 | Status: COMPLETED | OUTPATIENT
Start: 2022-01-01 | End: 2022-01-01

## 2022-01-01 RX ORDER — POTASSIUM CHLORIDE 20 MEQ
40 PACKET (EA) ORAL ONCE
Refills: 0 | Status: COMPLETED | OUTPATIENT
Start: 2022-01-01 | End: 2022-01-01

## 2022-01-01 RX ORDER — MORPHINE SULFATE 50 MG/1
1 CAPSULE, EXTENDED RELEASE ORAL
Qty: 100 | Refills: 0 | Status: DISCONTINUED | OUTPATIENT
Start: 2022-01-01 | End: 2022-01-01

## 2022-01-01 RX ORDER — HYDROCORTISONE 20 MG
50 TABLET ORAL EVERY 6 HOURS
Refills: 0 | Status: DISCONTINUED | OUTPATIENT
Start: 2022-01-01 | End: 2022-01-01

## 2022-01-01 RX ORDER — DILTIAZEM HCL 120 MG
10 CAPSULE, EXT RELEASE 24 HR ORAL ONCE
Refills: 0 | Status: COMPLETED | OUTPATIENT
Start: 2022-01-01 | End: 2022-01-01

## 2022-01-01 RX ORDER — PROPOFOL 10 MG/ML
10 INJECTION, EMULSION INTRAVENOUS
Qty: 1000 | Refills: 0 | Status: DISCONTINUED | OUTPATIENT
Start: 2022-01-01 | End: 2022-01-01

## 2022-01-01 RX ORDER — PANTOPRAZOLE SODIUM 20 MG/1
40 TABLET, DELAYED RELEASE ORAL ONCE
Refills: 0 | Status: COMPLETED | OUTPATIENT
Start: 2022-01-01 | End: 2022-01-01

## 2022-01-01 RX ADMIN — CHLORHEXIDINE GLUCONATE 15 MILLILITER(S): 213 SOLUTION TOPICAL at 09:21

## 2022-01-01 RX ADMIN — PIPERACILLIN AND TAZOBACTAM 25 GRAM(S): 4; .5 INJECTION, POWDER, LYOPHILIZED, FOR SOLUTION INTRAVENOUS at 05:50

## 2022-01-01 RX ADMIN — PIPERACILLIN AND TAZOBACTAM 25 GRAM(S): 4; .5 INJECTION, POWDER, LYOPHILIZED, FOR SOLUTION INTRAVENOUS at 05:19

## 2022-01-01 RX ADMIN — HEPARIN SODIUM 5000 UNIT(S): 5000 INJECTION INTRAVENOUS; SUBCUTANEOUS at 22:57

## 2022-01-01 RX ADMIN — Medication 1: at 12:46

## 2022-01-01 RX ADMIN — PIPERACILLIN AND TAZOBACTAM 25 GRAM(S): 4; .5 INJECTION, POWDER, LYOPHILIZED, FOR SOLUTION INTRAVENOUS at 06:02

## 2022-01-01 RX ADMIN — Medication 650 MILLIGRAM(S): at 19:39

## 2022-01-01 RX ADMIN — Medication 3: at 23:51

## 2022-01-01 RX ADMIN — MIDODRINE HYDROCHLORIDE 5 MILLIGRAM(S): 2.5 TABLET ORAL at 14:20

## 2022-01-01 RX ADMIN — Medication 1 MILLIGRAM(S): at 21:11

## 2022-01-01 RX ADMIN — Medication 500 MILLIGRAM(S): at 21:05

## 2022-01-01 RX ADMIN — Medication 500 MILLIGRAM(S): at 21:39

## 2022-01-01 RX ADMIN — SODIUM CHLORIDE 125 MILLILITER(S): 9 INJECTION, SOLUTION INTRAVENOUS at 06:52

## 2022-01-01 RX ADMIN — Medication 1 TABLET(S): at 10:19

## 2022-01-01 RX ADMIN — HEPARIN SODIUM 5000 UNIT(S): 5000 INJECTION INTRAVENOUS; SUBCUTANEOUS at 22:13

## 2022-01-01 RX ADMIN — Medication 500 MILLIGRAM(S): at 09:20

## 2022-01-01 RX ADMIN — Medication 100 MILLIGRAM(S): at 10:18

## 2022-01-01 RX ADMIN — Medication 100 MILLIGRAM(S): at 10:42

## 2022-01-01 RX ADMIN — SODIUM CHLORIDE 75 MILLILITER(S): 9 INJECTION, SOLUTION INTRAVENOUS at 15:35

## 2022-01-01 RX ADMIN — Medication 100 MILLIEQUIVALENT(S): at 08:48

## 2022-01-01 RX ADMIN — PIPERACILLIN AND TAZOBACTAM 25 GRAM(S): 4; .5 INJECTION, POWDER, LYOPHILIZED, FOR SOLUTION INTRAVENOUS at 21:19

## 2022-01-01 RX ADMIN — PROPOFOL 3.4 MICROGRAM(S)/KG/MIN: 10 INJECTION, EMULSION INTRAVENOUS at 07:51

## 2022-01-01 RX ADMIN — MIDODRINE HYDROCHLORIDE 10 MILLIGRAM(S): 2.5 TABLET ORAL at 21:09

## 2022-01-01 RX ADMIN — CHLORHEXIDINE GLUCONATE 15 MILLILITER(S): 213 SOLUTION TOPICAL at 09:13

## 2022-01-01 RX ADMIN — Medication 1: at 12:15

## 2022-01-01 RX ADMIN — Medication 5 MILLIGRAM(S): at 14:13

## 2022-01-01 RX ADMIN — Medication 1 MILLIGRAM(S): at 21:39

## 2022-01-01 RX ADMIN — MIDODRINE HYDROCHLORIDE 5 MILLIGRAM(S): 2.5 TABLET ORAL at 05:19

## 2022-01-01 RX ADMIN — Medication 12.5 MILLIGRAM(S): at 10:18

## 2022-01-01 RX ADMIN — SODIUM CHLORIDE 125 MILLILITER(S): 9 INJECTION, SOLUTION INTRAVENOUS at 18:48

## 2022-01-01 RX ADMIN — HEPARIN SODIUM 5000 UNIT(S): 5000 INJECTION INTRAVENOUS; SUBCUTANEOUS at 05:07

## 2022-01-01 RX ADMIN — FENTANYL CITRATE 50 MICROGRAM(S): 50 INJECTION INTRAVENOUS at 11:10

## 2022-01-01 RX ADMIN — CEFEPIME 1000 MILLIGRAM(S): 1 INJECTION, POWDER, FOR SOLUTION INTRAMUSCULAR; INTRAVENOUS at 05:06

## 2022-01-01 RX ADMIN — PIPERACILLIN AND TAZOBACTAM 25 GRAM(S): 4; .5 INJECTION, POWDER, LYOPHILIZED, FOR SOLUTION INTRAVENOUS at 13:49

## 2022-01-01 RX ADMIN — Medication 50 MILLIGRAM(S): at 21:53

## 2022-01-01 RX ADMIN — HEPARIN SODIUM 5000 UNIT(S): 5000 INJECTION INTRAVENOUS; SUBCUTANEOUS at 21:14

## 2022-01-01 RX ADMIN — Medication 100 MILLIEQUIVALENT(S): at 10:45

## 2022-01-01 RX ADMIN — MORPHINE SULFATE 2 MILLIGRAM(S): 50 CAPSULE, EXTENDED RELEASE ORAL at 11:11

## 2022-01-01 RX ADMIN — Medication 2: at 05:05

## 2022-01-01 RX ADMIN — GABAPENTIN 100 MILLIGRAM(S): 400 CAPSULE ORAL at 21:52

## 2022-01-01 RX ADMIN — Medication 1: at 23:39

## 2022-01-01 RX ADMIN — HEPARIN SODIUM 5000 UNIT(S): 5000 INJECTION INTRAVENOUS; SUBCUTANEOUS at 10:19

## 2022-01-01 RX ADMIN — PANTOPRAZOLE SODIUM 40 MILLIGRAM(S): 20 TABLET, DELAYED RELEASE ORAL at 22:30

## 2022-01-01 RX ADMIN — ZINC SULFATE TAB 220 MG (50 MG ZINC EQUIVALENT) 220 MILLIGRAM(S): 220 (50 ZN) TAB at 10:19

## 2022-01-01 RX ADMIN — TAMSULOSIN HYDROCHLORIDE 0.4 MILLIGRAM(S): 0.4 CAPSULE ORAL at 22:13

## 2022-01-01 RX ADMIN — ZINC SULFATE TAB 220 MG (50 MG ZINC EQUIVALENT) 220 MILLIGRAM(S): 220 (50 ZN) TAB at 10:03

## 2022-01-01 RX ADMIN — ZINC SULFATE TAB 220 MG (50 MG ZINC EQUIVALENT) 220 MILLIGRAM(S): 220 (50 ZN) TAB at 10:21

## 2022-01-01 RX ADMIN — Medication 37.2 MICROGRAM(S)/KG/MIN: at 15:10

## 2022-01-01 RX ADMIN — Medication 1 TABLET(S): at 09:14

## 2022-01-01 RX ADMIN — Medication 100 MILLIGRAM(S): at 10:20

## 2022-01-01 RX ADMIN — FINASTERIDE 5 MILLIGRAM(S): 5 TABLET, FILM COATED ORAL at 09:12

## 2022-01-01 RX ADMIN — SODIUM CHLORIDE 1500 MILLILITER(S): 9 INJECTION, SOLUTION INTRAVENOUS at 00:18

## 2022-01-01 RX ADMIN — ZINC SULFATE TAB 220 MG (50 MG ZINC EQUIVALENT) 220 MILLIGRAM(S): 220 (50 ZN) TAB at 09:42

## 2022-01-01 RX ADMIN — PIPERACILLIN AND TAZOBACTAM 25 GRAM(S): 4; .5 INJECTION, POWDER, LYOPHILIZED, FOR SOLUTION INTRAVENOUS at 22:30

## 2022-01-01 RX ADMIN — Medication 100 MILLIGRAM(S): at 10:02

## 2022-01-01 RX ADMIN — SODIUM CHLORIDE 500 MILLILITER(S): 9 INJECTION, SOLUTION INTRAVENOUS at 17:51

## 2022-01-01 RX ADMIN — Medication 20 MILLIGRAM(S): at 11:05

## 2022-01-01 RX ADMIN — Medication 650 MILLIGRAM(S): at 21:00

## 2022-01-01 RX ADMIN — HEPARIN SODIUM 5000 UNIT(S): 5000 INJECTION INTRAVENOUS; SUBCUTANEOUS at 21:10

## 2022-01-01 RX ADMIN — PANTOPRAZOLE SODIUM 40 MILLIGRAM(S): 20 TABLET, DELAYED RELEASE ORAL at 09:12

## 2022-01-01 RX ADMIN — SODIUM CHLORIDE 2000 MILLILITER(S): 9 INJECTION INTRAMUSCULAR; INTRAVENOUS; SUBCUTANEOUS at 14:26

## 2022-01-01 RX ADMIN — ENOXAPARIN SODIUM 50 MILLIGRAM(S): 100 INJECTION SUBCUTANEOUS at 21:44

## 2022-01-01 RX ADMIN — PROPOFOL 3.4 MICROGRAM(S)/KG/MIN: 10 INJECTION, EMULSION INTRAVENOUS at 07:01

## 2022-01-01 RX ADMIN — MIDODRINE HYDROCHLORIDE 10 MILLIGRAM(S): 2.5 TABLET ORAL at 05:57

## 2022-01-01 RX ADMIN — Medication 650 MILLIGRAM(S): at 00:35

## 2022-01-01 RX ADMIN — HEPARIN SODIUM 5000 UNIT(S): 5000 INJECTION INTRAVENOUS; SUBCUTANEOUS at 21:05

## 2022-01-01 RX ADMIN — POTASSIUM PHOSPHATE, MONOBASIC POTASSIUM PHOSPHATE, DIBASIC 83.33 MILLIMOLE(S): 236; 224 INJECTION, SOLUTION INTRAVENOUS at 11:44

## 2022-01-01 RX ADMIN — Medication 650 MILLIGRAM(S): at 05:40

## 2022-01-01 RX ADMIN — Medication 10.6 MICROGRAM(S)/KG/MIN: at 05:45

## 2022-01-01 RX ADMIN — Medication 1: at 15:19

## 2022-01-01 RX ADMIN — Medication 100 MILLIGRAM(S): at 23:16

## 2022-01-01 RX ADMIN — PIPERACILLIN AND TAZOBACTAM 25 GRAM(S): 4; .5 INJECTION, POWDER, LYOPHILIZED, FOR SOLUTION INTRAVENOUS at 13:12

## 2022-01-01 RX ADMIN — CHLORHEXIDINE GLUCONATE 15 MILLILITER(S): 213 SOLUTION TOPICAL at 21:39

## 2022-01-01 RX ADMIN — Medication 37.2 MICROGRAM(S)/KG/MIN: at 08:37

## 2022-01-01 RX ADMIN — Medication 25 GRAM(S): at 09:51

## 2022-01-01 RX ADMIN — Medication 1 TABLET(S): at 10:18

## 2022-01-01 RX ADMIN — Medication 500 MILLIGRAM(S): at 10:20

## 2022-01-01 RX ADMIN — HEPARIN SODIUM 5000 UNIT(S): 5000 INJECTION INTRAVENOUS; SUBCUTANEOUS at 09:45

## 2022-01-01 RX ADMIN — Medication 150 MILLIGRAM(S): at 14:37

## 2022-01-01 RX ADMIN — MIDODRINE HYDROCHLORIDE 10 MILLIGRAM(S): 2.5 TABLET ORAL at 13:11

## 2022-01-01 RX ADMIN — Medication 500 MILLIGRAM(S): at 10:02

## 2022-01-01 RX ADMIN — Medication 1 TABLET(S): at 09:12

## 2022-01-01 RX ADMIN — MORPHINE SULFATE 2 MILLIGRAM(S): 50 CAPSULE, EXTENDED RELEASE ORAL at 10:41

## 2022-01-01 RX ADMIN — PROPOFOL 3.4 MICROGRAM(S)/KG/MIN: 10 INJECTION, EMULSION INTRAVENOUS at 21:42

## 2022-01-01 RX ADMIN — HEPARIN SODIUM 5000 UNIT(S): 5000 INJECTION INTRAVENOUS; SUBCUTANEOUS at 09:51

## 2022-01-01 RX ADMIN — SODIUM CHLORIDE 2000 MILLILITER(S): 9 INJECTION, SOLUTION INTRAVENOUS at 21:45

## 2022-01-01 RX ADMIN — Medication 100 MILLIEQUIVALENT(S): at 14:39

## 2022-01-01 RX ADMIN — Medication 500 MILLIGRAM(S): at 21:53

## 2022-01-01 RX ADMIN — PIPERACILLIN AND TAZOBACTAM 25 GRAM(S): 4; .5 INJECTION, POWDER, LYOPHILIZED, FOR SOLUTION INTRAVENOUS at 21:24

## 2022-01-01 RX ADMIN — Medication 2: at 23:38

## 2022-01-01 RX ADMIN — Medication 25 MILLIGRAM(S): at 09:14

## 2022-01-01 RX ADMIN — HEPARIN SODIUM 5000 UNIT(S): 5000 INJECTION INTRAVENOUS; SUBCUTANEOUS at 09:42

## 2022-01-01 RX ADMIN — Medication 100 MILLIGRAM(S): at 09:21

## 2022-01-01 RX ADMIN — Medication 37.2 MICROGRAM(S)/KG/MIN: at 22:59

## 2022-01-01 RX ADMIN — MORPHINE SULFATE 1 MILLIGRAM(S): 50 CAPSULE, EXTENDED RELEASE ORAL at 11:25

## 2022-01-01 RX ADMIN — Medication 5 MILLIGRAM(S): at 18:22

## 2022-01-01 RX ADMIN — Medication 50 MILLIGRAM(S): at 10:21

## 2022-01-01 RX ADMIN — POLYETHYLENE GLYCOL 3350 17 GRAM(S): 17 POWDER, FOR SOLUTION ORAL at 22:13

## 2022-01-01 RX ADMIN — Medication 1 TABLET(S): at 23:01

## 2022-01-01 RX ADMIN — Medication 650 MILLIGRAM(S): at 23:00

## 2022-01-01 RX ADMIN — FENTANYL CITRATE 50 MICROGRAM(S): 50 INJECTION INTRAVENOUS at 23:08

## 2022-01-01 RX ADMIN — Medication 100 MILLIEQUIVALENT(S): at 23:53

## 2022-01-01 RX ADMIN — FENTANYL CITRATE 50 MICROGRAM(S): 50 INJECTION INTRAVENOUS at 11:25

## 2022-01-01 RX ADMIN — Medication 3: at 11:11

## 2022-01-01 RX ADMIN — Medication 100 MILLIEQUIVALENT(S): at 13:34

## 2022-01-01 RX ADMIN — Medication 100 MILLIGRAM(S): at 09:42

## 2022-01-01 RX ADMIN — SODIUM CHLORIDE 2000 MILLILITER(S): 9 INJECTION INTRAMUSCULAR; INTRAVENOUS; SUBCUTANEOUS at 15:13

## 2022-01-01 RX ADMIN — Medication 1 TABLET(S): at 09:20

## 2022-01-01 RX ADMIN — GABAPENTIN 100 MILLIGRAM(S): 400 CAPSULE ORAL at 21:10

## 2022-01-01 RX ADMIN — Medication 100 MILLIGRAM(S): at 09:15

## 2022-01-01 RX ADMIN — Medication 40 MILLIGRAM(S): at 10:59

## 2022-01-01 RX ADMIN — PIPERACILLIN AND TAZOBACTAM 25 GRAM(S): 4; .5 INJECTION, POWDER, LYOPHILIZED, FOR SOLUTION INTRAVENOUS at 21:08

## 2022-01-01 RX ADMIN — Medication 40 MILLIEQUIVALENT(S): at 10:20

## 2022-01-01 RX ADMIN — PIPERACILLIN AND TAZOBACTAM 25 GRAM(S): 4; .5 INJECTION, POWDER, LYOPHILIZED, FOR SOLUTION INTRAVENOUS at 21:05

## 2022-01-01 RX ADMIN — Medication 100 GRAM(S): at 10:22

## 2022-01-01 RX ADMIN — PIPERACILLIN AND TAZOBACTAM 25 GRAM(S): 4; .5 INJECTION, POWDER, LYOPHILIZED, FOR SOLUTION INTRAVENOUS at 22:43

## 2022-01-01 RX ADMIN — Medication 2: at 17:29

## 2022-01-01 RX ADMIN — Medication 500 MILLIGRAM(S): at 09:41

## 2022-01-01 RX ADMIN — SENNA PLUS 2 TABLET(S): 8.6 TABLET ORAL at 21:10

## 2022-01-01 RX ADMIN — FINASTERIDE 5 MILLIGRAM(S): 5 TABLET, FILM COATED ORAL at 10:18

## 2022-01-01 RX ADMIN — Medication 50 MILLIGRAM(S): at 09:42

## 2022-01-01 RX ADMIN — SODIUM CHLORIDE 50 MILLILITER(S): 9 INJECTION, SOLUTION INTRAVENOUS at 12:16

## 2022-01-01 RX ADMIN — PIPERACILLIN AND TAZOBACTAM 200 GRAM(S): 4; .5 INJECTION, POWDER, LYOPHILIZED, FOR SOLUTION INTRAVENOUS at 14:26

## 2022-01-01 RX ADMIN — PIPERACILLIN AND TAZOBACTAM 25 GRAM(S): 4; .5 INJECTION, POWDER, LYOPHILIZED, FOR SOLUTION INTRAVENOUS at 06:20

## 2022-01-01 RX ADMIN — FENTANYL CITRATE 50 MICROGRAM(S): 50 INJECTION INTRAVENOUS at 21:42

## 2022-01-01 RX ADMIN — HEPARIN SODIUM 5000 UNIT(S): 5000 INJECTION INTRAVENOUS; SUBCUTANEOUS at 09:15

## 2022-01-01 RX ADMIN — MORPHINE SULFATE 2 MILLIGRAM(S): 50 CAPSULE, EXTENDED RELEASE ORAL at 23:01

## 2022-01-01 RX ADMIN — Medication 2.5 MILLIGRAM(S): at 13:36

## 2022-01-01 RX ADMIN — Medication 650 MILLIGRAM(S): at 06:30

## 2022-01-01 RX ADMIN — SODIUM CHLORIDE 1000 MILLILITER(S): 9 INJECTION, SOLUTION INTRAVENOUS at 02:42

## 2022-01-01 RX ADMIN — ZINC SULFATE TAB 220 MG (50 MG ZINC EQUIVALENT) 220 MILLIGRAM(S): 220 (50 ZN) TAB at 09:13

## 2022-01-01 RX ADMIN — PIPERACILLIN AND TAZOBACTAM 25 GRAM(S): 4; .5 INJECTION, POWDER, LYOPHILIZED, FOR SOLUTION INTRAVENOUS at 13:47

## 2022-01-01 RX ADMIN — CEFEPIME 1000 MILLIGRAM(S): 1 INJECTION, POWDER, FOR SOLUTION INTRAMUSCULAR; INTRAVENOUS at 05:08

## 2022-01-01 RX ADMIN — SODIUM CHLORIDE 125 MILLILITER(S): 9 INJECTION, SOLUTION INTRAVENOUS at 16:56

## 2022-01-01 RX ADMIN — PANTOPRAZOLE SODIUM 40 MILLIGRAM(S): 20 TABLET, DELAYED RELEASE ORAL at 14:26

## 2022-01-01 RX ADMIN — HEPARIN SODIUM 5000 UNIT(S): 5000 INJECTION INTRAVENOUS; SUBCUTANEOUS at 09:46

## 2022-01-01 RX ADMIN — HEPARIN SODIUM 5000 UNIT(S): 5000 INJECTION INTRAVENOUS; SUBCUTANEOUS at 23:52

## 2022-01-01 RX ADMIN — PIPERACILLIN AND TAZOBACTAM 25 GRAM(S): 4; .5 INJECTION, POWDER, LYOPHILIZED, FOR SOLUTION INTRAVENOUS at 22:28

## 2022-01-01 RX ADMIN — HEPARIN SODIUM 5000 UNIT(S): 5000 INJECTION INTRAVENOUS; SUBCUTANEOUS at 12:13

## 2022-01-01 RX ADMIN — MIDAZOLAM HYDROCHLORIDE 1 MILLIGRAM(S): 1 INJECTION, SOLUTION INTRAMUSCULAR; INTRAVENOUS at 15:35

## 2022-01-01 RX ADMIN — CHLORHEXIDINE GLUCONATE 1 APPLICATION(S): 213 SOLUTION TOPICAL at 05:08

## 2022-01-01 RX ADMIN — Medication 4: at 08:48

## 2022-01-01 RX ADMIN — Medication 40 MILLIEQUIVALENT(S): at 13:55

## 2022-01-01 RX ADMIN — Medication 500 MILLIGRAM(S): at 10:42

## 2022-01-01 RX ADMIN — Medication 5 MILLIGRAM(S): at 00:04

## 2022-01-01 RX ADMIN — Medication 25 MILLIGRAM(S): at 21:11

## 2022-01-01 RX ADMIN — Medication 37.2 MICROGRAM(S)/KG/MIN: at 08:10

## 2022-01-01 RX ADMIN — Medication 100 MILLIEQUIVALENT(S): at 12:24

## 2022-01-01 RX ADMIN — Medication 40 MILLIEQUIVALENT(S): at 09:42

## 2022-01-01 RX ADMIN — FINASTERIDE 5 MILLIGRAM(S): 5 TABLET, FILM COATED ORAL at 10:20

## 2022-01-01 RX ADMIN — Medication 50 MILLIGRAM(S): at 22:12

## 2022-01-01 RX ADMIN — Medication 100 MILLIEQUIVALENT(S): at 09:43

## 2022-01-01 RX ADMIN — MIDODRINE HYDROCHLORIDE 10 MILLIGRAM(S): 2.5 TABLET ORAL at 05:20

## 2022-01-01 RX ADMIN — VASOPRESSIN 2.4 UNIT(S)/MIN: 20 INJECTION INTRAVENOUS at 00:45

## 2022-01-01 RX ADMIN — Medication 100 MILLIEQUIVALENT(S): at 12:12

## 2022-01-01 RX ADMIN — Medication 1 MILLIGRAM(S): at 21:05

## 2022-01-01 RX ADMIN — Medication 50 MILLIGRAM(S): at 05:07

## 2022-01-01 RX ADMIN — Medication 3: at 17:04

## 2022-01-01 RX ADMIN — Medication 2.5 MILLIGRAM(S): at 14:26

## 2022-01-01 RX ADMIN — Medication 5 MG/HR: at 10:43

## 2022-01-01 RX ADMIN — Medication 100 MILLIEQUIVALENT(S): at 11:28

## 2022-01-01 RX ADMIN — HEPARIN SODIUM 5000 UNIT(S): 5000 INJECTION INTRAVENOUS; SUBCUTANEOUS at 21:39

## 2022-01-01 RX ADMIN — Medication 100 MILLIEQUIVALENT(S): at 22:42

## 2022-01-01 RX ADMIN — Medication 25 GRAM(S): at 22:33

## 2022-01-01 RX ADMIN — CHLORHEXIDINE GLUCONATE 15 MILLILITER(S): 213 SOLUTION TOPICAL at 21:45

## 2022-01-01 RX ADMIN — Medication 1: at 06:52

## 2022-01-01 RX ADMIN — Medication 1: at 05:49

## 2022-01-01 RX ADMIN — Medication 2: at 12:10

## 2022-01-01 RX ADMIN — Medication 10.6 MICROGRAM(S)/KG/MIN: at 16:08

## 2022-01-01 RX ADMIN — Medication 100 MILLIGRAM(S): at 09:13

## 2022-01-01 RX ADMIN — PIPERACILLIN AND TAZOBACTAM 25 GRAM(S): 4; .5 INJECTION, POWDER, LYOPHILIZED, FOR SOLUTION INTRAVENOUS at 06:06

## 2022-01-01 RX ADMIN — Medication 50 MILLIGRAM(S): at 23:36

## 2022-01-01 RX ADMIN — SODIUM CHLORIDE 100 MILLILITER(S): 9 INJECTION, SOLUTION INTRAVENOUS at 16:59

## 2022-01-01 RX ADMIN — POTASSIUM PHOSPHATE, MONOBASIC POTASSIUM PHOSPHATE, DIBASIC 83.33 MILLIMOLE(S): 236; 224 INJECTION, SOLUTION INTRAVENOUS at 09:41

## 2022-01-01 RX ADMIN — Medication 5.32 MICROGRAM(S)/KG/MIN: at 18:03

## 2022-01-01 RX ADMIN — GABAPENTIN 100 MILLIGRAM(S): 400 CAPSULE ORAL at 09:42

## 2022-01-01 RX ADMIN — Medication 25 MILLIGRAM(S): at 21:05

## 2022-01-01 RX ADMIN — HEPARIN SODIUM 5000 UNIT(S): 5000 INJECTION INTRAVENOUS; SUBCUTANEOUS at 13:04

## 2022-01-01 RX ADMIN — PIPERACILLIN AND TAZOBACTAM 25 GRAM(S): 4; .5 INJECTION, POWDER, LYOPHILIZED, FOR SOLUTION INTRAVENOUS at 14:13

## 2022-01-01 RX ADMIN — PIPERACILLIN AND TAZOBACTAM 25 GRAM(S): 4; .5 INJECTION, POWDER, LYOPHILIZED, FOR SOLUTION INTRAVENOUS at 05:20

## 2022-01-01 RX ADMIN — Medication 500 MILLIGRAM(S): at 21:43

## 2022-01-01 RX ADMIN — MORPHINE SULFATE 2 MILLIGRAM(S): 50 CAPSULE, EXTENDED RELEASE ORAL at 14:04

## 2022-01-01 RX ADMIN — HEPARIN SODIUM 5000 UNIT(S): 5000 INJECTION INTRAVENOUS; SUBCUTANEOUS at 10:21

## 2022-01-01 RX ADMIN — Medication 50 MILLILITER(S): at 16:52

## 2022-01-01 RX ADMIN — Medication 25 MILLIGRAM(S): at 10:02

## 2022-01-01 RX ADMIN — PROPOFOL 3.4 MICROGRAM(S)/KG/MIN: 10 INJECTION, EMULSION INTRAVENOUS at 12:43

## 2022-01-01 RX ADMIN — VASOPRESSIN 2.4 UNIT(S)/MIN: 20 INJECTION INTRAVENOUS at 12:49

## 2022-01-01 RX ADMIN — Medication 500 MILLIGRAM(S): at 22:12

## 2022-01-01 RX ADMIN — ENOXAPARIN SODIUM 50 MILLIGRAM(S): 100 INJECTION SUBCUTANEOUS at 10:43

## 2022-01-01 RX ADMIN — MIDODRINE HYDROCHLORIDE 5 MILLIGRAM(S): 2.5 TABLET ORAL at 21:53

## 2022-01-01 RX ADMIN — CHLORHEXIDINE GLUCONATE 1 APPLICATION(S): 213 SOLUTION TOPICAL at 06:53

## 2022-01-01 RX ADMIN — Medication 100 MILLIEQUIVALENT(S): at 13:36

## 2022-01-01 RX ADMIN — Medication 500 MILLIGRAM(S): at 21:10

## 2022-01-01 RX ADMIN — SODIUM CHLORIDE 1000 MILLILITER(S): 9 INJECTION, SOLUTION INTRAVENOUS at 20:00

## 2022-01-01 RX ADMIN — Medication 10 MILLIGRAM(S): at 22:37

## 2022-01-01 RX ADMIN — Medication 50 MILLIGRAM(S): at 11:10

## 2022-01-01 RX ADMIN — Medication 500 MILLIGRAM(S): at 09:15

## 2022-01-01 RX ADMIN — Medication 1: at 17:32

## 2022-01-01 RX ADMIN — GABAPENTIN 100 MILLIGRAM(S): 400 CAPSULE ORAL at 22:12

## 2022-01-01 RX ADMIN — CHLORHEXIDINE GLUCONATE 1 APPLICATION(S): 213 SOLUTION TOPICAL at 05:06

## 2022-01-01 RX ADMIN — FINASTERIDE 5 MILLIGRAM(S): 5 TABLET, FILM COATED ORAL at 09:15

## 2022-01-01 RX ADMIN — FINASTERIDE 5 MILLIGRAM(S): 5 TABLET, FILM COATED ORAL at 09:20

## 2022-01-01 RX ADMIN — PIPERACILLIN AND TAZOBACTAM 25 GRAM(S): 4; .5 INJECTION, POWDER, LYOPHILIZED, FOR SOLUTION INTRAVENOUS at 05:21

## 2022-01-01 RX ADMIN — DEXTROSE MONOHYDRATE, SODIUM CHLORIDE, AND POTASSIUM CHLORIDE 70 MILLILITER(S): 50; .745; 4.5 INJECTION, SOLUTION INTRAVENOUS at 11:28

## 2022-01-01 RX ADMIN — PIPERACILLIN AND TAZOBACTAM 25 GRAM(S): 4; .5 INJECTION, POWDER, LYOPHILIZED, FOR SOLUTION INTRAVENOUS at 13:40

## 2022-01-01 RX ADMIN — PIPERACILLIN AND TAZOBACTAM 25 GRAM(S): 4; .5 INJECTION, POWDER, LYOPHILIZED, FOR SOLUTION INTRAVENOUS at 05:57

## 2022-01-01 RX ADMIN — Medication 50 MILLIGRAM(S): at 17:04

## 2022-01-01 RX ADMIN — MIDODRINE HYDROCHLORIDE 5 MILLIGRAM(S): 2.5 TABLET ORAL at 22:12

## 2022-01-01 RX ADMIN — Medication 5 MG/HR: at 12:16

## 2022-01-01 RX ADMIN — Medication 25 MILLIGRAM(S): at 17:02

## 2022-01-01 RX ADMIN — Medication 4: at 17:04

## 2022-01-01 RX ADMIN — GABAPENTIN 100 MILLIGRAM(S): 400 CAPSULE ORAL at 10:21

## 2022-01-01 RX ADMIN — Medication 133 MILLILITER(S): at 10:53

## 2022-01-01 RX ADMIN — CHLORHEXIDINE GLUCONATE 1 APPLICATION(S): 213 SOLUTION TOPICAL at 06:29

## 2022-01-01 RX ADMIN — SODIUM CHLORIDE 100 MILLILITER(S): 9 INJECTION, SOLUTION INTRAVENOUS at 07:51

## 2022-01-01 RX ADMIN — Medication 100 MILLIEQUIVALENT(S): at 07:42

## 2022-01-01 RX ADMIN — Medication 2 MILLIGRAM(S): at 14:04

## 2022-01-01 RX ADMIN — SODIUM CHLORIDE 1000 MILLILITER(S): 9 INJECTION, SOLUTION INTRAVENOUS at 13:29

## 2022-01-01 RX ADMIN — Medication 500 MILLIGRAM(S): at 10:18

## 2022-01-01 RX ADMIN — SODIUM CHLORIDE 50 MILLILITER(S): 9 INJECTION, SOLUTION INTRAVENOUS at 09:12

## 2022-01-01 RX ADMIN — PIPERACILLIN AND TAZOBACTAM 25 GRAM(S): 4; .5 INJECTION, POWDER, LYOPHILIZED, FOR SOLUTION INTRAVENOUS at 14:20

## 2022-01-01 RX ADMIN — Medication 5 MILLIGRAM(S): at 06:20

## 2022-01-01 RX ADMIN — Medication 50 MILLIGRAM(S): at 21:10

## 2022-01-01 RX ADMIN — CHLORHEXIDINE GLUCONATE 1 APPLICATION(S): 213 SOLUTION TOPICAL at 22:32

## 2022-01-01 RX ADMIN — Medication 50 MILLIGRAM(S): at 10:03

## 2022-01-01 RX ADMIN — HEPARIN SODIUM 5000 UNIT(S): 5000 INJECTION INTRAVENOUS; SUBCUTANEOUS at 21:27

## 2022-01-01 RX ADMIN — Medication 25 GRAM(S): at 09:41

## 2022-01-01 RX ADMIN — Medication 5.32 MICROGRAM(S)/KG/MIN: at 01:06

## 2022-01-01 RX ADMIN — Medication 10 MILLIGRAM(S): at 21:40

## 2022-01-01 RX ADMIN — Medication 1 TABLET(S): at 09:41

## 2022-01-01 RX ADMIN — POLYETHYLENE GLYCOL 3350 17 GRAM(S): 17 POWDER, FOR SOLUTION ORAL at 21:09

## 2022-01-01 RX ADMIN — CHLORHEXIDINE GLUCONATE 1 APPLICATION(S): 213 SOLUTION TOPICAL at 05:20

## 2022-01-01 RX ADMIN — Medication 1 TABLET(S): at 10:42

## 2022-01-01 RX ADMIN — Medication 50 MILLIGRAM(S): at 23:52

## 2022-01-01 RX ADMIN — Medication 5 MG/HR: at 06:24

## 2022-01-01 RX ADMIN — PIPERACILLIN AND TAZOBACTAM 25 GRAM(S): 4; .5 INJECTION, POWDER, LYOPHILIZED, FOR SOLUTION INTRAVENOUS at 14:33

## 2022-01-01 RX ADMIN — PIPERACILLIN AND TAZOBACTAM 25 GRAM(S): 4; .5 INJECTION, POWDER, LYOPHILIZED, FOR SOLUTION INTRAVENOUS at 22:11

## 2022-01-01 RX ADMIN — MIDODRINE HYDROCHLORIDE 5 MILLIGRAM(S): 2.5 TABLET ORAL at 13:55

## 2022-01-01 RX ADMIN — FENTANYL CITRATE 50 MICROGRAM(S): 50 INJECTION INTRAVENOUS at 17:44

## 2022-01-01 RX ADMIN — FINASTERIDE 5 MILLIGRAM(S): 5 TABLET, FILM COATED ORAL at 10:41

## 2022-01-01 RX ADMIN — Medication 1 TABLET(S): at 10:02

## 2022-01-01 RX ADMIN — Medication 100 MILLIEQUIVALENT(S): at 07:44

## 2022-01-01 RX ADMIN — Medication 100 MILLIEQUIVALENT(S): at 08:46

## 2022-01-01 RX ADMIN — PANTOPRAZOLE SODIUM 40 MILLIGRAM(S): 20 TABLET, DELAYED RELEASE ORAL at 09:20

## 2022-01-01 RX ADMIN — FENTANYL CITRATE 50 MICROGRAM(S): 50 INJECTION INTRAVENOUS at 21:43

## 2022-01-01 RX ADMIN — PIPERACILLIN AND TAZOBACTAM 25 GRAM(S): 4; .5 INJECTION, POWDER, LYOPHILIZED, FOR SOLUTION INTRAVENOUS at 14:34

## 2022-01-01 RX ADMIN — ZINC SULFATE TAB 220 MG (50 MG ZINC EQUIVALENT) 220 MILLIGRAM(S): 220 (50 ZN) TAB at 09:14

## 2022-01-01 RX ADMIN — PIPERACILLIN AND TAZOBACTAM 25 GRAM(S): 4; .5 INJECTION, POWDER, LYOPHILIZED, FOR SOLUTION INTRAVENOUS at 05:45

## 2022-01-01 RX ADMIN — PIPERACILLIN AND TAZOBACTAM 25 GRAM(S): 4; .5 INJECTION, POWDER, LYOPHILIZED, FOR SOLUTION INTRAVENOUS at 13:11

## 2022-01-01 RX ADMIN — ZINC SULFATE TAB 220 MG (50 MG ZINC EQUIVALENT) 220 MILLIGRAM(S): 220 (50 ZN) TAB at 22:12

## 2022-01-01 RX ADMIN — SODIUM CHLORIDE 1000 MILLILITER(S): 9 INJECTION, SOLUTION INTRAVENOUS at 16:29

## 2022-01-01 RX ADMIN — CEFEPIME 1000 MILLIGRAM(S): 1 INJECTION, POWDER, FOR SOLUTION INTRAMUSCULAR; INTRAVENOUS at 18:41

## 2022-01-01 RX ADMIN — PANTOPRAZOLE SODIUM 40 MILLIGRAM(S): 20 TABLET, DELAYED RELEASE ORAL at 09:50

## 2022-01-01 RX ADMIN — MORPHINE SULFATE 1 MG/HR: 50 CAPSULE, EXTENDED RELEASE ORAL at 14:03

## 2022-01-01 RX ADMIN — Medication 250 MILLIGRAM(S): at 22:29

## 2022-01-01 RX ADMIN — Medication 1: at 06:03

## 2022-01-01 RX ADMIN — Medication 50 MILLIGRAM(S): at 05:05

## 2022-01-01 RX ADMIN — Medication 10 MILLIGRAM(S): at 10:51

## 2022-01-01 RX ADMIN — Medication 20 MILLIEQUIVALENT(S): at 14:20

## 2022-01-01 RX ADMIN — CEFEPIME 1000 MILLIGRAM(S): 1 INJECTION, POWDER, FOR SOLUTION INTRAMUSCULAR; INTRAVENOUS at 17:04

## 2022-01-01 RX ADMIN — Medication 1: at 23:53

## 2022-01-01 RX ADMIN — SODIUM CHLORIDE 125 MILLILITER(S): 9 INJECTION, SOLUTION INTRAVENOUS at 22:55

## 2022-01-01 RX ADMIN — MORPHINE SULFATE 1 MILLIGRAM(S): 50 CAPSULE, EXTENDED RELEASE ORAL at 10:55

## 2022-01-01 RX ADMIN — MORPHINE SULFATE 2 MILLIGRAM(S): 50 CAPSULE, EXTENDED RELEASE ORAL at 23:56

## 2022-01-01 RX ADMIN — Medication 5 MG/HR: at 17:06

## 2022-01-01 RX ADMIN — GABAPENTIN 100 MILLIGRAM(S): 400 CAPSULE ORAL at 10:02

## 2022-01-01 RX ADMIN — PANTOPRAZOLE SODIUM 40 MILLIGRAM(S): 20 TABLET, DELAYED RELEASE ORAL at 21:44

## 2022-01-01 RX ADMIN — Medication 500 MILLIGRAM(S): at 21:14

## 2022-01-01 RX ADMIN — Medication 1 MILLIGRAM(S): at 21:44

## 2022-01-01 RX ADMIN — PIPERACILLIN AND TAZOBACTAM 25 GRAM(S): 4; .5 INJECTION, POWDER, LYOPHILIZED, FOR SOLUTION INTRAVENOUS at 21:13

## 2022-01-01 RX ADMIN — PIPERACILLIN AND TAZOBACTAM 25 GRAM(S): 4; .5 INJECTION, POWDER, LYOPHILIZED, FOR SOLUTION INTRAVENOUS at 21:52

## 2022-01-01 RX ADMIN — ENOXAPARIN SODIUM 50 MILLIGRAM(S): 100 INJECTION SUBCUTANEOUS at 21:23

## 2022-01-01 RX ADMIN — SENNA PLUS 2 TABLET(S): 8.6 TABLET ORAL at 22:11

## 2022-01-01 RX ADMIN — HEPARIN SODIUM 5000 UNIT(S): 5000 INJECTION INTRAVENOUS; SUBCUTANEOUS at 10:02

## 2022-01-01 RX ADMIN — Medication 650 MILLIGRAM(S): at 21:53

## 2022-01-01 RX ADMIN — Medication 650 MILLIGRAM(S): at 01:05

## 2022-01-01 RX ADMIN — MIDODRINE HYDROCHLORIDE 10 MILLIGRAM(S): 2.5 TABLET ORAL at 21:14

## 2022-01-01 RX ADMIN — Medication 1 MILLIGRAM(S): at 21:14

## 2022-01-01 RX ADMIN — PIPERACILLIN AND TAZOBACTAM 25 GRAM(S): 4; .5 INJECTION, POWDER, LYOPHILIZED, FOR SOLUTION INTRAVENOUS at 13:55

## 2022-01-01 RX ADMIN — Medication 5.32 MICROGRAM(S)/KG/MIN: at 05:30

## 2022-01-01 RX ADMIN — Medication 1: at 18:50

## 2022-01-01 RX ADMIN — Medication 500 MILLIGRAM(S): at 09:12

## 2022-01-01 RX ADMIN — SODIUM CHLORIDE 100 MILLILITER(S): 9 INJECTION, SOLUTION INTRAVENOUS at 05:50

## 2022-01-01 RX ADMIN — Medication 100 MILLIEQUIVALENT(S): at 20:41

## 2022-01-01 RX ADMIN — Medication 1 MILLIGRAM(S): at 21:53

## 2022-01-01 RX ADMIN — MIDODRINE HYDROCHLORIDE 5 MILLIGRAM(S): 2.5 TABLET ORAL at 05:41

## 2022-01-01 RX ADMIN — HEPARIN SODIUM 5000 UNIT(S): 5000 INJECTION INTRAVENOUS; SUBCUTANEOUS at 21:52

## 2022-01-01 RX ADMIN — Medication 12.5 MILLIGRAM(S): at 21:13

## 2022-01-01 RX ADMIN — Medication 5 MILLIGRAM(S): at 17:12

## 2022-01-01 RX ADMIN — Medication 20 MILLIGRAM(S): at 10:35

## 2022-01-01 RX ADMIN — FINASTERIDE 5 MILLIGRAM(S): 5 TABLET, FILM COATED ORAL at 10:02

## 2022-01-01 RX ADMIN — CHLORHEXIDINE GLUCONATE 1 APPLICATION(S): 213 SOLUTION TOPICAL at 08:49

## 2022-01-01 NOTE — H&P ADULT - ASSESSMENT
When following up on how the 1645 feed went, MOB states that infant was sleeping and reluctant to feed. Educated MOB again on the importance of attempting to feed infant every 2-3 hours unless he's showing cues before that time. Discussed unswaddling infant and changing his diaper in attempt to wake him. MOB verbalized understanding and said she would wake baby for a feed. A/P:  Right 5th rib fracture  SDH  Intensivist consult for critical care management  Neurosurgery on consult for intracranial pathology  Monitor neurocheccks  Fall risk precautions  GI/non chemical DVT prophylaxis  Pain control  Incentive spirometry  Serial abd exams  F/U labs  Pt will be monitored for signs of evolution/resolution of pathology and surgical intervention as required and warranted  Pt aware of and agrees with all of the above

## 2022-01-13 NOTE — PROVIDER CONTACT NOTE (CRITICAL VALUE NOTIFICATION) - NS PROVIDER READ BACK
MEDICARE WELLNESS VISIT NOTE    HISTORY OF PRESENT ILLNESS:   Merlin D Pieper presents for his Subsequent Annual Medicare Wellness Visit.   He has no current complaints or concerns.      Patient Care Team:  Fuad Jules MD as PCP - General (Family Practice)     Review potential risk factors.  Depression: Not applicable   ADLs (Activities of Daily Living): Independent  Safety concerns: Low Fall Risk   Hearing/Vision Screen: Wears glasses.  Vision test is OK.  Whisper test is OK   Advance Directives: See Below             Patient Active Problem List   Diagnosis   • Seasonal allergies   • Erectile dysfunction   • Vitreous degeneration   • H/O TIA (transient ischemic attack) and stroke   • Nuclear sclerosis of both eyes   • Bilateral dry eyes   • Astigmatism of both eyes with presbyopia         Past Medical History:   Diagnosis Date   • Cataract    • Erectile dysfunction 9/6/2011   • Hemorrhoids 01/16/2018   • Iliotibial band tendonitis 4/6/2015   • Seasonal allergies 9/6/2011   • Senile nuclear sclerosis 4/22/2014   • Tear film insufficiency, unspecified 4/29/2013   • Trochanteric bursitis 4/6/2015   • Tubular adenoma of colon 01/16/2018   • Vitreous degeneration 4/29/2013         Past Surgical History:   Procedure Laterality Date   • Colonoscopy  2013   • Colonoscopy diagnostic  01/16/2018    Dr Flores recall 5 years   • Middle ear surgery Left    • Nose surgery      Rhinoplasty   • Occult blood test tube  8/31/2010         Social History     Tobacco Use   • Smoking status: Never Smoker   • Smokeless tobacco: Never Used   Substance Use Topics   • Alcohol use: Yes     Alcohol/week: 0.0 standard drinks   • Drug use: No     Drug use:    Drug Use:    No              Family History   Problem Relation Age of Onset   • Diabetes Sister    • Macular degeneration Maternal Aunt        Current Outpatient Medications   Medication Sig Dispense Refill   • triamcinolone (ARISTOCORT) 0.1 % cream Apply to affected area TID prn 15 g 5    • tadalafil (CIALIS) 20 MG tablet Take 1 tablet by mouth as needed for Erectile Dysfunction. Generic ok. 36 tablet 1   • aspirin 81 MG tablet Take 1 tablet by mouth daily. 30 tablet 0     No current facility-administered medications for this visit.        The following items on the Medicare Health Risk Assessment were found to be positive  6 b.) How many servings of High Fiber / Whole Grain Foods to you have each day ( 1 serving = 1 cup cold cereal, 1/2 cup cooked cereal, 1 slice bread): 1 per day     7c.) Do you worry about falling?: Yes         Vision and Hearing screens: Both screenings were performed and reviewed    Advance Directive:   The patient has the following documents:  Power of  for Health Care  Living Will (Declaration for Physicians)    Cognitive/Functional Status: no evidence of cognitive dysfunction by direct observation    Opioid Review: Merlin is not taking opioid medications.    Recent PHQ 2/9 Score:    PHQ 2:  Date Adult PHQ 2 Score Adult PHQ 2 Interpretation   1/13/2022 0 No further screening needed       PHQ 9:       DEPRESSION ASSESSMENT/PLAN:  Depression screening is negative no further plan needed.     Body mass index is 27.49 kg/m².    BMI ASSESSMENT/PLAN:  Patient is overweight.    See Below        Needed Screening/Treatment:   None  Needed follow up:  See Below        Merlin was seen today for medicare wellness visit and follow-up.    Diagnoses and all orders for this visit:    Medicare annual wellness visit, subsequent      Overall the patient is doing very well.  He has no new medical concerns to discuss today.  He is up-to-date on all blood work.  Information on Shingrix vaccine provided to the patient.  I will see him back in 6 months to year.  All questions answered.    Fuad Jules MD     yes

## 2022-03-10 NOTE — ED PROVIDER NOTE - ST/T WAVE
Non-specific T-wave abnormality

## 2022-03-13 NOTE — ED PROVIDER NOTE - PROGRESS NOTE DETAILS
PA: Patient is a 89 y/o male with PMHx of GERD, BPH, DM, HLD, HTN, CAD presents to the ED BIBA from [facility] for vomiting. As per facility, pt had large amount coffee ground emesis and persistent loose stool, dark in appearance, skin color pale and cold to the touch. Pt is on 2L O2 via nasal canula. Unable to give hx. Dr. Gunderson is PCP. Paged ICU. ~Miguel Ángel Gómez PA-C Patient seen by ICU dr. Elizabeth, wants CT head, abd/pel. ~Miguel Ángel Gómez PA-C Soft BP discussed with ICU NASH Sierra, will order Levophed. Admitted to Dr. Elizabeth. ~Miguel Ángel Gómez PA-C PA: Patient is a 89 y/o male with PMHx of GERD, BPH, DM, HLD, HTN, CAD presents to the ED BIBA from [facility] for vomiting coffee ground material. As per facility, pt had large amount coffee ground emesis and persistent loose stool, dark in appearance. ~Miguel Ángel Gómez PA-C

## 2022-03-13 NOTE — ED PROVIDER NOTE - CARE PLAN
1 Principal Discharge DX:	GI bleed   Principal Discharge DX:	Acute sepsis  Secondary Diagnosis:	GI bleed  Secondary Diagnosis:	Respiratory acidosis  Secondary Diagnosis:	ARF (acute renal failure)  Secondary Diagnosis:	Acute UTI  Secondary Diagnosis:	Hypoxia  Secondary Diagnosis:	Elevated troponin

## 2022-03-13 NOTE — ED PROVIDER NOTE - OBJECTIVE STATEMENT
89 y/o male with PMHx of GERD, BPH, DM, HLD, HTN, CAD presents to the ED BIBA from [facility] for vomiting. As per facility, pt had large amount coffee ground emesis and persistent loose stool, dark in appearance, skin color pale and cold to the touch. Pt is on 2L O2 via nasal canula. Unable to give hx. Dr. Gunderson is PCP.

## 2022-03-13 NOTE — H&P ADULT - HISTORY OF PRESENT ILLNESS
89 yo M w PMH of BPH, CAD, HTN, DMII, GERD, HLD, SDH s/p craniotomy and surgical removal is brought into HH from [facility] for vomiting. As per facility, pt had large amount coffee ground emesis and persistent loose stool, dark in appearance, skin color pale and cold to the touch. Pt is on 2L O2 via nasal canula. Unable to give hx. At bedside, pt states he feels cold, denies abd pain, N/V.     In ED, pt fount to be hypoxic, placed on non-rebreather. Leukocytosis of 14, Cr 1.6, Trop 202, lactate 8.5, Ph 5.4. U/A is very cloudy, w mod LE, blood & bacteria. CXR demonstrates lower lobe infiltrates. Given Vanc & Zosyn in ED, given 2L NS (30 cc/kg), dawson was placed, blood & urine cx were obtained. Awaiting Head, Abd CT.

## 2022-03-13 NOTE — ED PROVIDER NOTE - PHYSICAL EXAMINATION
Constitutional: NAD AAOx3  Eyes: PERRLA EOMI  Head: Normocephalic atraumatic  Mouth: MMM  Cardiac: regular rate   Resp: Lungs CTAB  GI: Abd s/nt/nd  Neuro: CN2-12 intact  Extremities: Intact distal pulses b/l, no calf tenderness, normal ROM b/l UE and LE   Skin: No rashes Constitutional: Pt non verbal, ill appearing   Eyes: PERRL  Head: Normocephalic atraumatic  Mouth: dry  Cardiac: regular rate   Resp: crackles b/l  GI: thin, non distended, brown stool present guaic negative   Neuro: lethargic   Extremities: Intact distal pulses b/l Attending: Constitutional: Pt non verbal, ill appearing   Eyes: PERRL  Head: Normocephalic atraumatic  Mouth: dry  Cardiac: regular rate   Resp: crackles b/l  GI: thin, non distended, brown stool present guaic negative   Neuro: lethargic   Extremities: Intact distal pulses b/l

## 2022-03-13 NOTE — ED ADULT TRIAGE NOTE - CHIEF COMPLAINT QUOTE
Pt brought in by ambulance from [facility] for coffee ground emesis beginning yesterday. EMS states that pt O2 sat unobtainable by them at time of transport, but facility reports 95% on 4L NC. Pt placed on 100NRB for transport. Pt poor historian, hx dementia.

## 2022-03-13 NOTE — ED PROVIDER NOTE - CRITERIA ADMIT PEDS PT
Acute Pain Service    Post Op Day 2 Ortho Note    Assessed patient in chair. Patient rates pain 3/10 at rest and 4-5/10 with activity. Patient states Ryan You is working well to manage pain; denies itching/nausea/dizziness.     Patient able to bear weight on s No

## 2022-03-13 NOTE — H&P ADULT - ATTENDING COMMENTS
A/P: 89 male who presents from a NH with coffee ground emesis, aspiration pneumonia, and a  UTI andd sepsis    Plan:  ICU    PULM: Check and ABG, !00% NRB, Zosyn    Cardio: Likely hypotensive from Sepsis, continue vol resuscitation, and  pressors if needed    Renal: In M[place] likely from ATN, IVF    ENDO: RISS    GI: NPO because of MS, PPI    ID: Zosyn for now, Vanco given in ED.  Has had VRE in the Urine in the PAST    GOD: Awaiting family.  has been a DNR/DNI in the past    Nutrition Consult

## 2022-03-13 NOTE — PATIENT PROFILE ADULT - FALL HARM RISK - HARM RISK INTERVENTIONS
Assistance with ambulation/Assistance OOB with selected safe patient handling equipment/Communicate Risk of Fall with Harm to all staff/Discuss with provider need for PT consult/Monitor for mental status changes/Monitor gait and stability/Reinforce activity limits and safety measures with patient and family/Reorient to person, place and time as needed/Tailored Fall Risk Interventions/Use of alarms - bed, chair and/or voice tab/Visual Cue: Yellow wristband and red socks/Bed in lowest position, wheels locked, appropriate side rails in place/Call bell, personal items and telephone in reach/Instruct patient to call for assistance before getting out of bed or chair/Non-slip footwear when patient is out of bed/[place] to call system/Physically safe environment - no spills, clutter or unnecessary equipment/Purposeful Proactive Rounding/Room/bathroom lighting operational, light cord in reach

## 2022-03-13 NOTE — H&P ADULT - NSHPPHYSICALEXAM_GEN_ALL_CORE
Vitals  T(F): --  HR: 57 (03-13-22 @ 13:24) (57 - 57)  BP: 104/60 (03-13-22 @ 13:24) (104/60 - 104/60)  RR: --  SpO2: --    Physical Exam   Gen: cachectic  HENT: atraumatic head and ears, no gross abnormalities of ears, mucous membranes moist, no oral lesions, neck supple without masses/goiter/lymphadenopathy  CV: RRR, nl s1/s2, no M/R/G  Pulm: crackles on auscultation, nl respiratory effort, CTAB, no wheezes/crackles/rhonchi  Abd: normoactive bowel sounds in all 4 quadrants, soft, nontender, nondistended, no rebound, no guarding, no masses  Extremities: contracted, no pedal edema, pedal pulses palpable   Skin: cold, sacral ulcer   Neuro: A&Ox1, answering questions appropriately, PERRL, EOMI, face symmetric

## 2022-03-13 NOTE — CONSULT NOTE ADULT - SUBJECTIVE AND OBJECTIVE BOX
ICU Conult Note    HPI:    S:    Pt seen and examined  HD # 1  ? DNI (no code status in chart from [facility])  PMHx extensive including BPH, CAD, DMII, GERD, HLD, SDH s/p craniotomy and surgical removal, urine rentention with Dawson catheter, HTN  Lives at [facility]  Pt here for vomiting. As per facility, pt had large amount coffee ground emesis and persistent loose stool, dark in appearance, skin color pale and cold to the touch. Pt is on 2L O2 via nasal canula. Unable to give hx. Dr. 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 Pending workup.    ROS: Unable to obtain 2/2 mental status    Allergies    No Known Allergies    Intolerances    MEDICATIONS  (STANDING):    chlorhexidine 4% Liquid 1 Application(s) Topical <User Schedule>  DAPTOmycin IVPB 250 milliGRAM(s) IV Intermittent Once  lactated ringers. 1000 milliLiter(s) (125 mL/Hr) IV Continuous <Continuous>  piperacillin/tazobactam IVPB.. 3.375 Gram(s) IV Intermittent Once    MEDICATIONS  (PRN):    Drug Dosing Weight    Height (cm): 175.3 (13 Mar 2022 13:24)  Weight (kg): 56.7 (13 Mar 2022 13:24)  BMI (kg/m2): 18.5 (13 Mar 2022 13:24)  BSA (m2): 1.69 (13 Mar 2022 13:24)    PAST MEDICAL & SURGICAL HISTORY:    CAD (coronary artery disease)    HTN (hypertension)    HLD (hyperlipidemia)    DM (diabetes mellitus)    BPH (benign prostatic hyperplasia)    GERD (gastroesophageal reflux disease)    No significant past surgical history    FAMILY HISTORY:  No pertinent family history in first degree relatives            ROS: See HPI; otherwise, all systems reviewed and negative.    O:    ICU Vital Signs Last 24 Hrs  T(C): --  T(F): --  HR: 57 (13 Mar 2022 13:24) (57 - 57)  BP: 104/60 (13 Mar 2022 13:24) (104/60 - 104/60)  BP(mean): --  ABP: --  ABP(mean): --  RR: --  SpO2: --          I&O's Detail          PE:    Elderly M  Chronically ill appearing and emaciated  No JVD trachea midline  + temporal wasting  S1S2+  CTA B/L  Abd soft NTND  + dawson in place, draining thick, white urine  No LE edema/swelling noted  Contracted throughout    LABS:    CBC Full  -  ( 13 Mar 2022 13:54 )  WBC Count : 14.56 K/uL  RBC Count : 4.55 M/uL  Hemoglobin : 14.5 g/dL  Hematocrit : 43.8 %  Platelet Count - Automated : 323 K/uL  Mean Cell Volume : 96.3 fl  Mean Cell Hemoglobin : 31.9 pg  Mean Cell Hemoglobin Concentration : 33.1 gm/dL  Auto Neutrophil # : 13.18 K/uL  Auto Lymphocyte # : 0.58 K/uL  Auto Monocyte # : 0.58 K/uL  Auto Eosinophil # : 0.00 K/uL  Auto Basophil # : 0.00 K/uL  Auto Neutrophil % : 78.0 %  Auto Lymphocyte % : 4.0 %  Auto Monocyte % : 4.0 %  Auto Eosinophil % : 0.0 %  Auto Basophil % : 0.0 %        142  |  104  |  29<H>  ----------------------------<  236<H>  3.7   |  26  |  1.63<H>    Ca    9.0      13 Mar 2022 13:54  Phos  5.4       Mg     2.0         TPro  7.0  /  Alb  2.7<L>  /  TBili  1.1  /  DBili  x   /  AST  28  /  ALT  16  /  AlkPhos  103  03-13    PT/INR - ( 13 Mar 2022 13:54 )   PT: 13.2 sec;   INR: 1.14 ratio         PTT - ( 13 Mar 2022 13:54 )  PTT:32.2 sec  Urinalysis Basic - ( 13 Mar 2022 13:54 )    Color: Yellow / Appearance: very cloudy / S.020 / pH: x  Gluc: x / Ketone: Trace  / Bili: Negative / Urobili: Negative   Blood: x / Protein: 100 / Nitrite: Negative   Leuk Esterase: Moderate / RBC: 6-10 /HPF / WBC >50   Sq Epi: x / Non Sq Epi: Few / Bacteria: Moderate      CAPILLARY BLOOD GLUCOSE            LIVER FUNCTIONS - ( 13 Mar 2022 13:54 )  Alb: 2.7 g/dL / Pro: 7.0 gm/dL / ALK PHOS: 103 U/L / ALT: 16 U/L / AST: 28 U/L / GGT: x

## 2022-03-13 NOTE — ED PROVIDER NOTE - NSICDXPASTMEDICALHX_GEN_ALL_CORE_FT
PAST MEDICAL HISTORY:  BPH (benign prostatic hyperplasia)     CAD (coronary artery disease)     DM (diabetes mellitus)     GERD (gastroesophageal reflux disease)     HLD (hyperlipidemia)     HTN (hypertension)        PAST MEDICAL HISTORY:  BPH (benign prostatic hyperplasia)     CAD (coronary artery disease)     DM (diabetes mellitus)     GERD (gastroesophageal reflux disease)     HLD (hyperlipidemia)     HTN (hypertension)

## 2022-03-13 NOTE — CHART NOTE - NSCHARTNOTEFT_GEN_A_CORE
Called patient's son Caleb Finn to discuss his father's hospital course, informing that patient is currently in ICU w pressors for proper perfusion. Caleb is deaf and therefore phone call was achieved by audio to video . Per  [facility]'s documentation, pt is FULL CODE, however per last hospitalization in 2020, pt was DNI. Discussed Advanced Directives including Resuscitation and Intubation. Process of chest compressions was discussed in detail and possible risks of resuscitation including broken ribs in this frail 89 year old who is cachectic and contracted, as well as intubation and mechanical ventilation, where once intubated, may be difficult to extubate patient. Son reiterated that he wishes all extreme measures to be taken. Encouraged son to come in person and physically see his father. All questions and concerns addressed. Will readdress advanced directives once son has seen patient's frail state in person. In interim, patient to remain FULL CODE. Called patient's son Caleb Finn to discuss his father's hospital course, informing that patient is currently in ICU w pressors for proper perfusion. Caleb is deaf and therefore phone call was achieved by audio to video . [facility], pt is FULL CODE, however per last hospitalization in 2020, pt was DNI. Discussed Advanced Directives including Resuscitation and Intubation. Process of chest compressions was discussed in detail and possible risks of resuscitation as well as intubation and mechanical ventilation, where once intubated, may be difficult to extubate patient. Son reiterated that he wishes all extreme measures to be taken. Encouraged son to come in person and physically see his father. All questions and concerns addressed. Will readdress advanced directives once son has seen patient's frail state in person. In interim, patient to remain FULL CODE.

## 2022-03-13 NOTE — PATIENT PROFILE ADULT - STATED REASON FOR ADMISSION
[facility] after pulling dawson, fever, and increased confusion and lethargy sent in from [facility] for vomiting and lethargy

## 2022-03-13 NOTE — ED PROVIDER NOTE - CRITICAL CARE ATTENDING CONTRIBUTION TO CARE
Elements for critical care include direct patient care (not related to procedure), additional history taking, interpretation of diagnostic studies, documentation, consultation with other physicians,

## 2022-03-13 NOTE — ED PROVIDER NOTE - ATTENDING CONTRIBUTION TO CARE
I, Marisol Hamilton MD, personally saw the patient with ACP.  I have personally performed a face to face diagnostic evaluation on this patient.  I have reviewed the ACP note and agree with the history, exam, and plan of care, except as noted.  I personally saw the patient and performed a substantive portion of the visit including all aspects of the medical decision making.

## 2022-03-13 NOTE — H&P ADULT - ASSESSMENT
89 yo M w PMH of BPH, CAD, HTN, DMII, GERD, HLD, SDH s/p craniotomy and surgical removal is brought into HH from [facility] for vomiting. As per facility, pt had large amount coffee ground emesis and persistent loose stool, dark in appearance, skin color pale and cold to the touch. In ED, pt fount to be hypoxic, placed on non-rebreather. Leukocytosis of 14, Cr 1.6, Trop 202, lactate 8.5, Ph 5.4. U/A is very cloudy, w mod LE, blood & bacteria. CXR demonstrates lower lobe infiltrates. Given Vanc & Zosyn in ED, given 2L NS (30 cc/kg), dawson was placed, blood & urine cx were obtained. Awaiting Head, Abd CT.      #Septic Shock  -Most likely in the setting of UTI. Pt presented hypotensive w cloudy urine, elevated lactate and leukocytosis. U/A w bacteria & LE  -S/P 30 cc/kg NS  -BP remains low, start on pressors, Levophed   -Admit to ICU  -S/P Vanc & Zosyn in ED. Pt w hx of VRE in 2020  -Spoke w Dr. Pino, continue w Zosyn. Avoid further Vanc/Dapto until urine cx result to avoid further nephrotoxicity  -F/U Urine & blood cxs  -ID consulted  -Lactate 8.5, F/U repeat  -Continue maintenance fluids    #Hypoxic Resp Failure  -Placed on non-rebreather due to hypoxia upon presentation  -F/U ABG  -CXR concerning for possible underlying PNA  -Continue abx, titrate O2 as needed. May require NIPPV  -Per prior MOLST, pt is DNI, however, not currently in chart. Until son reaches back to hospital, pt to remain FULL CODE    #Cystitis  -U/A w mod LE, bacteria and cloudy appearance  -Hx of VRE  -S/P Vanx & Zosyn in ED. Continue Zoysn.   -F/U Urine cx  -Continue dawson catheter     #Elevated Trops  -Trops elevated at 200  -F/U EKG & Echo  -Currently denies chest pain. Most likely in the setting of septic shock  -Trend trop. If continues to trend upwards, consider Cardiology consult for ischemic work-up once pt is more stable    #DMII      #BPH    #Protein Calorie Malnutrition    #Diet    #DVT PPX    #GOC    *Above dsicussed w Dr. Elizabeth   89 yo M w PMH of BPH, CAD, HTN, DMII, GERD, HLD, SDH s/p craniotomy and surgical removal is brought into HH from [facility] for vomiting. As per facility, pt had large amount coffee ground emesis and persistent loose stool, dark in appearance, skin color pale and cold to the touch. In ED, pt fount to be hypoxic, placed on non-rebreather. Leukocytosis of 14, Cr 1.6, Trop 202, lactate 8.5, Ph 5.4. U/A is very cloudy, w mod LE, blood & bacteria. CXR demonstrates lower lobe infiltrates. Given Vanc & Zosyn in ED, given 2L NS (30 cc/kg), dawson was placed, blood & urine cx were obtained. Awaiting Head, Abd CT.      #Septic Shock  -Most likely in the setting of UTI. Pt presented hypotensive w cloudy urine, elevated lactate and leukocytosis. U/A w bacteria & LE  -S/P 30 cc/kg NS  -BP remains low, start on pressors, Levophed   -Admit to ICU  -S/P Vanc & Zosyn in ED. Pt w hx of VRE in 2020  -Spoke w Dr. Pino, continue w Zosyn. Avoid further Vanc/Dapto until urine cx result to avoid further nephrotoxicity  -F/U Urine & blood cxs  -ID consulted  -Lactate 8.5, F/U repeat  -Continue maintenance fluids    #Hypoxic Resp Failure  -Placed on non-rebreather due to hypoxia upon presentation  -F/U ABG  -CXR concerning for possible underlying PNA  -Continue abx, titrate O2 as needed. May require NIPPV  -Per prior MOLST, pt is DNI, however, not currently in chart. Until son reaches back to hospital, pt to remain FULL CODE    #Cystitis  -U/A w mod LE, bacteria and cloudy appearance  -Hx of VRE  -S/P Vanx & Zosyn in ED. Continue Zoysn.   -F/U Urine cx  -Continue dawson catheter     #MÓNICA  -Cr elevated at 1.6 (baseline 0.7)  -Most likely 2/2 ATN from septic shock  -IV fluids, started on pressors  -Monitor renal function    #GI Bleed  -Initially brought in from nursing home 2/2 coffee ground emesis and dark colored stools  -Hg stable at 14  -Monitor H&H  -IV PPI    #Elevated Trops  -Trops elevated at 200  -F/U EKG & Echo  -Currently denies chest pain. Most likely in the setting of septic shock  -Trend trop. If continues to trend upwards, consider Cardiology consult for ischemic work-up once pt is more stable    #DMII  -ISS  -NPO in intertim    #BPH  -Restart on home finasteride, bethanechol and tamsulosin once pt tolerates PO  -Continue w dawson    #Protein Calorie Malnutrition  -Pt severely cachetic and contracted  -Nutrition consult once pt more stable, may need NG tube if remains NPO    #Diet  -NPO    #DVT PPX  -Heparin q8H  IMPROVE VTE Individual Risk Assessment    RISK                                                                Points    [  ] Previous VTE                                                  3    [  ] Thrombophilia                                               2    [ 2 ] Lower limb paralysis                                      2        (unable to hold up >15 seconds)      [  ] Current Cancer                                              2         (within 6 months)    [ 1 ] Immobilization > 24 hrs                                1    [ 1 ] ICU/CCU stay > 24 hours                              1    [ 1 ] Age > 60                                                      1    IMPROVE VTE Score ___5______    IMPROVE Score 0-1: Low Risk, No VTE prophylaxis required for most patients, encourage ambulation.   IMPROVE Score 2-3: At risk, pharmacologic VTE prophylaxis is indicated for most patients (in the absence of a contraindication)  IMPROVE Score > or = 4: High Risk, pharmacologic VTE prophylaxis is indicated for most patients (in the absence of a contraindication)    #GOC  -During pt's last hospitalization, pt was DNR/DNI and MOLST was completed w son who is NOK. However, no MOLST w patient. Voicemail  left for son to call back. In interim, FULL CODE.     *Above discussed w Dr. Elizabeth   89 yo M w PMH of BPH, CAD, HTN, DMII, GERD, HLD, SDH s/p craniotomy and surgical removal is brought into HH from [facility] for vomiting. As per facility, pt had large amount coffee ground emesis and persistent loose stool, dark in appearance, skin color pale and cold to the touch. In ED, pt fount to be hypoxic, placed on non-rebreather. Leukocytosis of 14, Cr 1.6, Trop 202, lactate 8.5, Ph 5.4. U/A is very cloudy, w mod LE, blood & bacteria. CXR demonstrates lower lobe infiltrates. Given Vanc & Zosyn in ED, given 2L NS (30 cc/kg), dawson was placed, blood & urine cx were obtained. Awaiting Head, Abd CT.      #Septic Shock  -Most likely in the setting of UTI. Pt presented hypotensive w cloudy urine, elevated lactate and leukocytosis. U/A w bacteria & LE  -S/P 30 cc/kg NS  -BP remains low, start on pressors, Levophed   -Admit to ICU  -S/P Vanc & Zosyn in ED. Pt w hx of VRE in 2020  -Spoke w Dr. Pino, continue w Zosyn. Avoid further Vanc/Dapto until urine cx result to avoid further nephrotoxicity  -F/U Urine & blood cxs  -ID consulted  -Lactate 8.5, F/U repeat  -Continue maintenance fluids    #Hypoxic Resp Failure  -Placed on non-rebreather due to hypoxia upon presentation  -F/U ABG  -CXR concerning for possible underlying PNA  -Continue abx, titrate O2 as needed. May require NIPPV  -Per prior MOLST, pt is DNI, however, not currently in chart. Until son reaches back to hospital, pt to remain FULL CODE    #Cystitis  -U/A w mod LE, bacteria and cloudy appearance  -Hx of VRE  -S/P Vanx & Zosyn in ED. Continue Zoysn.   -F/U Urine cx  -Continue dawson catheter     #MÓNICA  -Cr elevated at 1.6 (baseline 0.7)  -Most likely 2/2 ATN from septic shock  -IV fluids, started on pressors  -Monitor renal function    #GI Bleed  -Initially brought in from nursing home 2/2 coffee ground emesis and dark colored stools  -Hg stable at 14  -Monitor H&H  -IV PPI    #Elevated Trops  -Trops elevated at 200  -F/U EKG & Echo  -Currently denies chest pain. Most likely in the setting of septic shock  -Trend trop. If continues to trend upwards, consider Cardiology consult for ischemic work-up once pt is more stable    #DMII  -ISS  -NPO in intertim    #BPH  -Restart on home finasteride, bethanechol and tamsulosin once pt tolerates PO  -Continue w dawson    #Protein Calorie Malnutrition  -Pt severely cachetic and contracted  -Nutrition consult once pt more stable, may need NG tube if remains NPO    #Diet  -NPO    #DVT PPX  -Elevated IMPROVE score, however, given GI bleed, pneumatic compressions.   IMPROVE VTE Individual Risk Assessment    RISK                                                                Points    [  ] Previous VTE                                                  3    [  ] Thrombophilia                                               2    [ 2 ] Lower limb paralysis                                      2        (unable to hold up >15 seconds)      [  ] Current Cancer                                              2         (within 6 months)    [ 1 ] Immobilization > 24 hrs                                1    [ 1 ] ICU/CCU stay > 24 hours                              1    [ 1 ] Age > 60                                                      1    IMPROVE VTE Score ___5______    IMPROVE Score 0-1: Low Risk, No VTE prophylaxis required for most patients, encourage ambulation.   IMPROVE Score 2-3: At risk, pharmacologic VTE prophylaxis is indicated for most patients (in the absence of a contraindication)  IMPROVE Score > or = 4: High Risk, pharmacologic VTE prophylaxis is indicated for most patients (in the absence of a contraindication)    #GOC  -During pt's last hospitalization, pt was DNR/DNI and MOLST was completed w son who is NOK. However, no MOLST w patient. Voicemail  left for son to call back. In interim, FULL CODE.     *Above discussed w Dr. Elizabeth

## 2022-03-13 NOTE — CONSULT NOTE ADULT - ASSESSMENT
A:    89M  HD # 1  ? code status    Here for:    1. Septic shock 2/2  2. UTI, concern for  3. VRE  4. Hx of SDH  5. ? GIB  6. NSTEMI  7. MÓNICA  8. Protein calorie malnutrition    This patient requires critical care for support of one or more vital organ systems with a high probability of imminent or life threatening deterioration in his/her condition    P:    Septic shock, suspect 2/2 UTI  Hx of VRE urosepsis/bacteremia in past Tx with daptomycin  Documented DNI in 2020; however, no copy of MOLST with Pt at this time  HCP is [facility], requires ; VM left with svc, awaiting call back    Avoid deleriogenic meds, suspect septic/toxic metabolic encepahlopathy on top of dementia, poor underlying status  HD monitoring, start vasopressors with levophed at 0.1 mcg/kg/min, titrate to MAP > 65. troponin elevated; pending EKG, suspect NSTEMI in setting of sepsis, MÓNICA; ? GIB as sent in for coffee groun stool so hold AC, antiplatelet therapy at this time, not a candidate for beta blockade or afterload reduction given shock state. Cardiology consult, TTE  f/u ABG, CXR clear; trial of NIPPV if able  Broad spectrum abx, zosyn 3.375g IV TID extended infusion, renally dosed; dapto 250mg IV x 1 dose now for VRE coverage, ID consult  NPO + meds  VTE ppx mechanical only given concern for GIB  May require central venous/arterial access  MÓNICA, dawson placed, I/O's, avoid renal toxic meds, trend  Replete lytes PRN  Trend LA    TOTAL CRITICAL CARE TIME: 50 minutes  (EXCLUSIVE of any non bundled procedures)    Note: This time spent INCLUDES time spent directly as this patient's bedside with evaluation, review of chart including review of laboratory and imaging studies, interpretation of vital signs and cardiac output measurements, any necessary ventilator management, and time spent discussing plan of care with patient and family, including goals of care discussion.

## 2022-03-13 NOTE — ED ADULT NURSE NOTE - OBJECTIVE STATEMENT
Pt presents to ER from [facility] after staff reports coffee ground emesis and loose stools dark in color. Pt limited verbally. Noticeable dark emesis around mouth. Pt appears weak and guarding diaper area with hands.

## 2022-03-13 NOTE — ED ADULT NURSE NOTE - CHIEF COMPLAINT QUOTE
PLAN:      Problem List Items Addressed This Visit     Hypertension associated with diabetes (Chronic)     TREAT DEVON.  CONTINUE CURRENT BLOOD PRESSURE MEDICATION REGIMEN.  TWO WEEK BLOOD PRESSURE CHECK.Discussed hypertension disease course, DASH-diet and exercise.  Discussed medication regimen importance of treating high blood pressure.  Patient advised of risk of untreated blood pressure.    ER precautions were given for symptoms of hypertensive urgency and emergency.           Relevant Medications    semaglutide (OZEMPIC) 1 mg/dose (2 mg/1.5 mL) PnIj    Other Relevant Orders    MyChart Patient Entered Blood Pressure    MyChart Patient Entered Pulse    MyChart Patient Entered Weight    Hyperlipidemia associated with type 2 diabetes mellitus (Chronic)     CONTINUE STATIN.  CHECK LIPID PANEL IN 6 TO 12 MONTHS.Discussed hyperlipidemia disease course, healthy diet and increased need for exercise.  Discussed the risk of cardiovascular disease, increase stroke and heart attack risk.    Patient voiced understanding and understood the treatment plan. All questions were answered.              Relevant Medications    semaglutide (OZEMPIC) 1 mg/dose (2 mg/1.5 mL) PnIj    Other Relevant Orders    MyChart Patient Entered Blood Pressure    MyChart Patient Entered Pulse    MyChart Patient Entered Weight    Encounter for long-term (current) use of medications (Chronic)     REVIEWED LABS.  PATIENT WITH MILD ELEVATION IN LFTS.  RECHECK IN THREE MONTHS.  PROCEED WITH WEIGHT LOSS.  IF NO IMPROVEMENT IN LFTS WILL NEED LIVER ULTRASOUND.Complete history and physical was completed today.  Complete and thorough medication reconciliation was performed.  Discussed risks and benefits of medications.  Advised patient on orders and health maintenance.  We discussed old records and old labs if available.  Will request any records not available through epic.  Continue current medications listed on your summary sheet.           Relevant Orders     MyChart Patient Entered Blood Pressure    MyChart Patient Entered Pulse    MyChart Patient Entered Weight    Type 2 diabetes mellitus with hyperglycemia, without long-term current use of insulin - Primary (Chronic)     PATIENT DUE FOR URINE MICROALBUMIN.  PATIENT NEEDS BETTER BLOOD PRESSURE CONTROL.  HOWEVER PATIENT DOES HAVE UNTREATED DEVON.  PATIENT IS STARTING TO LOSE WEIGHT AND IS AT BMI OF 49.  GOAL BMI LESS THAN 40 FOR THIS PATIENT.Monitor hemoglobin A1c.  Discussed diabetic diet and exercise protocol.  Continue medications.  Monitor for side effects.  Discussed checking blood glucose.  Discussed symptoms to monitor for and to notify me immediately if persistent or worsening.  Follow up with Ophthalmology/Optometry and Podiatry.           Relevant Medications    semaglutide (OZEMPIC) 1 mg/dose (2 mg/1.5 mL) PnIj    Other Relevant Orders    MyChart Patient Entered Blood Pressure    MyChart Patient Entered Pulse    MyChart Patient Entered Weight    Severe obstructive sleep apnea-hypopnea syndrome (Chronic)     START AUTO PAP.  CONTINUE WEIGHT LOSS.    FOLLOW-UP WITH SLEEP MEDICINE.         Relevant Orders    CPAP FOR HOME USE    CPAP/BIPAP SUPPLIES    MyChart Patient Entered Blood Pressure    MyChart Patient Entered Pulse    MyChart Patient Entered Weight    Class 3 obesity with alveolar hypoventilation, serious comorbidity, and body mass index (BMI) of 45.0 to 49.9 in adult (Chronic)     CONTINUE LIFESTYLE MODIFICATION WITH DIET EXERCISE.  CONTINUE OZEMPIC FOR HELP WITH DIABETES AND WEIGHT LOSS.         Relevant Orders    MyChart Patient Entered Blood Pressure    MyChart Patient Entered Pulse    MyChart Patient Entered Weight        Future Appointments     Date Provider Specialty Appt Notes    12/15/2020  Lab     12/22/2020 Nathaniel Begum MD Family Medicine 3 mo f/u dm    8/20/2021 Nathaniel Begum MD Family Medicine annual           Medication List with Changes/Refills   New Medications    SEMAGLUTIDE  (OZEMPIC) 1 MG/DOSE (2 MG/1.5 ML) PNIJ    Inject 0.75 mLs into the skin every 7 days.   Current Medications    AMLODIPINE (NORVASC) 10 MG TABLET    Take 1 tablet (10 mg total) by mouth once daily.    LOSARTAN (COZAAR) 100 MG TABLET    Take 1 tablet (100 mg total) by mouth once daily.    MELOXICAM (MOBIC) 15 MG TABLET    Take 1 tablet (15 mg total) by mouth once daily.    ROSUVASTATIN (CRESTOR) 20 MG TABLET    Take 1 tablet (20 mg total) by mouth once daily.   Discontinued Medications    ACETAMINOPHEN (TYLENOL) 325 MG TABLET    Take 325 mg by mouth every 6 (six) hours as needed.    IBUPROFEN (ADVIL,MOTRIN) 200 MG TABLET    Take 200 mg by mouth every 6 (six) hours as needed.    SEMAGLUTIDE (OZEMPIC) 0.25 MG OR 0.5 MG(2 MG/1.5 ML) PNIJ    Inject 0.5 mg into the skin once a week.       Nathaniel Begum M.D.     ==========================================================================  Subjective:      Patient ID: Ze Ortega is a 34 y.o. male.  has a past medical history of Hyperlipidemia and Hypertension.     Chief Complaint: Hypertension and Results      Problem List Items Addressed This Visit     Hypertension associated with diabetes (Chronic)    Overview     CHRONIC.  September 2020:  BP uncontrolled today. BP Reviewed.  Compliant with BP medications. No SE reported.   Patient has extra large arms and no cuff will fit his arm.  Wrist measurement is borderline elevated.  PATIENT WITH UNTREATED DEVON.  SEE PROBLEM.  (-) CP, SOB, palpitations, dizziness, lightheadedness, HA, arm numbness, tingling or weakness, syncope.  Creatinine   Date Value Ref Range Status   08/20/2020 1.1 0.5 - 1.4 mg/dL Final              Current Assessment & Plan     TREAT DEVON.  CONTINUE CURRENT BLOOD PRESSURE MEDICATION REGIMEN.  TWO WEEK BLOOD PRESSURE CHECK.Discussed hypertension disease course, DASH-diet and exercise.  Discussed medication regimen importance of treating high blood pressure.  Patient advised of risk of untreated  Pt brought in by ambulance from [facility] for coffee ground emesis beginning yesterday. EMS states that pt O2 sat unobtainable by them at time of transport, but facility reports 95% on 4L NC. Pt placed on 100NRB for transport. Pt poor historian, hx dementia. blood pressure.    ER precautions were given for symptoms of hypertensive urgency and emergency.           Hyperlipidemia associated with type 2 diabetes mellitus (Chronic)    Overview     CHRONIC. STABLE. Lab analysis reviewed.   (-) CP, SOB, abdominal pain, N/V/D, constipation, jaundice, skin changes.  (-) Myalgias  Lab Results   Component Value Date    CHOL 148 08/20/2020    CHOL 151 07/02/2018    CHOL 160 08/26/2017     Lab Results   Component Value Date    HDL 36 (L) 08/20/2020    HDL 36 (L) 07/02/2018    HDL 36 (L) 08/26/2017     Lab Results   Component Value Date    LDLCALC 85.2 08/20/2020    LDLCALC 79.4 07/02/2018    LDLCALC 92.4 08/26/2017     Lab Results   Component Value Date    TRIG 134 08/20/2020    TRIG 178 (H) 07/02/2018    TRIG 158 (H) 08/26/2017     Lab Results   Component Value Date    CHOLHDL 24.3 08/20/2020    CHOLHDL 23.8 07/02/2018    CHOLHDL 22.5 08/26/2017     Lab Results   Component Value Date    TOTALCHOLEST 4.1 08/20/2020    TOTALCHOLEST 4.2 07/02/2018    TOTALCHOLEST 4.4 08/26/2017     Lab Results   Component Value Date    ALT 71 (H) 08/20/2020    AST 43 (H) 08/20/2020    ALKPHOS 32 (L) 08/20/2020    BILITOT 1.0 08/20/2020     ======================================================           Current Assessment & Plan     CONTINUE STATIN.  CHECK LIPID PANEL IN 6 TO 12 MONTHS.Discussed hyperlipidemia disease course, healthy diet and increased need for exercise.  Discussed the risk of cardiovascular disease, increase stroke and heart attack risk.    Patient voiced understanding and understood the treatment plan. All questions were answered.              Encounter for long-term (current) use of medications (Chronic)    Overview     CHRONIC. Stable. Compliant with medications for managed conditions. See medication list. No SE reported.   Routine lab analysis is being monitored. Refills were addressed.  Lab Results   Component Value Date    WBC 6.04 08/20/2020    HGB 14.5 08/20/2020    HCT 47.2  08/20/2020    MCV 93 08/20/2020     08/20/2020         Chemistry        Component Value Date/Time     08/20/2020 1106    K 4.4 08/20/2020 1106     08/20/2020 1106    CO2 25 08/20/2020 1106    BUN 16 08/20/2020 1106    CREATININE 1.1 08/20/2020 1106     08/20/2020 1106        Component Value Date/Time    CALCIUM 9.8 08/20/2020 1106    ALKPHOS 32 (L) 08/20/2020 1106    AST 43 (H) 08/20/2020 1106    ALT 71 (H) 08/20/2020 1106    BILITOT 1.0 08/20/2020 1106    ESTGFRAFRICA >60.0 08/20/2020 1106    EGFRNONAA >60.0 08/20/2020 1106          Lab Results   Component Value Date    TSH 1.038 08/20/2020              Current Assessment & Plan     REVIEWED LABS.  PATIENT WITH MILD ELEVATION IN LFTS.  RECHECK IN THREE MONTHS.  PROCEED WITH WEIGHT LOSS.  IF NO IMPROVEMENT IN LFTS WILL NEED LIVER ULTRASOUND.Complete history and physical was completed today.  Complete and thorough medication reconciliation was performed.  Discussed risks and benefits of medications.  Advised patient on orders and health maintenance.  We discussed old records and old labs if available.  Will request any records not available through epic.  Continue current medications listed on your summary sheet.           Type 2 diabetes mellitus with hyperglycemia, without long-term current use of insulin - Primary (Chronic)    Overview     SEPTEMBER 2020:  PATIENT HAS RECEIVED OZEMPIC AND IS TOLERATING IT WELL.  PATIENT CURRENTLY ON 0.5 MG.  REVIEWED Diabetes Management Status    Statin: Taking  ACE/ARB: Taking    Screening or Prevention Patient's value Goal Complete/Controlled?   HgA1C Testing and Control   Lab Results   Component Value Date    HGBA1C 6.7 (H) 08/20/2020      Annually/Less than 8% Yes   Lipid profile : 08/20/2020 Annually Yes   LDL control Lab Results   Component Value Date    LDLCALC 85.2 08/20/2020    Annually/Less than 100 mg/dl  Yes   Nephropathy screening Lab Results   Component Value Date    LABMICR 19.0 06/30/2017      No results found for: PROTEINUA Annually No   Blood pressure BP Readings from Last 1 Encounters:   09/22/20 (!) 150/84    Less than 140/90 No   Dilated retinal exam Most Recent Eye Exam Date: Not Found Annually Yes   Foot exam   Most Recent Foot Exam Date: Not Found Annually Yes              Current Assessment & Plan     PATIENT DUE FOR URINE MICROALBUMIN.  PATIENT NEEDS BETTER BLOOD PRESSURE CONTROL.  HOWEVER PATIENT DOES HAVE UNTREATED DEVON.  PATIENT IS STARTING TO LOSE WEIGHT AND IS AT BMI OF 49.  GOAL BMI LESS THAN 40 FOR THIS PATIENT.Monitor hemoglobin A1c.  Discussed diabetic diet and exercise protocol.  Continue medications.  Monitor for side effects.  Discussed checking blood glucose.  Discussed symptoms to monitor for and to notify me immediately if persistent or worsening.  Follow up with Ophthalmology/Optometry and Podiatry.           Severe obstructive sleep apnea-hypopnea syndrome (Chronic)    Overview     PATIENT HAD A SLEEP STUDY IN 2019.  I WAS ABLE TO PULL THE REPORT FROM PROCEDURES.  PATIENT INDEED DOES HAVE SEVERE SLEEP APNEA BUT HAS NEVER GOTTEN A MACHINE FOR THIS.    REPORTS SNORING, FATIGUE, HYPERTENSION, OBESITY.         Current Assessment & Plan     START AUTO PAP.  CONTINUE WEIGHT LOSS.    FOLLOW-UP WITH SLEEP MEDICINE.         Class 3 obesity with alveolar hypoventilation, serious comorbidity, and body mass index (BMI) of 45.0 to 49.9 in adult (Chronic)    Overview     PATIENT IS CURRENTLY UNDERGOING EXTENSIVE LIFESTYLE MODIFICATION WITH DIET AND EXERCISE.  PATIENT HAS A  AND IS GOING WELL.  PATIENT HAS LOST APPROXIMATELY 10 LB SINCE PREVIOUS VISIT.         Current Assessment & Plan     CONTINUE LIFESTYLE MODIFICATION WITH DIET EXERCISE.  CONTINUE OZEMPIC FOR HELP WITH DIABETES AND WEIGHT LOSS.                Past Medical History:  Past Medical History:   Diagnosis Date    Hyperlipidemia     Hypertension      Past Surgical History:   Procedure Laterality Date    TYMPANOSTOMY  TUBE PLACEMENT       Review of patient's allergies indicates:  No Known Allergies  Medication List with Changes/Refills   New Medications    SEMAGLUTIDE (OZEMPIC) 1 MG/DOSE (2 MG/1.5 ML) PNIJ    Inject 0.75 mLs into the skin every 7 days.   Current Medications    AMLODIPINE (NORVASC) 10 MG TABLET    Take 1 tablet (10 mg total) by mouth once daily.    LOSARTAN (COZAAR) 100 MG TABLET    Take 1 tablet (100 mg total) by mouth once daily.    MELOXICAM (MOBIC) 15 MG TABLET    Take 1 tablet (15 mg total) by mouth once daily.    ROSUVASTATIN (CRESTOR) 20 MG TABLET    Take 1 tablet (20 mg total) by mouth once daily.   Discontinued Medications    ACETAMINOPHEN (TYLENOL) 325 MG TABLET    Take 325 mg by mouth every 6 (six) hours as needed.    IBUPROFEN (ADVIL,MOTRIN) 200 MG TABLET    Take 200 mg by mouth every 6 (six) hours as needed.    SEMAGLUTIDE (OZEMPIC) 0.25 MG OR 0.5 MG(2 MG/1.5 ML) PNIJ    Inject 0.5 mg into the skin once a week.      Social History     Tobacco Use    Smoking status: Never Smoker    Smokeless tobacco: Never Used   Substance Use Topics    Alcohol use: No     Frequency: Monthly or less     Drinks per session: 1 or 2     Binge frequency: Less than monthly      Family History   Problem Relation Age of Onset    Hypertension Mother     Hyperlipidemia Mother     Diabetes Father     Hypertension Father     Kidney disease Father     Coronary artery disease Maternal Grandmother     Stroke Maternal Grandfather     Diabetes Paternal Grandmother     Diabetes Paternal Grandfather     Cancer Paternal Grandfather        I have reviewed the complete PMH, social history, surgical history, allergies and medications.  As well as family history.    Review of Systems   Constitutional: Negative for chills, fatigue, fever and unexpected weight change.   HENT: Negative for ear pain and sore throat.    Eyes: Negative for redness and visual disturbance.   Respiratory: Negative for cough and shortness of breath.   "  Cardiovascular: Negative for chest pain and palpitations.   Gastrointestinal: Negative for nausea and vomiting.   Endocrine: Negative for cold intolerance and heat intolerance.   Genitourinary: Negative for difficulty urinating and hematuria.   Musculoskeletal: Negative for arthralgias and myalgias.        BACK PAIN HAS IMPROVED   Skin: Negative for rash and wound.   Allergic/Immunologic: Negative for environmental allergies and food allergies.   Neurological: Negative for weakness and headaches.   Hematological: Negative for adenopathy. Does not bruise/bleed easily.   Psychiatric/Behavioral: Negative for sleep disturbance. The patient is not nervous/anxious.      Objective:   BP (!) 150/84   Pulse 93   Temp 97.9 °F (36.6 °C) (Temporal)   Ht 6' 7" (2.007 m)   Wt (!) 198.4 kg (437 lb 6.4 oz)   BMI 49.28 kg/m²   Physical Exam  Vitals signs and nursing note reviewed.   Constitutional:       General: He is not in acute distress.     Appearance: He is well-developed.   HENT:      Head: Normocephalic and atraumatic.   Eyes:      Pupils: Pupils are equal, round, and reactive to light.   Neck:      Musculoskeletal: Normal range of motion and neck supple.   Cardiovascular:      Rate and Rhythm: Normal rate and regular rhythm.      Pulses:           Dorsalis pedis pulses are 2+ on the right side and 2+ on the left side.      Heart sounds: Normal heart sounds. No murmur.   Pulmonary:      Effort: Pulmonary effort is normal. No respiratory distress.      Breath sounds: Normal breath sounds. No wheezing.   Abdominal:      General: Bowel sounds are normal.      Palpations: Abdomen is soft.   Musculoskeletal: Normal range of motion.      Right foot: Normal range of motion. No deformity.      Left foot: Normal range of motion. No deformity.   Feet:      Right foot:      Skin integrity: No ulcer, blister, skin breakdown, erythema, warmth, callus or dry skin.      Left foot:      Skin integrity: No ulcer, blister, skin " breakdown, erythema, warmth, callus or dry skin.   Skin:     General: Skin is warm and dry.      Capillary Refill: Capillary refill takes less than 2 seconds.   Neurological:      Mental Status: He is alert and oriented to person, place, and time.      Cranial Nerves: No cranial nerve deficit.   Psychiatric:         Behavior: Behavior normal.         Assessment:     1. Type 2 diabetes mellitus with hyperglycemia, without long-term current use of insulin    2. Severe obstructive sleep apnea-hypopnea syndrome    3. Hypertension associated with diabetes    4. Hyperlipidemia associated with type 2 diabetes mellitus    5. Encounter for long-term (current) use of medications    6. Class 3 obesity with alveolar hypoventilation, serious comorbidity, and body mass index (BMI) of 45.0 to 49.9 in adult      MDM:   MODERATE COMPLEXITY.  MODERATE RISK.  I have Reviewed and summarized old records.  I have performed thorough medication reconciliation today and discussed risk and benefits of each medication.  I have reviewed  SLEEP STUDY, labs and discussed with patient.  All questions were answered.  I am requesting old records and will review them once they are available.    I have signed for the following orders AND/OR meds.  Orders Placed This Encounter   Procedures    CPAP FOR HOME USE     Order Specific Question:   Type:     Answer:   Auto CPAP     Order Specific Question:   Auto CPAP pressure setting range (cmH20):     Answer:   6-20     Order Specific Question:   Length of need (1-99 months):     Answer:   99     Order Specific Question:   Humidification:     Answer:   Heated     Order Specific Question:   Type of mask:     Answer:   FFM     Order Specific Question:   Headgear?     Answer:   Yes     Order Specific Question:   Tubing?     Answer:   Yes     Order Specific Question:   Humidifier chamber?     Answer:   Yes     Order Specific Question:   Chin strap?     Answer:   Yes     Order Specific Question:   Filters?      Answer:   Yes     Order Specific Question:   Cushions?     Answer:   Yes    CPAP/BIPAP SUPPLIES     Order Specific Question:   Type of mask:     Answer:   FFM     Order Specific Question:   Headgear?     Answer:   Yes     Order Specific Question:   Tubing?     Answer:   Yes     Order Specific Question:   Humidifier chamber?     Answer:   Yes     Order Specific Question:   Chin strap?     Answer:   Yes     Order Specific Question:   Filters?     Answer:   Yes     Order Specific Question:   Cushions?     Answer:   Yes     Order Specific Question:   Length of need (1-99 months):     Answer:   99    MyChart Patient Entered Blood Pressure     Order Specific Question:   After how many days would you like to receive a notification of this patient's flowsheet entries?     Answer:   30    MyChart Patient Entered Pulse     Order Specific Question:   After how many days would you like to receive a notification of this patient's flowsheet entries?     Answer:   30    MyChart Patient Entered Weight     Please track your weight day so that I can review the daily control that you have.  This will be uploaded to your EPIC chart at our office so that we can care for you better.  Dr. Serafin Garcia     Order Specific Question:   After how many days would you like to receive a notification of this patient's flowsheet entries?     Answer:   30     Medications Ordered This Encounter   Medications    semaglutide (OZEMPIC) 1 mg/dose (2 mg/1.5 mL) PnIj     Sig: Inject 0.75 mLs into the skin every 7 days.     Dispense:  2 Syringe     Refill:  11        Follow up in about 3 months (around 12/22/2020), or if symptoms worsen or fail to improve, for DM, HTN.    If no improvement in symptoms or symptoms worsen, advised to call/follow-up at clinic or go to ER. Patient voiced understanding and all questions/concerns were addressed.     DISCLAIMER: This note was compiled by using a speech recognition dictation system and therefore please be  aware that typographical / speech recognition errors can and do occur.  Please contact me if you see any errors specifically.    Nathaniel Begum M.D.       Office: [phone]   [id number] [place] [address]  FAX: [phone]

## 2022-03-13 NOTE — ED PROVIDER NOTE - NS ED ATTENDING STATEMENT MOD
I have personally provided the amount of critical care time documented below excluding time spent on separate procedures. This was a shared visit with the ADRIENNE. I reviewed and verified the documentation and independently performed the documented:

## 2022-03-13 NOTE — PROVIDER CONTACT NOTE (HYPOGLYCEMIA EVENT) - NS PROVIDER CONTACT BACKGROUND-HYPO
Age: 89y    Gender: Male    POCT Blood Glucose:  96 mg/dL (03-13-22 @ 22:49)  41 mg/dL (03-13-22 @ 22:18)  42 mg/dL (03-13-22 @ 22:16)  166 mg/dL (03-13-22 @ 17:59)      eMAR:  dextrose 50% Injectable   25 Gram(s) IV Push (03-13-22 @ 22:33)    insulin lispro (ADMELOG) corrective regimen sliding scale   1 Unit(s) SubCutaneous (03-13-22 @ 18:50)

## 2022-03-13 NOTE — ED ADULT NURSE NOTE - NSIMPLEMENTINTERV_GEN_ALL_ED
Implemented All Fall with Harm Risk Interventions:  [place] to call system. Call bell, personal items and telephone within reach. Instruct patient to call for assistance. Room bathroom lighting operational. Non-slip footwear when patient is off stretcher. Physically safe environment: no spills, clutter or unnecessary equipment. Stretcher in lowest position, wheels locked, appropriate side rails in place. Provide visual cue, wrist band, yellow gown, etc. Monitor gait and stability. Monitor for mental status changes and reorient to person, place, and time. Review medications for side effects contributing to fall risk. Reinforce activity limits and safety measures with patient and family. Provide visual clues: red socks.

## 2022-03-14 NOTE — DIETITIAN INITIAL EVALUATION ADULT. - ORAL INTAKE PTA/DIET HISTORY
Unable to obtain PO intake PTA 2/2 pt asleep and very lethargic upon assessment. From SNF - on puree diet w/ nectar fluids (aspiration precautions)

## 2022-03-14 NOTE — PROGRESS NOTE ADULT - ASSESSMENT
Imp:       Plan:      DVT ppx: sc heparin   Nutrition: npo pending swallow eval   Central line: no  Arterial line: no  [place]: yes  Restraints: no  Activity: bedrest     Code status: full     Disposition: ICU     Updated:  89M PMH HTN, DM2 not on insulin, HLD, SDH, cachexia/severe malnutrition, sent from NH for vomiting and diarrhea, found to have septic shock from multifocal pneumonia + UTI, also found to have stercoral colitis.     Remains on levo with decreasing requirement       Plan:  Monitor neuro status, daily reorientation when interactive   Wean levo for goal MAP>65, started on midodrine   Elevated trop secondary to septic shock, EKG w/o ischemic changes, trend trop   Keep NPO, swallow eval, may need NGT   Continue zosyn, f/u cultures  Hypoglycemia - on D5LR, goal glu 120-180  Prerenal MÓNICA - improving, UO adequate, maintain dawson for 1 more day   Constipation - on bowel regimen, gave enema earlier   Nutrition consult     Prognosis extremely poor, C d/w family by Dr. Raine Hardin - patient full code     Remains critically ill with high risk for decompensation and death     DVT ppx: sc heparin   Nutrition: npo pending swallow eval   Central line: no  Arterial line: no  [place]: yes  Restraints: no  Activity: bedrest     Code status: full     Disposition: ICU     Updated:

## 2022-03-14 NOTE — DIETITIAN INITIAL EVALUATION ADULT. - OTHER INFO
Pt is a 89 y/o male w/ PMH GERD, BPH, T2DM, HLD, HTN, CAD, SDH s/p craniotomy and surgical removal. Admitted to ED from SNF ([facility]) for vomiting. As per SNF, pt w/ coffee ground emesis, loose stool (dark in appearance), pale skin, and cold to the touch. Pt denies abdominal pain, N/V. Pt is full code, as per son - wants aggregative interventions.     Pt known to RD service. Appears very thin, frail, and skinny w/ severe muscle and fat wasting, meeting criteria for PCM. Pt currently NPO - consult for SLP appreciated. Wt from SNF upon admission at 91.6# - ? accuracy. As per last RD note (10/27/20), wt documented at 102.5#. Bed scale wt obtained by RD on 3/14 at 77.5#, 25# wt loss/ 24.39% wt change x 2.5 years (not clin sig). Advance diet when medically feasible. If failed bedside swallow evaluation, re-consult RD for corpak placement and initiate TF. See recommendations below. Pt is a 89 y/o male w/ PMH GERD, BPH, T2DM, HLD, HTN, CAD, SDH s/p craniotomy and surgical removal. Admitted to ED from SNF ([facility]) for vomiting. As per SNF, pt w/ coffee ground emesis, loose stool (dark in appearance), pale skin, and cold to the touch. Pt denies abdominal pain, N/V. Pt is full code, as per son - wants aggregative interventions.     Pt known to RD service. Appears very thin, frail, and skinny w/ severe muscle and fat wasting, meeting criteria for PCM. Pt currently NPO - consult for SLP appreciated. Wt from SNF upon admission at 91.6# - ? accuracy. As per last RD note (10/27/20), wt documented at 102.5#. Bed scale wt obtained by RD on 3/14 at 77.5#, 25# wt loss/ 24.39% wt change x 2.5 years (not clin sig). Advance diet to regular w/ texture and Ensure Enlive TID when medically feasible. If failed bedside swallow evaluation, recommend corpak placement, re-consult RD to initiate TF. See recommendations below.

## 2022-03-14 NOTE — PROGRESS NOTE ADULT - SUBJECTIVE AND OBJECTIVE BOX
Patient is a 89y old  Male who presents with a chief complaint of Coffee ground emesis (14 Mar 2022 10:27)    24 hour events: remains on levo     PAST MEDICAL & SURGICAL HISTORY:  CAD (coronary artery disease)    HTN (hypertension)    HLD (hyperlipidemia)    DM (diabetes mellitus)    BPH (benign prostatic hyperplasia)    GERD (gastroesophageal reflux disease)    SDH (subdural hematoma)        Allergies    No Known Allergies    Intolerances      REVIEW OF SYSTEMS: SEE BELOW       ICU Vital Signs Last 24 Hrs  T(C): 37.5 (14 Mar 2022 11:15), Max: 38.5 (14 Mar 2022 02:00)  T(F): 99.5 (14 Mar 2022 11:15), Max: 101.3 (14 Mar 2022 02:00)  HR: 68 (14 Mar 2022 11:15) (57 - 91)  BP: 133/60 (14 Mar 2022 11:15) (56/48 - 158/62)  BP(mean): 79 (14 Mar 2022 11:15) (48 - 129)  ABP: --  ABP(mean): --  RR: 21 (14 Mar 2022 11:15) (15 - 29)  SpO2: 94% (14 Mar 2022 11:15) (61% - 100%)      CAPILLARY BLOOD GLUCOSE      POCT Blood Glucose.: 126 mg/dL (14 Mar 2022 11:32)  POCT Blood Glucose.: 148 mg/dL (14 Mar 2022 05:47)  POCT Blood Glucose.: 96 mg/dL (13 Mar 2022 22:49)  POCT Blood Glucose.: 41 mg/dL (13 Mar 2022 22:18)  POCT Blood Glucose.: 42 mg/dL (13 Mar 2022 22:16)  POCT Blood Glucose.: 166 mg/dL (13 Mar 2022 17:59)      I&O's Summary    13 Mar 2022 07:01  -  14 Mar 2022 07:00  --------------------------------------------------------  IN: 3087 mL / OUT: 757 mL / NET: 2330 mL    14 Mar 2022 07:01  -  14 Mar 2022 12:30  --------------------------------------------------------  IN: 0 mL / OUT: 215 mL / NET: -215 mL            MEDICATIONS  (STANDING):  ascorbic acid 500 milliGRAM(s) Oral two times a day  bethanechol 50 milliGRAM(s) Oral two times a day  chlorhexidine 4% Liquid 1 Application(s) Topical <User Schedule>  dextrose 40% Gel 15 Gram(s) Oral once  dextrose 5% + lactated ringers. 1000 milliLiter(s) (125 mL/Hr) IV Continuous <Continuous>  dextrose 5%. 1000 milliLiter(s) (50 mL/Hr) IV Continuous <Continuous>  dextrose 5%. 1000 milliLiter(s) (100 mL/Hr) IV Continuous <Continuous>  dextrose 50% Injectable 25 Gram(s) IV Push once  dextrose 50% Injectable 12.5 Gram(s) IV Push once  dextrose 50% Injectable 25 Gram(s) IV Push once  finasteride 5 milliGRAM(s) Oral daily  gabapentin 100 milliGRAM(s) Oral two times a day  glucagon  Injectable 1 milliGRAM(s) IntraMuscular once  heparin   Injectable 5000 Unit(s) SubCutaneous every 12 hours  insulin lispro (ADMELOG) corrective regimen sliding scale   SubCutaneous three times a day before meals  insulin lispro (ADMELOG) corrective regimen sliding scale   SubCutaneous at bedtime  midodrine 5 milliGRAM(s) Oral every 8 hours  multivitamin 1 Tablet(s) Oral daily  multivitamin/minerals 1 Tablet(s) Oral daily  norepinephrine Infusion 0.1 MICROgram(s)/kG/Min (10.6 mL/Hr) IV Continuous <Continuous>  pantoprazole  Injectable 40 milliGRAM(s) IV Push daily  piperacillin/tazobactam IVPB.. 3.375 Gram(s) IV Intermittent every 8 hours  polyethylene glycol 3350 17 Gram(s) Oral at bedtime  senna 2 Tablet(s) Oral at bedtime  tamsulosin 0.4 milliGRAM(s) Oral at bedtime  thiamine 100 milliGRAM(s) Oral daily  zinc sulfate 220 milliGRAM(s) Oral daily      MEDICATIONS  (PRN):  acetaminophen     Tablet .. 650 milliGRAM(s) Oral every 6 hours PRN Mild Pain (1 - 3)  magnesium hydroxide Suspension 30 milliLiter(s) Oral Once PRN Constipation      PHYSICAL EXAM: SEE BELOW                          11.3   15.25 )-----------( 200      ( 14 Mar 2022 06:49 )             34.0     Bands 4.0  Bands 12.5    03-14    143  |  112<H>  |  30<H>  ----------------------------<  156<H>  3.7   |  23  |  1.19    Ca    8.1<L>      14 Mar 2022 06:49  Phos  2.7     03-14  Mg     1.5     03-14    TPro  5.6<L>  /  Alb  2.1<L>  /  TBili  1.1  /  DBili  x   /  AST  88<H>  /  ALT  29  /  AlkPhos  61  -14    Lactate 3.4           -14 @ 10:02    Lactate 8.0           03-13 @ 16:43    Lactate 8.5           - @ 13:54      CARDIAC MARKERS ( 14 Mar 2022 06:49 )  x     / x     / 2000 U/L / x     / x          PT/INR - ( 13 Mar 2022 13:54 )   PT: 13.2 sec;   INR: 1.14 ratio         PTT - ( 13 Mar 2022 13:54 )  PTT:32.2 sec  Urinalysis Basic - ( 13 Mar 2022 13:54 )    Color: Yellow / Appearance: very cloudy / S.020 / pH: x  Gluc: x / Ketone: Trace  / Bili: Negative / Urobili: Negative   Blood: x / Protein: 100 / Nitrite: Negative   Leuk Esterase: Moderate / RBC: 6-10 /HPF / WBC >50   Sq Epi: x / Non Sq Epi: Few / Bacteria: Moderate          Rapid RVP Result: NotDetec ( @ 13:14)

## 2022-03-14 NOTE — PROGRESS NOTE ADULT - ASSESSMENT
89 yo M w PMH of BPH, CAD, HTN, DMII, GERD, HLD, SDH s/p craniotomy and surgical removal is brought into HH from [facility] for vomiting. As per facility, pt had large amount coffee ground emesis and persistent loose stool, dark in appearance, skin color pale and cold to the touch. In ED, pt fount to be hypoxic, placed on non-rebreather. Leukocytosis of 14, Cr 1.6, Trop 202, lactate 8.5, Ph 5.4. U/A is very cloudy, w mod LE, blood & bacteria. CXR demonstrates lower lobe infiltrates. Given Vanc & Zosyn in ED, given 2L NS (30 cc/kg), dawson was placed, blood & urine cx were obtained.    #Septic Shock  - Multiple possible sources of infection: cystitis vs PNA vs gallbladder sludge  - S/P 30 cc/kg, continue maintenance fluids at D5/LR @125  -Wean off norepi, started on midodrine 5mg q8H, ensure MAP>65  -Monitor temps, patient cycling between hypothermic and febrile  -S/P Vanc & Zosyn in ED. Pt w hx of VRE in 2020  -Spoke w Dr. Pino, continue w Zosyn. Avoid further Vanc/Dapto until urine cx result to avoid further nephrotoxicity  -F/U Urine & blood cxs  -ID consulted  -Lactate 8.0, F/U repeat  -Leukocytosis persistent    #PNA  -Comfortable on 3L NC  -Titrate O2 as tolerated  -CT abd/chest confirms multifocal PNA  -Continue abx    #Cystitis  -U/A w mod LE, bacteria and cloudy appearance  -Hx of VRE in 2020  -S/P Vanc & Zosyn in ED. Continue Zoysn.   -F/U Urine cx  -Continue dawson catheter     #Stercoral Proctitis  -CT abd reveals large stool burden  -Fleet enema  -Started on bowel regimen: Miralax + Senna    #MÓNICA  -Cr elevated at 1.6 upon presentation (baseline 0.7)  -Improved w fluid resuscitation/pressors  -Most likely 2/2 ATN from septic shock  -Continue IV fluids & pressors  -Monitor renal function    #GI Bleed  -Initially brought in from nursing home 2/2 coffee ground emesis and dark colored stools  -H&H stable, decrease attributable to fluid resuscitation  -Monitor H&H  -IV PPI    #Type II NSTEMI  -Trops trending up, this AM 1021  -CK elevated at 2000  -F/U EKG & Echo  -Most likely in the setting of septic shock  -Consider Cardiology consult for ischemic work-up once pt is more stable    #DMII  -ISS  -NPO, awaiting speech & swallow eval    #BPH  -Restart on home finasteride, bethanechol and tamsulosin once pt tolerates PO  -Continue w dawson    #Protein Calorie Malnutrition  -Pt w severe malnutrition, cachetic and contracted  -Nutrition consult appreciated, started on MVI, Thiamine, Zinc & Vit C  -F/U speech and swallow eval  -May need NG tube if fails speech and swallow    #Diet  -NPO in interim    #DVT PPX  -Started on Heparin for DVT PPX    IMPROVE VTE Individual Risk Assessment    RISK                                                                Points    [  ] Previous VTE                                                  3    [  ] Thrombophilia                                               2    [ 2 ] Lower limb paralysis                                      2        (unable to hold up >15 seconds)      [  ] Current Cancer                                              2         (within 6 months)    [ 1 ] Immobilization > 24 hrs                                1    [ 1 ] ICU/CCU stay > 24 hours                              1    [ 1 ] Age > 60                                                      1    IMPROVE VTE Score ___5______    IMPROVE Score 0-1: Low Risk, No VTE prophylaxis required for most patients, encourage ambulation.   IMPROVE Score 2-3: At risk, pharmacologic VTE prophylaxis is indicated for most patients (in the absence of a contraindication)  IMPROVE Score > or = 4: High Risk, pharmacologic VTE prophylaxis is indicated for most patients (in the absence of a contraindication)    #GOC  -Extensive GOC w patient's son, Caleb. He is deaf, therefore video call  was utilized. Explained in detail patient's frail state, use of pressors for proper perfusion & severe malnutrition. Explained risks and benefits of cardiac resuscitation as well as intubation, son insisted on FULL CODE. Encouraged him to come see his father in person.     *Above discussed w Dr. Harris

## 2022-03-14 NOTE — CONSULT NOTE ADULT - SUBJECTIVE AND OBJECTIVE BOX
Patient is a 89y old  Male who presents with a chief complaint of Coffee ground emesis    HPI:  89 y/o Male with h/o BPH, CAD, HTN, DM type II, GERD, HLD, SDH s/p craniotomy, prior UTI with VREF was admitted on 3/12 from [facility] for vomiting. As per facility, pt had large amount coffee ground emesis and persistent loose stool, dark in appearance, skin color pale and cold to the touch. Pt is on 2L O2 via nasal canula. He is a poor historian, In ED, pt found hypoxic, placed on non-rebreather. Urine reported cloudy.  He received vancomycin IV x 1 and zosyn.    PMH: as above  PSH: as above  Meds: per reconciliation sheet, noted below  MEDICATIONS  (STANDING):  ascorbic acid 500 milliGRAM(s) Oral two times a day  bethanechol 50 milliGRAM(s) Oral two times a day  chlorhexidine 4% Liquid 1 Application(s) Topical <User Schedule>  dextrose 40% Gel 15 Gram(s) Oral once  dextrose 5% + lactated ringers. 1000 milliLiter(s) (125 mL/Hr) IV Continuous <Continuous>  dextrose 5%. 1000 milliLiter(s) (50 mL/Hr) IV Continuous <Continuous>  dextrose 5%. 1000 milliLiter(s) (100 mL/Hr) IV Continuous <Continuous>  dextrose 50% Injectable 25 Gram(s) IV Push once  dextrose 50% Injectable 12.5 Gram(s) IV Push once  dextrose 50% Injectable 25 Gram(s) IV Push once  finasteride 5 milliGRAM(s) Oral daily  gabapentin 100 milliGRAM(s) Oral two times a day  glucagon  Injectable 1 milliGRAM(s) IntraMuscular once  heparin   Injectable 5000 Unit(s) SubCutaneous every 12 hours  insulin lispro (ADMELOG) corrective regimen sliding scale   SubCutaneous three times a day before meals  insulin lispro (ADMELOG) corrective regimen sliding scale   SubCutaneous at bedtime  midodrine 5 milliGRAM(s) Oral every 8 hours  multivitamin 1 Tablet(s) Oral daily  multivitamin/minerals 1 Tablet(s) Oral daily  norepinephrine Infusion 0.1 MICROgram(s)/kG/Min (10.6 mL/Hr) IV Continuous <Continuous>  piperacillin/tazobactam IVPB.. 3.375 Gram(s) IV Intermittent every 8 hours  polyethylene glycol 3350 17 Gram(s) Oral at bedtime  senna 2 Tablet(s) Oral at bedtime  tamsulosin 0.4 milliGRAM(s) Oral at bedtime  thiamine Injectable 100 milliGRAM(s) IV Push daily  zinc sulfate 220 milliGRAM(s) Oral daily    MEDICATIONS  (PRN):  acetaminophen     Tablet .. 650 milliGRAM(s) Oral every 6 hours PRN Mild Pain (1 - 3)  magnesium hydroxide Suspension 30 milliLiter(s) Oral Once PRN Constipation    Allergies    No Known Allergies    Intolerances      Social: no smoking, no alcohol, no illegal drugs; no recent travel, no exposure to TB  FAMILY HISTORY:  No pertinent family history in first degree relatives      no history of premature cardiovascular disease in first degree relatives    ROS: the patient denies fever, no chills, no HA, no seizures, no dizziness, no sore throat, no nasal congestion, no blurry vision, no CP, no palpitations, no SOB, no cough, no abdominal pain, no diarrhea, no N/V, no dysuria, no leg pain, no claudication, no rash, no joint aches, no rectal pain or bleeding, no night sweats  All other systems reviewed and are negative    Vital Signs Last 24 Hrs  T(C): 37.3 (14 Mar 2022 14:30), Max: 38.5 (14 Mar 2022 02:00)  T(F): 99.1 (14 Mar 2022 14:30), Max: 101.3 (14 Mar 2022 02:00)  HR: 66 (14 Mar 2022 14:30) (57 - 90)  BP: 121/53 (14 Mar 2022 14:30) (56/48 - 158/62)  BP(mean): 71 (14 Mar 2022 14:30) (48 - 129)  RR: 15 (14 Mar 2022 14:30) (15 - 29)  SpO2: 96% (14 Mar 2022 14:30) (61% - 100%)  Daily     Daily     PE:    Constitutional:  No acute distress  HEENT: NC/AT, EOMI, PERRLA, conjunctivae clear; ears and nose atraumatic; pharynx benign  Neck: supple; thyroid not palpable  Back: no tenderness  Respiratory: respiratory effort normal; clear to auscultation  Cardiovascular: S1S2 regular, no murmurs  Abdomen: soft, not tender, not distended, positive BS; no liver or spleen organomegaly  Genitourinary: no suprapubic tenderness  Lymphatic: no LN palpable  Musculoskeletal: no muscle tenderness, no joint swelling or tenderness  Extremities: no pedal edema  Neurological/ Psychiatric: AxOx3, judgement and insight normal; moving all extremities  Skin: no rashes; no palpable lesions    Labs: all available labs reviewed                        11.3   15.25 )-----------( 200      ( 14 Mar 2022 06:49 )             34.0     -    143  |  112<H>  |  30<H>  ----------------------------<  156<H>  3.7   |  23  |  1.19    Ca    8.1<L>      14 Mar 2022 06:49  Phos  2.7     -  Mg     1.5         TPro  5.6<L>  /  Alb  2.1<L>  /  TBili  1.1  /  DBili  x   /  AST  88<H>  /  ALT  29  /  AlkPhos  61  14     LIVER FUNCTIONS - ( 14 Mar 2022 06:49 )  Alb: 2.1 g/dL / Pro: 5.6 gm/dL / ALK PHOS: 61 U/L / ALT: 29 U/L / AST: 88 U/L / GGT: x           Urinalysis Basic - ( 13 Mar 2022 13:54 )    Color: Yellow / Appearance: very cloudy / S.020 / pH: x  Gluc: x / Ketone: Trace  / Bili: Negative / Urobili: Negative   Blood: x / Protein: 100 / Nitrite: Negative   Leuk Esterase: Moderate / RBC: 6-10 /HPF / WBC >50   Sq Epi: x / Non Sq Epi: Few / Bacteria: Moderate    ( @ 13:14)  [facility]      Radiology: all available radiological tests reviewed    Advanced directives addressed: full resuscitation

## 2022-03-14 NOTE — DIETITIAN NUTRITION RISK NOTIFICATION - ADDITIONAL COMMENTS/DIETITIAN RECOMMENDATIONS
1. Advance diet when medically feasible. If failed bedside swallow, re-consult RD for corpak placement and initiate TF, 2. Monitor bowel movements, if no BM for >3 days, consider implementing bowel regimen, 3. Maintain aspiration precautions, back of bed >35 degrees, 4. Recommend to add MVI w/minerals, Vit C 500 mg BID, add Zinc Sulfate 220 mg x 10 days to promote wound healing, 5. Thiamine 100 mg daily 2/2 poor PO intake and malnutrition, 6. Obtain daily wt to track/trend changes, RD will continue to monitor PO intake, labs, hydration, and wt prn. 1. When medically feasible from SLP, advance diet to regular diet w/ texture and Ensure Enlive TID, 2. If failed bedside swallow evaluation, place corpak and re-consult RD to initiate TF, 2. Monitor bowel movements, if no BM for >3 days, consider implementing bowel regimen, 3. Maintain aspiration precautions, back of bed >35 degrees, 4. Recommend to add MVI w/minerals, Vit C 500 mg BID, add Zinc Sulfate 220 mg x 10 days to promote wound healing, 5. Thiamine 100 mg daily 2/2 poor PO intake and malnutrition, 6. Obtain daily wt to track/trend changes, RD will continue to monitor PO intake, labs, hydration, and wt prn.

## 2022-03-14 NOTE — CONSULT NOTE ADULT - ASSESSMENT
89 y/o Male with h/o BPH, CAD, HTN, DM type II, GERD, HLD, SDH s/p craniotomy, prior UTI with VREF was admitted on 3/12 from [facility] for vomiting. As per facility, pt had large amount coffee ground emesis and persistent loose stool, dark in appearance, skin color pale and cold to the touch. Pt is on 2L O2 via nasal canula. He is a poor historian, In ED, pt found hypoxic, placed on non-rebreather. Urine reported cloudy.  He received vancomycin IV x 1 and zosyn.    1. Febrile syndrome. Hypotension. Probable sepsis. Pyuria. Probable UTI. ARF.   -obtain BC x 2, urine c/s  -agree with zosyn 3.375 gm IV q8h  -reason for abx use and side effects reviewed with patient; monitor BMP   -pressors per ICU  -old chart reviewed to assess prior cultures  -dawson in place; no h/o urinary retention  -monitor temps  -f/u CBC  -supportive care  2. Other issues:   -care per medicine    d/w medical team

## 2022-03-14 NOTE — CHART NOTE - NSCHARTNOTEFT_GEN_A_CORE
CORPAK insertion    Corpak inserted into R nare. Pt tolerated procedure well. Gurgling auscultated w air inflation. CXR obtained to confirm proper placement. Ok to use CORPAK for supplemental feeding since pt is not tolerating PO.

## 2022-03-14 NOTE — PROGRESS NOTE ADULT - SUBJECTIVE AND OBJECTIVE BOX
Patient is a 89y old  Male who presents with a chief complaint of Coffee ground emesis (13 Mar 2022 15:18)    PAST MEDICAL & SURGICAL HISTORY:  CAD (coronary artery disease)    HTN (hypertension)    HLD (hyperlipidemia)    DM (diabetes mellitus)    BPH (benign prostatic hyperplasia)    GERD (gastroesophageal reflux disease)    SDH (subdural hematoma)      CHEL LEVY   89y    Male    BRIEF HOSPITAL COURSE:    Review of Systems:   urnrliab;le                    All other ROS are negative.    Allergies    No Known Allergies    Intolerances      Weight (kg): 56.7 (22 @ 13:24)  BMI (kg/m2): 18.5 (22 @ 13:24)    ICU Vital Signs Last 24 Hrs  T(C): 37.2 (13 Mar 2022 23:00), Max: 37.3 (13 Mar 2022 13:24)  T(F): 99 (13 Mar 2022 23:00), Max: 99.2 (13 Mar 2022 16:00)  HR: 84 (13 Mar 2022 23:00) (57 - 91)  BP: 130/73 (13 Mar 2022 23:00) (56/48 - 146/131)  BP(mean): 86 (13 Mar 2022 23:00) (48 - 106)  ABP: --  ABP(mean): --  RR: 18 (13 Mar 2022 23:00) (15 - 29)  SpO2: 100% (13 Mar 2022 23:00) (61% - 100%)      ABG - ( 13 Mar 2022 15:21 )  pH, Arterial: 7.29  pH, Blood: x     /  pCO2: 41    /  pO2: 232   / HCO3: 20    / Base Excess: -6.5  /  SaO2: 99          LABS:                        14.5   14.56 )-----------( 323      ( 13 Mar 2022 13:54 )             43.8     03-13    142  |  104  |  29<H>  ----------------------------<  236<H>  3.7   |  26  |  1.63<H>    Ca    9.0      13 Mar 2022 13:54  Phos  5.4     03-13  Mg     2.0     03-13    TPro  7.0  /  Alb  2.7<L>  /  TBili  1.1  /  DBili  x   /  AST  28  /  ALT  16  /  AlkPhos  103  -13          CAPILLARY BLOOD GLUCOSE      POCT Blood Glucose.: 96 mg/dL (13 Mar 2022 22:49)  POCT Blood Glucose.: 41 mg/dL (13 Mar 2022 22:18)  POCT Blood Glucose.: 42 mg/dL (13 Mar 2022 22:16)  POCT Blood Glucose.: 166 mg/dL (13 Mar 2022 17:59)    PT/INR - ( 13 Mar 2022 13:54 )   PT: 13.2 sec;   INR: 1.14 ratio         PTT - ( 13 Mar 2022 13:54 )  PTT:32.2 sec  Urinalysis Basic - ( 13 Mar 2022 13:54 )    Color: Yellow / Appearance: very cloudy / S.020 / pH: x  Gluc: x / Ketone: Trace  / Bili: Negative / Urobili: Negative   Blood: x / Protein: 100 / Nitrite: Negative   Leuk Esterase: Moderate / RBC: 6-10 /HPF / WBC >50   Sq Epi: x / Non Sq Epi: Few / Bacteria: Moderate      CULTURES:  Rapid RVP Result: NotDetec ( @ 13:14)      Medications:  MEDICATIONS  (STANDING):  bethanechol 50 milliGRAM(s) Oral two times a day  chlorhexidine 4% Liquid 1 Application(s) Topical <User Schedule>  dextrose 40% Gel 15 Gram(s) Oral once  dextrose 5% + lactated ringers. 1000 milliLiter(s) (125 mL/Hr) IV Continuous <Continuous>  dextrose 5%. 1000 milliLiter(s) (50 mL/Hr) IV Continuous <Continuous>  dextrose 5%. 1000 milliLiter(s) (100 mL/Hr) IV Continuous <Continuous>  dextrose 50% Injectable 25 Gram(s) IV Push once  dextrose 50% Injectable 12.5 Gram(s) IV Push once  dextrose 50% Injectable 25 Gram(s) IV Push once  finasteride 5 milliGRAM(s) Oral daily  gabapentin 100 milliGRAM(s) Oral two times a day  glucagon  Injectable 1 milliGRAM(s) IntraMuscular once  insulin lispro (ADMELOG) corrective regimen sliding scale   SubCutaneous three times a day before meals  insulin lispro (ADMELOG) corrective regimen sliding scale   SubCutaneous at bedtime  multivitamin 1 Tablet(s) Oral daily  norepinephrine Infusion 0.1 MICROgram(s)/kG/Min (10.6 mL/Hr) IV Continuous <Continuous>  pantoprazole  Injectable 40 milliGRAM(s) IV Push daily  piperacillin/tazobactam IVPB.. 3.375 Gram(s) IV Intermittent every 8 hours  tamsulosin 0.4 milliGRAM(s) Oral at bedtime    MEDICATIONS  (PRN):  acetaminophen     Tablet .. 650 milliGRAM(s) Oral every 6 hours PRN Mild Pain (1 - 3)  magnesium hydroxide Suspension 30 milliLiter(s) Oral Once PRN Constipation         @ 07:  -   @ 23:29  --------------------------------------------------------  IN: 1289 mL / OUT: 372 mL / NET: 917 mL        RADIOLOGY/IMAGING/ECHO    RLL infiltrate on CXR and CT     renal stone on the R no hydro     Assessment/Plan:    89[facility]x  BPH, CAD, HTN, DM (2)  GERD, HLD, SDH s/p craniotomy admit 3/132 with hypotension hypoxemia and coffee ground emesis     Septic shock  RLL infiltrate > pyuria  > pi/tazo On low dose nor-epi titrating to MAP 65  Add midodrine   Hx VRE  dapto x1 given.    MÓNICA due to ATN sepsis and hypotension   Type 2 demand NSTEMI  add an ASA if hgb remains stable.     NAGMA  Hypoglycemia  dextrose pushed  IVF change to D5LR     Family was called.  They want all medical therapies, not interested in DNR/I     No further coffee ground emesis start [facility] sq for DVT P   PPI      CRITICAL CARE TIME SPENT: 37 minutes assessing presenting problems of acute illness, which pose high probability of life threatening deterioration or end organ damage/dysfunction, as well as medical decision making including initiating plan of care, reviewing data, reviewing radiologic exams, discussing with multidisciplinary team,  discussing goals of care with patient/family, and writing this note.  Non-inclusive of procedures performed,

## 2022-03-14 NOTE — DIETITIAN INITIAL EVALUATION ADULT. - PERTINENT LABORATORY DATA
03-14    143  |  112<H>  |  30<H>  ----------------------------<  156<H>  3.7   |  23  |  1.19    Ca    8.1<L>      14 Mar 2022 06:49  Phos  2.7     03-14  Mg     1.5     03-14    TPro  5.6<L>  /  Alb  2.1<L>  /  TBili  1.1  /  DBili  x   /  AST  88<H>  /  ALT  29  /  AlkPhos  61  03-14    Lactate, Blood: 8.0    Troponin I, High Sensitivity Result: 1021.39    POCT Blood Glucose.: 148 mg/dL (03.14.22 @ 05:47)  POCT Blood Glucose.: 96 mg/dL (03.13.22 @ 22:49)  POCT Blood Glucose.: 41 mg/dL (03.13.22 @ 22:18)  POCT Blood Glucose.: 166 mg/dL (03.13.22 @ 17:59)

## 2022-03-14 NOTE — PROGRESS NOTE ADULT - SUBJECTIVE AND OBJECTIVE BOX
89 yo M w PMH of BPH, CAD, HTN, DMII, GERD, HLD, SDH s/p craniotomy and surgical removal is brought into HH from [facility] for vomiting. As per facility, pt had large amount coffee ground emesis and persistent loose stool, dark in appearance, skin color pale and cold to the touch. Pt is on 2L O2 via nasal canula. Unable to give hx. At bedside, pt states he feels cold, denies abd pain, N/V.   In ED, pt fount to be hypoxic, placed on non-rebreather. Leukocytosis of 14, Cr 1.6, Trop 202, lactate 8.5, Ph 5.4. U/A is very cloudy, w mod LE, blood & bacteria. CXR demonstrates lower lobe infiltrates. Given Vanc & Zosyn in ED, given 2L NS (30 cc/kg), dawson was placed, blood & urine cx were obtained.     03/14: Patient seen at bedside, lethargic, but arousable. Not answering questions when spoken to, frail and contracted.     Vitals:  ============  T(F): 99.9 (14 Mar 2022 10:00), Max: 101.3 (14 Mar 2022 02:00)  HR: 73 (14 Mar 2022 10:00)  BP: 129/54 (14 Mar 2022 10:00)  RR: 25 (14 Mar 2022 10:00)  SpO2: 95% (14 Mar 2022 10:00) (61% - 100%)  temp max in last 48H T(F): , Max: 101.3 (03-14-22 @ 02:00)    Physical Exam   Gen: lethargic  HENT: atraumatic head and ears, no gross abnormalities of ears, dry mucous membranes, no oral lesions, neck supple without masses/goiter/lymphadenopathy  CV: RRR, nl s1/s2, no M/R/G  Pulm: crackles on auscultation, nl respiratory effort on 3  Abd: tenderness upon palpation of suprapubic region, normoactive bowel sounds in all 4 quadrants, soft, nondistended, no rebound, no guarding, no masses  Extremities: contracted, cachectic, no pedal edema, pedal pulses palpable   Skin: cold  Neuro: A&Ox0 today, PERRL, EOMI, face symmetric    =======================================================  Current Antibiotics:  piperacillin/tazobactam IVPB.. 3.375 Gram(s) IV Intermittent every 8 hours    Other medications:  ascorbic acid 500 milliGRAM(s) Oral two times a day  bethanechol 50 milliGRAM(s) Oral two times a day  chlorhexidine 4% Liquid 1 Application(s) Topical <User Schedule>  dextrose 40% Gel 15 Gram(s) Oral once  dextrose 5% + lactated ringers. 1000 milliLiter(s) IV Continuous <Continuous>  dextrose 5%. 1000 milliLiter(s) IV Continuous <Continuous>  dextrose 5%. 1000 milliLiter(s) IV Continuous <Continuous>  dextrose 50% Injectable 25 Gram(s) IV Push once  dextrose 50% Injectable 12.5 Gram(s) IV Push once  dextrose 50% Injectable 25 Gram(s) IV Push once  finasteride 5 milliGRAM(s) Oral daily  gabapentin 100 milliGRAM(s) Oral two times a day  glucagon  Injectable 1 milliGRAM(s) IntraMuscular once  heparin   Injectable 5000 Unit(s) SubCutaneous every 12 hours  insulin lispro (ADMELOG) corrective regimen sliding scale   SubCutaneous three times a day before meals  insulin lispro (ADMELOG) corrective regimen sliding scale   SubCutaneous at bedtime  midodrine 5 milliGRAM(s) Oral every 8 hours  mineral oil enema 133 milliLiter(s) Rectal once  multivitamin 1 Tablet(s) Oral daily  multivitamin/minerals 1 Tablet(s) Oral daily  norepinephrine Infusion 0.1 MICROgram(s)/kG/Min IV Continuous <Continuous>  pantoprazole  Injectable 40 milliGRAM(s) IV Push daily  polyethylene glycol 3350 17 Gram(s) Oral at bedtime  senna 2 Tablet(s) Oral at bedtime  tamsulosin 0.4 milliGRAM(s) Oral at bedtime  thiamine 100 milliGRAM(s) Oral daily  zinc sulfate 220 milliGRAM(s) Oral daily      =======================================================  Labs:                        11.3   15.25 )-----------( 200      ( 14 Mar 2022 06:49 )             34.0     03-14    143  |  112<H>  |  30<H>  ----------------------------<  156<H>  3.7   |  23  |  1.19    Ca    8.1<L>      14 Mar 2022 06:49  Phos  2.7     03-14  Mg     1.5     03-14    TPro  5.6<L>  /  Alb  2.1<L>  /  TBili  1.1  /  DBili  x   /  AST  88<H>  /  ALT  29  /  AlkPhos  61  03-14      Creatinine, Serum: 1.19 mg/dL (03-14-22 @ 06:49)  Creatinine, Serum: 1.63 mg/dL (03-13-22 @ 13:54)            WBC Count: 15.25 K/uL (03-14-22 @ 06:49)  WBC Count: 14.56 K/uL (03-13-22 @ 13:54)    SARS-CoV-2: NotDetec (03-13-22 @ 13:14)      Alkaline Phosphatase, Serum: 61 U/L (03-14-22 @ 06:49)  Alkaline Phosphatase, Serum: 103 U/L (03-13-22 @ 13:54)  Alanine Aminotransferase (ALT/SGPT): 29 U/L (03-14-22 @ 06:49)  Alanine Aminotransferase (ALT/SGPT): 16 U/L (03-13-22 @ 13:54)  Aspartate Aminotransferase (AST/SGOT): 88 U/L (03-14-22 @ 06:49)  Aspartate Aminotransferase (AST/SGOT): 28 U/L (03-13-22 @ 13:54)  Bilirubin Total, Serum: 1.1 mg/dL (03-14-22 @ 06:49)  Bilirubin Total, Serum: 1.1 mg/dL (03-13-22 @ 13:54)    I&O's Detail    13 Mar 2022 07:01  -  14 Mar 2022 07:00  --------------------------------------------------------  IN:    dextrose 5% + lactated ringers: 1487 mL    IV PiggyBack: 200 mL    Lactated Ringers: 125 mL    Lactated Ringers Bolus: 1000 mL    Norepinephrine: 275 mL  Total IN: 3087 mL    OUT:    Indwelling Catheter - Urethral (mL): 757 mL  Total OUT: 757 mL    Total NET: 2330 mL      14 Mar 2022 07:01  -  14 Mar 2022 10:33  --------------------------------------------------------  IN:  Total IN: 0 mL    OUT:    Indwelling Catheter - Urethral (mL): 215 mL  Total OUT: 215 mL    Total NET: -215 mL    < from: CT Abdomen and Pelvis No Cont (03.13.22 @ 21:21) >    IMPRESSION:  1. Multifocal areas of bilateral airspace consolidation most pronounced   in the  right lower lobe. Appearance is suggestive of multifocal pneumonia.  2. Small layering right pleural effusion.  3. Small layering stones or sludge in the gallbladder fundus.  4. Punctate nonobstructive right nephrolithiasis.  5. large rectal fecal retention with evidence of stercoral proctitis.    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 Moderate to severe   atrophy in the BILATERAL temporal lobes.      < end of copied text >  < from: Xray Chest 1 View-PORTABLE IMMEDIATE (03.13.22 @ 14:12) >    IMPRESSION:  Recommend correlation for lower lobe interstitial infiltrates      < end of copied text >

## 2022-03-14 NOTE — DIETITIAN INITIAL EVALUATION ADULT. - PERTINENT MEDS FT
MEDICATIONS  (STANDING):  bethanechol 50 milliGRAM(s) Oral two times a day  chlorhexidine 4% Liquid 1 Application(s) Topical <User Schedule>  dextrose 40% Gel 15 Gram(s) Oral once  dextrose 5% + lactated ringers. 1000 milliLiter(s) (125 mL/Hr) IV Continuous <Continuous>  dextrose 5%. 1000 milliLiter(s) (50 mL/Hr) IV Continuous <Continuous>  dextrose 5%. 1000 milliLiter(s) (100 mL/Hr) IV Continuous <Continuous>  dextrose 50% Injectable 25 Gram(s) IV Push once  dextrose 50% Injectable 12.5 Gram(s) IV Push once  dextrose 50% Injectable 25 Gram(s) IV Push once  finasteride 5 milliGRAM(s) Oral daily  gabapentin 100 milliGRAM(s) Oral two times a day  glucagon  Injectable 1 milliGRAM(s) IntraMuscular once  heparin   Injectable 5000 Unit(s) SubCutaneous every 12 hours  insulin lispro (ADMELOG) corrective regimen sliding scale   SubCutaneous three times a day before meals  insulin lispro (ADMELOG) corrective regimen sliding scale   SubCutaneous at bedtime  magnesium sulfate  IVPB 2 Gram(s) IV Intermittent once  midodrine 5 milliGRAM(s) Oral every 8 hours  mineral oil enema 133 milliLiter(s) Rectal once  multivitamin 1 Tablet(s) Oral daily  norepinephrine Infusion 0.1 MICROgram(s)/kG/Min (10.6 mL/Hr) IV Continuous <Continuous>  pantoprazole  Injectable 40 milliGRAM(s) IV Push daily  piperacillin/tazobactam IVPB.. 3.375 Gram(s) IV Intermittent every 8 hours  polyethylene glycol 3350 17 Gram(s) Oral at bedtime  senna 2 Tablet(s) Oral at bedtime  tamsulosin 0.4 milliGRAM(s) Oral at bedtime    MEDICATIONS  (PRN):  acetaminophen     Tablet .. 650 milliGRAM(s) Oral every 6 hours PRN Mild Pain (1 - 3)  magnesium hydroxide Suspension 30 milliLiter(s) Oral Once PRN Constipation

## 2022-03-14 NOTE — SWALLOW BEDSIDE ASSESSMENT ADULT - ORAL PHASE
Bolus formation/transfer were achieved via mildly to moderately prolonged discontinuous disorganized lingual pumping actions that were felt to be grossly functional with the above modified food consistencies. Piecemeal deglutition was evident. A very small amount of tongue debris noted with puree foods.

## 2022-03-14 NOTE — PROGRESS NOTE ADULT - GASTROINTESTINAL COMMENTS
limited exam as patient was resisting, abd soft mostly with some nonspecific tenderness w/o guarding or rebound

## 2022-03-14 NOTE — DIETITIAN INITIAL EVALUATION ADULT. - PHYSCIAL ASSESSMENT
Tushar score 9  PI stage I: left heel, right heel, lower thoracic spine  Last documented BM 3/13 very thin, frail/underweight/emaciated/other (specify) Tushar score 9  PI stage I: left heel, right heel, lower thoracic spine  Last documented BM 3/13 - CT scan reveals thickened stool in intestine, pending mineral oil

## 2022-03-14 NOTE — DIETITIAN NUTRITION RISK NOTIFICATION - TREATMENT: THE FOLLOWING DIET HAS BEEN RECOMMENDED
Diet, NPO:   Except Medications     Special Instructions for Nursing:  Except Medications (03-13-22 @ 15:07) [Active]

## 2022-03-14 NOTE — DIETITIAN INITIAL EVALUATION ADULT. - MALNUTRITION
Pt meets criteria for severe malnutrition in context of acute on chronic illness Pt meets criteria for severe malnutrition in context of acute on chronic illness r/t decreased ability to meet increased nutrient needs 2/2 sepsis, GERD, dysphagia AEB severe muscle/fat wasting, PO intake <50% x 1 mo.

## 2022-03-14 NOTE — DIETITIAN INITIAL EVALUATION ADULT. - ADD RECOMMEND
1. Advance diet when medically feasible. If failed bedside swallow evaluation, re-consult RD for corpak placement and initiate TF, 2. Monitor bowel movements, if no BM for >3 days, consider implementing bowel regimen, 3. Maintain aspiration precautions, back of bed >35 degrees, 4. Recommend to add MVI w/minerals, Vit C 500 mg BID, add Zinc Sulfate 220 mg x 10 days to promote wound healing, 5. Thiamine 100 mg daily 2/2 poor PO intake and malnutrition, 6. Obtain daily wt to track/trend changes, RD will continue to monitor PO intake, labs, hydration, and wt prn. 1. When medically feasible from SLP, advance diet to regular diet w/ texture and Ensure Enlive TID, 2. If failed bedside swallow evaluation, place corpak and re-consult RD to initiate TF, 2. Monitor bowel movements, if no BM for >3 days, consider implementing bowel regimen, 3. Maintain aspiration precautions, back of bed >35 degrees, 4. Recommend to add MVI w/minerals, Vit C 500 mg BID, add Zinc Sulfate 220 mg x 10 days to promote wound healing, 5. Thiamine 100 mg daily 2/2 poor PO intake and malnutrition, 6. Obtain daily wt to track/trend changes, RD will continue to monitor PO intake, labs, hydration, and wt prn.

## 2022-03-15 NOTE — PROGRESS NOTE ADULT - SUBJECTIVE AND OBJECTIVE BOX
Date of service: 03-15-22 @ 08:57    Lying in bed in NAD  Has dry cough  [place] in place  Has low grade fever    ROS: poorly verbal    MEDICATIONS  (STANDING):  ascorbic acid 500 milliGRAM(s) Oral two times a day  bethanechol 50 milliGRAM(s) Oral two times a day  chlorhexidine 4% Liquid 1 Application(s) Topical <User Schedule>  dextrose 40% Gel 15 Gram(s) Oral once  dextrose 5% + lactated ringers. 1000 milliLiter(s) (100 mL/Hr) IV Continuous <Continuous>  dextrose 5%. 1000 milliLiter(s) (100 mL/Hr) IV Continuous <Continuous>  dextrose 5%. 1000 milliLiter(s) (50 mL/Hr) IV Continuous <Continuous>  dextrose 50% Injectable 25 Gram(s) IV Push once  dextrose 50% Injectable 12.5 Gram(s) IV Push once  dextrose 50% Injectable 25 Gram(s) IV Push once  finasteride 5 milliGRAM(s) Oral daily  gabapentin 100 milliGRAM(s) Oral two times a day  glucagon  Injectable 1 milliGRAM(s) IntraMuscular once  heparin   Injectable 5000 Unit(s) SubCutaneous every 12 hours  insulin lispro (ADMELOG) corrective regimen sliding scale   SubCutaneous every 6 hours  magnesium sulfate  IVPB 2 Gram(s) IV Intermittent once  midodrine 5 milliGRAM(s) Oral every 8 hours  multivitamin 1 Tablet(s) Oral daily  multivitamin/minerals 1 Tablet(s) Oral daily  norepinephrine Infusion 0.1 MICROgram(s)/kG/Min (10.6 mL/Hr) IV Continuous <Continuous>  piperacillin/tazobactam IVPB.. 3.375 Gram(s) IV Intermittent every 8 hours  polyethylene glycol 3350 17 Gram(s) Oral at bedtime  potassium chloride   Powder 40 milliEquivalent(s) Oral every 4 hours  potassium phosphate IVPB 30 milliMole(s) IV Intermittent once  senna 2 Tablet(s) Oral at bedtime  tamsulosin 0.4 milliGRAM(s) Oral at bedtime  thiamine Injectable 100 milliGRAM(s) IV Push daily  zinc sulfate 220 milliGRAM(s) Oral daily    Vital Signs Last 24 Hrs  T(C): 37.6 (15 Mar 2022 08:07), Max: 38.1 (15 Mar 2022 06:00)  T(F): 99.7 (15 Mar 2022 08:07), Max: 100.6 (15 Mar 2022 06:00)  HR: 126 (15 Mar 2022 08:07) (62 - 126)  BP: 116/66 (15 Mar 2022 08:00) (95/57 - 148/78)  BP(mean): 79 (15 Mar 2022 08:00) (61 - 94)  RR: 27 (15 Mar 2022 08:07) (15 - 27)  SpO2: 74% (15 Mar 2022 08:07) (74% - 100%)     Physical exam:    Constitutional:  No acute distress  HEENT: NC/AT, EOMI, PERRLA, conjunctivae clear; ears and nose atraumatic  Neck: supple; thyroid not palpable  Back: no tenderness  Respiratory: respiratory effort normal; few crackles at bases; rhonchi  Cardiovascular: S1S2 regular, no murmurs  Abdomen: soft, not tender, not distended, positive BS  Genitourinary: no suprapubic tenderness  Lymphatic: no LN palpable  Musculoskeletal: no muscle tenderness, no joint swelling or tenderness  Extremities: no pedal edema  Neurological/ Psychiatric: AxOx3, moving all extremities  Skin: no rashes; no palpable lesions    Labs: reviewed                        11.2   12.82 )-----------( 178      ( 15 Mar 2022 05:42 )             32.5     03-15    144  |  113<H>  |  23  ----------------------------<  175<H>  2.7<LL>   |  26  |  0.91    Ca    8.4<L>      15 Mar 2022 05:42  Phos  1.1     03-15  Mg     1.8     03-15    TPro  5.2<L>  /  Alb  1.7<L>  /  TBili  0.9  /  DBili  x   /  AST  89<H>  /  ALT  41  /  AlkPhos  66  03-15                        11.3   15.25 )-----------( 200      ( 14 Mar 2022 06:49 )             34.0     03-14    143  |  112<H>  |  30<H>  ----------------------------<  156<H>  3.7   |  23  |  1.19    Ca    8.1<L>      14 Mar 2022 06:49  Phos  2.7     -14  Mg     1.5     -14    TPro  5.6<L>  /  Alb  2.1<L>  /  TBili  1.1  /  DBili  x   /  AST  88<H>  /  ALT  29  /  AlkPhos  61  -14     LIVER FUNCTIONS - ( 14 Mar 2022 06:49 )  Alb: 2.1 g/dL / Pro: 5.6 gm/dL / ALK PHOS: 61 U/L / ALT: 29 U/L / AST: 88 U/L / GGT: x           Urinalysis Basic - ( 13 Mar 2022 13:54 )    Color: Yellow / Appearance: very cloudy / S.020 / pH: x  Gluc: x / Ketone: Trace  / Bili: Negative / Urobili: Negative   Blood: x / Protein: 100 / Nitrite: Negative   Leuk Esterase: Moderate / RBC: 6-10 /HPF / WBC >50   Sq Epi: x / Non Sq Epi: Few / Bacteria: Moderate    ( @ 13:14)  [facility]      Culture - Blood (collected 13 Mar 2022 13:54)  Source: .Blood None  Preliminary Report (14 Mar 2022 20:01):    No growth to date.    Culture - Blood (collected 13 Mar 2022 13:54)  Source: .Blood None  Preliminary Report (14 Mar 2022 20:01):    No growth to date.    Radiology: all available radiological tests reviewed    < from: Xray Chest 1 View-PORTABLE IMMEDIATE (Xray Chest 1 View-PORTABLE IMMEDIATE .) (22 @ 16:52) >  IMPRESSION:   Corpak enteric tube tip in fundus of stomach..  RIGHT lung perihilar/basilar and LEFT medial basilar multifocal and   diffuse ill-defined airspace opacities.  < end of copied text >    < from: CT Abdomen and Pelvis No Cont (22 @ 21:21) >  1. Multifocal areas of bilateral airspace consolidation most pronounced   in the  right lower lobe. Appearance is suggestive of multifocal pneumonia.  2. Small layering right pleural effusion.  3. Small layering stones or sludge in the gallbladder fundus.  4. Punctate nonobstructive right nephrolithiasis.  5. large rectal fecal retention with evidence of stercoral proctitis.  < end of copied text >      Advanced directives addressed: full resuscitation

## 2022-03-15 NOTE — PROGRESS NOTE ADULT - SUBJECTIVE AND OBJECTIVE BOX
Patient is a 89y old  Male who presents with a chief complaint of Coffee ground emesis (15 Mar 2022 08:57)    24 hour events: off levo   episodes of hypoxia today   new onset A.fib     PAST MEDICAL & SURGICAL HISTORY:  CAD (coronary artery disease)    HTN (hypertension)    HLD (hyperlipidemia)    DM (diabetes mellitus)    BPH (benign prostatic hyperplasia)    GERD (gastroesophageal reflux disease)    SDH (subdural hematoma)        Allergies    No Known Allergies    Intolerances      REVIEW OF SYSTEMS: SEE BELOW       ICU Vital Signs Last 24 Hrs  T(C): 37.1 (15 Mar 2022 10:00), Max: 38.1 (15 Mar 2022 06:00)  T(F): 98.8 (15 Mar 2022 10:00), Max: 100.6 (15 Mar 2022 06:00)  HR: 89 (15 Mar 2022 11:00) (64 - 126)  BP: 104/55 (15 Mar 2022 11:00) (95/57 - 148/78)  BP(mean): 66 (15 Mar 2022 11:00) (61 - 94)  ABP: --  ABP(mean): --  RR: 20 (15 Mar 2022 11:00) (15 - 27)  SpO2: 100% (15 Mar 2022 11:00) (74% - 100%)      CAPILLARY BLOOD GLUCOSE      POCT Blood Glucose.: 135 mg/dL (15 Mar 2022 11:10)  POCT Blood Glucose.: 169 mg/dL (15 Mar 2022 05:45)  POCT Blood Glucose.: 147 mg/dL (14 Mar 2022 23:16)  POCT Blood Glucose.: 150 mg/dL (14 Mar 2022 17:12)      I&O's Summary    14 Mar 2022 07:01  -  15 Mar 2022 07:00  --------------------------------------------------------  IN: 3421 mL / OUT: 1015 mL / NET: 2406 mL            MEDICATIONS  (STANDING):  ascorbic acid 500 milliGRAM(s) Oral two times a day  bethanechol 50 milliGRAM(s) Oral two times a day  chlorhexidine 4% Liquid 1 Application(s) Topical <User Schedule>  dextrose 40% Gel 15 Gram(s) Oral once  dextrose 5%. 1000 milliLiter(s) (50 mL/Hr) IV Continuous <Continuous>  dextrose 5%. 1000 milliLiter(s) (100 mL/Hr) IV Continuous <Continuous>  dextrose 50% Injectable 25 Gram(s) IV Push once  dextrose 50% Injectable 12.5 Gram(s) IV Push once  dextrose 50% Injectable 25 Gram(s) IV Push once  doxazosin 1 milliGRAM(s) Oral at bedtime  finasteride 5 milliGRAM(s) Oral daily  gabapentin 100 milliGRAM(s) Oral two times a day  glucagon  Injectable 1 milliGRAM(s) IntraMuscular once  heparin   Injectable 5000 Unit(s) SubCutaneous every 12 hours  insulin lispro (ADMELOG) corrective regimen sliding scale   SubCutaneous every 6 hours  midodrine 5 milliGRAM(s) Oral every 8 hours  multivitamin/minerals 1 Tablet(s) Oral daily  norepinephrine Infusion 0.1 MICROgram(s)/kG/Min (10.6 mL/Hr) IV Continuous <Continuous>  piperacillin/tazobactam IVPB.. 3.375 Gram(s) IV Intermittent every 8 hours  polyethylene glycol 3350 17 Gram(s) Oral at bedtime  potassium chloride   Powder 40 milliEquivalent(s) Oral every 4 hours  senna 2 Tablet(s) Oral at bedtime  thiamine 100 milliGRAM(s) Oral daily  zinc sulfate 220 milliGRAM(s) Oral daily      MEDICATIONS  (PRN):  acetaminophen     Tablet .. 650 milliGRAM(s) Oral every 6 hours PRN Temp greater or equal to 38.5C (101.3F), Mild Pain (1 - 3)  magnesium hydroxide Suspension 30 milliLiter(s) Oral Once PRN Constipation      PHYSICAL EXAM: SEE BELOW                          11.2   12.82 )-----------( 178      ( 15 Mar 2022 05:42 )             32.5       03-15    144  |  113<H>  |  23  ----------------------------<  175<H>  2.7<LL>   |  26  |  0.91    Ca    8.4<L>      15 Mar 2022 05:42  Phos  1.1     03-15  Mg     1.8     03-15    TPro  5.2<L>  /  Alb  1.7<L>  /  TBili  0.9  /  DBili  x   /  AST  89<H>  /  ALT  41  /  AlkPhos  66  03-15    Lactate 3.2           03-15 @ 05:42      CARDIAC MARKERS ( 15 Mar 2022 05:42 )  x     / x     / 1058 U/L / x     / x      CARDIAC MARKERS ( 14 Mar 2022 06:49 )  x     / x     / 2000 U/L / x     / x          PT/INR - ( 13 Mar 2022 13:54 )   PT: 13.2 sec;   INR: 1.14 ratio         PTT - ( 13 Mar 2022 13:54 )  PTT:32.2 sec  Urinalysis Basic - ( 13 Mar 2022 13:54 )    Color: Yellow / Appearance: very cloudy / S.020 / pH: x  Gluc: x / Ketone: Trace  / Bili: Negative / Urobili: Negative   Blood: x / Protein: 100 / Nitrite: Negative   Leuk Esterase: Moderate / RBC: 6-10 /HPF / WBC >50   Sq Epi: x / Non Sq Epi: Few / Bacteria: Moderate      .Blood None   No growth to date. --  @ 13:54      Rapid RVP Result: NotDetec ( @ 13:14)

## 2022-03-15 NOTE — CHART NOTE - NSCHARTNOTEFT_GEN_A_CORE
Clinical Nutrition TF BRIEF NOTE    * 89 y/o male admitted for Sepsis. Presented to ED from SNF w/ coffee ground emesis, loose stool, pale skin, and cold to the touch. PMH includes GERD, BPH, T2DM, HLD, HTN, CAD, SDH s/p craniotomy and surgical removal. SLP eval yesterday - recommended puree diet w/ moderately thick liquids. Corpak placed, TF initiated.     *current status: Pt currently on Jevity 1.5, starting rate @ 10 ml/hr, titrating by 10 ml/hr until goal TF rate 40 ml/hr is met. This provides 1440 calories, 61 g protein, 1210 mL total volume w/ water flushes 20 ml/hr (provides 480 ml extra fluid). Pt currently going into refeeding syndrome - d/c Jevity 1.5.    *labs reviewed:  03-15    144  |  113<H>  |  23  ----------------------------<  175<H>  2.7<LL>   |  26  |  0.91    Ca    8.4<L>      15 Mar 2022 05:42  Phos  1.1     03-15  Mg     1.8     03-15    TPro  5.2<L>  /  Alb  1.7<L>  /  TBili  0.9  /  DBili  x   /  AST  89<H>  /  ALT  41  /  AlkPhos  66  03-15    *ESTIMATED NUTR NEEDS: 72.5 Kg based on IBW      1812 - 2175 Kcal (25-30 Kcal/Kg)       108 - 145 g protein (1.5-2.0 g/Kg)      1812 - 2175 mL   (25-30 mL/Kg)    Height (cm): 175.3 (03-13)  Weight (kg): 56.7 (03-13)  BMI (kg/m2): 18.5 (03-13)  Ideal Body Weight: 160#    Diet, NPO with Tube Feed:   Tube Feeding Modality: Nasogastric  Jevity 1.5 Rafa (JEVITY1.5)  Total Volume for 24 Hours (mL): 960  Continuous  Starting Tube Feed Rate {mL per Hour}: 10  Increase Tube Feed Rate by (mL): 10     Every 8 hours  Until Goal Tube Feed Rate (mL per Hour): 40  Tube Feed Duration (in Hours): 24  Tube Feed Start Time: 17:04  Free Water Flush  Pump   Rate (mL per Hour): 20   Frequency: Every Hour    Duration (Hours): 24 (03-14-22 @ 17:03)      RECOMMENDATIONS:  1) Glucerna 1.5 @ 10 ml/hr, titrate up 10 ml/hr q 12 hrs until goal TF rate 50 ml/hr. With 3 pkts Prosource this provides 1620 kcal, 116 g protein, 960 ml total volume.  2) monitor hydration status; adjust free water flushes prn, consider free water flushes of 10 ml/hr (which provides ~ 200 mL of water per day)  3) monitor TF tolerance; keep back of bed > 35 degrees  4) monitor BM: if > 3 days without BM, add bowel regimen prn  5) daily wt checks to track/trend changes    *will continue to monitor and adjust treatment plan prn*  Jodi Amaya, Dietetic Intern Clinical Nutrition TF BRIEF NOTE    * 89 y/o male admitted for Sepsis. Presented to ED from SNF w/ coffee ground emesis, loose stool, pale skin, and cold to the touch. PMH includes GERD, BPH, T2DM, HLD, HTN, CAD, SDH s/p craniotomy and surgical removal. SLP eval yesterday - recommended puree diet w/ moderately thick liquids. Corpak placed, TF initiated.     *current status: Pt currently on Jevity 1.5, starting rate @ 10 ml/hr, titrating by 10 ml/hr until goal TF rate 40 ml/hr is met. This provides 1440 calories, 61 g protein, 1210 mL total volume w/ water flushes 20 ml/hr (provides 480 ml extra fluid). Pt currently going into refeeding syndrome - d/c Jevity 1.5. Change to Glucerna 1.5 2/2 increased protein demands and elevated glucose levels.     *labs reviewed:  03-15    144  |  113<H>  |  23  ----------------------------<  175<H>  2.7<LL>   |  26  |  0.91    Ca    8.4<L>      15 Mar 2022 05:42  Phos  1.1     03-15  Mg     1.8     03-15    TPro  5.2<L>  /  Alb  1.7<L>  /  TBili  0.9  /  DBili  x   /  AST  89<H>  /  ALT  41  /  AlkPhos  66  03-15    *ESTIMATED NUTR NEEDS: 72.5 Kg based on IBW      1812 - 2175 Kcal (25-30 Kcal/Kg)       108 - 145 g protein (1.5-2.0 g/Kg)      1812 - 2175 mL   (25-30 mL/Kg)    Height (cm): 175.3 (03-13)  Weight (kg): 56.7 (03-13)  BMI (kg/m2): 18.5 (03-13)  Ideal Body Weight: 160#    Diet, NPO with Tube Feed:   Tube Feeding Modality: Nasogastric  Jevity 1.5 Rafa (JEVITY1.5)  Total Volume for 24 Hours (mL): 960  Continuous  Starting Tube Feed Rate {mL per Hour}: 10  Increase Tube Feed Rate by (mL): 10     Every 8 hours  Until Goal Tube Feed Rate (mL per Hour): 40  Tube Feed Duration (in Hours): 24  Tube Feed Start Time: 17:04  Free Water Flush  Pump   Rate (mL per Hour): 20   Frequency: Every Hour    Duration (Hours): 24 (03-14-22 @ 17:03)      RECOMMENDATIONS:  1) Change to Glucerna 1.5 @ 10 ml/hr, titrate up 10 ml/hr q 12 hrs until goal TF rate 50 ml/hr. With 3 pkts Prosource this provides 1620 kcal, 116 g protein, 960 ml total volume.  2) monitor hydration status; adjust free water flushes prn, consider free water flushes of 10 ml/hr (which provides ~ 200 mL of water per day)  3) monitor TF tolerance; keep back of bed > 35 degrees  4) monitor BM: if > 3 days without BM, add bowel regimen prn  5) daily wt checks to track/trend changes    *will continue to monitor and adjust treatment plan prn*  Jodi Amaya, Dietetic Intern

## 2022-03-15 NOTE — PROGRESS NOTE ADULT - ASSESSMENT
89 y/o Male with h/o BPH, CAD, HTN, DM type II, GERD, HLD, SDH s/p craniotomy, prior UTI with VREF was admitted on 3/12 from [facility] for vomiting. As per facility, pt had large amount coffee ground emesis and persistent loose stool, dark in appearance, skin color pale and cold to the touch. Pt is on 2L O2 via nasal canula. He is a poor historian, In ED, pt found hypoxic, placed on non-rebreather. Urine reported cloudy.  He received vancomycin IV x 1 and zosyn.    1. Febrile syndrome and Hypotension improving. Probable sepsis. Pyuria. Probable UTI. ARF. Probable aspiration pneumonia.   -encephalopathy  -alert and confused  -BC x 2, urine c/s noted  -on zosyn 3.375 gm IV q8h # 2  -tolerating abx well so far; no side effects noted  - off pressors  -dawson in place; no h/o urinary retention  -continue abx coverage  -aspiration precautions  -continue abx coverage   -monitor temps  -f/u CBC  -supportive care  2. Other issues:   -care per medicine    d/w medical team

## 2022-03-15 NOTE — PROGRESS NOTE ADULT - ASSESSMENT
89M PMH HTN, DM2 not on insulin, HLD, SDH, cachexia/severe malnutrition, sent from NH for vomiting and diarrhea, found to have septic shock from multifocal pneumonia + UTI, also found to have stercoral colitis.     Course complicated by acute hypoxic resp failure from acute pulm edema + multifocal pna  Course also complicated by new onset A.fib       Plan:  More responsive and interactive today   Off levo, continue midodrine   A.fib likely in the setting of sepsis, resp distress, HR is controlled at this time, not a candidate for AC given recent "coffee ground emesis"   Trop improving   ECHO reviewed - normal EF, reduced LV compliance, TR, mild LA dilatation   Give lasix 20mg x 1, d/c IVF   VM to maintain sats>92%  Aspiration precautions   Continue zosyn, UCx is pending, BCx neg so far, WBC improving   Continue TF, will get swallow eval when ok from resp standpoint   Constipation resolved, has brown colored stool, no active bleeding at this time, H/H is stable     Prognosis extremely poor, GOC d/w family by Dr. Raine Hardin - patient full code     Remains critically ill with high risk for decompensation and death     DVT ppx: sc heparin   Nutrition: TF  Central line: no  Arterial line: no  [place]: yes, will d/c   Restraints: no  Activity: bedrest     Code status: full     Disposition: ICU     Updated: called son Caleb in am - no response, will try again later today

## 2022-03-16 NOTE — PROGRESS NOTE ADULT - ASSESSMENT
89 y/o Male with h/o BPH, CAD, HTN, DM type II, GERD, HLD, SDH s/p craniotomy, prior UTI with VREF was admitted on 3/12 from [facility] for vomiting. As per facility, pt had large amount coffee ground emesis and persistent loose stool, dark in appearance, skin color pale and cold to the touch. Pt is on 2L O2 via nasal canula. He is a poor historian, In ED, pt found hypoxic, placed on non-rebreather. Urine reported cloudy.  He received vancomycin IV x 1 and zosyn.    1. Febrile syndrome resolving. Probable sepsis. Pyuria. UTI with PSAE. ARF. Probable aspiration pneumonia.   -leukocytosis is slightly worse  -encephalopathy improving  -alert and confused  -BC x 2, urine c/s noted  -on zosyn 3.375 gm IV q8h # 3  -tolerating abx well so far; no side effects noted  -off pressors  -f/u culture  -aspiration precautions  -continue abx coverage   -monitor temps  -f/u CBC  -supportive care  2. Other issues:   -care per medicine    d/w medical team

## 2022-03-16 NOTE — PROGRESS NOTE ADULT - SUBJECTIVE AND OBJECTIVE BOX
Date of service: 22    Lying in bed in NAD  More alert; opens eyes  Whitmore removed  Has low grade fever    ROS: poorly verbal    MEDICATIONS  (STANDING):  ascorbic acid 500 milliGRAM(s) Oral two times a day  bethanechol 50 milliGRAM(s) Oral two times a day  chlorhexidine 4% Liquid 1 Application(s) Topical <User Schedule>  dextrose 40% Gel 15 Gram(s) Oral once  dextrose 5% + lactated ringers. 1000 milliLiter(s) (100 mL/Hr) IV Continuous <Continuous>  dextrose 5%. 1000 milliLiter(s) (100 mL/Hr) IV Continuous <Continuous>  dextrose 5%. 1000 milliLiter(s) (50 mL/Hr) IV Continuous <Continuous>  dextrose 50% Injectable 25 Gram(s) IV Push once  dextrose 50% Injectable 12.5 Gram(s) IV Push once  dextrose 50% Injectable 25 Gram(s) IV Push once  finasteride 5 milliGRAM(s) Oral daily  gabapentin 100 milliGRAM(s) Oral two times a day  glucagon  Injectable 1 milliGRAM(s) IntraMuscular once  heparin   Injectable 5000 Unit(s) SubCutaneous every 12 hours  insulin lispro (ADMELOG) corrective regimen sliding scale   SubCutaneous every 6 hours  magnesium sulfate  IVPB 2 Gram(s) IV Intermittent once  midodrine 5 milliGRAM(s) Oral every 8 hours  multivitamin 1 Tablet(s) Oral daily  multivitamin/minerals 1 Tablet(s) Oral daily  piperacillin/tazobactam IVPB.. 3.375 Gram(s) IV Intermittent every 8 hours  polyethylene glycol 3350 17 Gram(s) Oral at bedtime  potassium chloride   Powder 40 milliEquivalent(s) Oral every 4 hours  potassium phosphate IVPB 30 milliMole(s) IV Intermittent once  senna 2 Tablet(s) Oral at bedtime  tamsulosin 0.4 milliGRAM(s) Oral at bedtime  thiamine Injectable 100 milliGRAM(s) IV Push daily  zinc sulfate 220 milliGRAM(s) Oral daily    Vital Signs Last 24 Hrs  T(C): 37.6 (16 Mar 2022 08:07), Max: 38.1 (16 Mar 2022 06:00)  T(F): 99.7 (16 Mar 2022 08:07), Max: 100.6 (15 Mar 2022 06:00)  HR: 126 (16 Mar 2022 08:07) (62 - 126)  BP: 116/66 (16 Mar 2022 08:00) (95/57 - 148/78)  BP(mean): 79 (16 Mar 2022 08:00) (61 - 94)  RR: 27 (16 Mar 2022 08:07) (15 - 27)  SpO2: 74% (16 Mar 2022 08:07) (74% - 100%)     Physical exam:    Constitutional:  No acute distress  HEENT: NC/AT, EOMI, PERRLA, conjunctivae clear; ears and nose atraumatic  Neck: supple; thyroid not palpable  Back: no tenderness  Respiratory: respiratory effort normal; few crackles at bases; rhonchi  Cardiovascular: S1S2 regular, no murmurs  Abdomen: soft, not tender, not distended, positive BS  Genitourinary: no suprapubic tenderness  Lymphatic: no LN palpable  Musculoskeletal: no muscle tenderness, no joint swelling or tenderness  Extremities: no pedal edema  Neurological/ Psychiatric: AxOx3, moving all extremities  Skin: no rashes; no palpable lesions    Labs: reviewed                        11.2   12.82 )-----------( 178      ( 15 Mar 2022 05:42 )             32.5     03-15    144  |  113<H>  |  23  ----------------------------<  175<H>  2.7<LL>   |  26  |  0.91    Ca    8.4<L>      15 Mar 2022 05:42  Phos  1.1     03-15  Mg     1.8     03-15    TPro  5.2<L>  /  Alb  1.7<L>  /  TBili  0.9  /  DBili  x   /  AST  89<H>  /  ALT  41  /  AlkPhos  66  03-15                        11.3   15.25 )-----------( 200      ( 14 Mar 2022 06:49 )             34.0     03-14    143  |  112<H>  |  30<H>  ----------------------------<  156<H>  3.7   |  23  |  1.19    Ca    8.1<L>      14 Mar 2022 06:49  Phos  2.7     03-14  Mg     1.5     03-14    TPro  5.6<L>  /  Alb  2.1<L>  /  TBili  1.1  /  DBili  x   /  AST  88<H>  /  ALT  29  /  AlkPhos  61       LIVER FUNCTIONS - ( 14 Mar 2022 06:49 )  Alb: 2.1 g/dL / Pro: 5.6 gm/dL / ALK PHOS: 61 U/L / ALT: 29 U/L / AST: 88 U/L / GGT: x           Urinalysis Basic - ( 13 Mar 2022 13:54 )    Color: Yellow / Appearance: very cloudy / S.020 / pH: x  Gluc: x / Ketone: Trace  / Bili: Negative / Urobili: Negative   Blood: x / Protein: 100 / Nitrite: Negative   Leuk Esterase: Moderate / RBC: 6-10 /HPF / WBC >50   Sq Epi: x / Non Sq Epi: Few / Bacteria: Moderate    ( @ 13:14)  NotDetec      Culture - Blood (collected 13 Mar 2022 13:54)  Source: .Blood None  Preliminary Report (14 Mar 2022 20:01):    No growth to date.    Culture - Blood (collected 13 Mar 2022 13:54)  Source: .Blood None  Preliminary Report (14 Mar 2022 20:01):    No growth to date.    Radiology: all available radiological tests reviewed    < from: Xray Chest 1 View-PORTABLE IMMEDIATE (Xray Chest 1 View-PORTABLE IMMEDIATE .) (22 @ 16:52) >  IMPRESSION:   Corpak enteric tube tip in fundus of stomach..  RIGHT lung perihilar/basilar and LEFT medial basilar multifocal and   diffuse ill-defined airspace opacities.  < end of copied text >    < from: CT Abdomen and Pelvis No Cont (22 @ 21:21) >  1. Multifocal areas of bilateral airspace consolidation most pronounced   in the  right lower lobe. Appearance is suggestive of multifocal pneumonia.  2. Small layering right pleural effusion.  3. Small layering stones or sludge in the gallbladder fundus.  4. Punctate nonobstructive right nephrolithiasis.  5. large rectal fecal retention with evidence of stercoral proctitis.  < end of copied text >      Advanced directives addressed: full resuscitation

## 2022-03-16 NOTE — PROGRESS NOTE ADULT - TIME BILLING
89M PMH HTN, DM2 not on insulin, HLD, SDH, cachexia/severe malnutrition, sent from NH for vomiting and diarrhea, found to have septic shock from multifocal pneumonia + UTI, also found to have stercoral colitis.     Course complicated by acute hypoxic resp failure from acute pulm edema + multifocal pna  Course also complicated by new onset A.fib       Plan:  More responsive and interactive today   Off levo, continue midodrine   A.fib likely in the setting of sepsis, resp distress, HR is controlled at this time, not a candidate for AC given recent "coffee ground emesis"   Trop improving   ECHO reviewed - normal EF, reduced LV compliance, TR, mild LA dilatation   Give lasix 20mg x 1, d/c IVF   VM to maintain sats>92%  Aspiration precautions   Continue zosyn, UCx is pending, BCx neg so far, WBC improving   Continue TF, will get swallow eval when ok from resp standpoint   Constipation resolved, has brown colored stool, no active bleeding at this time, H/H is stable     Prognosis extremely poor, GOC d/w family by Dr. Raine Hardin - patient full code     Remains critically ill with high risk for decompensation and death     DVT ppx: sc heparin   Nutrition: TF  Central line: no  Arterial line: no  [place]: yes, will d/c   Restraints: no  Activity: bedrest     Code status: full     Disposition: ICU     Updated: called son Caleb in am - no response, will try again later today 89M PMH HTN, DM2 not on insulin, HLD, SDH, cachexia/severe malnutrition, sent from NH for vomiting and diarrhea, found to have septic shock from multifocal pneumonia + UTI, also found to have stercoral colitis.     Course complicated by acute hypoxic resp failure from acute pulm edema + multifocal pna  Course also complicated by new onset A.fib       Plan:  Mental status continues to improve  BP stable on midodrine   A.fib with RVR - resume metoprolol (on Toprol at home)   Hold AC due to concern for bleeding   Resp status improved, no lasix today     Prognosis extremely poor, [facility] d/w family by Dr. Raine Hardin - patient full code       DVT ppx: sc heparin   Nutrition: TF  Central line: no  Arterial line: no  Whit[place]: no  Restraints: no  Activity: oob    Code status: full     Disposition: ICU     Updated: will d/w son later today when he arrives

## 2022-03-16 NOTE — CHART NOTE - NSCHARTNOTEFT_GEN_A_CORE
Had a long discussion with patient's son at bedside via sign  ID # [id number] -     Patient had not been able to walk since his SDH and craniotomy in 2020 and his po intake has been poor due to weakness causing significant amount of weight loss. Patient has a prior living will from 2003 when he had his heart attack which states that he would not want to stay alive on life support measures, I asked him if he would like to do a MOLST form here however the son feels that patient's living will should speak for itself and that he will send me the photos after going back home (I gave him my number to text the photos to).

## 2022-03-16 NOTE — PROGRESS NOTE ADULT - SUBJECTIVE AND OBJECTIVE BOX
Patient is a 89y old  Male who presents with a chief complaint of Coffee ground emesis (16 Mar 2022 09:13)    24 hour events: HR 110s   increasing WBC   breathing better, VM weaned to 35%     PAST MEDICAL & SURGICAL HISTORY:  CAD (coronary artery disease)    HTN (hypertension)    HLD (hyperlipidemia)    DM (diabetes mellitus)    BPH (benign prostatic hyperplasia)    GERD (gastroesophageal reflux disease)    SDH (subdural hematoma)        Allergies    No Known Allergies    Intolerances      REVIEW OF SYSTEMS: SEE BELOW       ICU Vital Signs Last 24 Hrs  T(C): 37.7 (16 Mar 2022 09:00), Max: 38.3 (15 Mar 2022 20:00)  T(F): 99.8 (16 Mar 2022 09:00), Max: 101 (15 Mar 2022 22:00)  HR: 88 (16 Mar 2022 11:00) (88 - 139)  BP: 112/59 (16 Mar 2022 11:00) (95/58 - 133/85)  BP(mean): 71 (16 Mar 2022 11:00) (63 - 92)  ABP: --  ABP(mean): --  RR: 19 (16 Mar 2022 11:00) (12 - 34)  SpO2: 96% (16 Mar 2022 11:00) (92% - 100%)      CAPILLARY BLOOD GLUCOSE      POCT Blood Glucose.: 140 mg/dL (16 Mar 2022 05:21)  POCT Blood Glucose.: 127 mg/dL (15 Mar 2022 23:46)  POCT Blood Glucose.: 191 mg/dL (15 Mar 2022 17:08)      I&O's Summary    15 Mar 2022 07:01  -  16 Mar 2022 07:00  --------------------------------------------------------  IN: 1224 mL / OUT: 550 mL / NET: 674 mL            MEDICATIONS  (STANDING):  ascorbic acid 500 milliGRAM(s) Oral two times a day  bethanechol 50 milliGRAM(s) Oral two times a day  chlorhexidine 4% Liquid 1 Application(s) Topical <User Schedule>  dextrose 40% Gel 15 Gram(s) Oral once  dextrose 5%. 1000 milliLiter(s) (50 mL/Hr) IV Continuous <Continuous>  dextrose 5%. 1000 milliLiter(s) (100 mL/Hr) IV Continuous <Continuous>  dextrose 50% Injectable 25 Gram(s) IV Push once  dextrose 50% Injectable 12.5 Gram(s) IV Push once  dextrose 50% Injectable 25 Gram(s) IV Push once  doxazosin 1 milliGRAM(s) Oral at bedtime  finasteride 5 milliGRAM(s) Oral daily  gabapentin 100 milliGRAM(s) Oral two times a day  glucagon  Injectable 1 milliGRAM(s) IntraMuscular once  heparin   Injectable 5000 Unit(s) SubCutaneous every 12 hours  insulin lispro (ADMELOG) corrective regimen sliding scale   SubCutaneous every 6 hours  metoprolol tartrate 25 milliGRAM(s) Oral two times a day  midodrine 5 milliGRAM(s) Oral every 8 hours  multivitamin/minerals 1 Tablet(s) Oral daily  piperacillin/tazobactam IVPB.. 3.375 Gram(s) IV Intermittent every 8 hours  polyethylene glycol 3350 17 Gram(s) Oral at bedtime  potassium chloride   Powder 20 milliEquivalent(s) Oral once  senna 2 Tablet(s) Oral at bedtime  thiamine 100 milliGRAM(s) Oral daily  zinc sulfate 220 milliGRAM(s) Oral daily      MEDICATIONS  (PRN):  acetaminophen     Tablet .. 650 milliGRAM(s) Oral every 6 hours PRN Temp greater or equal to 38.5C (101.3F), Mild Pain (1 - 3)  magnesium hydroxide Suspension 30 milliLiter(s) Oral Once PRN Constipation      PHYSICAL EXAM: SEE BELOW                          11.3   16.19 )-----------( 172      ( 16 Mar 2022 06:05 )             33.6       03-16    143  |  112<H>  |  27<H>  ----------------------------<  144<H>  3.8   |  26  |  0.98    Ca    8.4<L>      16 Mar 2022 06:05  Phos  2.9     03-16  Mg     2.1     03-16    TPro  5.5<L>  /  Alb  1.9<L>  /  TBili  1.1  /  DBili  x   /  AST  57<H>  /  ALT  42  /  AlkPhos  100  03-16    Lactate 2.4           03-16 @ 06:05      CARDIAC MARKERS ( 16 Mar 2022 06:05 )  x     / x     / 260 U/L / x     / x      CARDIAC MARKERS ( 15 Mar 2022 05:42 )  x     / x     / 1058 U/L / x     / x              Clean Catch Clean Catch (Midstream)   >100,000 CFU/ml Pseudomonas aeruginosa -- 03-13 @ 13:54      Rapid RVP Result: NotDetec (03-13 @ 13:14)

## 2022-03-17 NOTE — PROGRESS NOTE ADULT - ASSESSMENT
89 y/o Male with h/o BPH, CAD, HTN, DM type II, GERD, HLD, SDH s/p craniotomy, prior UTI with VREF was admitted on 3/12 from [facility] for vomiting. As per facility, pt had large amount coffee ground emesis and persistent loose stool, dark in appearance, skin color pale and cold to the touch. Pt is on 2L O2 via nasal canula. He is a poor historian, In ED, pt found hypoxic, placed on non-rebreather. Urine reported cloudy.  He received vancomycin IV x 1 and zosyn.    1. Febrile syndrome resolving. Probable sepsis. Pyuria. UTI with PSAE. ARF. Probable aspiration pneumonia.   -leukocytosis is slightly worse  -encephalopathy improving  -alert and confused  -BC x 2, urine c/s noted  -on zosyn 3.375 gm IV q8h # 4  -tolerating abx well so far; no side effects noted  -f/u culture  -aspiration precautions  -continue abx coverage   -monitor temps  -f/u CBC  -supportive care  2. Other issues:   -care per medicine    d/w medical team

## 2022-03-17 NOTE — CHART NOTE - NSCHARTNOTEFT_GEN_A_CORE
Received patient's living will paperwork from his son Caleb   It is a generic living will that says "should he have an incurable or an irreversible condition he would not like to be artificially kept alive" but it also says he will receive "CPR"   The will was done in 2003  Given his bed bound status since his SDH in 2020 and inability to eat with subsequent severe malnutrition his prognosis is very poor and any life sustaining measures including a feeding tube placement may be seen as "effort to artificially keep him alive" without significant change in his prognosis or outcome   I told him that being his HCP further health related decisions fall on him and that he should consider doing a MOLST form  I suggested to consider DNR, DNI, no feeding tube   He will speak with his  and let me know

## 2022-03-17 NOTE — PROGRESS NOTE ADULT - SUBJECTIVE AND OBJECTIVE BOX
Date of service: 22 @ 09:13    Lying in bed in NAD  Alert and confused  Dose not seem in pain  Off pressors    ROS: no fever or chills; unobtainable    MEDICATIONS  (STANDING):  ascorbic acid 500 milliGRAM(s) Oral two times a day  bethanechol 50 milliGRAM(s) Oral two times a day  chlorhexidine 4% Liquid 1 Application(s) Topical <User Schedule>  dextrose 40% Gel 15 Gram(s) Oral once  dextrose 5%. 1000 milliLiter(s) (50 mL/Hr) IV Continuous <Continuous>  dextrose 5%. 1000 milliLiter(s) (100 mL/Hr) IV Continuous <Continuous>  dextrose 50% Injectable 25 Gram(s) IV Push once  dextrose 50% Injectable 12.5 Gram(s) IV Push once  dextrose 50% Injectable 25 Gram(s) IV Push once  doxazosin 1 milliGRAM(s) Oral at bedtime  finasteride 5 milliGRAM(s) Oral daily  gabapentin 100 milliGRAM(s) Oral two times a day  glucagon  Injectable 1 milliGRAM(s) IntraMuscular once  heparin   Injectable 5000 Unit(s) SubCutaneous every 12 hours  insulin lispro (ADMELOG) corrective regimen sliding scale   SubCutaneous every 6 hours  magnesium sulfate  IVPB 1 Gram(s) IV Intermittent once  metoprolol tartrate 25 milliGRAM(s) Oral two times a day  midodrine 10 milliGRAM(s) Oral every 8 hours  multivitamin/minerals 1 Tablet(s) Oral daily  piperacillin/tazobactam IVPB.. 3.375 Gram(s) IV Intermittent every 8 hours  polyethylene glycol 3350 17 Gram(s) Oral at bedtime  potassium chloride   Powder 40 milliEquivalent(s) Oral once  potassium phosphate IVPB 30 milliMole(s) IV Intermittent once  senna 2 Tablet(s) Oral at bedtime  thiamine 100 milliGRAM(s) Oral daily  zinc sulfate 220 milliGRAM(s) Oral daily    Vital Signs Last 24 Hrs  T(C): 37.8 (17 Mar 2022 04:00), Max: 37.8 (17 Mar 2022 04:00)  T(F): 100 (17 Mar 2022 04:00), Max: 100 (17 Mar 2022 04:00)  HR: 97 (17 Mar 2022 08:00) (75 - 111)  BP: 111/55 (17 Mar 2022 08:00) (90/51 - 121/76)  BP(mean): 65 (17 Mar 2022 08:00) (58 - 87)  RR: 20 (17 Mar 2022 08:00) (12 - 30)  SpO2: 96% (17 Mar 2022 08:00) (92% - 99%)     Physical exam:    Constitutional:  No acute distress  HEENT: NC/AT, EOMI, PERRLA, conjunctivae clear; ears and nose atraumatic  Neck: supple; thyroid not palpable  Back: no tenderness  Respiratory: respiratory effort normal; few crackles at bases; rhonchi  Cardiovascular: S1S2 regular, no murmurs  Abdomen: soft, not tender, not distended, positive BS  Genitourinary: no suprapubic tenderness  Lymphatic: no LN palpable  Musculoskeletal: no muscle tenderness, no joint swelling or tenderness  Extremities: no pedal edema  Neurological/ Psychiatric: AxOx3, moving all extremities  Skin: no rashes; no palpable lesions    Labs: reviewed                        9.9    13.45 )-----------( 150      ( 17 Mar 2022 06:21 )             29.1     03-17    148<H>  |  116<H>  |  33<H>  ----------------------------<  149<H>  3.3<L>   |  26  |  0.82    Ca    8.4<L>      17 Mar 2022 06:21  Phos  1.9     03-17  Mg     1.9     03-17    TPro  5.5<L>  /  Alb  1.9<L>  /  TBili  1.1  /  DBili  x   /  AST  57<H>  /  ALT  42  /  AlkPhos  100  03-16                        11.3   15.25 )-----------( 200      ( 14 Mar 2022 06:49 )             34.0     03-14    143  |  112<H>  |  30<H>  ----------------------------<  156<H>  3.7   |  23  |  1.19    Ca    8.1<L>      14 Mar 2022 06:49  Phos  2.7     03-14  Mg     1.5     03-14    TPro  5.6<L>  /  Alb  2.1<L>  /  TBili  1.1  /  DBili  x   /  AST  88<H>  /  ALT  29  /  AlkPhos  61       LIVER FUNCTIONS - ( 14 Mar 2022 06:49 )  Alb: 2.1 g/dL / Pro: 5.6 gm/dL / ALK PHOS: 61 U/L / ALT: 29 U/L / AST: 88 U/L / GGT: x           Urinalysis Basic - ( 13 Mar 2022 13:54 )    Color: Yellow / Appearance: very cloudy / S.020 / pH: x  Gluc: x / Ketone: Trace  / Bili: Negative / Urobili: Negative   Blood: x / Protein: 100 / Nitrite: Negative   Leuk Esterase: Moderate / RBC: 6-10 /HPF / WBC >50   Sq Epi: x / Non Sq Epi: Few / Bacteria: Moderate    ( @ 13:14)  NotDete      Culture - Blood (collected 13 Mar 2022 13:54)  Source: .Blood None  Preliminary Report (14 Mar 2022 20:01):    No growth to date.    Culture - Blood (collected 13 Mar 2022 13:54)  Source: .Blood None  Preliminary Report (14 Mar 2022 20:01):    No growth to date.    Culture - Urine (collected 13 Mar 2022 13:54)  Source: Clean Catch Clean Catch (Midstream)  Final Report (16 Mar 2022 11:24):    >100,000 CFU/ml Pseudomonas aeruginosa  Organism: Pseudomonas aeruginosa (16 Mar 2022 11:24)  Organism: Pseudomonas aeruginosa (16 Mar 2022 11:24)      -  Amikacin: S <=16      -  Aztreonam: S 8      -  Cefepime: S 4      -  Ceftazidime: S 4      -  Ciprofloxacin: S <=0.25      -  Gentamicin: S 4      -  Imipenem: S <=1      -  Levofloxacin: S <=0.5      -  Meropenem: S <=1      -  Piperacillin/Tazobactam: S <=8      -  Tobramycin: S <=2      Method Type: YUSEF    Radiology: all available radiological tests reviewed    < from: Xray Chest 1 View-PORTABLE IMMEDIATE (Xray Chest 1 View-PORTABLE IMMEDIATE .) (22 @ 16:52) >  IMPRESSION:   Corpak enteric tube tip in fundus of stomach..  RIGHT lung perihilar/basilar and LEFT medial basilar multifocal and   diffuse ill-defined airspace opacities.  < end of copied text >    < from: CT Abdomen and Pelvis No Cont (22 @ 21:21) >  1. Multifocal areas of bilateral airspace consolidation most pronounced   in the  right lower lobe. Appearance is suggestive of multifocal pneumonia.  2. Small layering right pleural effusion.  3. Small layering stones or sludge in the gallbladder fundus.  4. Punctate nonobstructive right nephrolithiasis.  5. large rectal fecal retention with evidence of stercoral proctitis.  < end of copied text >    Advanced directives addressed: full resuscitation

## 2022-03-17 NOTE — PROGRESS NOTE ADULT - SUBJECTIVE AND OBJECTIVE BOX
Patient is a 89y old  Male who presents with a chief complaint of Coffee ground emesis (17 Mar 2022 10:11)    24 hour events: intermittent hypoxia requiring 40% VM - likely aspirating   BP remains stable   HR better controlled     PAST MEDICAL & SURGICAL HISTORY:  CAD (coronary artery disease)    HTN (hypertension)    HLD (hyperlipidemia)    DM (diabetes mellitus)    BPH (benign prostatic hyperplasia)    GERD (gastroesophageal reflux disease)    SDH (subdural hematoma)        Allergies    No Known Allergies    Intolerances      REVIEW OF SYSTEMS: SEE BELOW       ICU Vital Signs Last 24 Hrs  T(C): 37.7 (17 Mar 2022 08:00), Max: 37.8 (17 Mar 2022 04:00)  T(F): 99.8 (17 Mar 2022 08:00), Max: 100 (17 Mar 2022 04:00)  HR: 98 (17 Mar 2022 10:00) (75 - 111)  BP: 100/64 (17 Mar 2022 10:00) (90/51 - 121/76)  BP(mean): 71 (17 Mar 2022 10:00) (58 - 87)  ABP: --  ABP(mean): --  RR: 25 (17 Mar 2022 10:00) (12 - 30)  SpO2: 95% (17 Mar 2022 10:00) (91% - 99%)      CAPILLARY BLOOD GLUCOSE      POCT Blood Glucose.: 122 mg/dL (17 Mar 2022 05:10)  POCT Blood Glucose.: 160 mg/dL (16 Mar 2022 23:32)  POCT Blood Glucose.: 114 mg/dL (16 Mar 2022 18:22)  POCT Blood Glucose.: 167 mg/dL (16 Mar 2022 15:16)      I&O's Summary    16 Mar 2022 07:01  -  17 Mar 2022 07:00  --------------------------------------------------------  IN: 886 mL / OUT: 565 mL / NET: 321 mL            MEDICATIONS  (STANDING):  ascorbic acid 500 milliGRAM(s) Oral two times a day  bethanechol 50 milliGRAM(s) Oral two times a day  chlorhexidine 4% Liquid 1 Application(s) Topical <User Schedule>  dextrose 40% Gel 15 Gram(s) Oral once  dextrose 5%. 1000 milliLiter(s) (100 mL/Hr) IV Continuous <Continuous>  dextrose 5%. 1000 milliLiter(s) (50 mL/Hr) IV Continuous <Continuous>  dextrose 50% Injectable 25 Gram(s) IV Push once  dextrose 50% Injectable 12.5 Gram(s) IV Push once  dextrose 50% Injectable 25 Gram(s) IV Push once  doxazosin 1 milliGRAM(s) Oral at bedtime  finasteride 5 milliGRAM(s) Oral daily  gabapentin 100 milliGRAM(s) Oral two times a day  glucagon  Injectable 1 milliGRAM(s) IntraMuscular once  heparin   Injectable 5000 Unit(s) SubCutaneous every 12 hours  insulin lispro (ADMELOG) corrective regimen sliding scale   SubCutaneous every 6 hours  metoprolol tartrate 25 milliGRAM(s) Oral two times a day  midodrine 10 milliGRAM(s) Oral every 8 hours  multivitamin/minerals 1 Tablet(s) Oral daily  piperacillin/tazobactam IVPB.. 3.375 Gram(s) IV Intermittent every 8 hours  potassium phosphate IVPB 30 milliMole(s) IV Intermittent once  thiamine 100 milliGRAM(s) Oral daily  zinc sulfate 220 milliGRAM(s) Oral daily      MEDICATIONS  (PRN):  acetaminophen     Tablet .. 650 milliGRAM(s) Oral every 6 hours PRN Temp greater or equal to 38.5C (101.3F), Mild Pain (1 - 3)      PHYSICAL EXAM: SEE BELOW                          9.9    13.45 )-----------( 150      ( 17 Mar 2022 06:21 )             29.1       03-17    148<H>  |  116<H>  |  33<H>  ----------------------------<  149<H>  3.3<L>   |  26  |  0.82    Ca    8.4<L>      17 Mar 2022 06:21  Phos  1.9     03-17  Mg     1.9     03-17    TPro  5.5<L>  /  Alb  1.9<L>  /  TBili  1.1  /  DBili  x   /  AST  57<H>  /  ALT  42  /  AlkPhos  100  03-16    Lactate 1.1           03-17 @ 06:21      CARDIAC MARKERS ( 16 Mar 2022 06:05 )  x     / x     / 260 U/L / x     / x              Clean Catch Clean Catch (Midstream)   >100,000 CFU/ml Pseudomonas aeruginosa -- 03-13 @ 13:54      Rapid RVP Result: Giovani (03-13 @ 13:14)

## 2022-03-17 NOTE — PROGRESS NOTE ADULT - SUBJECTIVE AND OBJECTIVE BOX
89 yo M w PMH of BPH, CAD, HTN, DMII, GERD, HLD, SDH s/p craniotomy and surgical removal is brought into HH from [facility] for vomiting. As per facility, pt had large amount coffee ground emesis and persistent loose stool, dark in appearance, skin color pale and cold to the touch. Pt is on 2L O2 via nasal canula. Unable to give hx. At bedside, pt states he feels cold, denies abd pain, N/V.   In ED, pt fount to be hypoxic, placed on non-rebreather. Leukocytosis of 14, Cr 1.6, Trop 202, lactate 8.5, Ph 5.4. U/A is very cloudy, w mod LE, blood & bacteria. CXR demonstrates lower lobe infiltrates. Given Vanc & Zosyn in ED, given 2L NS (30 cc/kg), dawson was placed, blood & urine cx were obtained.     03/17: Patient seen at bedside, A&Ox2 (name and location, not date), able to communicate needs. Requiring higher amounts of oxygen per day.     Vitals:  ============  T(F): 100 (17 Mar 2022 04:00), Max: 100 (17 Mar 2022 04:00)  HR: 97 (17 Mar 2022 08:00)  BP: 111/55 (17 Mar 2022 08:00)  RR: 20 (17 Mar 2022 08:00)  SpO2: 96% (17 Mar 2022 08:00) (92% - 99%)  temp max in last 48H T(F): , Max: 101 (03-15-22 @ 22:00)    Physical Exam   Gen: lethargic but arousable, frail  HENT: atraumatic head and ears, no gross abnormalities of ears, mucous membranes moist, no oral lesions, neck supple without masses/goiter/lymphadenopathy  CV: irregular rate and rhythm, nl s1/s2, no M/R/G  Pulm: on venti-mask, no wheezes/crackles/rhonchi  Abd: normoactive bowel sounds in all 4 quadrants, soft, nontender, nondistended, no rebound, no guarding, no masses  Extremities: contracted B/L LE, small pressure ulcers on B/L heels, no pedal edema, pedal pulses palpable   Skin: cold & dry  Neuro: A&Ox2, answering questions appropriately, PERRL, EOMI, face symmetric      =======================================================  Current Antibiotics:  piperacillin/tazobactam IVPB.. 3.375 Gram(s) IV Intermittent every 8 hours    Other medications:  ascorbic acid 500 milliGRAM(s) Oral two times a day  bethanechol 50 milliGRAM(s) Oral two times a day  chlorhexidine 4% Liquid 1 Application(s) Topical <User Schedule>  dextrose 40% Gel 15 Gram(s) Oral once  dextrose 5%. 1000 milliLiter(s) IV Continuous <Continuous>  dextrose 5%. 1000 milliLiter(s) IV Continuous <Continuous>  dextrose 50% Injectable 25 Gram(s) IV Push once  dextrose 50% Injectable 12.5 Gram(s) IV Push once  dextrose 50% Injectable 25 Gram(s) IV Push once  doxazosin 1 milliGRAM(s) Oral at bedtime  finasteride 5 milliGRAM(s) Oral daily  gabapentin 100 milliGRAM(s) Oral two times a day  glucagon  Injectable 1 milliGRAM(s) IntraMuscular once  heparin   Injectable 5000 Unit(s) SubCutaneous every 12 hours  insulin lispro (ADMELOG) corrective regimen sliding scale   SubCutaneous every 6 hours  magnesium sulfate  IVPB 1 Gram(s) IV Intermittent once  metoprolol tartrate 25 milliGRAM(s) Oral two times a day  midodrine 10 milliGRAM(s) Oral every 8 hours  multivitamin/minerals 1 Tablet(s) Oral daily  potassium chloride   Powder 40 milliEquivalent(s) Oral once  potassium phosphate IVPB 30 milliMole(s) IV Intermittent once  thiamine 100 milliGRAM(s) Oral daily  zinc sulfate 220 milliGRAM(s) Oral daily      =======================================================  Labs:                        9.9    13.45 )-----------( 150      ( 17 Mar 2022 06:21 )             29.1     03-17    148<H>  |  116<H>  |  33<H>  ----------------------------<  149<H>  3.3<L>   |  26  |  0.82    Ca    8.4<L>      17 Mar 2022 06:21  Phos  1.9     03-17  Mg     1.9     03-17    TPro  5.5<L>  /  Alb  1.9<L>  /  TBili  1.1  /  DBili  x   /  AST  57<H>  /  ALT  42  /  AlkPhos  100  03-16      Culture - Blood (collected 03-13-22 @ 13:54)  Source: .Blood None    Culture - Blood (collected 03-13-22 @ 13:54)  Source: .Blood None    Culture - Urine (collected 03-13-22 @ 13:54)  Source: Clean Catch Clean Catch (Midstream)  Final Report (03-16-22 @ 11:24):    >100,000 CFU/ml Pseudomonas aeruginosa  Organism: Pseudomonas aeruginosa (03-16-22 @ 11:24)  Organism: Pseudomonas aeruginosa (03-16-22 @ 11:24)    Sensitivities:      -  Amikacin: S <=16      -  Aztreonam: S 8      -  Cefepime: S 4      -  Ceftazidime: S 4      -  Ciprofloxacin: S <=0.25      -  Gentamicin: S 4      -  Imipenem: S <=1      -  Levofloxacin: S <=0.5      -  Meropenem: S <=1      -  Piperacillin/Tazobactam: S <=8      -  Tobramycin: S <=2      Method Type: YUSEF      Creatinine, Serum: 0.82 mg/dL (03-17-22 @ 06:21)  Creatinine, Serum: 0.98 mg/dL (03-16-22 @ 06:05)  Creatinine, Serum: 0.84 mg/dL (03-15-22 @ 18:32)  Creatinine, Serum: 0.91 mg/dL (03-15-22 @ 05:42)  Creatinine, Serum: 1.19 mg/dL (03-14-22 @ 06:49)  Creatinine, Serum: 1.63 mg/dL (03-13-22 @ 13:54)            WBC Count: 13.45 K/uL (03-17-22 @ 06:21)  WBC Count: 16.19 K/uL (03-16-22 @ 06:05)  WBC Count: 12.82 K/uL (03-15-22 @ 05:42)  WBC Count: 15.25 K/uL (03-14-22 @ 06:49)  WBC Count: 14.56 K/uL (03-13-22 @ 13:54)    SARS-CoV-2: NotDetec (03-13-22 @ 13:14)      Alkaline Phosphatase, Serum: 100 U/L (03-16-22 @ 06:05)  Alkaline Phosphatase, Serum: 66 U/L (03-15-22 @ 05:42)  Alkaline Phosphatase, Serum: 61 U/L (03-14-22 @ 06:49)  Alkaline Phosphatase, Serum: 103 U/L (03-13-22 @ 13:54)  Alanine Aminotransferase (ALT/SGPT): 42 U/L (03-16-22 @ 06:05)  Alanine Aminotransferase (ALT/SGPT): 41 U/L (03-15-22 @ 05:42)  Alanine Aminotransferase (ALT/SGPT): 29 U/L (03-14-22 @ 06:49)  Alanine Aminotransferase (ALT/SGPT): 16 U/L (03-13-22 @ 13:54)  Aspartate Aminotransferase (AST/SGOT): 57 U/L (03-16-22 @ 06:05)  Aspartate Aminotransferase (AST/SGOT): 89 U/L (03-15-22 @ 05:42)  Aspartate Aminotransferase (AST/SGOT): 88 U/L (03-14-22 @ 06:49)  Aspartate Aminotransferase (AST/SGOT): 28 U/L (03-13-22 @ 13:54)  Bilirubin Total, Serum: 1.1 mg/dL (03-16-22 @ 06:05)  Bilirubin Total, Serum: 0.9 mg/dL (03-15-22 @ 05:42)  Bilirubin Total, Serum: 1.1 mg/dL (03-14-22 @ 06:49)  Bilirubin Total, Serum: 1.1 mg/dL (03-13-22 @ 13:54)        < from: CT Abdomen and Pelvis No Cont (03.13.22 @ 21:21) >    IMPRESSION:  1. Multifocal areas of bilateral airspace consolidation most pronounced   in the  right lower lobe. Appearance is suggestive of multifocal pneumonia.  2. Small layering right pleural effusion.  3. Small layering stones or sludge in the gallbladder fundus.  4. Punctate nonobstructive right nephrolithiasis.  5. large rectal fecal retention with evidence of stercoral proctitis.    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 Moderate to severe   atrophy in the BILATERAL temporal lobes.      < end of copied text >  < from: Xray Chest 1 View-PORTABLE IMMEDIATE (03.13.22 @ 14:12) >    IMPRESSION:  Recommend correlation for lower lobe interstitial infiltrates      < end of copied text >

## 2022-03-17 NOTE — PROGRESS NOTE ADULT - ASSESSMENT
89 yo M w PMH of BPH, CAD, HTN, DMII, GERD, HLD, SDH s/p craniotomy and surgical removal is brought into HH from [facility] for vomiting. As per facility, pt had large amount coffee ground emesis and persistent loose stool, dark in appearance, skin color pale and cold to the touch. In ED, pt fount to be hypoxic, placed on non-rebreather. Leukocytosis of 14, Cr 1.6, Trop 202, lactate 8.5, Ph 5.4. U/A is very cloudy, w mod LE, blood & bacteria. CXR demonstrates lower lobe infiltrates. Given Vanc & Zosyn in ED, given 2L NS (30 cc/kg), dawson was placed, blood & urine cx were obtained.    #Septic Shock  - Currently off pressors, now on Midodrine 10 mg q8H  - Urine culture grew Pseudomonas susceptible to Zosyn, day 5/10  - Lactic acidosis resolved  - Blood cx negative  - Currently afebrile  - Pt w hx of VRE in 2020  -ID consult appreciated  -Leukocytosis improving    #PNA  -Increasing oxygen demands, currently on 50% venti mask  -Titrate O2 as tolerated  -CT abd/chest confirms multifocal PNA  -Continue abx    #Cystitis  -U/A w mod LE, bacteria and cloudy appearance  -Hx of VRE in 2020  -S/P Vanc & Zosyn in ED. Continue Zoysn   -Urine culture grew Pseudomonas susceptible to Zosyn  -Dawson D/C    #Stercoral Proctitis  -CT abd revealed large stool burden  -S/P fleet enema and bowel regimen  -Per nursing, having regular BM now    #MÓNICA  -Resolved w fluid resuscitation/pressors  -Cr elevated at 1.6 upon presentation (baseline 0.7)  -Most likely 2/2 ATN from septic shock  -Monitor renal function    #GI Bleed  -Initially brought in from nursing home 2/2 coffee ground emesis and dark colored stools  -H&H stable, monitor. No further episodes of dark stools  -D/C PPI    #Type II NSTEMI  -Trops trending down (1,021 -> 907 -> 463)  -CK downtrended  -EKG demonstrated 1st degree AV block and RBB, no ST segment changes   -Echo: EF 55-60%, mild concentric LVH, mildly dilated LA, mild MR, mod TR. no WMA  -Demand ischemia in the setting of septic shock, resolved    #DMII  -ISS  -Glucerna by CORPAK    #BPH  -Pt on finasteride,  bethanechol & doxazosin (switched home finasteride)  -Haim D/C    #Protein Calorie Malnutrition  -Pt w severe malnutrition, cachetic and contracted  -Nutrition consult appreciated, continue MVI, Thiamine, Zinc & Vit C  -Glucerna feeds per CORPAK    #Diet  -CORPAK Glucerna    #DVT PPX  -Heparin for DVT PPX    IMPROVE VTE Individual Risk Assessment    RISK                                                                Points    [  ] Previous VTE                                                  3    [  ] Thrombophilia                                               2    [ 2 ] Lower limb paralysis                                      2        (unable to hold up >15 seconds)      [  ] Current Cancer                                              2         (within 6 months)    [ 1 ] Immobilization > 24 hrs                                1    [ 1 ] ICU/CCU stay > 24 hours                              1    [ 1 ] Age > 60                                                      1    IMPROVE VTE Score ___5______    IMPROVE Score 0-1: Low Risk, No VTE prophylaxis required for most patients, encourage ambulation.   IMPROVE Score 2-3: At risk, pharmacologic VTE prophylaxis is indicated for most patients (in the absence of a contraindication)  IMPROVE Score > or = 4: High Risk, pharmacologic VTE prophylaxis is indicated for most patients (in the absence of a contraindication)    #GOC  -Dr. Harris spoke w son Caleb in person 03/16. Pt has a living will where he apparently wishes to not be sustained on life support. Will bring/send a copy of living will to hospital.     *Above discussed w Dr. Harris

## 2022-03-18 NOTE — PROGRESS NOTE ADULT - SUBJECTIVE AND OBJECTIVE BOX
89 yo M w PMH of BPH, CAD, HTN, DMII, GERD, HLD, SDH s/p craniotomy and surgical removal is brought into HH from [facility] for vomiting. As per facility, pt had large amount coffee ground emesis and persistent loose stool, dark in appearance, skin color pale and cold to the touch.     3/18: No events over night.  On VM O2         PAST MEDICAL & SURGICAL HISTORY:  CAD (coronary artery disease)    HTN (hypertension)    HLD (hyperlipidemia)    DM (diabetes mellitus)    BPH (benign prostatic hyperplasia)    GERD (gastroesophageal reflux disease)    SDH (subdural hematoma)        FAMILY HISTORY:  No pertinent family history in first degree relatives        Social Hx:    Allergies    No Known Allergies    Intolerances            ICU Vital Signs Last 24 Hrs  T(C): 37.4 (18 Mar 2022 08:00), Max: 38.3 (17 Mar 2022 22:00)  T(F): 99.3 (18 Mar 2022 08:00), Max: 100.9 (17 Mar 2022 22:00)  HR: 109 (18 Mar 2022 09:00) (75 - 112)  BP: 141/85 (18 Mar 2022 09:00) (101/61 - 141/85)  BP(mean): 99 (18 Mar 2022 09:00) (67 - 99)  ABP: --  ABP(mean): --  RR: 27 (18 Mar 2022 09:00) (13 - 30)  SpO2: 91% (18 Mar 2022 09:00) (91% - 99%)          I&O's Summary    17 Mar 2022 07:01  -  18 Mar 2022 07:00  --------------------------------------------------------  IN: 3003 mL / OUT: 480 mL / NET: 2523 mL                              10.3   11.66 )-----------( 180      ( 18 Mar 2022 06:54 )             32.3       03-18    147<H>  |  115<H>  |  31<H>  ----------------------------<  156<H>  4.1   |  26  |  0.76    Ca    8.1<L>      18 Mar 2022 06:54  Phos  3.0     03-18  Mg     2.3     03-18    TPro  5.3<L>  /  Alb  1.7<L>  /  TBili  0.7  /  DBili  x   /  AST  33  /  ALT  34  /  AlkPhos  166<H>  03-18                    MEDICATIONS  (STANDING):  ascorbic acid 500 milliGRAM(s) Oral two times a day  doxazosin 1 milliGRAM(s) Oral at bedtime  finasteride 5 milliGRAM(s) Oral daily  heparin   Injectable 5000 Unit(s) SubCutaneous every 12 hours  metoprolol tartrate 12.5 milliGRAM(s) Oral two times a day  midodrine 5 milliGRAM(s) Oral every 8 hours  multivitamin/minerals 1 Tablet(s) Oral daily  piperacillin/tazobactam IVPB.. 3.375 Gram(s) IV Intermittent every 8 hours  thiamine 100 milliGRAM(s) Oral daily  zinc sulfate 220 milliGRAM(s) Oral daily    MEDICATIONS  (PRN):  acetaminophen     Tablet .. 650 milliGRAM(s) Oral every 6 hours PRN Temp greater or equal to 38.5C (101.3F), Mild Pain (1 - 3)      DVT Prophylaxis:    Advanced Directives:  Discussed with:    Visit Information:    ** Time is exclusive of billed procedures and/or teaching and/or routine family updates.

## 2022-03-18 NOTE — PROGRESS NOTE ADULT - SUBJECTIVE AND OBJECTIVE BOX
89 yo M w PMH of BPH, CAD, HTN, DMII, GERD, HLD, SDH s/p craniotomy and surgical removal is brought into HH from [facility] [place] for vomiting. As per facility, pt had large amount coffee ground emesis and persistent loose stool, dark in appearance, skin color pale and cold to the touch. Pt is on 2L O2 via nasal canula. Unable to give hx. At bedside, pt states he feels cold, denies abd pain, N/V.   In ED, pt fount to be hypoxic, placed on non-rebreather. Leukocytosis of 14, Cr 1.6, Trop 202, lactate 8.5, Ph 5.4. U/A is very cloudy, w mod LE, blood & bacteria. CXR demonstrates lower lobe infiltrates. Given Vanc & Zosyn in ED, given 2L NS (30 cc/kg), dawson was placed, blood & urine cx were obtained.     03/18: No acute events overnight. A&Ox2 (name and location, not date), able to communicate needs. Requiring higher amounts of oxygen per day.     Vitals  T(F): 99.3 (03-18-22 @ 08:00), Max: 100.9 (03-17-22 @ 22:00)  HR: 109 (03-18-22 @ 09:00) (75 - 112)  BP: 141/85 (03-18-22 @ 09:00) (100/64 - 141/85)  RR: 27 (03-18-22 @ 09:00) (13 - 30)  SpO2: 91% (03-18-22 @ 09:00) (91% - 99%)    Physical Exam   Gen: lethargic but arousable, frail  HENT: atraumatic head and ears, no gross abnormalities of ears, mucous membranes moist, no oral lesions, neck supple without masses/goiter/lymphadenopathy  CV: irregular rate and rhythm, nl s1/s2, no M/R/G  Pulm: on venti-mask, no wheezes/crackles/rhonchi  Abd: normoactive bowel sounds in all 4 quadrants, soft, nontender, nondistended, no rebound, no guarding, no masses  Extremities: contracted B/L LE, small pressure ulcers on B/L heels, no pedal edema, pedal pulses palpable   Skin: cold & dry  Neuro: A&Ox2, answering questions appropriately, PERRL, EOMI, face symmetric      =======================================================  MEDICATIONS  (STANDING):  ascorbic acid 500 milliGRAM(s) Oral two times a day  doxazosin 1 milliGRAM(s) Oral at bedtime  finasteride 5 milliGRAM(s) Oral daily  heparin   Injectable 5000 Unit(s) SubCutaneous every 12 hours  metoprolol tartrate 12.5 milliGRAM(s) Oral two times a day  midodrine 10 milliGRAM(s) Oral every 8 hours  multivitamin/minerals 1 Tablet(s) Oral daily  piperacillin/tazobactam IVPB.. 3.375 Gram(s) IV Intermittent every 8 hours  thiamine 100 milliGRAM(s) Oral daily  zinc sulfate 220 milliGRAM(s) Oral daily    MEDICATIONS  (PRN):  acetaminophen     Tablet .. 650 milliGRAM(s) Oral every 6 hours PRN Temp greater or equal to 38.5C (101.3F), Mild Pain (1 - 3)    =======================================================    LABS:  cret                        10.3   11.66 )-----------( 180      ( 18 Mar 2022 06:54 )             32.3     03-18    147<H>  |  115<H>  |  31<H>  ----------------------------<  156<H>  4.1   |  26  |  0.76    Ca    8.1<L>      18 Mar 2022 06:54  Phos  3.0     03-18  Mg     2.3     03-18    TPro  5.3<L>  /  Alb  1.7<L>  /  TBili  0.7  /  DBili  x   /  AST  33  /  ALT  34  /  AlkPhos  166<H>  03-18             from: CT Abdomen and Pelvis No Cont (03.13.22 @ 21:21) >    IMPRESSION:  1. Multifocal areas of bilateral airspace consolidation most pronounced   in the  right lower lobe. Appearance is suggestive of multifocal pneumonia.  2. Small layering right pleural effusion.  3. Small layering stones or sludge in the gallbladder fundus.  4. Punctate nonobstructive right nephrolithiasis.  5. large rectal fecal retention with evidence of stercoral proctitis.    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 Moderate to severe   atrophy in the BILATERAL temporal lobes.      < end of copied text >  < from: Xray Chest 1 View-PORTABLE IMMEDIATE (03.13.22 @ 14:12) >    IMPRESSION:  Recommend correlation for lower lobe interstitial infiltrates      < end of copied text >    < from: TTE Echo Complete w/o Contrast w/ Doppler (03.14.22 @ 10:56) >   Impression     Summary     Fibrocalcific changes noted to the mitral valve leaflets with preserved   leaflet excursion.   Mild mitral annular calcification is present.   Mild (1+) mitral regurgitation is present.   EA reversal of the mitral inflow consistent with reduced compliance of   the   left ventricle.   Normal appearing tricuspid valve structure.   Moderate (2+) tricuspid valve regurgitation is present.   The left atrium is mildly dilated.   Estimated left ventricular ejection fraction is 55-60 %.   The left ventricle has normal wall motion and contractility as seen in   limited views.  Mild concentric left ventricular hypertrophy is present.   IVC was not seen within the subcostal view.   Aortic root is within the upper limits of normal (4.0 cm).     Signature    < end of copied text >

## 2022-03-18 NOTE — PROGRESS NOTE ADULT - SUBJECTIVE AND OBJECTIVE BOX
Date of service: 22 @ 08:57    Lying in bed in NAD  Alert and confused  Poorly verbal  Has low grade fever    ROS: no fever or chills; denies pain    MEDICATIONS  (STANDING):  ascorbic acid 500 milliGRAM(s) Oral two times a day  doxazosin 1 milliGRAM(s) Oral at bedtime  finasteride 5 milliGRAM(s) Oral daily  heparin   Injectable 5000 Unit(s) SubCutaneous every 12 hours  metoprolol tartrate 12.5 milliGRAM(s) Oral two times a day  midodrine 10 milliGRAM(s) Oral every 8 hours  multivitamin/minerals 1 Tablet(s) Oral daily  piperacillin/tazobactam IVPB.. 3.375 Gram(s) IV Intermittent every 8 hours  thiamine 100 milliGRAM(s) Oral daily  zinc sulfate 220 milliGRAM(s) Oral daily    Vital Signs Last 24 Hrs  T(C): 37.6 (18 Mar 2022 04:00), Max: 38.3 (17 Mar 2022 22:00)  T(F): 99.6 (18 Mar 2022 04:00), Max: 100.9 (17 Mar 2022 22:00)  HR: 99 (18 Mar 2022 08:00) (75 - 112)  BP: 126/67 (18 Mar 2022 08:00) (100/64 - 135/72)  BP(mean): 81 (18 Mar 2022 08:00) (67 - 88)  RR: 26 (18 Mar 2022 08:00) (13 - 30)  SpO2: 98% (18 Mar 2022 08:00) (91% - 99%)     Physical exam:    Constitutional:  No acute distress  HEENT: NC/AT, EOMI, PERRLA, conjunctivae clear; ears and nose atraumatic  Neck: supple; thyroid not palpable  Back: no tenderness  Respiratory: respiratory effort normal; few crackles at bases; rhonchi  Cardiovascular: S1S2 regular, no murmurs  Abdomen: soft, not tender, not distended, positive BS  Genitourinary: no suprapubic tenderness  Lymphatic: no LN palpable  Musculoskeletal: no muscle tenderness, no joint swelling or tenderness  Extremities: no pedal edema  Neurological/ Psychiatric: confused, moving all extremities  Skin: no rashes; no palpable lesions    Labs: reviewed                        10.3   11 )-----------( 180      ( 18 Mar 2022 06:54 )             32.3     03-18    147<H>  |  115<H>  |  31<H>  ----------------------------<  156<H>  4.1   |  26  |  0.76    Ca    8.1<L>      18 Mar 2022 06:54  Phos  3.0     03-18  Mg     2.3     03-18    TPro  5.3<L>  /  Alb  1.7<L>  /  TBili  0.7  /  DBili  x   /  AST  33  /  ALT  34  /  AlkPhos  166<H>  03-18                        9.9    13.45 )-----------( 150      ( 17 Mar 2022 06:21 )             29.1     03-17    148<H>  |  116<H>  |  33<H>  ----------------------------<  149<H>  3.3<L>   |  26  |  0.82    Ca    8.4<L>      17 Mar 2022 06:21  Phos  1.9     03-17  Mg     1.9     03-17    TPro  5.5<L>  /  Alb  1.9<L>  /  TBili  1.1  /  DBili  x   /  AST  57<H>  /  ALT  42  /  AlkPhos  100  03-16                        11.3   15.25 )-----------( 200      ( 14 Mar 2022 06:49 )             34.0     03-14    143  |  112<H>  |  30<H>  ----------------------------<  156<H>  3.7   |  23  |  1.19    Ca    8.1<L>      14 Mar 2022 06:49  Phos  2.7     03-14  Mg     1.5     03-14    TPro  5.6<L>  /  Alb  2.1<L>  /  TBili  1.1  /  DBili  x   /  AST  88<H>  /  ALT  29  /  AlkPhos  61  03-14     LIVER FUNCTIONS - ( 14 Mar 2022 06:49 )  Alb: 2.1 g/dL / Pro: 5.6 gm/dL / ALK PHOS: 61 U/L / ALT: 29 U/L / AST: 88 U/L / GGT: x           Urinalysis Basic - ( 13 Mar 2022 13:54 )    Color: Yellow / Appearance: very cloudy / S.020 / pH: x  Gluc: x / Ketone: Trace  / Bili: Negative / Urobili: Negative   Blood: x / Protein: 100 / Nitrite: Negative   Leuk Esterase: Moderate / RBC: 6-10 /HPF / WBC >50   Sq Epi: x / Non Sq Epi: Few / Bacteria: Moderate    ( @ 13:14)  [facility]      Culture - Blood (collected 13 Mar 2022 13:54)  Source: .Blood None  Preliminary Report (14 Mar 2022 20:01):    No growth to date.    Culture - Blood (collected 13 Mar 2022 13:54)  Source: .Blood None  Preliminary Report (14 Mar 2022 20:01):    No growth to date.    Culture - Urine (collected 13 Mar 2022 13:54)  Source: Clean Catch Clean Catch (Midstream)  Final Report (16 Mar 2022 11:24):    >100,000 CFU/ml Pseudomonas aeruginosa  Organism: Pseudomonas aeruginosa (16 Mar 2022 11:24)  Organism: Pseudomonas aeruginosa (16 Mar 2022 11:24)      -  Amikacin: S <=16      -  Aztreonam: S 8      -  Cefepime: S 4      -  Ceftazidime: S 4      -  Ciprofloxacin: S <=0.25      -  Gentamicin: S 4      -  Imipenem: S <=1      -  Levofloxacin: S <=0.5      -  Meropenem: S <=1      -  Piperacillin/Tazobactam: S <=8      -  Tobramycin: S <=2      Method Type: YUSEF    Radiology: all available radiological tests reviewed    < from: Xray Chest 1 View-PORTABLE IMMEDIATE (Xray Chest 1 View-PORTABLE IMMEDIATE .) (22 @ 16:52) >  IMPRESSION:   Corpak enteric tube tip in fundus of stomach..  RIGHT lung perihilar/basilar and LEFT medial basilar multifocal and   diffuse ill-defined airspace opacities.  < end of copied text >    < from: CT Abdomen and Pelvis No Cont (22 @ 21:21) >  1. Multifocal areas of bilateral airspace consolidation most pronounced   in the  right lower lobe. Appearance is suggestive of multifocal pneumonia.  2. Small layering right pleural effusion.  3. Small layering stones or sludge in the gallbladder fundus.  4. Punctate nonobstructive right nephrolithiasis.  5. large rectal fecal retention with evidence of stercoral proctitis.  < end of copied text >    Advanced directives addressed: full resuscitation

## 2022-03-18 NOTE — PROGRESS NOTE ADULT - ASSESSMENT
89 y/o Male with h/o BPH, CAD, HTN, DM type II, GERD, HLD, SDH s/p craniotomy, prior UTI with VREF was admitted on 3/12 from [facility] [place] for vomiting. As per facility, pt had large amount coffee ground emesis and persistent loose stool, dark in appearance, skin color pale and cold to the touch. Pt is on 2L O2 via nasal canula. He is a poor historian, In ED, pt found hypoxic, placed on non-rebreather. Urine reported cloudy.  He received vancomycin IV x 1 and zosyn.    1. Febrile syndrome resolving. Probable sepsis. Pyuria. UTI with PSAE. ARF. Probable aspiration pneumonia.   -leukocytosis is improving  -encephalopathy improving  -alert and confused  -BC x 2, urine c/s noted  -on zosyn 3.375 gm IV q8h # 5  -tolerating abx well so far; no side effects noted  -f/u culture  -aspiration precautions  -continue abx coverage   -monitor temps  -f/u CBC  -supportive care  2. Other issues:   -care per medicine    d/w medical team

## 2022-03-18 NOTE — PROGRESS NOTE ADULT - ASSESSMENT
A/P: 89 male who presents from a NH with coffee ground emesis, aspiration pneumonia, and a  pseudomonal UTI    Plan:  ICU    PULM: Aspiration PNA.  Continue 50% VM.  Continue Zosyn    Cardio: Improving     Renal: In MÓNICA improved    ENDO: RISS    GI: Continue Feeds, PPI    ID: Zosyn for now, Vanco given in ED.  Has had VRE in the Urine in the PAST    GOC: Patient has been a DNR/DNI in the past.  Awaiting the sons decision

## 2022-03-18 NOTE — PROGRESS NOTE ADULT - ASSESSMENT
89 yo M w PMH of BPH, CAD, HTN, DMII, GERD, HLD, SDH s/p craniotomy and surgical removal is brought into HH from [facility] for vomiting. As per facility, pt had large amount coffee ground emesis and persistent loose stool, dark in appearance, skin color pale and cold to the touch. In ED, pt fount to be hypoxic, placed on non-rebreather. Leukocytosis of 14, Cr 1.6, Trop 202, lactate 8.5, Ph 5.4. U/A is very cloudy, w mod LE, blood & bacteria. CXR demonstrates lower lobe infiltrates. Given Vanc & Zosyn in ED, given 2L NS (30 cc/kg), dawson was placed, blood & urine cx were obtained.    #Septic Shock  - Currently off pressors, now on Midodrine 10 mg q8H  - Urine culture grew Pseudomonas susceptible to Zosyn, day 6/10  - Lactic acidosis resolved  - Blood cx negative  - Fever overnight  - Pt w hx of VRE in 2020  -ID consult appreciated  -Leukocytosis improving    #PNA  -Increasing oxygen demands, currently on 50% venti mask  -Titrate O2 as tolerated  -CT abd/chest confirms multifocal PNA  -Continue abx    #Cystitis  -U/A w mod LE, bacteria and cloudy appearance  -Hx of VRE in 2020  -S/P Vanc & Zosyn in ED. Continue Zoysn   -Urine culture grew Pseudomonas susceptible to Zosyn  -Dawson D/C    #New Onset Afib  -Most likely in setting of septic shock  -Currently rate controlled on metoprolol tartrate 12.5 mg BID PO  -Not A candidate for AC  -Continue to monitor on tele    #Stercoral Proctitis  -Resolved  -CT abd revealed large stool burden  -S/P fleet enema and bowel regimen  -Per nursing, having regular BM now    #MÓNICA  -Resolved w fluid resuscitation/pressors  -Cr elevated at 1.6 upon presentation (baseline 0.7)  -Most likely 2/2 ATN from septic shock  -Monitor renal function    #GI Bleed  -Resolved  -Initially brought in from nursing home 2/2 coffee ground emesis and dark colored stools  -H&H stable, monitor. No further episodes of dark stools  -D/C PPI    #Type II NSTEMI  -Trops trended down (1,021 -> 907 -> 463)  -CK downtrended  -EKG demonstrated 1st degree AV block and RBB, no ST segment changes   -Echo: EF 55-60%, mild concentric LVH, mildly dilated LA, mild MR, mod TR. no WMA  -Demand ischemia in the setting of septic shock, resolved    #DMII  -ISS  -Glucerna by CORPAK    #BPH  -Pt on finasteride,  bethanechol & doxazosin (switched from home finasteride)  -Dawson D/C    #Protein Calorie Malnutrition  -Pt w severe malnutrition, cachetic and contracted  -Nutrition consult appreciated, continue MVI, Thiamine, Zinc & Vit C  -Glucerna feeds per [facility]PAK    #Diet  -CORPAK Glucerna    #DVT PPX  -Heparin for DVT PPX    IMPROVE VTE Individual Risk Assessment    RISK                                                                Points    [  ] Previous VTE                                                  3    [  ] Thrombophilia                                               2    [ 2 ] Lower limb paralysis                                      2        (unable to hold up >15 seconds)      [  ] Current Cancer                                              2         (within 6 months)    [ 1 ] Immobilization > 24 hrs                                1    [ 1 ] ICU/CCU stay > 24 hours                              1    [ 1 ] Age > 60                                                      1    IMPROVE VTE Score ___5______    IMPROVE Score 0-1: Low Risk, No VTE prophylaxis required for most patients, encourage ambulation.   IMPROVE Score 2-3: At risk, pharmacologic VTE prophylaxis is indicated for most patients (in the absence of a contraindication)  IMPROVE Score > or = 4: High Risk, pharmacologic VTE prophylaxis is indicated for most patients (in the absence of a contraindication)    #GOC  -Dr. Harris received pt's living will from 2003, nonspecific about wishes regarding life sustaining methods. Son will speak with  before deciding upon further GOC.     *Above discussed w Dr. Elizabeth

## 2022-03-19 NOTE — PROGRESS NOTE ADULT - SUBJECTIVE AND OBJECTIVE BOX
89 yo M w PMH of BPH, CAD, HTN, DMII, GERD, HLD, SDH s/p craniotomy and surgical removal is brought into HH from [facility] for vomiting. As per facility, pt had large amount coffee ground emesis and persistent loose stool, dark in appearance, skin color pale and cold to the touch.     3/18: No events over night.  On VM O2  3/19: PAtient remains on 50% VM, weak, and overall doing poorly, AFIb in the low 100s           PAST MEDICAL & SURGICAL HISTORY:  CAD (coronary artery disease)    HTN (hypertension)    HLD (hyperlipidemia)    DM (diabetes mellitus)    BPH (benign prostatic hyperplasia)    GERD (gastroesophageal reflux disease)    SDH (subdural hematoma)        FAMILY HISTORY:  No pertinent family history in first degree relatives        Social Hx:    Allergies    No Known Allergies    Intolerances            ICU Vital Signs Last 24 Hrs  T(C): 38 (19 Mar 2022 04:00), Max: 38.2 (18 Mar 2022 16:00)  T(F): 100.4 (19 Mar 2022 04:00), Max: 100.7 (18 Mar 2022 16:00)  HR: 125 (19 Mar 2022 08:00) (97 - 126)  BP: 153/82 (19 Mar 2022 08:00) (117/62 - 165/73)  BP(mean): 101 (19 Mar 2022 08:00) (74 - 102)  ABP: --  ABP(mean): --  RR: 35 (19 Mar 2022 08:00) (21 - 38)  SpO2: 95% (19 Mar 2022 08:00) (91% - 100%)          I&O's Summary    18 Mar 2022 07:01  -  19 Mar 2022 07:00  --------------------------------------------------------  IN: 2155 mL / OUT: 925 mL / NET: 1230 mL                              11.0   12.51 )-----------( 211      ( 19 Mar 2022 05:52 )             33.7       03-19    147<H>  |  114<H>  |  35<H>  ----------------------------<  177<H>  3.5   |  28  |  0.74    Ca    8.5      19 Mar 2022 05:52  Phos  2.5     03-19  Mg     2.1     03-19    TPro  5.3<L>  /  Alb  1.7<L>  /  TBili  0.7  /  DBili  x   /  AST  33  /  ALT  34  /  AlkPhos  166<H>  03-18        MEDICATIONS  (STANDING):  ascorbic acid 500 milliGRAM(s) Oral two times a day  doxazosin 1 milliGRAM(s) Oral at bedtime  finasteride 5 milliGRAM(s) Oral daily  furosemide   Injectable 20 milliGRAM(s) IV Push once  heparin   Injectable 5000 Unit(s) SubCutaneous every 12 hours  metoprolol tartrate 25 milliGRAM(s) Oral every 8 hours  multivitamin/minerals 1 Tablet(s) Oral daily  piperacillin/tazobactam IVPB.. 3.375 Gram(s) IV Intermittent every 8 hours  thiamine 100 milliGRAM(s) Oral daily  zinc sulfate 220 milliGRAM(s) Oral daily    MEDICATIONS  (PRN):  acetaminophen     Tablet .. 650 milliGRAM(s) Oral every 6 hours PRN Temp greater or equal to 38.5C (101.3F), Mild Pain (1 - 3)      DVT Prophylaxis:    Advanced Directives:  Discussed with:    Visit Information: 30 min    ** Time is exclusive of billed procedures and/or teaching and/or routine family updates.

## 2022-03-19 NOTE — PROGRESS NOTE ADULT - SUBJECTIVE AND OBJECTIVE BOX
89 yo M w PMH of BPH, CAD, HTN, DMII, GERD, HLD, SDH s/p craniotomy and surgical removal is brought into HH from [facility] for vomiting. As per facility, pt had large amount coffee ground emesis and persistent loose stool, dark in appearance, skin color pale and cold to the touch. Pt is on 2L O2 via nasal canula. Unable to give hx. At bedside, pt states he feels cold, denies abd pain, N/V.   In ED, pt fount to be hypoxic, placed on non-rebreather. Leukocytosis of 14, Cr 1.6, Trop 202, lactate 8.5, Ph 5.4. U/A is very cloudy, w mod LE, blood & bacteria. CXR demonstrates lower lobe infiltrates. Given Vanc & Zosyn in ED, given 2L NS (30 cc/kg), dawson was placed, blood & urine cx were obtained.     03/19: Pt increasingly tachypneic and tachycardic, on Ventimask. Patient denies being uncomfortable, answers questions appropriately.     Vitals  T(F): 100.4 (03-19-22 @ 04:00), Max: 100.7 (03-18-22 @ 16:00)  HR: 125 (03-19-22 @ 08:00) (97 - 126)  BP: 153/82 (03-19-22 @ 08:00) (117/62 - 165/73)  RR: 35 (03-19-22 @ 08:00) (21 - 38)  SpO2: 95% (03-19-22 @ 08:00) (91% - 100%)    Physical Exam   Gen: more awake, frail  HENT: atraumatic head and ears, no gross abnormalities of ears, mucous membranes moist, no oral lesions, neck supple without masses/goiter/lymphadenopathy  CV: irregular rate and rhythm, nl s1/s2, no M/R/G  Pulm: on venti-mask, no wheezes/crackles/rhonchi  Abd: normoactive bowel sounds in all 4 quadrants, soft, nontender, nondistended, no rebound, no guarding, no masses  Extremities: contracted B/L LE, small pressure ulcers on B/L heels, no pedal edema, pedal pulses palpable   Skin: cold & dry  Neuro: A&Ox2, answering questions appropriately, PERRL, EOMI, face symmetric      =======================================================  MEDICATIONS  (STANDING):  ascorbic acid 500 milliGRAM(s) Oral two times a day  doxazosin 1 milliGRAM(s) Oral at bedtime  finasteride 5 milliGRAM(s) Oral daily  furosemide   Injectable 20 milliGRAM(s) IV Push once  heparin   Injectable 5000 Unit(s) SubCutaneous every 12 hours  metoprolol tartrate 25 milliGRAM(s) Oral every 8 hours  multivitamin/minerals 1 Tablet(s) Oral daily  piperacillin/tazobactam IVPB.. 3.375 Gram(s) IV Intermittent every 8 hours  thiamine 100 milliGRAM(s) Oral daily  zinc sulfate 220 milliGRAM(s) Oral daily    MEDICATIONS  (PRN):  acetaminophen     Tablet .. 650 milliGRAM(s) Oral every 6 hours PRN Temp greater or equal to 38.5C (101.3F), Mild Pain (1 - 3)    =======================================================      LABS:  cret                        11.0   12.51 )-----------( 211      ( 19 Mar 2022 05:52 )             33.7     03-19    147<H>  |  114<H>  |  35<H>  ----------------------------<  177<H>  3.5   |  28  |  0.74    Ca    8.5      19 Mar 2022 05:52  Phos  2.5     03-19  Mg     2.1     03-19    TPro  5.3<L>  /  Alb  1.7<L>  /  TBili  0.7  /  DBili  x   /  AST  33  /  ALT  34  /  AlkPhos  166<H>  03-18       from: CT Abdomen and Pelvis No Cont (03.13.22 @ 21:21) >    IMPRESSION:  1. Multifocal areas of bilateral airspace consolidation most pronounced   in the  right lower lobe. Appearance is suggestive of multifocal pneumonia.  2. Small layering right pleural effusion.  3. Small layering stones or sludge in the gallbladder fundus.  4. Punctate nonobstructive right nephrolithiasis.  5. large rectal fecal retention with evidence of stercoral proctitis.    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 Moderate to severe   atrophy in the BILATERAL temporal lobes.      < end of copied text >  < from: Xray Chest 1 View-PORTABLE IMMEDIATE (03.13.22 @ 14:12) >    IMPRESSION:  Recommend correlation for lower lobe interstitial infiltrates      < end of copied text >    < from: TTE Echo Complete w/o Contrast w/ Doppler (03.14.22 @ 10:56) >   Impression     Summary     Fibrocalcific changes noted to the mitral valve leaflets with preserved   leaflet excursion.   Mild mitral annular calcification is present.   Mild (1+) mitral regurgitation is present.   EA reversal of the mitral inflow consistent with reduced compliance of   the   left ventricle.   Normal appearing tricuspid valve structure.   Moderate (2+) tricuspid valve regurgitation is present.   The left atrium is mildly dilated.   Estimated left ventricular ejection fraction is 55-60 %.   The left ventricle has normal wall motion and contractility as seen in   limited views.  Mild concentric left ventricular hypertrophy is present.   IVC was not seen within the subcostal view.   Aortic root is within the upper limits of normal (4.0 cm).     Signature    < end of copied text >

## 2022-03-19 NOTE — PROGRESS NOTE ADULT - ASSESSMENT
89 yo M w PMH of BPH, CAD, HTN, DMII, GERD, HLD, SDH s/p craniotomy and surgical removal is brought into HH from [facility] for vomiting. As per facility, pt had large amount coffee ground emesis and persistent loose stool, dark in appearance, skin color pale and cold to the touch. In ED, pt fount to be hypoxic, placed on non-rebreather. Leukocytosis of 14, Cr 1.6, Trop 202, lactate 8.5, Ph 5.4. U/A is very cloudy, w mod LE, blood & bacteria. CXR demonstrates lower lobe infiltrates. Given Vanc & Zosyn in ED, given 2L NS (30 cc/kg), dawson was placed, blood & urine cx were obtained.    #Septic Shock  - Resolved  - Currently off pressors and off midodrine  - Urine culture grew Pseudomonas susceptible to Zosyn, day 7/10  - Lactic acidosis resolved  - Blood cx negative  - Fever overnight  - Pt w hx of VRE in 2020  -ID consult appreciated  -Persistent leuocytosis    #PNA  #Pulm Infiltrates  -Increasing oxygen demands, currently on 50% venti mask  -Titrate O2 as tolerated  -CT abd/chest confirms multifocal PNA  -CXR obtained today 03/19 demonstrates worsening infiltrates when compared to admission  -IV Lasix 20 mg   - Will reach out to son today to clarify advanced directives, worsening resp status  -Continue abx    #Cystitis  -U/A w mod LE, bacteria and cloudy appearance  -Hx of VRE in 2020  -S/P Vanc & Zosyn in ED. Continue Zoysn   -Urine culture grew Pseudomonas susceptible to Zosyn  -Dawson D/C    #New Onset Afib  -Most likely in setting of septic shock  -Increase Metoprolol to 25 mg q8H for better rate control  -Not A candidate for AC  -Continue to monitor on tele    #Stercoral Proctitis  -Resolved  -CT abd revealed large stool burden  -S/P fleet enema and bowel regimen  -Per nursing, having regular BM now    #MÓNICA  -Resolved w fluid resuscitation/pressors  -Cr elevated at 1.6 upon presentation (baseline 0.7)  -Most likely 2/2 ATN from septic shock  -Monitor renal function    #GI Bleed  -Resolved  -Initially brought in from nursing home 2/2 coffee ground emesis and dark colored stools  -H&H stable, monitor. No further episodes of dark stools  -D/C PPI    #Type II NSTEMI  -Resolved  -Trops trended down (1,021 -> 907 -> 463)  -CK downtrended  -EKG demonstrated 1st degree AV block and RBB, no ST segment changes   -Echo: EF 55-60%, mild concentric LVH, mildly dilated LA, mild MR, mod TR. no WMA  -Demand ischemia in the setting of septic shock, resolved    #DMII  -ISS  -Glucerna by CORPAK    #BPH  -Pt on finasteride,  bethanechol & doxazosin (switched from home finasteride)  -Dawson D/C    #Protein Calorie Malnutrition  -Pt w severe malnutrition, cachetic and contracted  -Nutrition consult appreciated, continue MVI, Thiamine, Zinc & Vit C  -Glucerna feeds per CORPAK    #Diet  -CORPAK Glucerna    #DVT PPX  -Heparin for DVT PPX    IMPROVE VTE Individual Risk Assessment    RISK                                                                Points    [  ] Previous VTE                                                  3    [  ] Thrombophilia                                               2    [ 2 ] Lower limb paralysis                                      2        (unable to hold up >15 seconds)      [  ] Current Cancer                                              2         (within 6 months)    [ 1 ] Immobilization > 24 hrs                                1    [ 1 ] ICU/CCU stay > 24 hours                              1    [ 1 ] Age > 60                                                      1    IMPROVE VTE Score ___5______    IMPROVE Score 0-1: Low Risk, No VTE prophylaxis required for most patients, encourage ambulation.   IMPROVE Score 2-3: At risk, pharmacologic VTE prophylaxis is indicated for most patients (in the absence of a contraindication)  IMPROVE Score > or = 4: High Risk, pharmacologic VTE prophylaxis is indicated for most patients (in the absence of a contraindication)    #[facility]  -Dr. Harris received pt's living will from 2003, nonspecific about wishes regarding life sustaining methods. Will reach out to son today to clarify advanced directives given pt's worsening resp status. Prognosis guarded.     *Above discussed w Dr. Elizabeth

## 2022-03-20 NOTE — PROGRESS NOTE ADULT - SUBJECTIVE AND OBJECTIVE BOX
89 yo M w PMH of BPH, CAD, HTN, DMII, GERD, HLD, SDH s/p craniotomy and surgical removal is brought into HH from [facility] for vomiting. As per facility, pt had large amount coffee ground emesis and persistent loose stool, dark in appearance, skin color pale and cold to the touch.     3/18: No events over night.  On VM O2  3/19: Patient remains on 50% VM, weak, and overall doing poorly, AFIb in the low 100s  3/20: Patient continues to be weak and doing poorly.  He refused replacement of the NGT over night after he pulled it out.  He also refused it today when i asked to replace it again today.  He remains on 50% VM             PAST MEDICAL & SURGICAL HISTORY:  CAD (coronary artery disease)    HTN (hypertension)    HLD (hyperlipidemia)    DM (diabetes mellitus)    BPH (benign prostatic hyperplasia)    GERD (gastroesophageal reflux disease)    SDH (subdural hematoma)        FAMILY HISTORY:  No pertinent family history in first degree relatives        Social Hx:    Allergies    No Known Allergies    Intolerances            ICU Vital Signs Last 24 Hrs  T(C): 36.9 (20 Mar 2022 08:00), Max: 38.6 (19 Mar 2022 19:30)  T(F): 98.4 (20 Mar 2022 08:00), Max: 101.5 (19 Mar 2022 19:30)  HR: 106 (20 Mar 2022 09:00) (86 - 130)  BP: 98/51 (20 Mar 2022 09:00) (91/47 - 174/91)  BP(mean): 63 (20 Mar 2022 09:00) (54 - 111)  ABP: --  ABP(mean): --  RR: 34 (20 Mar 2022 09:00) (22 - 42)  SpO2: 100% (20 Mar 2022 09:00) (91% - 100%)          I&O's Summary    19 Mar 2022 07:01  -  20 Mar 2022 07:00  --------------------------------------------------------  IN: 1462 mL / OUT: 500 mL / NET: 962 mL    20 Mar 2022 07:01  -  20 Mar 2022 09:50  --------------------------------------------------------  IN: 100 mL / OUT: 0 mL / NET: 100 mL                              10.5   19.61 )-----------( 258      ( 20 Mar 2022 05:32 )             31.2       03-20    146<H>  |  111<H>  |  35<H>  ----------------------------<  158<H>  2.8<LL>   |  27  |  0.80    Ca    8.4<L>      20 Mar 2022 05:32  Phos  2.7     03-20  Mg     2.0     03-20        MEDICATIONS  (STANDING):  ascorbic acid 500 milliGRAM(s) Oral two times a day  doxazosin 1 milliGRAM(s) Oral at bedtime  finasteride 5 milliGRAM(s) Oral daily  heparin   Injectable 5000 Unit(s) SubCutaneous every 12 hours  metoprolol tartrate 25 milliGRAM(s) Oral every 8 hours  multivitamin/minerals 1 Tablet(s) Oral daily  piperacillin/tazobactam IVPB.. 3.375 Gram(s) IV Intermittent every 8 hours  potassium chloride  10 mEq/100 mL IVPB 10 milliEquivalent(s) IV Intermittent every 1 hour  thiamine 100 milliGRAM(s) Oral daily  zinc sulfate 220 milliGRAM(s) Oral daily    MEDICATIONS  (PRN):  acetaminophen     Tablet .. 650 milliGRAM(s) Oral every 6 hours PRN Temp greater or equal to 38.5C (101.3F), Mild Pain (1 - 3)      DVT Prophylaxis:    Advanced Directives:  Discussed with:    Visit Information: 30 min    ** Time is exclusive of billed procedures and/or teaching and/or routine family updates.

## 2022-03-20 NOTE — PROGRESS NOTE ADULT - ASSESSMENT
A/P: 89 male who presents from a NH with coffee ground emesis, aspiration pneumonia, and a  pseudomonal UTI    Plan:  ICU    PULM: Aspiration PNA.  Continue 50% VM.  Continue Zosyn    Cardio: lopressor to 25 q8h for AFIB    Renal: MÓNICA improved    ENDO: RISS    GI: Feeds off as he pulled out the NGT with the bridal in place and is refusing to have it replaced, PPI    ID: Zosyn for PSA in the Urine and Asp PNA    GOC: Patient has been a DNR/DNI in the past.  I used the sign-language services to communicate with the patients son.  At this time he does not want to honor the prior MOLST and if the patient requires intubation or CPR to have it done.  Will have palliative care come see the patient on 3/21 to review the goals of care

## 2022-03-21 NOTE — CONSULT NOTE ADULT - SUBJECTIVE AND OBJECTIVE BOX
Patient is a 89y old  Male who presents with a chief complaint of Coffee ground emesis (21 Mar 2022 17:12)    HPI: 89 yo M w PMH of BPH, CAD, HTN, DMII, GERD, HLD, SDH s/p craniotomy and surgical removal is brought into  from [facility] for vomiting on 3/13, he was admitted to the ICU with septic shock, hypoxic respiratory failure, cystitis, GI bleed, and elevated troponins. He was stabilized in the ICU and today he was transferred to the floor for further medical management. He was noted to be in new-onset Atrial fibrillation with a rapid ventricular rate. Cardiology was consulted, he was started on a Cardizem drip and metoprolol for rate control. Cardiology would like to anticoagulate the patient but wanted neurosurgery "clearance"  Pt seen and examined in 3E- he is frail, cachetic and ill appearing, unable to fully examine as pt does not participate in exam.   Last CT head reviewed.     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    FAMILY HISTORY: No pertinent family history in first degree relatives    Social Hx:  Nonsmoker, no drug or alcohol use    MEDICATIONS  (STANDING):  ascorbic acid 500 milliGRAM(s) Oral two times a day  bisacodyl Suppository 10 milliGRAM(s) Rectal at bedtime  diltiazem Infusion 5 mG/Hr (5 mL/Hr) IV Continuous <Continuous>  doxazosin 1 milliGRAM(s) Oral at bedtime  finasteride 5 milliGRAM(s) Oral daily  glucagon  Injectable 1 milliGRAM(s) IntraMuscular once  heparin   Injectable 5000 Unit(s) SubCutaneous every 12 hours  insulin lispro (ADMELOG) corrective regimen sliding scale   SubCutaneous every 6 hours  multivitamin/minerals 1 Tablet(s) Oral daily  piperacillin/tazobactam IVPB.. 3.375 Gram(s) IV Intermittent every 8 hours  thiamine 100 milliGRAM(s) Oral daily  zinc sulfate 220 milliGRAM(s) Oral daily     Allergies:  No Known Allergies    ROS: unable to assess      Vital Signs Last 24 Hrs  T(C): 36.7 (21 Mar 2022 08:12), Max: 37.5 (20 Mar 2022 20:00)  T(F): 98.1 (21 Mar 2022 08:12), Max: 99.5 (20 Mar 2022 20:00)  HR: 100 (21 Mar 2022 11:35) (95 - 128)  BP: 104/58 (21 Mar 2022 11:35) (94/44 - 145/78)  BP(mean): 74 (20 Mar 2022 21:00) (72 - 91)  RR: 30 (21 Mar 2022 10:42) (19 - 40)  SpO2: 93% (21 Mar 2022 10:42) (93% - 100%)    Physical Exam:  Constitutional: frail, cachectic, eyes open but no response to voice, did not follow comands   HEENT: eyes sunken, pupils round and reactive, did look at me but unsure if he tracked, dry mucus membranes   Neck: Supple  Respiratory: Breath sounds +rhonchi/ crackles b/l   Cardiovascular: S1 and S2, irregular   Gastrointestinal: soft, nontender, +BS   Extremities: no edema  Vascular: Carotid Bruit - no  Musculoskeletal: +muscle waisting   Skin: No rashes    Neurological exam:  HF: eyes open but no response to voice, no verbal output, did not follow commands   CN: pupils round and reactive   Motor: slight withdrawal to noxious stimulus X4, pt in restraints   Coord:  unable to assess   Gait/Balance: unable to assess     Labs:                        10.6   18.90 )-----------( 356      ( 21 Mar 2022 08:25 )             32.1     03-21    150<H>  |  115<H>  |  38<H>  ----------------------------<  224<H>  3.2<L>   |  27  |  1.03    Ca    8.6      21 Mar 2022 13:29  Phos  3.2     03-21  Mg     2.3     03-21    TPro  5.6<L>  /  Alb  1.7<L>  /  TBili  1.3<H>  /  DBili  x   /  AST  42<H>  /  ALT  37  /  AlkPhos  144<H>  03-21    RADIOLOGY:  < from: CT Head No Cont (03.13.22 @ 15:00) >  The brain demonstrates mild anterior periventricular white matter   ischemia with old lacunar infarction in the RIGHT corona radiata.   No   acute cerebral cortical infarct is seen.  No intracranial hemorrhage is   found.  No mass effect is found in the brain.    The ventricles, sulci and basal cisterns show moderate to severe atrophy   in the BILATERAL temporal lobes.    The orbits are unremarkable.  The paranasal sinuses are clear.  The nasal   cavity appears intact.  The nasopharynx is symmetric.  The central skull   base, petrous temporal bones and calvarium demonstrate RIGHT   frontoparietal craniotomy with stable fluid collection subjacent to the   craniotomy flap.    IMPRESSION:   Mild anterior periventricular white matter ischemia with   old lacunar infarction in the RIGHT corona radiata. Moderate to severe   atrophy in the BILATERAL temporal lobes.

## 2022-03-21 NOTE — CONSULT NOTE ADULT - SUBJECTIVE AND OBJECTIVE BOX
Patient is a 89y old  Male who presents with a chief complaint of Coffee ground emesis (21 Mar 2022 08:43)    ________________________________  KEITH LEVY is a 89y year old Male with a past medical history of CAD status post PCI, hypertension, diabetes, GERD, history of subdural hematoma status post craniotomy, who [facility] for [facility] vomiting.    He has been admitted for septic shock due to aspiration pneumonia and UTI.  Cardiology consulted for new onset A. fib with RVR.    Currently requiring nonrebreather.  Heart rate into the 130s.  History difficult to obtain due to encephalopathy.  ________________________________  Review of systems: Cannot obtain  PAST MEDICAL & SURGICAL HISTORY:  CAD (coronary artery disease)    HTN (hypertension)    HLD (hyperlipidemia)    DM (diabetes mellitus)    BPH (benign prostatic hyperplasia)    GERD (gastroesophageal reflux disease)    SDH (subdural hematoma)      FAMILY HISTORY:  Cannot obtain    SOCIAL HISTORY: Cannot obtain    ALLERGIES:  No Known Allergies    Home Medications:  acetaminophen 325 mg oral tablet: 2 tab(s) orally every 6 hours, As needed (13 Mar 2022 15:14)  ascorbic acid 500 mg oral tablet: 1 tab(s) orally once a day (13 Mar 2022 15:14)  bethanechol 50 mg oral tablet: 1 tab(s) orally 2 times a day (13 Mar 2022 15:14)  bisacodyl 10 mg rectal suppository: 1 suppository(ies) rectal once a day, As Needed if no BM from MOM at 5am next day (13 Mar 2022 15:14)  finasteride 5 mg oral tablet: 1 tab(s) orally once a day (13 Mar 2022 15:14)  gabapentin 100 mg oral capsule: 1 cap(s) orally 2 times a day (13 Mar 2022 15:43)  metoprolol succinate 25 mg oral tablet, extended release: 1 tab(s) orally once a day (13 Mar 2022 15:43)  Milk of Magnesia 8% oral suspension: 30 milliliter(s) orally once a day (at bedtime) as neded if no BM in 3 days (13 Mar 2022 15:14)  Multiple Vitamins oral tablet: 1 tab(s) orally once a day (13 Mar 2022 15:14)  tamsulosin 0.4 mg oral capsule: 1 cap(s) orally once a day (at bedtime) (13 Mar 2022 15:14)    MEDICATIONS  (STANDING):  ascorbic acid 500 milliGRAM(s) Oral two times a day  bisacodyl Suppository 10 milliGRAM(s) Rectal at bedtime  dextrose 40% Gel 15 Gram(s) Oral once  dextrose 5%. 1000 milliLiter(s) (50 mL/Hr) IV Continuous <Continuous>  dextrose 5%. 1000 milliLiter(s) (100 mL/Hr) IV Continuous <Continuous>  dextrose 5%. 1000 milliLiter(s) (50 mL/Hr) IV Continuous <Continuous>  dextrose 50% Injectable 25 Gram(s) IV Push once  dextrose 50% Injectable 12.5 Gram(s) IV Push once  dextrose 50% Injectable 25 Gram(s) IV Push once  diltiazem Infusion 5 mG/Hr (5 mL/Hr) IV Continuous <Continuous>  doxazosin 1 milliGRAM(s) Oral at bedtime  finasteride 5 milliGRAM(s) Oral daily  glucagon  Injectable 1 milliGRAM(s) IntraMuscular once  heparin   Injectable 5000 Unit(s) SubCutaneous every 12 hours  insulin lispro (ADMELOG) corrective regimen sliding scale   SubCutaneous every 6 hours  multivitamin/minerals 1 Tablet(s) Oral daily  piperacillin/tazobactam IVPB.. 3.375 Gram(s) IV Intermittent every 8 hours  thiamine 100 milliGRAM(s) Oral daily  zinc sulfate 220 milliGRAM(s) Oral daily    MEDICATIONS  (PRN):  acetaminophen     Tablet .. 650 milliGRAM(s) Oral every 6 hours PRN Temp greater or equal to 38.5C (101.3F), Mild Pain (1 - 3)  metoprolol tartrate Injectable 2.5 milliGRAM(s) IV Push every 6 hours PRN HR >110    Vital Signs Last 24 Hrs  T(C): 36.7 (21 Mar 2022 08:12), Max: 37.5 (20 Mar 2022 20:00)  T(F): 98.1 (21 Mar 2022 08:12), Max: 99.5 (20 Mar 2022 20:00)  HR: 100 (21 Mar 2022 11:35) (95 - 128)  BP: 104/58 (21 Mar 2022 11:35) (94/44 - 145/78)  BP(mean): 74 (20 Mar 2022 21:00) (72 - 91)  RR: 30 (21 Mar 2022 10:42) (19 - 40)  SpO2: 93% (21 Mar 2022 10:42) (93% - 100%)  I&O's Summary    20 Mar 2022 07:01  -  21 Mar 2022 07:00  --------------------------------------------------------  IN: 400 mL / OUT: 900 mL / NET: -500 mL      ________________________________  GENERAL APPEARANCE:  No acute distress  HEAD: normocephalic, atraumatic  NECK: supple, no jugular venous distention, no carotid bruit    HEART: Irregularly irregular, S1, S2 normal, 1/6 murmur    CHEST:  No anterior chest wall tenderness    LUNGS: Coarse    ABDOMEN: soft, nontender, nondistended, with positive bowel sounds appreciated  EXTREMITIES: no edema.   NEURO: Lethargic  PSYC:  Normal affect  SKIN:  Dry  ________________________________   TELEMETRY: A. gee with RVR     ECG: Initial EKG showed sinus rhythm at 74 bpm    LABS:                        10.6   18.90 )-----------( 356      ( 21 Mar 2022 08:25 )             32.1             03-21    150<H>  |  115<H>  |  38<H>  ----------------------------<  224<H>  3.2<L>   |  27  |  1.03    Ca    8.6      21 Mar 2022 13:29  Phos  3.2     03-21  Mg     2.3     03-21    TPro  5.6<L>  /  Alb  1.7<L>  /  TBili  1.3<H>  /  DBili  x   /  AST  42<H>  /  ALT  37  /  AlkPhos  144<H>  03-21      LIVER FUNCTIONS - ( 21 Mar 2022 13:29 )  Alb: 1.7 g/dL / Pro: 5.6 gm/dL / ALK PHOS: 144 U/L / ALT: 37 U/L / AST: 42 U/L / GGT: x             03-16 @ 06:05  Trop-I  --  CK      260  CK-MB   --    03-15 @ 05:42  Trop-I  --  CK      1058  CK-MB   --    Pro BNP  [id number] 03-21 @ 08:25  D Dimer  -- 03-21 @ 08:25    ________________________________    RADIOLOGY & ADDITIONAL STUDIES:  IMPRESSION:  Moderate bilateral interstitial infiltrates, increased in the right lower   lobe.    NG tube removed.    Left pleural effusion/adjacent atelectasis, grossly unchanged    Echocardiogram 3/14/2022     Fibrocalcific changes noted to the mitral valve leaflets with preserved   leaflet excursion.   Mild mitral annular calcification is present.   Mild (1+) mitral regurgitation is present.   EA reversal of the mitral inflow consistent with reduced compliance of   the   left ventricle.   Normal appearing tricuspid valve structure.   Moderate (2+) tricuspid valve regurgitation is present.   The left atrium is mildly dilated.   Estimated left ventricular ejection fraction is 55-60 %.   The left ventricle has normal wall motion and contractility as seen in   limited views.  Mild concentric left ventricular hypertrophy is present.   IVC was not seen within the subcostal view.   Aortic root is within the upper limits of normal (4.0 cm).    ________________________________    ASSESSMENT:  Atrial fibrillation with RVR - CHADSVASc 5  History of CAD  Hypertension  Diabetes  Subdural hematoma status post craniotomy in 2020  Hypoxic respiratory failure    PLAN:  In summary, this is a 89y Male with a past medical history of craniotomy following subdural hematoma admitted for septic shock.  Cardiology consulted for A. fib with RVR.  Recommend Cardizem drip for rate control. Metoprol 5mg IV PRN for HR >120 BPM.  Given history of ICH, neurosurgery to clear for anticoagulation.  Status post Lasix.  Will reassess.  No overt signs of volume overload on my evaluation.  Supplement K.    __________________________________________________________________________  Thank you for allowing me to participate in the care of your patient. Please contact me should any questions arise.    RICHIE Cleary DO, [facility]  Office: [phone]

## 2022-03-21 NOTE — PROGRESS NOTE ADULT - ASSESSMENT
89 y/o M w PMH of BPH, CAD, HTN, DMII, GERD, HLD, SDH s/p craniotomy and surgical removal is brought into  from [facility] for coffee ground emesis, hypoxia, fever. Patient admitted on 3/13 to ICU team for Septic shock due to aspiration pneumonia and pseudomonal UTI. Now transferred to the care of the hospital medicine team.    Septic Shock   Due to aspiration PNA and pseudomonal UTI   Resolved. Currently weaned off pressors and midodrine   Lactic acidosis resolved  Blood culture NGTD x2     Acute hypoxic respiratory failure due to aspiration pneumonia  Continue supplemental 02 - currently on NC 3L  Continue IV Zosyn day #9          Hyperglycemia. DM Type 2  Continue ISS q6 while on continuous TF      MÓNICA on CKD Stage 2  Resolved. s/p IVF. Monitor.  Treat UTI w/ ABX      Atrial Fibrillation, New Onset  - Continue with lopressor IV    Stercoral proctitis  Resolved. CT imaging as above. (+) Large rectal fecal retention.  Continue dulcolax suppository HS    BPH  Continue doxazosin, finasteride       Severe Protein Calorie Malnutrition   Feeds off as he pulled out the NGT with the bridal in place and is refusing to have it replaced, PPI    GOC/ Advanced Directives:     DVT ppx  SQ Heparin BID   89 y/o M w PMH of BPH, CAD, HTN, DMII, GERD, HLD, SDH s/p craniotomy and surgical removal is brought into HH from [facility] for coffee ground emesis, hypoxia, fever. Patient admitted on 3/13 to ICU team for Septic shock due to aspiration pneumonia and pseudomonal UTI. Now transferred to the care of the hospital medicine team.    Septic Shock   Due to aspiration PNA and pseudomonal UTI   Resolved. Currently weaned off pressors and midodrine. Lactic acidosis resolved. Blood culture NGTD x2. UCx w/ Pseudomonas aeruginosa. Transferred from ICU to  for further management    Acute hypoxic respiratory failure due to aspiration pneumonia  This AM noted with worsening hypoxia. Placed on NRB. Repeat CXR with worsening congestive findings. Check ABG. Continue with ABX treatment. Lasix given x1.  - Continue supplemental 02  - Continue IV Zosyn day #9    Pseudomonas aeruginosa UTI  - Continue with ABX (as above)     New Onset Atrial Fibrillation  On tele with RVR up to 130s. Monitor on tele. Cardizem 10mg IVP  - Continue Cardizem gtt. at 5mg/hr. Monitor BPs  - Continue with Lopressor Q6 IV w/ parameters  - CHADsVasc =4, no AC given due to hx of SDH s/p craniotomy.  - Echo EF 55-60%, no significant valvulopathy  - Condom cath for I&O monitoring  - Cardio eval    Hypernatremia  Likely due to poor PO intake  - Started on D5W @50, monitor and repeat  labs later today    Hyperglycemia. DM Type 2  - Continue ISS q6 while NPO/d5w drip in place    MÓNICA on CKD Stage 2  Resolved. s/p IVF. Monitor.  - Treat UTI w/ ABX    Stercoral proctitis  Resolved. CT imaging as above. (+) Large rectal fecal retention.  - Continue dulcolax suppository HS    BPH  - Continue doxazosin, finasteride     Severe Protein Calorie Malnutrition   - Feeds off as he pulled out the NGT with the bridal in place and is refusing to have it replaced  - GI eval for PEG tube evaluation    GOC/ Advanced Directives  - No limitations. Pall f/u appreciated     DVT ppx  - SQ Heparin BID   89 y/o M w PMH of BPH, CAD, HTN, DMII, GERD, HLD, SDH s/p craniotomy and surgical removal is brought into HH from [facility] for coffee ground emesis, hypoxia, fever. Patient admitted on 3/13 to ICU team for Septic shock due to aspiration pneumonia and pseudomonal UTI. Now transferred to the care of the hospital medicine team.    Septic Shock   Due to aspiration PNA and pseudomonal UTI   Resolved. Currently weaned off pressors and midodrine. Lactic acidosis resolved. Blood culture NGTD x2. UCx w/ Pseudomonas aeruginosa. Transferred from ICU to  for further management    Acute hypoxic respiratory failure due to aspiration pneumonia  This AM noted with worsening hypoxia. Placed on NRB. Repeat CXR with worsening congestive findings. Check ABG. Continue with ABX treatment. Lasix given x1.  - Continue supplemental 02  - Continue IV Zosyn day #9    Pseudomonas aeruginosa UTI  - Continue with ABX (as above)     New Onset Atrial Fibrillation  On tele with RVR up to 130s. Monitor on tele. Cardizem 10mg IVP  - Continue Cardizem gtt.  Monitor BPs  - Continue with Lopressor Q6 IV w/ parameters  - CHADsVasc =4, no AC given due to hx of SDH s/p craniotomy.  - Echo EF 55-60%, no significant valvulopathy  - Condom cath for I&O monitoring  - Cardio eval    Hypernatremia  Likely due to poor PO intake  - Started on D5W @50, monitor and repeat  labs later today  - Free water via cor rusty    Hyperglycemia. DM Type 2  - Continue ISS q6 while NPO/d5w drip in place    MÓNICA on CKD Stage 2  Resolved. s/p IVF. Monitor.  - Treat UTI w/ ABX    Stercoral proctitis  Resolved. CT imaging as above. (+) Large rectal fecal retention.  - Continue dulcolax suppository HS    BPH  - Continue doxazosin, finasteride     Severe Protein Calorie Malnutrition   - NGT replaced this AM, resume TF. GI eval for PEG tube evaluation    GOC/ Advanced Directives  - No limitations. Pall f/u appreciated     DVT ppx  - SQ Heparin BID    Dispo: c/w medical plan as outlined above. Prognosis guarded.   89 y/o M w PMH of BPH, CAD, HTN, DMII, GERD, HLD, SDH s/p craniotomy and surgical removal is brought into HH from [facility] for coffee ground emesis, hypoxia, fever. Patient admitted on 3/13 to ICU team for Septic shock due to aspiration pneumonia and pseudomonal UTI. Now transferred to the care of the hospital medicine team.    Septic Shock   Due to aspiration PNA and pseudomonal UTI   Resolved. Currently weaned off pressors and midodrine. Lactic acidosis resolved. Blood culture NGTD x2. UCx w/ Pseudomonas aeruginosa. Transferred from ICU to  for further management    Acute hypoxic respiratory failure due to aspiration pneumonia  This AM noted with worsening hypoxia. Placed on NRB. Repeat CXR with worsening congestive findings. Check ABG. Continue with ABX treatment. Lasix given x1.  - Continue supplemental 02  - Continue IV Zosyn day #9    Pseudomonas aeruginosa UTI  - Continue with ABX (as above)     New Onset Atrial Fibrillation  On tele with RVR up to 130s. Monitor on tele. Cardizem 10mg IVP  - Continue Cardizem gtt.  Monitor BPs  - Continue with Lopressor Q6 IV w/ parameters  - CHADsVasc =4, no AC given due to hx of SDH s/p craniotomy.  - Echo EF 55-60%, no significant valvulopathy  - Condom cath for I&O monitoring  - Cardio eval    Hypernatremia  Likely due to poor PO intake  - Started on D5W @50, monitor and repeat  labs later today  - Free water via cor rusty    Hyperglycemia. DM Type 2  - Continue ISS q6 while NPO/d5w drip in place    MÓNICA on CKD Stage 2  Resolved. s/p IVF. Monitor.  - Treat UTI w/ ABX    Stercoral proctitis  Resolved. CT imaging as above. (+) Large rectal fecal retention.  - Continue dulcolax suppository HS    BPH  - Continue doxazosin, finasteride     Severe Protein Calorie Malnutrition   - NGT replaced this AM, resume TF. GI eval for PEG tube evaluation    Agitation  Patient pulling at lines, Cor paks removed by patient x2  Wrist restraints for safety/line pulling    GOC/ Advanced Directives  - No limitations. Pall f/u appreciated     DVT ppx  - SQ Heparin BID    Dispo: c/w medical plan as outlined above. Prognosis guarded.

## 2022-03-21 NOTE — CONSULT NOTE ADULT - ASSESSMENT
89 year old man with PMH significant for CAD, HTN, HLD, DM, and GERD, s/p craniotomy for an acute SDH in October 2020 with Dr. Obrien.   He was admitted with septic shock and respiratory failure.  He is now in atrial fibrillation with RVR.   CT head done 3/13 shows stable fluid collection subjacent to the craniotomy flap.     Case discussed with Dr. Obrien, he has no objection to starting anticoagulation on this patient given that the risk of stroke in rapid a-fib is greater than the risk of rebleeding into the SDH. The risk vs benefit of anticoagulation should be explained to the family by the primary medical team.     Thank you for this consult, will sign off, please re-consult as needed

## 2022-03-21 NOTE — CONSULT NOTE ADULT - CONVERSATION DETAILS
We discuss at length patients underlying clinical diagnosis. We discussed resuscitation and risk/benefits of CPR.  Risks include broken ribs, lung puncture, pain and discomfort.      At this time due to patients underlying irreversible diagnosis of  cad, hx of vascular dementia I would not recommend CPR because it would not reverse current medical condition.  They understand DNR does NOT mean do not treat.     It means NO CPR would be attempted if patients heart stopped. It would allow at that stage, natural death.          We also discussed mechanical ventilation in the event that they could no longer breathing on their own.  Risk and benefits of mechanical ventilation were discussed at length.  At this time, due to patients irreversible medical condition of vascular dementia 2/2 to sdh w/ craniotomy it is of concern that if patient were to be intubated extubation may not be possible.   Intubation would also NOT reverse current medical condition- which if progresses naturally would lead to patients death.     At this time JEANCARLOS Ellis wants to continue FULL CODE w/o restrictions

## 2022-03-21 NOTE — CONSULT NOTE ADULT - SUBJECTIVE AND OBJECTIVE BOX
HPI:     89 yo M w PMH of BPH, CAD, HTN, DMII, GERD, HLD, SDH s/p craniotomy and surgical removal is brought into  from [facility] for vomiting on 3/13, he was admitted to the ICU with septic shock, hypoxic respiratory failure, cystitis, GI bleed, and elevated troponins. He was stabilized in the ICU and today he was transferred to the floor for further medical management. He was noted to be in new-onset Atrial fibrillation with a rapid ventricular rate. Cardiology was consulted, he was started on a Cardizem drip and metoprolol for rate control. Cardiology would like to anticoagulate the patient but wanted neurosurgery "clearance"  Pt seen and examined in 3E- he is frail, cachetic and ill appearing, unable to fully examine as pt does not participate in exam.   Last CT head reviewed.       3/21  pt seen and examined  tachypnea uncomfortable   limited ros no pain or nausea    PAIN: ( )Yes   (x )No  DYSPNEA: (xx Yes  ( ) No  Level:    PAST MEDICAL & SURGICAL HISTORY:  CAD (coronary artery disease)    HTN (hypertension)    HLD (hyperlipidemia)    DM (diabetes mellitus)    BPH (benign prostatic hyperplasia)    GERD (gastroesophageal reflux disease)    SDH (subdural hematoma)        SOCIAL HX:    Hx opiate tolerance ( )YES  (x )NO    Baseline ADLs  (Prior to Admission)  ( ) Independent   ( x)Dependent    FAMILY HISTORY:  No pertinent family history in first degree relatives        Review of Systems:  Unable to obtain/Limited due to: sob and somnolence      PHYSICAL EXAM:    Vital Signs Last 24 Hrs  T(C): 36.7 (21 Mar 2022 08:12), Max: 37.5 (20 Mar 2022 20:00)  T(F): 98.1 (21 Mar 2022 08:12), Max: 99.5 (20 Mar 2022 20:00)  HR: 128 (21 Mar 2022 08:12) (95 - 128)  BP: 124/67 (21 Mar 2022 08:12) (99/64 - 137/51)  BP(mean): 74 (20 Mar 2022 21:00) (64 - 91)  RR: 19 (21 Mar 2022 08:12) (19 - 40)  SpO2: 96% (21 Mar 2022 08:12) (95% - 100%)  Daily     Daily     PPSV2:  30 %  FAST:    General: uncomfortable fatigued weak frail  Mental Status: A+Ox2   HEENT: EOMI   Lungs: decreaed b/s tachypnea  Cardiac: s1s2  GI: soft notnedner  : voids  Ext: moves all extremiteis  Neuro: no gross findings      LABS:                        10.6   18.90 )-----------( 356      ( 21 Mar 2022 08:25 )             32.1     03-21    152<H>  |  117<H>  |  36<H>  ----------------------------<  183<H>  3.3<L>   |  27  |  0.94    Ca    8.7      21 Mar 2022 08:25  Phos  3.2     03-21  Mg     2.3     03-21        Albumin: Albumin, Serum: 1.7 g/dL (03-18 @ 06:54)      Allergies    No Known Allergies    Intolerances      MEDICATIONS  (STANDING):  ascorbic acid 500 milliGRAM(s) Oral two times a day  bisacodyl Suppository 10 milliGRAM(s) Rectal at bedtime  dextrose 40% Gel 15 Gram(s) Oral once  dextrose 5%. 1000 milliLiter(s) (50 mL/Hr) IV Continuous <Continuous>  dextrose 5%. 1000 milliLiter(s) (100 mL/Hr) IV Continuous <Continuous>  dextrose 5%. 1000 milliLiter(s) (50 mL/Hr) IV Continuous <Continuous>  dextrose 50% Injectable 25 Gram(s) IV Push once  dextrose 50% Injectable 12.5 Gram(s) IV Push once  dextrose 50% Injectable 25 Gram(s) IV Push once  doxazosin 1 milliGRAM(s) Oral at bedtime  finasteride 5 milliGRAM(s) Oral daily  glucagon  Injectable 1 milliGRAM(s) IntraMuscular once  heparin   Injectable 5000 Unit(s) SubCutaneous every 12 hours  insulin lispro (ADMELOG) corrective regimen sliding scale   SubCutaneous every 6 hours  metoprolol tartrate Injectable 5 milliGRAM(s) IV Push every 6 hours  multivitamin/minerals 1 Tablet(s) Oral daily  piperacillin/tazobactam IVPB.. 3.375 Gram(s) IV Intermittent every 8 hours  potassium chloride  10 mEq/100 mL IVPB 10 milliEquivalent(s) IV Intermittent every 1 hour  thiamine 100 milliGRAM(s) Oral daily  zinc sulfate 220 milliGRAM(s) Oral daily    MEDICATIONS  (PRN):  acetaminophen     Tablet .. 650 milliGRAM(s) Oral every 6 hours PRN Temp greater or equal to 38.5C (101.3F), Mild Pain (1 - 3)      RADIOLOGY/ADDITIONAL STUDIES:

## 2022-03-21 NOTE — CONSULT NOTE ADULT - ASSESSMENT
Assessment:   89 yo M w PMH of BPH, CAD, HTN, DMII, GERD, HLD, SDH s/p craniotomy and surgical removal is brought into  from [facility] for vomiting on 3/13, he was admitted to the ICU with septic shock, hypoxic respiratory failure, cystitis, GI bleed, and elevated troponins.   Process of Care  --Reviewed dx/treatment problems and alignment with Goals of Care    Physical Aspects of Care  --Pain  patient denies at this time  c/w current managment    --Bowel Regimen  denies constipation  risk for constipation d/t immobility  daily dulcolax    --Dyspnea  reccomend  IV Morphine 1mg [id number] PRN      --Nausea Vomiting  denies    --Weakness  PT as tolerated     Psychological and Psychiatric Aspects of Care:   --Greif/ Bereavement emotional support provided  --Hx of psychiatric dx: none  -Pastoral Care Available PRN     Social Aspects of Care  -SW involved     Cultural Aspects  -Primary Language: English    Goals of Care:     We discussed Palliative Care team being a supportive team when a patient has ongoing illnesses.  We also discussed that it is not an end of life care service, but can help navigate symptoms and emotional support throughout their hospital stay here.       Prognosis: Death can occur at anytime, but if disease continues to progress naturally patient likely has days to weeks.    Ethical and Legal Aspects:   NA    Capacity-pt defers decisions to son, at this time very weak and unable to hold full discussion  HCP/Surrogate: Caleb Haywood Status- FULLL CODE  MOLST- na  Dispo Plan- dc back to snf once stable    Discussed With:    Case coordinated with attending and SW and RN     Time Spent: 90 minutes including the care, coordination and counseling of this patient, 50% of which was spent coordinating and counseling.

## 2022-03-21 NOTE — PROGRESS NOTE ADULT - SUBJECTIVE AND OBJECTIVE BOX
Chief Complaint: coffee ground emesis    Hpi: 89 yo M w PMH of BPH, CAD, HTN, DMII, GERD, HLD, SDH s/p craniotomy and surgical removal is brought into HH from [facility] for vomiting. As per facility, pt had large amount coffee ground emesis and persistent loose stool, dark in appearance, skin color pale and cold to the touch. Pt is on 2L O2 via nasal canula. Unable to give hx. At bedside, pt states he feels cold, denies abd pain, N/V.     In ED, pt fount to be hypoxic, placed on non-rebreather. Leukocytosis of 14, Cr 1.6, Trop 202, lactate 8.5, Ph 5.4. U/A is very cloudy, w mod LE, blood & bacteria. CXR demonstrates lower lobe infiltrates. Given Vanc & Zosyn in ED, given 2L NS (30 cc/kg), dawson was placed, blood & urine cx were obtained. Awaiting Head, Abd CT.   (13 Mar 2022 15:18)    3/21/22:  All other review of systems is negative unless indicated above    Physical Exam:  T(C): 36.7 (21 Mar 2022 08:12), Max: 37.5 (20 Mar 2022 20:00)  T(F): 98.1 (21 Mar 2022 08:12), Max: 99.5 (20 Mar 2022 20:00)  HR: 128 (21 Mar 2022 08:12) (95 - 128)  BP: 124/67 (21 Mar 2022 08:12) (98/51 - 137/51)  BP(mean): 74 (20 Mar 2022 21:00) (63 - 91)  RR: 19 (21 Mar 2022 08:12) (19 - 40)  SpO2: 96% (21 Mar 2022 08:12) (95% - 100%)    Constitutional: NAD, awake and alert  HEENT: PERR, EOMI, Normal Hearing, MMM  Neck: Soft and supple, No LAD, No JVD  Respiratory: Breath sounds are clear bilaterally, No wheezing, rales or rhonchi  Cardiovascular: S1 and S2, regular rate and rhythm, no Murmurs, gallops or rubs  Gastrointestinal: Bowel Sounds present, soft, nontender, nondistended, no guarding, no rebound  Extremities: No peripheral edema  Vascular: 2+ peripheral pulses  Neurological: A/O x 3, no focal deficits  Musculoskeletal: 5/5 strength b/l upper and lower extremities  Skin: No rashes    Labs:                        10.5   19.61 )-----------( 258      ( 20 Mar 2022 05:32 )             31.2     03-20    145  |  113<H>  |  37<H>  ----------------------------<  177<H>  3.3<L>   |  25  |  0.84    Ca    8.7      20 Mar 2022 11:58  Phos  2.7     03-20  Mg     2.0     03-20    Lactate 8.5 -- 8.0 --3.4 --3.2 -- 2.4 -- 1.1    Lipase 20    A1c 5.9    CK 2000 -- 1058 -- 260    Micro:  3/13 (+) UA   3/13 Urine Culture: >100,000 CFUs Pseudomonas aeruginosa   3/13 Blood Culture NGTD x2  COVID-19 PCR: NotDetec (20 Mar 2022 10:20)  SARS-CoV-2: NotDetec (13 Mar 2022 13:14)    Radiology:    3/15/22:  Xray Chest 1 View: Patchy perihilar infiltrates, right greater than left, grossly unchanged. Mild pulmonary venous congestion, new    3/14/22: Xray Chest 1 View: RIGHT lung perihilar/basilar and LEFT medial basilar multifocal and diffuse ill-defined airspace opacities.    3/13/22: CT Abdomen and Pelvis No Cont: 1. Multifocal areas of bilateral airspace consolidation most pronounced in the right lower lobe. Appearance is suggestive of multifocal pneumonia.  2. Small layering right pleural effusion. 3. Small layering stones or sludge in the gallbladder fundus. 4. Punctate nonobstructive right nephrolithiasis. 5. Large rectal fecal retention with evidence of stercoral proctitis.    3/13/22: CT Head No Cont: Mild anterior periventricular white matter ischemia with old lacunar infarction in the RIGHT corona radiata. Moderate to severe atrophy in the BILATERAL temporal lobes.    3/13/22: Xray Chest 1 View: Recommend correlation for lower lobe interstitial infiltrates    Cardiac Testing:    Trops 202.16 -- 232.63 -- 1021.39 -- 907.26 -- 463.86    3/14/22: ECHO: Fibrocalcific changes noted to the mitral valve leaflets with preserved leaflet excursion. Mild mitral annular calcification is present. Mild (1+) mitral regurgitation is present. EA reversal of the mitral inflow consistent with reduced compliance of the left ventricle. Normal appearing tricuspid valve structure. Moderate (2+) tricuspid valve regurgitation is present. The left atrium is mildly dilated. Estimated left ventricular ejection fraction is 55-60 %. The left ventricle has normal wall motion and contractility as seen in limited views. Mild concentric left ventricular hypertrophy is present. IVC was not seen within the subcostal view. Aortic root is within the upper limits of normal (4.0 cm).    3/14/22: ECG: Diagnosis Line Sinus rhythm with 1st degree A-V block Left axis deviation Right bundle branch block Abnormal ECG    3/13/22: ECG: Diagnosis Line Atrial fibrillation with premature ventricular or aberrantly conducted complexes Right bundle branch block Anterior infarct , age undetermined Abnormal ECG    Medications:  ascorbic acid 500 milliGRAM(s) Oral two times a day  doxazosin 1 milliGRAM(s) Oral at bedtime  finasteride 5 milliGRAM(s) Oral daily  heparin   Injectable 5000 Unit(s) SubCutaneous every 12 hours  metoprolol tartrate Injectable 5 milliGRAM(s) IV Push every 6 hours  multivitamin/minerals 1 Tablet(s) Oral daily  piperacillin/tazobactam IVPB.. 3.375 Gram(s) IV Intermittent every 8 hours  thiamine 100 milliGRAM(s) Oral daily  zinc sulfate 220 milliGRAM(s) Oral daily    MEDICATIONS  (PRN):  acetaminophen     Tablet .. 650 milliGRAM(s) Oral every 6 hours PRN Temp greater or equal to 38.5C (101.3F), Mild Pain (1 - 3)    Home Medications:  acetaminophen 325 mg oral tablet: 2 tab(s) orally every 6 hours, As needed (13 Mar 2022 15:14)  ascorbic acid 500 mg oral tablet: 1 tab(s) orally once a day (13 Mar 2022 15:14)  bethanechol 50 mg oral tablet: 1 tab(s) orally 2 times a day (13 Mar 2022 15:14)  bisacodyl 10 mg rectal suppository: 1 suppository(ies) rectal once a day, As Needed if no BM from MOM at 5am next day (13 Mar 2022 15:14)  finasteride 5 mg oral tablet: 1 tab(s) orally once a day (13 Mar 2022 15:14)  gabapentin 100 mg oral capsule: 1 cap(s) orally 2 times a day (13 Mar 2022 15:43)  metoprolol succinate 25 mg oral tablet, extended release: 1 tab(s) orally once a day (13 Mar 2022 15:43)  Milk of Magnesia 8% oral suspension: 30 milliliter(s) orally once a day (at bedtime) as neded if no BM in 3 days (13 Mar 2022 15:14)  Multiple Vitamins oral tablet: 1 tab(s) orally once a day (13 Mar 2022 15:14)  tamsulosin 0.4 mg oral capsule: 1 cap(s) orally once a day (at bedtime) (13 Mar 2022 15:14)   Chief Complaint: coffee ground emesis    Hpi: 89 yo M w PMH of BPH, CAD, HTN, DMII, GERD, HLD, SDH s/p craniotomy and surgical removal is brought into HH from [facility] for vomiting. As per facility, pt had large amount coffee ground emesis and persistent loose stool, dark in appearance, skin color pale and cold to the touch. Pt is on 2L O2 via nasal canula. Unable to give hx. At bedside, pt states he feels cold, denies abd pain, N/V. In ED, pt fount to be hypoxic, placed on non-rebreather. Leukocytosis of 14, Cr 1.6, Trop 202, lactate 8.5, Ph 5.4. U/A is very cloudy, w mod LE, blood & bacteria. CXR demonstrates lower lobe infiltrates. Given Vanc & Zosyn in ED, given 2L NS (30 cc/kg), dawson was placed, blood & urine cx were obtained. Awaiting Head, Abd CT.   (13 Mar 2022 15:18)    3/21/22: Patient seen and examined this AM. Worsening tachypnea and hypoxia, ill appearing cachectic male. Placed on NRB. Repeat CXR. ABG. Lasix x1. Noted with Afib RVR up to 130s, IV Cardizem and monitor BPs. D/w pall team this AM, no limitations of care, will place GI consult for PEG tube eval. Also on AM labs with hypernatremia, started on D5 given the fact the Corpack has been removed by patient x2. Prognosis guarded. Limited ROS given baseline cognitive impairment/current clinical condition. From observation very deconditioned.    All other review of systems is negative unless indicated above    Physical Exam:  T(C): 36.7 (21 Mar 2022 08:12), Max: 37.5 (20 Mar 2022 20:00)  T(F): 98.1 (21 Mar 2022 08:12), Max: 99.5 (20 Mar 2022 20:00)  HR: 120 (21 Mar 2022 10:42) (95 - 128)  BP: 145/78 (21 Mar 2022 10:42) (99/64 - 145/78)  BP(mean): 74 (20 Mar 2022 21:00) (64 - 91)  RR: 30 (21 Mar 2022 10:42) (19 - 40)  SpO2: 93% (21 Mar 2022 10:42) (93% - 100%) NRB    Constitutional: Awake, ill appearing, cachectic, not responding to commands  HEENT: dry MM  Neck: Soft and supple   Respiratory: Breath sounds are rhonchi /crackles b/l  Cardiovascular: S1 and S2, IR IR  Gastrointestinal: Bowel Sounds present, soft  Extremities: No peripheral edema  Vascular: 2+ peripheral pulses  Neurological: Unable to assess, not responding/cooperating with commands. awake.  Musculoskeletal: unable to assess  Skin: No rashes    Labs:                        10.5   19.61 )-----------( 258      ( 20 Mar 2022 05:32 )             31.2     03-20    145  |  113<H>  |  37<H>  ----------------------------<  177<H>  3.3<L>   |  25  |  0.84    Ca    8.7      20 Mar 2022 11:58  Phos  2.7     03-20  Mg     2.0     03-20    Lactate 8.5 -- 8.0 --3.4 --3.2 -- 2.4 -- 1.1    Lipase 20    A1c 5.9    CK 2000 -- 1058 -- 260    Micro:  3/13 (+) UA     3/13 Urine Culture: >100,000 CFUs Pseudomonas aeruginosa     3/13 Blood Culture NGTD x2    COVID-19 PCR: NotDetec (20 Mar 2022 10:20)    SARS-CoV-2: NotDetec (13 Mar 2022 13:14)    Radiology:    3/15/22:  Xray Chest 1 View: Patchy perihilar infiltrates, right greater than left, grossly unchanged. Mild pulmonary venous congestion, new    3/14/22: Xray Chest 1 View: RIGHT lung perihilar/basilar and LEFT medial basilar multifocal and diffuse ill-defined airspace opacities.    3/13/22: CT Abdomen and Pelvis No Cont: 1. Multifocal areas of bilateral airspace consolidation most pronounced in the right lower lobe. Appearance is suggestive of multifocal pneumonia.  2. Small layering right pleural effusion. 3. Small layering stones or sludge in the gallbladder fundus. 4. Punctate nonobstructive right nephrolithiasis. 5. Large rectal fecal retention with evidence of stercoral proctitis.    3/13/22: CT Head No Cont: Mild anterior periventricular white matter ischemia with old lacunar infarction in the RIGHT corona radiata. Moderate to severe atrophy in the BILATERAL temporal lobes.    3/13/22: Xray Chest 1 View: Recommend correlation for lower lobe interstitial infiltrates    Cardiac Testing:    Trops 202.16 -- 232.63 -- 1021.39 -- 907.26 -- 463.86    3/14/22: ECHO: Fibrocalcific changes noted to the mitral valve leaflets with preserved leaflet excursion. Mild mitral annular calcification is present. Mild (1+) mitral regurgitation is present. EA reversal of the mitral inflow consistent with reduced compliance of the left ventricle. Normal appearing tricuspid valve structure. Moderate (2+) tricuspid valve regurgitation is present. The left atrium is mildly dilated. Estimated left ventricular ejection fraction is 55-60 %. The left ventricle has normal wall motion and contractility as seen in limited views. Mild concentric left ventricular hypertrophy is present. IVC was not seen within the subcostal view. Aortic root is within the upper limits of normal (4.0 cm).    3/14/22: ECG: Diagnosis Line Sinus rhythm with 1st degree A-V block Left axis deviation Right bundle branch block Abnormal ECG    3/13/22: ECG: Diagnosis Line Atrial fibrillation with premature ventricular or aberrantly conducted complexes Right bundle branch block Anterior infarct , age undetermined Abnormal ECG    Medications:  ascorbic acid 500 milliGRAM(s) Oral two times a day  doxazosin 1 milliGRAM(s) Oral at bedtime  finasteride 5 milliGRAM(s) Oral daily  heparin   Injectable 5000 Unit(s) SubCutaneous every 12 hours  metoprolol tartrate Injectable 5 milliGRAM(s) IV Push every 6 hours  multivitamin/minerals 1 Tablet(s) Oral daily  piperacillin/tazobactam IVPB.. 3.375 Gram(s) IV Intermittent every 8 hours  thiamine 100 milliGRAM(s) Oral daily  zinc sulfate 220 milliGRAM(s) Oral daily    MEDICATIONS  (PRN):  acetaminophen     Tablet .. 650 milliGRAM(s) Oral every 6 hours PRN Temp greater or equal to 38.5C (101.3F), Mild Pain (1 - 3)    Home Medications:  acetaminophen 325 mg oral tablet: 2 tab(s) orally every 6 hours, As needed (13 Mar 2022 15:14)  ascorbic acid 500 mg oral tablet: 1 tab(s) orally once a day (13 Mar 2022 15:14)  bethanechol 50 mg oral tablet: 1 tab(s) orally 2 times a day (13 Mar 2022 15:14)  bisacodyl 10 mg rectal suppository: 1 suppository(ies) rectal once a day, As Needed if no BM from MOM at 5am next day (13 Mar 2022 15:14)  finasteride 5 mg oral tablet: 1 tab(s) orally once a day (13 Mar 2022 15:14)  gabapentin 100 mg oral capsule: 1 cap(s) orally 2 times a day (13 Mar 2022 15:43)  metoprolol succinate 25 mg oral tablet, extended release: 1 tab(s) orally once a day (13 Mar 2022 15:43)  Milk of Magnesia 8% oral suspension: 30 milliliter(s) orally once a day (at bedtime) as needed if no BM in 3 days (13 Mar 2022 15:14)  Multiple Vitamins oral tablet: 1 tab(s) orally once a day (13 Mar 2022 15:14)  tamsulosin 0.4 mg oral capsule: 1 cap(s) orally once a day (at bedtime) (13 Mar 2022 15:14)

## 2022-03-22 NOTE — SWALLOW BEDSIDE ASSESSMENT ADULT - COMMENTS
The patient was admitted to  from [facility] with fever & coffee ground emesis. Hospital course is notable for upper GIB, stercoral colitis, leukocytosis, elevate lactate, pneumonia, UTI, cystitis, elevated Troponin/MI and hypoalbuminemia. Note that pt was hospitalized at this facility in 12/20 with urosepsis. He was seen by speech pathology at the time at which time he was diagnosed with chronic Cognitive Dysfunction as well as chronic Dysphagia. Other selected prior medical history is remarkable for another hospitalization at this facility after a fall at which time he was diagnosed with an SDH warranting evacuation/craniotomy, Dementia, chronic Oropharyngeal Dysphagia, GERD, CAD, DM, HLD, HTN, and BPH.
The patient was admitted to  from [facility] with fever & coffee ground emesis. Hospital course is notable for upper GIB, stercoral colitis, leukocytosis, elevate lactate, pneumonia, UTI, cystitis, MÓNICA, hypernatremia, elevated Troponin/MI, new onset of A-Fib, hypoalbuminemia and encephalopathy. Pt was initially seen by speech pathology on 3/14/22 at which time he was diagnosed with chronic Cognitive Dysfunction and chronic functional Oropharyngeal Dysphagia for modified food textures when he is feedable which is not always the case. Pt was deemed to not be a therapy candidate due to the severity of his chronic cognitive deficits. However, since time of prior exam on 3/14, a Corpak was placed and a speech pathology RECONSULT was ordered today.  Note that pt was hospitalized at this facility in 12/20 with urosepsis. He was seen by speech pathology at the time as well, at which time he was again diagnosed with chronic Cognitive Dysfunction as well as chronic Dysphagia. Other selected prior medical history is remarkable for another hospitalization at this facility after a fall at which time he was diagnosed with an SDH warranting evacuation/craniotomy, Dementia, chronic Oropharyngeal Dysphagia, GERD, CAD, DM, HLD, HTN, and BPH.

## 2022-03-22 NOTE — CHART NOTE - NSCHARTNOTEFT_GEN_A_CORE
HPI:  89 yo M w PMH of BPH, CAD, HTN, DMII, GERD, HLD, SDH s/p craniotomy and surgical removal is brought into HH from [facility] for vomiting.(13 Mar 2022 15:18)      PERTINENT PMH REVIEWED:  [ X ] YES [ ] NO           Primary Contact:   Pts son Caleb listed as HCP in [employer]    HCP [ X ] Surrogate [   ] Guardian [   ]    Mental Status: pt defers decisions to son, at this time very weak and unable to hold full discussion  Concerns of Depression [  ]- not identified   Anxiety [   ]- not identified   Baseline ADLs (prior to admission):  Independent [ ] moderately [ ] fully   Dependent   [ ] moderately [  X  ] fully    Family Meeting: GOC took place with Dr. Bella yesterday, No limits set    Anticipated Grief: Patient [  ] Family [ X ]    Caregiver [place] Assessed: Yes [ X ] No [  ]    Islam: Yazidism     Spiritual Concerns: Not identified     Goals of Care: Comfort [  ] Rehabilitation [  ] Curative [  ] Life Prolonging [ X ]    Previous Services: [facility]    ADVANCE DIRECTIVES: Full Code    Anticipated D/C Plan: likely back to [facility]                     Summary:    This SW spoke with pts son via phone to follow up and offer support. He reports that pt. has been at [facility] for LTC since Oct 2020 after a fall. Pts son shared that he has been in contact with the medical team and updated on pts condition. When asked how he was managing he reports he is doing well. Pts son confirmed that pt. is a full code and that they do not wish to set any limits. Emotional support provided. Plan to be determined but likely back to [facility] for LTC. Our team will continue to follow.

## 2022-03-22 NOTE — PHYSICAL THERAPY INITIAL EVALUATION ADULT - GENERAL OBSERVATIONS, REHAB EVAL
Pt rec'd supine in bed with HM (Sinus Tachy 110-130), BP Cuff, Pulse Oxym (SpO2 %) (RR 20 -45), IV and BLE SCD's all intact

## 2022-03-22 NOTE — SWALLOW BEDSIDE ASSESSMENT ADULT - ADDITIONAL RECOMMENDATIONS
1) NUTRITION FOLLOW UP. PT with chronic dysphagia, CAD, DM, HTN, HLD and GERD. Pt at nutrition risk. Pt with a sufficient level of oropharyngeal swallowing preservation to be on an oral diet of modified consistency when he is alert/cognizant enough to be fed which is NOT always the case. Pt currently has a Corpak in place and enteral supplementation can be provided if PO intake is inadequate. However, it should be noted that I do NOT   foresee placement of a long term feeding tube(i.e PEG) enhancing life quality given pt's advanced age/Dementia/multiple comorbidities. With that being stated, the decision to pursue PEG placement is ultimately up to pt's proxy. If pt does receive a PEG, he can still be on a Puree Diet with Moderately Thick Liquids and be provided with PO of modified texture when he's alert/cognizant enough to feed.     2) SUGGEST PALLIATIVE CARE INPUT. NOTE THAT IF PT DOES RECEIVE A PEG, I DO NOT FEEL THAT THIS WOULD ENHANCE HIS LIFE QUALITY NOR WOULD IT ELIMINATE SALIVA ASPIRATION.
NUTRITION FOLLOW UP. PT with chronic dysphagia, CAD, DM, HTN, HLD and GERD. Pt at nutrition risk. Do not foresee placement of a feeding tube enhancing life quality given pt's advanced age/Dementia/multiple comorbidities.

## 2022-03-22 NOTE — SWALLOW BEDSIDE ASSESSMENT ADULT - SWALLOW EVAL: STRUCTURAL ABNORMALITIES
A loss of bulk was evident in pt's strap muscle regions.
A loss of bulk was evident in pt's strap muscle regions.

## 2022-03-22 NOTE — SWALLOW BEDSIDE ASSESSMENT ADULT - SWALLOW EVAL: DIAGNOSIS
1) The pt exhibits chronic Oropharyngeal Dysphagia which subjectively appears to be a functional condition with a restricted inventory of modified food textures when he is in an alert state. Dysphagia is pre-existing/irreversible in setting of advanced Dementia, muscle wasting & prior SDH s/p craniotomy.  2) The pt is awake. He is interactive but distractible. Pt followed a limited number of 1 step verbal commands and he occasionally verbalized during communicative probes. At these limited times, his motor speech abilities were felt to be grossly functional. However, his verbalizations were often brief, variably vague/fragmented, frequently tangential and contextually inappropriate. 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
1) The pt exhibits periodically reduced alertness for/orientation to feeding atop chronic Oropharyngeal Dysphagia which subjectively appeared to be a grossly functional condition with a restricted inventory of modified food textures when he is alert/cognizant enough to accept PO which is NOT always the case. Dysphagia is pre-existing/irreversible in setting of advanced Dementia, muscle wasting & prior SDH s/p craniotomy.  2) Pt is awake/interactive but distractible. Pt followed a limited number of 1 step verbal commands & periodically verbalized during communicative probes. At these limited times, his motor speech abilities were felt to be grossly functional. However, his speech integrity was variably hindered by dry mouth/fatigue. Further, verbalizations were often brief, variably vague/fragmented, frequently tangential & variably contextually inappropriate. 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

## 2022-03-22 NOTE — SWALLOW BEDSIDE ASSESSMENT ADULT - SWALLOW EVAL: SECRETION MANAGEMENT
No drooling was noted though it is noted that strength of volitional cough was mildly to moderately reduced.
No drooling was noted though it is noted that strength of volitional cough was mildly to moderately reduced. Further, his cough was moist.

## 2022-03-22 NOTE — PROGRESS NOTE ADULT - ASSESSMENT
89 y/o M w PMH of BPH, CAD, HTN, DMII, GERD, HLD, SDH s/p craniotomy and surgical removal is brought into HH from [facility] for coffee ground emesis, hypoxia, fever. Patient admitted on 3/13 to ICU team for Septic shock due to aspiration pneumonia and pseudomonal UTI. Now transferred to the care of the hospital medicine team.    Septic Shock   Due to aspiration PNA and pseudomonal UTI   Resolved. Currently weaned off pressors and midodrine. Lactic acidosis resolved. Blood culture NGTD x2. UCx w/ Pseudomonas aeruginosa. Transferred from ICU to  for further management    Acute hypoxic respiratory failure due to aspiration pneumonia  This AM noted with worsening hypoxia. Placed on NRB. Repeat CXR with worsening congestive findings. Check ABG. Continue with ABX treatment. Lasix given x1.  - Continue supplemental 02  - Continue IV Zosyn day #9    Pseudomonas aeruginosa UTI  - Continue with ABX (as above)     New Onset Atrial Fibrillation  On tele with RVR up to 130s. Monitor on tele. Cardizem 10mg IVP  - Continue Cardizem gtt.  Monitor BPs  - Continue with Lopressor Q6 IV w/ parameters  - CHADsVasc =4, no AC given due to hx of SDH s/p craniotomy.  - Echo EF 55-60%, no significant valvulopathy  - Condom cath for I&O monitoring  - Cardio eval    Hypernatremia  Likely due to poor PO intake  - Started on D5W @50, monitor and repeat  labs later today  - Free water via cor rusty    Hyperglycemia. DM Type 2  - Continue ISS q6 while NPO/d5w drip in place    MÓNICA on CKD Stage 2  Resolved. s/p IVF. Monitor.  - Treat UTI w/ ABX    Stercoral proctitis  Resolved. CT imaging as above. (+) Large rectal fecal retention.  - Continue dulcolax suppository HS    BPH  - Continue doxazosin, finasteride     Severe Protein Calorie Malnutrition   - NGT replaced this AM, resume TF. GI eval for PEG tube evaluation    Agitation  Patient pulling at lines, Cor paks removed by patient x2  Wrist restraints for safety/line pulling    GOC/ Advanced Directives  - No limitations. Pall f/u appreciated     DVT ppx  - SQ Heparin BID    Dispo: C/w medical plan as outlined above. Prognosis guarded.   89 y/o M w PMH of BPH, CAD, HTN, DMII, GERD, HLD, SDH s/p craniotomy and surgical removal is brought into HH from [facility] for coffee ground emesis, hypoxia, fever. Patient admitted on 3/13 to ICU team for Septic shock due to aspiration pneumonia and pseudomonal UTI. Now transferred to the care of the hospital medicine team.    Septic Shock   Due to aspiration PNA and pseudomonal UTI   Resolved. Currently weaned off pressors and midodrine. Lactic acidosis resolved. Blood culture NGTD x2. UCx w/ Pseudomonas aeruginosa. Transferred from ICU to  for further management    Acute hypoxic respiratory failure due to aspiration pneumonia  Repeat CXR with worsening congestive findings. Continue with ABX treatment. Lasix given x1 on 3/21. Unable to get ABG after mult. attempts.   - Continue supplemental 02 - on 4L NC  - Continue IV Zosyn - last dose tomorrow.   - PRN morphine for dyspnea.  - Resume restricted diet td. dysphagia puree w/ mod thickened liquids     Pseudomonas aeruginosa UTI  - Continue with ABX (as above)     New Onset Atrial Fibrillation  On tele with RVR improved rates up to 110s  - Continue Cardizem gtt.    - Continue with Lopressor Q6 IV w/ parameters  - CHADsVasc =4, no AC given due to hx of SDH s/p craniotomy. NSGY eval appreciated. Risks/ benefits d/w son. Start Lovenox  - Echo EF 55-60%, no significant valvulopathy   - Condom cath for I&O monitoring    Hypernatremia  Likely due to poor PO intake  - C/w D5W. encourage free water      Hyperglycemia. DM Type 2  - Continue ISS ACHS      MÓNICA on CKD Stage 2  Resolved. s/p IVF. Monitor.  - Treat UTI w/ ABX    Stercoral proctitis  Resolved. CT imaging as above. (+) Large rectal fecal retention.  - Continue dulcolax suppository HS    BPH  - Continue doxazosin, finasteride     Severe Protein Calorie Malnutrition   - Resume diet today and monitor    Agitation  Patient pulling at lines, Cor paks removed by patient x2  Wrist restraints for safety/line pulling    GOC/ Advanced Directives  - No limitations. Pall f/u appreciated     DVT ppx  - SQ Heparin BID    Dispo: C/w medical plan as outlined above. Prognosis guarded.   89 y/o M w PMH of BPH, CAD, HTN, DMII, GERD, HLD, SDH s/p craniotomy and surgical removal is brought into HH from [facility] for coffee ground emesis, hypoxia, fever. Patient admitted on 3/13 to ICU team for Septic shock due to aspiration pneumonia and pseudomonal UTI. Now transferred to the care of the hospital medicine team.    Septic Shock   Due to aspiration PNA and pseudomonal UTI   Resolved. Currently weaned off pressors and midodrine. Lactic acidosis resolved. Blood culture NGTD x2. UCx w/ Pseudomonas aeruginosa. Transferred from ICU to  for further management    Acute hypoxic respiratory failure due to aspiration pneumonia  Repeat CXR with worsening congestive findings. Continue with ABX treatment. Lasix given x1 on 3/21. Unable to get ABG after mult. attempts. Continue supplemental 02 - on 4L NC  - Continue IV Zosyn - last dose tomorrow per ID.  - PRN morphine for dyspnea.  - Resume restricted diet td. dysphagia puree w/ mod thickened liquids     Pseudomonas aeruginosa UTI  - Continue with ABX (as above)     New Onset Atrial Fibrillation  On tele with RVR improved rates up to 110s  - Continue Cardizem gtt.    - Continue with Lopressor Q6 IV w/ parameters  - CHADsVasc =4, no AC given due to hx of SDH s/p craniotomy. NSGY eval appreciated. Risks/ benefits d/w son. Start Lovenox  - Echo EF 55-60%, no significant valvulopathy   - Condom cath for I&O monitoring    Hypernatremia  Likely due to poor PO intake  - C/w D5W. encourage free water  w/ diet    Hyperglycemia. DM Type 2  - Continue ISS ACHS      MÓNICA on CKD Stage 2  Resolved. s/p IVF. Monitor.  - Treat UTI w/ ABX    Stercoral proctitis  Resolved. CT imaging as above. (+) Large rectal fecal retention.  - Continue dulcolax suppository HS    BPH  - Continue doxazosin, finasteride     Severe Protein Calorie Malnutrition   - Resume diet today and monitor    GOC/ Advanced Directives  - No limitations. Pall f/u appreciated     DVT ppx  - SQ Heparin BID    Dispo: C/w medical plan as outlined above. Prognosis guarded.  Updated son this AM.

## 2022-03-22 NOTE — PHYSICAL THERAPY INITIAL EVALUATION ADULT - LEVEL OF INDEPENDENCE: SUPINE/SIT, REHAB EVAL
[facility] Functional Mobility Assessment at this time due to decline in Vital Signs with minimal exertion. With minimal exertion patient's HR elevates to 130's, SpO2 drops down to 92%, RR Elevates to 45. Patient not medically stable for increased activity. Will attempt OOB Assessment at a later date/time upon improvement in medical status.

## 2022-03-22 NOTE — PROGRESS NOTE ADULT - SUBJECTIVE AND OBJECTIVE BOX
The patient was seen and examined.  No acute events overnight.  Respiratory status appears mildly improved.      Initial Consult HPI  ________________________________  KEITH LEVY is a 89y year old Male with a past medical history of CAD status post PCI, hypertension, diabetes, GERD, history of subdural hematoma status post craniotomy, who [facility] for [facility] vomiting.    He has been admitted for septic shock due to aspiration pneumonia and UTI.  Cardiology consulted for new onset A. fib with RVR.    Currently requiring nonrebreather.  Heart rate into the 130s.  History difficult to obtain due to encephalopathy.  ________________________________  Review of systems: Cannot obtain  PAST MEDICAL & SURGICAL HISTORY:  CAD (coronary artery disease)    HTN (hypertension)    HLD (hyperlipidemia)    DM (diabetes mellitus)    BPH (benign prostatic hyperplasia)    GERD (gastroesophageal reflux disease)    SDH (subdural hematoma)      FAMILY HISTORY:  Cannot obtain    SOCIAL HISTORY: Cannot obtain    ALLERGIES:  No Known Allergies    Home Medications:  acetaminophen 325 mg oral tablet: 2 tab(s) orally every 6 hours, As needed (13 Mar 2022 15:14)  ascorbic acid 500 mg oral tablet: 1 tab(s) orally once a day (13 Mar 2022 15:14)  bethanechol 50 mg oral tablet: 1 tab(s) orally 2 times a day (13 Mar 2022 15:14)  bisacodyl 10 mg rectal suppository: 1 suppository(ies) rectal once a day, As Needed if no BM from MOM at 5am next day (13 Mar 2022 15:14)  finasteride 5 mg oral tablet: 1 tab(s) orally once a day (13 Mar 2022 15:14)  gabapentin 100 mg oral capsule: 1 cap(s) orally 2 times a day (13 Mar 2022 15:43)  metoprolol succinate 25 mg oral tablet, extended release: 1 tab(s) orally once a day (13 Mar 2022 15:43)  Milk of Magnesia 8% oral suspension: 30 milliliter(s) orally once a day (at bedtime) as neded if no BM in 3 days (13 Mar 2022 15:14)  Multiple Vitamins oral tablet: 1 tab(s) orally once a day (13 Mar 2022 15:14)  tamsulosin 0.4 mg oral capsule: 1 cap(s) orally once a day (at bedtime) (13 Mar 2022 15:14)    MEDICATIONS  (STANDING):  ascorbic acid 500 milliGRAM(s) Oral two times a day  bisacodyl Suppository 10 milliGRAM(s) Rectal at bedtime  dextrose 40% Gel 15 Gram(s) Oral once  dextrose 5% with potassium chloride 20 mEq/L 1000 milliLiter(s) (70 mL/Hr) IV Continuous <Continuous>  dextrose 5%. 1000 milliLiter(s) (50 mL/Hr) IV Continuous <Continuous>  dextrose 5%. 1000 milliLiter(s) (100 mL/Hr) IV Continuous <Continuous>  dextrose 50% Injectable 25 Gram(s) IV Push once  dextrose 50% Injectable 12.5 Gram(s) IV Push once  dextrose 50% Injectable 25 Gram(s) IV Push once  diltiazem Infusion 5 mG/Hr (5 mL/Hr) IV Continuous <Continuous>  doxazosin 1 milliGRAM(s) Oral at bedtime  enoxaparin Injectable 50 milliGRAM(s) SubCutaneous every 12 hours  finasteride 5 milliGRAM(s) Oral daily  glucagon  Injectable 1 milliGRAM(s) IntraMuscular once  insulin lispro (ADMELOG) corrective regimen sliding scale   SubCutaneous three times a day before meals  insulin lispro (ADMELOG) corrective regimen sliding scale   SubCutaneous at bedtime  multivitamin/minerals 1 Tablet(s) Oral daily  piperacillin/tazobactam IVPB.. 3.375 Gram(s) IV Intermittent every 8 hours  thiamine 100 milliGRAM(s) Oral daily  zinc sulfate 220 milliGRAM(s) Oral daily    MEDICATIONS  (PRN):  acetaminophen     Tablet .. 650 milliGRAM(s) Oral every 6 hours PRN Temp greater or equal to 38.5C (101.3F), Mild Pain (1 - 3)  metoprolol tartrate Injectable 2.5 milliGRAM(s) IV Push every 6 hours PRN HR >110  morphine  - Injectable 2 milliGRAM(s) IV Push every 12 hours PRN Dyspnea      Vital Signs Last 24 Hrs  T(C): 37.4 (22 Mar 2022 10:15), Max: 37.4 (22 Mar 2022 10:15)  T(F): 99.3 (22 Mar 2022 10:15), Max: 99.3 (22 Mar 2022 10:15)  HR: 75 (22 Mar 2022 16:15) (75 - 120)  BP: 110/58 (22 Mar 2022 16:15) (102/50 - 151/60)  BP(mean): --  RR: 18 (22 Mar 2022 08:12) (18 - 21)  SpO2: 93% (22 Mar 2022 08:12) (93% - 98%)  I&O's Summary    22 Mar 2022 07:01  -  22 Mar 2022 17:37  --------------------------------------------------------  IN: 300 mL / OUT: 0 mL / NET: 300 mL        ________________________________  GENERAL APPEARANCE:  No acute distress  HEAD: normocephalic, atraumatic  NECK: supple, no jugular venous distention, no carotid bruit    HEART: Irregularly irregular, S1, S2 normal, 1/6 murmur    CHEST:  No anterior chest wall tenderness    LUNGS: Coarse    ABDOMEN: soft, nontender, nondistended, with positive bowel sounds appreciated  EXTREMITIES: no edema.   NEURO: Lethargic  PSYC:  Normal affect  SKIN:  Dry  ________________________________   TELEMETRY: KEITH flynn with RVR     ECG: Initial EKG showed sinus rhythm at 74 bpm    LABS:                                     9.8    14.77 )-----------( 372      ( 22 Mar 2022 08:48 )             30.4          03-22    151<H>  |  118<H>  |  34<H>  ----------------------------<  132<H>  3.4<L>   |  30  |  0.99    Ca    8.5      22 Mar 2022 08:48  Phos  3.2     03-21  Mg     2.2     03-22    TPro  5.2<L>  /  Alb  1.6<L>  /  TBili  1.2  /  DBili  x   /  AST  29  /  ALT  32  /  AlkPhos  130<H>  03-22         ________________________________    RADIOLOGY & ADDITIONAL STUDIES:  IMPRESSION:  Moderate bilateral interstitial infiltrates, increased in the right lower   lobe.    NG tube removed.    Left pleural effusion/adjacent atelectasis, grossly unchanged    Echocardiogram 3/14/2022     Fibrocalcific changes noted to the mitral valve leaflets with preserved   leaflet excursion.   Mild mitral annular calcification is present.   Mild (1+) mitral regurgitation is present.   EA reversal of the mitral inflow consistent with reduced compliance of   the   left ventricle.   Normal appearing tricuspid valve structure.   Moderate (2+) tricuspid valve regurgitation is present.   The left atrium is mildly dilated.   Estimated left ventricular ejection fraction is 55-60 %.   The left ventricle has normal wall motion and contractility as seen in   limited views.  Mild concentric left ventricular hypertrophy is present.   IVC was not seen within the subcostal view.   Aortic root is within the upper limits of normal (4.0 cm).    ________________________________    ASSESSMENT:  Atrial fibrillation with RVR - CHADSVASc 5  History of CAD  Hypertension  Diabetes  Subdural hematoma status post craniotomy in 2020  Hypoxic respiratory failure    PLAN:  In summary, this is a 89y Male with a past medical history of craniotomy following subdural hematoma admitted for septic shock.  Cardiology consulted for A. fib with RVR.  Continue Cardizem for rate control.  Neurosurgery evaluation noted.  Start full dose anticoagulation with Lovenox.  If able to tolerate Lovenox, will transition to DOAC.  Risk and benefit of anticoagulation discussed with family.  They are agreeable.    __________________________________________________________________________  Thank you for allowing me to participate in the care of your patient. Please contact me should any questions arise.    RICHIE Cleary DO, FACC  Office: [phone]        The patient was seen and examined.  No acute events overnight.  Respiratory status appears mildly improved.      Initial Consult HPI  ________________________________  KEITH LEVY is a 89y year old Male with a past medical history of CAD status post PCI, hypertension, diabetes, GERD, history of subdural hematoma status post craniotomy, who presented to [facility] for [facility] vomiting.    He has been admitted for septic shock due to aspiration pneumonia and UTI.  Cardiology consulted for new onset A. fib with RVR.    Currently requiring nonrebreather.  Heart rate into the 130s.  History difficult to obtain due to encephalopathy.  ________________________________  Review of systems: Cannot obtain  PAST MEDICAL & SURGICAL HISTORY:  CAD (coronary artery disease)    HTN (hypertension)    HLD (hyperlipidemia)    DM (diabetes mellitus)    BPH (benign prostatic hyperplasia)    GERD (gastroesophageal reflux disease)    SDH (subdural hematoma)      FAMILY HISTORY:  Cannot obtain    SOCIAL HISTORY: Cannot obtain    ALLERGIES:  No Known Allergies    Home Medications:  acetaminophen 325 mg oral tablet: 2 tab(s) orally every 6 hours, As needed (13 Mar 2022 15:14)  ascorbic acid 500 mg oral tablet: 1 tab(s) orally once a day (13 Mar 2022 15:14)  bethanechol 50 mg oral tablet: 1 tab(s) orally 2 times a day (13 Mar 2022 15:14)  bisacodyl 10 mg rectal suppository: 1 suppository(ies) rectal once a day, As Needed if no BM from MOM at 5am next day (13 Mar 2022 15:14)  finasteride 5 mg oral tablet: 1 tab(s) orally once a day (13 Mar 2022 15:14)  gabapentin 100 mg oral capsule: 1 cap(s) orally 2 times a day (13 Mar 2022 15:43)  metoprolol succinate 25 mg oral tablet, extended release: 1 tab(s) orally once a day (13 Mar 2022 15:43)  Milk of Magnesia 8% oral suspension: 30 milliliter(s) orally once a day (at bedtime) as neded if no BM in 3 days (13 Mar 2022 15:14)  Multiple Vitamins oral tablet: 1 tab(s) orally once a day (13 Mar 2022 15:14)  tamsulosin 0.4 mg oral capsule: 1 cap(s) orally once a day (at bedtime) (13 Mar 2022 15:14)    MEDICATIONS  (STANDING):  ascorbic acid 500 milliGRAM(s) Oral two times a day  bisacodyl Suppository 10 milliGRAM(s) Rectal at bedtime  dextrose 40% Gel 15 Gram(s) Oral once  dextrose 5% with potassium chloride 20 mEq/L 1000 milliLiter(s) (70 mL/Hr) IV Continuous <Continuous>  dextrose 5%. 1000 milliLiter(s) (50 mL/Hr) IV Continuous <Continuous>  dextrose 5%. 1000 milliLiter(s) (100 mL/Hr) IV Continuous <Continuous>  dextrose 50% Injectable 25 Gram(s) IV Push once  dextrose 50% Injectable 12.5 Gram(s) IV Push once  dextrose 50% Injectable 25 Gram(s) IV Push once  diltiazem Infusion 5 mG/Hr (5 mL/Hr) IV Continuous <Continuous>  doxazosin 1 milliGRAM(s) Oral at bedtime  enoxaparin Injectable 50 milliGRAM(s) SubCutaneous every 12 hours  finasteride 5 milliGRAM(s) Oral daily  glucagon  Injectable 1 milliGRAM(s) IntraMuscular once  insulin lispro (ADMELOG) corrective regimen sliding scale   SubCutaneous three times a day before meals  insulin lispro (ADMELOG) corrective regimen sliding scale   SubCutaneous at bedtime  multivitamin/minerals 1 Tablet(s) Oral daily  piperacillin/tazobactam IVPB.. 3.375 Gram(s) IV Intermittent every 8 hours  thiamine 100 milliGRAM(s) Oral daily  zinc sulfate 220 milliGRAM(s) Oral daily    MEDICATIONS  (PRN):  acetaminophen     Tablet .. 650 milliGRAM(s) Oral every 6 hours PRN Temp greater or equal to 38.5C (101.3F), Mild Pain (1 - 3)  metoprolol tartrate Injectable 2.5 milliGRAM(s) IV Push every 6 hours PRN HR >110  morphine  - Injectable 2 milliGRAM(s) IV Push every 12 hours PRN Dyspnea      Vital Signs Last 24 Hrs  T(C): 37.4 (22 Mar 2022 10:15), Max: 37.4 (22 Mar 2022 10:15)  T(F): 99.3 (22 Mar 2022 10:15), Max: 99.3 (22 Mar 2022 10:15)  HR: 75 (22 Mar 2022 16:15) (75 - 120)  BP: 110/58 (22 Mar 2022 16:15) (102/50 - 151/60)  BP(mean): --  RR: 18 (22 Mar 2022 08:12) (18 - 21)  SpO2: 93% (22 Mar 2022 08:12) (93% - 98%)  I&O's Summary    22 Mar 2022 07:01  -  22 Mar 2022 17:37  --------------------------------------------------------  IN: 300 mL / OUT: 0 mL / NET: 300 mL        ________________________________  GENERAL APPEARANCE:  No acute distress  HEAD: normocephalic, atraumatic  NECK: supple, no jugular venous distention, no carotid bruit    HEART: Irregularly irregular, S1, S2 normal, 1/6 murmur    CHEST:  No anterior chest wall tenderness    LUNGS: Coarse    ABDOMEN: soft, nontender, nondistended, with positive bowel sounds appreciated  EXTREMITIES: no edema.   NEURO: Lethargic  PSYC:  Normal affect  SKIN:  Dry  ________________________________   TELEMETRY: KEITH flynn with RVR     ECG: Initial EKG showed sinus rhythm at 74 bpm    LABS:                                     9.8    14.77 )-----------( 372      ( 22 Mar 2022 08:48 )             30.4          03-22    151<H>  |  118<H>  |  34<H>  ----------------------------<  132<H>  3.4<L>   |  30  |  0.99    Ca    8.5      22 Mar 2022 08:48  Phos  3.2     03-21  Mg     2.2     03-22    TPro  5.2<L>  /  Alb  1.6<L>  /  TBili  1.2  /  DBili  x   /  AST  29  /  ALT  32  /  AlkPhos  130<H>  03-22         ________________________________    RADIOLOGY & ADDITIONAL STUDIES:  IMPRESSION:  Moderate bilateral interstitial infiltrates, increased in the right lower   lobe.    NG tube removed.    Left pleural effusion/adjacent atelectasis, grossly unchanged    Echocardiogram 3/14/2022     Fibrocalcific changes noted to the mitral valve leaflets with preserved   leaflet excursion.   Mild mitral annular calcification is present.   Mild (1+) mitral regurgitation is present.   EA reversal of the mitral inflow consistent with reduced compliance of   the   left ventricle.   Normal appearing tricuspid valve structure.   Moderate (2+) tricuspid valve regurgitation is present.   The left atrium is mildly dilated.   Estimated left ventricular ejection fraction is 55-60 %.   The left ventricle has normal wall motion and contractility as seen in   limited views.  Mild concentric left ventricular hypertrophy is present.   IVC was not seen within the subcostal view.   Aortic root is within the upper limits of normal (4.0 cm).    ________________________________    ASSESSMENT:  Atrial fibrillation with RVR - CHADSVASc 5  History of CAD  Hypertension  Diabetes  Subdural hematoma status post craniotomy in 2020  Hypoxic respiratory failure    PLAN:  In summary, this is a 89y Male with a past medical history of craniotomy following subdural hematoma admitted for septic shock.  Cardiology consulted for A. fib with RVR.  Continue Cardizem for rate control.  Neurosurgery evaluation noted.  Start full dose anticoagulation with Lovenox.  If able to tolerate Lovenox, will transition to DOAC.  Called Caleb, son, to discuss Risk and benefit of anticoagulation  - no ans VM left    __________________________________________________________________________  Thank you for allowing me to participate in the care of your patient. Please contact me should any questions arise.    RICHIE Cleary DO, [facility]  Office: [phone]

## 2022-03-22 NOTE — SWALLOW BEDSIDE ASSESSMENT ADULT - SWALLOW EVAL: RECOMMENDED FEEDING/EATING TECHNIQUES
check mouth frequently for oral residue/pocketing/crush medication (when feasible)/maintain upright posture during/after eating for 30 mins/small sips/bites
check mouth frequently for oral residue/pocketing/crush medication (when feasible)/maintain upright posture during/after eating for 30 mins/small sips/bites

## 2022-03-22 NOTE — SWALLOW BEDSIDE ASSESSMENT ADULT - DIET PRIOR TO ADMI
The patient was on a puree diet with thick liquids at [facility].
The patient was on a puree diet with thick liquids at [facility].

## 2022-03-22 NOTE — SWALLOW BEDSIDE ASSESSMENT ADULT - SWALLOW EVAL: RECOMMENDED DIET
SUGGEST A PUREE TEXTURE DIET WITH MODERATELY THICK LIQUIDS AS THIS IS THE LEAST RESTRICTIVE TOLERABLE DIET CONSISTENCIES FROM AN OROPHARYNGEAL SWALLOWING STANCE ON EXAM WHEN PT IS IN AN ALERT STATE. PT MUST BE ALERT WHEN BEING FED. NOTE THAT IF PT NEEDS TO BE ON A LIQUID DIET FOR GI REASONS, THAN ALL LIQUIDS MUST BE MODERATELY THICKENED.
STILL SUGGEST A PUREE TEXTURE DIET WITH MODERATELY THICK LIQUIDS AS THIS IS THE LEAST RESTRICTIVE TOLERABLE DIET CONSISTENCIES FROM AN OROPHARYNGEAL SWALLOWING STANCE ON EXAM WHEN PT IS IN AN ALERT/CALM STATE WHICH IS NOT ALWAYS THE CASE. PT MUST BE ALERT/CALM WHEN BEING FED. NOTE THAT PT DOES HAVE A CORPAK IN PLACE FOR ENTERAL NUTRITION SUPPLEMENTATION WHEN HE'S NOT FEEDABLE. NOTE THAT CORPAK PLACEMENT IS FELT TO BE A SHORT TERM COMPENSATION TO A LONG TERM FEEDING PROBLEM.

## 2022-03-22 NOTE — SWALLOW BEDSIDE ASSESSMENT ADULT - ASR SWALLOW LABIAL MOBILITY
Unable to assess due to altered mentation with reduced task comprehension.
Unable to assess due to altered mentation with reduced task comprehension.

## 2022-03-22 NOTE — SWALLOW BEDSIDE ASSESSMENT ADULT - PHARYNGEAL PHASE
Swallow trigger was timely to mildly latent. Laryngeal lift on palpation during swallowing trials was mildly to moderately decreased but felt to be grossly functional with the above modified food consistencies. NO behavioral aspiration signs exhibited with puree foods and moderately thick liquids.
Swallow trigger was timely to mildly latent. Laryngeal lift on palpation during swallowing trials was mildly to moderately decreased but felt to be grossly functional with the above modified food consistencies. NO behavioral aspiration signs exhibited with puree foods and moderately thick liquids.

## 2022-03-22 NOTE — SWALLOW BEDSIDE ASSESSMENT ADULT - NS SPL SWALLOW CLINIC TRIAL FT
Solids/liquids thinner than moderately thick fluids were not offered given severity of chronic pre-existing dysphagic profile and considering that solids/thin liquids are reportedly not in food inventory PTH. Odynophagia was denied.
Solids/liquids thinner than moderately thick fluids were not offered given severity of chronic pre-existing dysphagic profile and considering that solids/thin liquids are reportedly not in food inventory PTH. Odynophagia was denied.

## 2022-03-22 NOTE — PROGRESS NOTE ADULT - SUBJECTIVE AND OBJECTIVE BOX
Chief Complaint: coffee ground emesis    Hpi: 89 yo M w PMH of BPH, CAD, HTN, DMII, GERD, HLD, SDH s/p craniotomy and surgical removal is brought into HH from [facility] for vomiting. As per facility, pt had large amount coffee ground emesis and persistent loose stool, dark in appearance, skin color pale and cold to the touch. Pt is on 2L O2 via nasal canula. Unable to give hx. At bedside, pt states he feels cold, denies abd pain, N/V. In ED, pt fount to be hypoxic, placed on non-rebreather. Leukocytosis of 14, Cr 1.6, Trop 202, lactate 8.5, Ph 5.4. U/A is very cloudy, w mod LE, blood & bacteria. CXR demonstrates lower lobe infiltrates. Given Vanc & Zosyn in ED, given 2L NS (30 cc/kg), dawson was placed, blood & urine cx were obtained. Awaiting Head, Abd CT.   (13 Mar 2022 15:18)    3/21/22: Patient seen and examined this AM. Worsening tachypnea and hypoxia, ill appearing cachectic male. Placed on NRB. Repeat CXR. ABG. Lasix x1. Noted with Afib RVR up to 130s, IV Cardizem and monitor BPs. D/w pall team this AM, no limitations of care, will place GI consult for PEG tube eval. Also on AM labs with hypernatremia, started on D5 given the fact the Corpack has been removed by patient x2. Prognosis guarded. Limited ROS given baseline cognitive impairment/current clinical condition. From observation very deconditioned.    3/22/22:    All other review of systems is negative unless indicated above    Physical Exam:  T(C): 36.6 (22 Mar 2022 08:12), Max: 36.7 (21 Mar 2022 20:17)  T(F): 97.9 (22 Mar 2022 08:12), Max: 98.1 (21 Mar 2022 20:17)  HR: 107 (22 Mar 2022 08:12) (88 - 120)  BP: 151/60 (22 Mar 2022 08:12) (94/44 - 151/60)  RR: 18 (22 Mar 2022 08:12) (18 - 30)  SpO2: 93% (22 Mar 2022 08:12) (93% - 98%)    Constitutional: Awake, ill appearing, cachectic, not responding to commands  HEENT: dry MM  Neck: Soft and supple   Respiratory: Breath sounds are rhonchi /crackles b/l  Cardiovascular: S1 and S2, IR IR  Gastrointestinal: Bowel Sounds present, soft  Extremities: No peripheral edema  Vascular: 2+ peripheral pulses  Neurological: Unable to assess, not responding/cooperating with commands. awake.  Musculoskeletal: unable to assess  Skin: No rashes    Labs:                      10.6   18.90 )-----------( 356      ( 21 Mar 2022 08:25 )             32.1     03-21    150<H>  |  115<H>  |  38<H>  ----------------------------<  224<H>  3.2<L>   |  27  |  1.03    Ca    8.6      21 Mar 2022 13:29  Phos  3.2     03-21  Mg     2.3     03-21    TPro  5.6<L>  /  Alb  1.7<L>  /  TBili  1.3<H>  /  DBili  x   /  AST  42<H>  /  ALT  37  /  AlkPhos  144<H>  03-21    Lactate 8.5 -- 8.0 --3.4 --3.2 -- 2.4 -- 1.1    Lipase 20    A1c 5.9    CK 2000 -- 1058 -- 260    Micro:  3/13 (+) UA     3/13 Urine Culture: >100,000 CFUs Pseudomonas aeruginosa     3/13 Blood Culture NGTD x2    COVID-19 PCR: NotDetec (20 Mar 2022 10:20)    SARS-CoV-2: NotDetec (13 Mar 2022 13:14)    Radiology:    3/15/22:  Xray Chest 1 View: Patchy perihilar infiltrates, right greater than left, grossly unchanged. Mild pulmonary venous congestion, new    3/14/22: Xray Chest 1 View: RIGHT lung perihilar/basilar and LEFT medial basilar multifocal and diffuse ill-defined airspace opacities.    3/13/22: CT Abdomen and Pelvis No Cont: 1. Multifocal areas of bilateral airspace consolidation most pronounced in the right lower lobe. Appearance is suggestive of multifocal pneumonia.  2. Small layering right pleural effusion. 3. Small layering stones or sludge in the gallbladder fundus. 4. Punctate nonobstructive right nephrolithiasis. 5. Large rectal fecal retention with evidence of stercoral proctitis.    3/13/22: CT Head No Cont: Mild anterior periventricular white matter ischemia with old lacunar infarction in the RIGHT corona radiata. Moderate to severe atrophy in the BILATERAL temporal lobes.    3/13/22: Xray Chest 1 View: Recommend correlation for lower lobe interstitial infiltrates    Cardiac Testing:    Trops 202.16 -- 232.63 -- 1021.39 -- 907.26 -- 463.86    3/14/22: ECHO: Fibrocalcific changes noted to the mitral valve leaflets with preserved leaflet excursion. Mild mitral annular calcification is present. Mild (1+) mitral regurgitation is present. EA reversal of the mitral inflow consistent with reduced compliance of the left ventricle. Normal appearing tricuspid valve structure. Moderate (2+) tricuspid valve regurgitation is present. The left atrium is mildly dilated. Estimated left ventricular ejection fraction is 55-60 %. The left ventricle has normal wall motion and contractility as seen in limited views. Mild concentric left ventricular hypertrophy is present. IVC was not seen within the subcostal view. Aortic root is within the upper limits of normal (4.0 cm).    3/14/22: ECG: Diagnosis Line Sinus rhythm with 1st degree A-V block Left axis deviation Right bundle branch block Abnormal ECG    3/13/22: ECG: Diagnosis Line Atrial fibrillation with premature ventricular or aberrantly conducted complexes Right bundle branch block Anterior infarct , age undetermined Abnormal ECG    Medications:  ascorbic acid 500 milliGRAM(s) Oral two times a day  bisacodyl Suppository 10 milliGRAM(s) Rectal at bedtime  dextrose 40% Gel 15 Gram(s) Oral once  dextrose 5%. 1000 milliLiter(s) (50 mL/Hr) IV Continuous <Continuous>  dextrose 5%. 1000 milliLiter(s) (100 mL/Hr) IV Continuous <Continuous>  dextrose 50% Injectable 25 Gram(s) IV Push once  dextrose 50% Injectable 12.5 Gram(s) IV Push once  dextrose 50% Injectable 25 Gram(s) IV Push once  diltiazem Infusion 5 mG/Hr (5 mL/Hr) IV Continuous <Continuous>  doxazosin 1 milliGRAM(s) Oral at bedtime  finasteride 5 milliGRAM(s) Oral daily  glucagon  Injectable 1 milliGRAM(s) IntraMuscular once  heparin   Injectable 5000 Unit(s) SubCutaneous every 12 hours  insulin lispro (ADMELOG) corrective regimen sliding scale   SubCutaneous every 6 hours  multivitamin/minerals 1 Tablet(s) Oral daily  piperacillin/tazobactam IVPB.. 3.375 Gram(s) IV Intermittent every 8 hours  thiamine 100 milliGRAM(s) Oral daily  zinc sulfate 220 milliGRAM(s) Oral daily    MEDICATIONS  (PRN):  acetaminophen     Tablet .. 650 milliGRAM(s) Oral every 6 hours PRN Temp greater or equal to 38.5C (101.3F), Mild Pain (1 - 3)  metoprolol tartrate Injectable 2.5 milliGRAM(s) IV Push every 6 hours PRN HR >110    Home Medications:  acetaminophen 325 mg oral tablet: 2 tab(s) orally every 6 hours, As needed (13 Mar 2022 15:14)  ascorbic acid 500 mg oral tablet: 1 tab(s) orally once a day (13 Mar 2022 15:14)  bethanechol 50 mg oral tablet: 1 tab(s) orally 2 times a day (13 Mar 2022 15:14)  bisacodyl 10 mg rectal suppository: 1 suppository(ies) rectal once a day, As Needed if no BM from MOM at 5am next day (13 Mar 2022 15:14)  finasteride 5 mg oral tablet: 1 tab(s) orally once a day (13 Mar 2022 15:14)  gabapentin 100 mg oral capsule: 1 cap(s) orally 2 times a day (13 Mar 2022 15:43)  metoprolol succinate 25 mg oral tablet, extended release: 1 tab(s) orally once a day (13 Mar 2022 15:43)  Milk of Magnesia 8% oral suspension: 30 milliliter(s) orally once a day (at bedtime) as needed if no BM in 3 days (13 Mar 2022 15:14)  Multiple Vitamins oral tablet: 1 tab(s) orally once a day (13 Mar 2022 15:14)  tamsulosin 0.4 mg oral capsule: 1 cap(s) orally once a day (at bedtime) (13 Mar 2022 15:14)   Chief Complaint: coffee ground emesis    Hpi: 89 yo M w PMH of BPH, CAD, HTN, DMII, GERD, HLD, SDH s/p craniotomy and surgical removal is brought into HH from [facility] for vomiting. As per facility, pt had large amount coffee ground emesis and persistent loose stool, dark in appearance, skin color pale and cold to the touch. Pt is on 2L O2 via nasal canula. Unable to give hx. At bedside, pt states he feels cold, denies abd pain, N/V. In ED, pt fount to be hypoxic, placed on non-rebreather. Leukocytosis of 14, Cr 1.6, Trop 202, lactate 8.5, Ph 5.4. U/A is very cloudy, w mod LE, blood & bacteria. CXR demonstrates lower lobe infiltrates. Given Vanc & Zosyn in ED, given 2L NS (30 cc/kg), dawson was placed, blood & urine cx were obtained. Awaiting Head, Abd CT.   (13 Mar 2022 15:18)    3/21/22: Patient seen and examined this AM. Worsening tachypnea and hypoxia, ill appearing cachectic male. Placed on NRB. Repeat CXR. ABG. Lasix x1. Noted with Afib RVR up to 130s, IV Cardizem and monitor BPs. D/w pall team this AM, no limitations of care, will place GI consult for PEG tube eval. Also on AM labs with hypernatremia, started on D5 given the fact the Corpack has been removed by patient x2. Prognosis guarded. Limited ROS given baseline cognitive impairment/current clinical condition. From observation very deconditioned.    3/22/22: Patient seen and examined this AM. still w/ persistent tachypnea and hypoxia, ill appearing cachectic male. speech re-eval this Am. restricted diet started. d/w son risks/benefits of AC with afib.    All other review of systems is negative unless indicated above    Physical Exam:  T(C): 36.6 (22 Mar 2022 08:12), Max: 36.7 (21 Mar 2022 20:17)  T(F): 97.9 (22 Mar 2022 08:12), Max: 98.1 (21 Mar 2022 20:17)  HR: 107 (22 Mar 2022 08:12) (88 - 120)  BP: 151/60 (22 Mar 2022 08:12) (94/44 - 151/60)  RR: 18 (22 Mar 2022 08:12) (18 - 30)  SpO2: 93% (22 Mar 2022 08:12) (93% - 98%)    Constitutional: Awake, ill appearing, cachectic, one/two word responses  HEENT: dry MM  Neck: Soft and supple   Respiratory: Breath sounds are rhonchi /crackles b/l  Cardiovascular: S1 and S2, IR IR  Gastrointestinal: Bowel Sounds present, soft  Extremities: No peripheral edema  Vascular: 2+ peripheral pulses  Neurological: Unable to assess, not responding/cooperating with commands. awake. one/two word responses  Musculoskeletal: unable to assess  Skin: No rashes    Labs:                      9.8    14.77 )-----------( 372      ( 22 Mar 2022 08:48 )             30.4     03-22    151<H>  |  118<H>  |  34<H>  ----------------------------<  132<H>  3.4<L>   |  30  |  0.99    Ca    8.5      22 Mar 2022 08:48  Phos  3.2     03-21  Mg     2.2     03-22    TPro  5.2<L>  /  Alb  1.6<L>  /  TBili  1.2  /  DBili  x   /  AST  29  /  ALT  32  /  AlkPhos  130<H>  03-22    Lactate 8.5 -- 8.0 --3.4 --3.2 -- 2.4 -- 1.1    Lipase 20    A1c 5.9    CK 2000 -- 1058 -- 260    Micro:  3/13 (+) UA     3/13 Urine Culture: >100,000 CFUs Pseudomonas aeruginosa     3/13 Blood Culture NGTD x2    COVID-19 PCR: NotDetec (20 Mar 2022 10:20)    SARS-CoV-2: NotDetec (13 Mar 2022 13:14)    Radiology:    3/15/22:  Xray Chest 1 View: Patchy perihilar infiltrates, right greater than left, grossly unchanged. Mild pulmonary venous congestion, new    3/14/22: Xray Chest 1 View: RIGHT lung perihilar/basilar and LEFT medial basilar multifocal and diffuse ill-defined airspace opacities.    3/13/22: CT Abdomen and Pelvis No Cont: 1. Multifocal areas of bilateral airspace consolidation most pronounced in the right lower lobe. Appearance is suggestive of multifocal pneumonia.  2. Small layering right pleural effusion. 3. Small layering stones or sludge in the gallbladder fundus. 4. Punctate nonobstructive right nephrolithiasis. 5. Large rectal fecal retention with evidence of stercoral proctitis.    3/13/22: CT Head No Cont: Mild anterior periventricular white matter ischemia with old lacunar infarction in the RIGHT corona radiata. Moderate to severe atrophy in the BILATERAL temporal lobes.    3/13/22: Xray Chest 1 View: Recommend correlation for lower lobe interstitial infiltrates    Cardiac Testing:    Trops 202.16 -- 232.63 -- 1021.39 -- 907.26 -- 463.86    3/14/22: ECHO: Fibrocalcific changes noted to the mitral valve leaflets with preserved leaflet excursion. Mild mitral annular calcification is present. Mild (1+) mitral regurgitation is present. EA reversal of the mitral inflow consistent with reduced compliance of the left ventricle. Normal appearing tricuspid valve structure. Moderate (2+) tricuspid valve regurgitation is present. The left atrium is mildly dilated. Estimated left ventricular ejection fraction is 55-60 %. The left ventricle has normal wall motion and contractility as seen in limited views. Mild concentric left ventricular hypertrophy is present. IVC was not seen within the subcostal view. Aortic root is within the upper limits of normal (4.0 cm).    3/14/22: ECG: Diagnosis Line Sinus rhythm with 1st degree A-V block Left axis deviation Right bundle branch block Abnormal ECG    3/13/22: ECG: Diagnosis Line Atrial fibrillation with premature ventricular or aberrantly conducted complexes Right bundle branch block Anterior infarct , age undetermined Abnormal ECG    Medications:  ascorbic acid 500 milliGRAM(s) Oral two times a day  bisacodyl Suppository 10 milliGRAM(s) Rectal at bedtime  dextrose 40% Gel 15 Gram(s) Oral once  dextrose 5% with potassium chloride 20 mEq/L 1000 milliLiter(s) (70 mL/Hr) IV Continuous <Continuous>  dextrose 5%. 1000 milliLiter(s) (50 mL/Hr) IV Continuous <Continuous>  dextrose 5%. 1000 milliLiter(s) (100 mL/Hr) IV Continuous <Continuous>  dextrose 50% Injectable 25 Gram(s) IV Push once  dextrose 50% Injectable 12.5 Gram(s) IV Push once  dextrose 50% Injectable 25 Gram(s) IV Push once  diltiazem Infusion 5 mG/Hr (5 mL/Hr) IV Continuous <Continuous>  doxazosin 1 milliGRAM(s) Oral at bedtime  finasteride 5 milliGRAM(s) Oral daily  glucagon  Injectable 1 milliGRAM(s) IntraMuscular once  heparin   Injectable 5000 Unit(s) SubCutaneous every 12 hours  insulin lispro (ADMELOG) corrective regimen sliding scale   SubCutaneous every 6 hours  multivitamin/minerals 1 Tablet(s) Oral daily  piperacillin/tazobactam IVPB.. 3.375 Gram(s) IV Intermittent every 8 hours  thiamine 100 milliGRAM(s) Oral daily  zinc sulfate 220 milliGRAM(s) Oral daily    MEDICATIONS  (PRN):  acetaminophen     Tablet .. 650 milliGRAM(s) Oral every 6 hours PRN Temp greater or equal to 38.5C (101.3F), Mild Pain (1 - 3)  metoprolol tartrate Injectable 2.5 milliGRAM(s) IV Push every 6 hours PRN HR >110  morphine  - Injectable 2 milliGRAM(s) IV Push every 6 hours PRN Dyspnea    Home Medications:  acetaminophen 325 mg oral tablet: 2 tab(s) orally every 6 hours, As needed (13 Mar 2022 15:14)  ascorbic acid 500 mg oral tablet: 1 tab(s) orally once a day (13 Mar 2022 15:14)  bethanechol 50 mg oral tablet: 1 tab(s) orally 2 times a day (13 Mar 2022 15:14)  bisacodyl 10 mg rectal suppository: 1 suppository(ies) rectal once a day, As Needed if no BM from MOM at 5am next day (13 Mar 2022 15:14)  finasteride 5 mg oral tablet: 1 tab(s) orally once a day (13 Mar 2022 15:14)  gabapentin 100 mg oral capsule: 1 cap(s) orally 2 times a day (13 Mar 2022 15:43)  metoprolol succinate 25 mg oral tablet, extended release: 1 tab(s) orally once a day (13 Mar 2022 15:43)  Milk of Magnesia 8% oral suspension: 30 milliliter(s) orally once a day (at bedtime) as needed if no BM in 3 days (13 Mar 2022 15:14)  Multiple Vitamins oral tablet: 1 tab(s) orally once a day (13 Mar 2022 15:14)  tamsulosin 0.4 mg oral capsule: 1 cap(s) orally once a day (at bedtime) (13 Mar 2022 15:14)

## 2022-03-22 NOTE — CHART NOTE - NSCHARTNOTEFT_GEN_A_CORE
This SW left message for pts son Caleb to follow up and offer support. Awaiting call back from son. Pt. confused therefore, cannot complete assessment with him. GOC took place yesterday with Dr. Bella. No limits set at this time.

## 2022-03-22 NOTE — SWALLOW BEDSIDE ASSESSMENT ADULT - ASR SWALLOW RECOMMEND DIAG
DEFER MBS AS PT APPEARED CLINICALLY TOLERANT OF SUGGESTED PO TEXTURES FROM AN OROPHARYNGEAL SWALLOWING STANCE, DYSPHAGIA WITH A PROPENSITY TO FLUCTUATE IN SEVERITY GIVEN PROFILE AND CONSIDERING HIS ALTERED MENTATION.
DEFER MBS AS PT APPEARED CLINICALLY TOLERANT OF SUGGESTED PO TEXTURES FROM AN OROPHARYNGEAL SWALLOWING STANCE, DYSPHAGIA WITH A PROPENSITY TO FLUCTUATE IN SEVERITY GIVEN PROFILE AND CONSIDERING HIS ALTERED MENTATION.

## 2022-03-22 NOTE — SWALLOW BEDSIDE ASSESSMENT ADULT - SLP GENERAL OBSERVATIONS
On encounter, a loss of bulk was evident in pt's strap muscle regions. Temporal wasting was evident.  The pt is awake. He is interactive but distractible. Pt followed a limited number of 1 step verbal commands and he occasionally verbalized during communicative probes. At these limited times, his motor speech abilities were felt to be grossly functional. However, his verbalizations were often brief, variably vague/fragmented, frequently tangential and contextually inappropriate. 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
On encounter, a loss of bulk was evident in pt's strap muscle regions. Temporal wasting was evident. A Corpak was noted to be in pt's nare.  Pt is awake/interactive but distractible. Pt followed a limited number of 1 step verbal commands & periodically verbalized during communicative probes. At these limited times, his motor speech abilities were felt to be grossly functional. However, his speech integrity was variably hindered by dry mouth/fatigue. Further, verbalizations were often brief, variably vague/fragmented, frequently tangential & variably contextually inappropriate. 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

## 2022-03-22 NOTE — SWALLOW BEDSIDE ASSESSMENT ADULT - SWALLOW EVAL: CRITERIA FOR SKILLED INTERVENTION MET
DO NOT FEEL THAT ACUTE SPEECH PATHOLOGY FOLLOW UP WOULD CHANGE CLINICAL MANAGEMENT/OUTCOME WHILE PT IN ACUTE HOSPITAL SETTING. PT'S DYSPHAGIA AND COGNITIVE DYSFUNCTION ARE CHRONIC PRE-EXISTING IRREVERSIBLE CONDITIONS. FURTHER, PT WOULD NT BE A VIABLE THERAPY CANDIDATE NONETHELESS GIVEN THE CHRONICITY OF HIS DEFICITS/SEVERITY OF HIS ALTERED COGNITION. GIVEN ABOVE, THIS SERVICE WILL NOT ACTIVELY FOLLOW. RECONSULT PRN SHOULD STATUS CHANGE AND CONDITION WARRANT.
STILL DO NOT FEEL THAT ACUTE SPEECH PATHOLOGY FOLLOW UP WOULD CHANGE CLINICAL MANAGEMENT/OUTCOME WHILE PT IN ACUTE HOSPITAL SETTING. PT'S DYSPHAGIA AND COGNITIVE DYSFUNCTION ARE CHRONIC PRE-EXISTING IRREVERSIBLE CONDITIONS. FURTHER, PT WOULD NOT BE A VIABLE THERAPY CANDIDATE NONETHELESS GIVEN THE CHRONICITY OF HIS DEFICITS/SEVERITY OF HIS ALTERED COGNITION. GIVEN ABOVE, THIS SERVICE WILL NOT ACTIVELY FOLLOW. RECONSULT PRN SHOULD STATUS CHANGE AND CONDITION WARRANT.

## 2022-03-22 NOTE — PHYSICAL THERAPY INITIAL EVALUATION ADULT - MODALITIES TREATMENT COMMENTS
Pt left as rec'd supine in bed with HM, BP Cuff, Pulse Oxym, NC (3L/min), IV all intact. CBIR. Pt instructed to not get up alone. Bed alarm activated. DINESH nunez

## 2022-03-22 NOTE — PROGRESS NOTE ADULT - SUBJECTIVE AND OBJECTIVE BOX
Date of service: 03-22-22 @ 11:11    Patient nonverbal, afebrile, sitting in bed       ROS unable to obtain secondary to patient medical condition     MEDICATIONS  (STANDING):  ascorbic acid 500 milliGRAM(s) Oral two times a day  bisacodyl Suppository 10 milliGRAM(s) Rectal at bedtime  dextrose 40% Gel 15 Gram(s) Oral once  dextrose 5% with potassium chloride 20 mEq/L 1000 milliLiter(s) (70 mL/Hr) IV Continuous <Continuous>  dextrose 5%. 1000 milliLiter(s) (50 mL/Hr) IV Continuous <Continuous>  dextrose 5%. 1000 milliLiter(s) (100 mL/Hr) IV Continuous <Continuous>  dextrose 50% Injectable 25 Gram(s) IV Push once  dextrose 50% Injectable 12.5 Gram(s) IV Push once  dextrose 50% Injectable 25 Gram(s) IV Push once  diltiazem Infusion 5 mG/Hr (5 mL/Hr) IV Continuous <Continuous>  doxazosin 1 milliGRAM(s) Oral at bedtime  finasteride 5 milliGRAM(s) Oral daily  glucagon  Injectable 1 milliGRAM(s) IntraMuscular once  heparin   Injectable 5000 Unit(s) SubCutaneous every 12 hours  insulin lispro (ADMELOG) corrective regimen sliding scale   SubCutaneous every 6 hours  multivitamin/minerals 1 Tablet(s) Oral daily  piperacillin/tazobactam IVPB.. 3.375 Gram(s) IV Intermittent every 8 hours  potassium chloride  10 mEq/100 mL IVPB 10 milliEquivalent(s) IV Intermittent every 1 hour  thiamine 100 milliGRAM(s) Oral daily  zinc sulfate 220 milliGRAM(s) Oral daily    MEDICATIONS  (PRN):  acetaminophen     Tablet .. 650 milliGRAM(s) Oral every 6 hours PRN Temp greater or equal to 38.5C (101.3F), Mild Pain (1 - 3)  metoprolol tartrate Injectable 2.5 milliGRAM(s) IV Push every 6 hours PRN HR >110      Vital Signs Last 24 Hrs  T(C): 36.6 (22 Mar 2022 08:12), Max: 36.7 (21 Mar 2022 20:17)  T(F): 97.9 (22 Mar 2022 08:12), Max: 98.1 (21 Mar 2022 20:17)  HR: 107 (22 Mar 2022 08:12) (88 - 109)  BP: 151/60 (22 Mar 2022 08:12) (94/54 - 151/60)  BP(mean): --  RR: 18 (22 Mar 2022 08:12) (18 - 21)  SpO2: 93% (22 Mar 2022 08:12) (93% - 98%)        Physical Exam:            Constitutional:  No acute distress  HEENT: NC/AT, EOMI, PERRLA, conjunctivae clear; ears and nose atraumatic  Neck: supple; thyroid not palpable  Back: no tenderness  Respiratory: respiratory effort normal; few crackles at bases; rhonchi  Cardiovascular: S1S2 regular, no murmurs  Abdomen: soft, not tender, not distended, positive BS  Genitourinary: no suprapubic tenderness  Musculoskeletal: no muscle tenderness, no joint swelling or tenderness  Extremities: no pedal edema  Neurological/ Psychiatric: confused, moving all extremities  Skin: no rashes; no palpable lesions    Labs: reviewed              Labs:                        9.8    14.77 )-----------( 372      ( 22 Mar 2022 08:48 )             30.4     03-22    151<H>  |  118<H>  |  34<H>  ----------------------------<  132<H>  3.4<L>   |  30  |  0.99    Ca    8.5      22 Mar 2022 08:48  Phos  3.2     03-21  Mg     2.2     03-22    TPro  5.2<L>  /  Alb  1.6<L>  /  TBili  1.2  /  DBili  x   /  AST  29  /  ALT  32  /  AlkPhos  130<H>  03-22           Cultures:               Radiology: all available radiological tests reviewed    < from: Xray Chest 1 View-PORTABLE IMMEDIATE (Xray Chest 1 View-PORTABLE IMMEDIATE .) (03.14.22 @ 16:52) >  IMPRESSION:   Corpak enteric tube tip in fundus of stomach..  RIGHT lung perihilar/basilar and LEFT medial basilar multifocal and   diffuse ill-defined airspace opacities.  < end of copied text >    < from: CT Abdomen and Pelvis No Cont (03.13.22 @ 21:21) >  1. Multifocal areas of bilateral airspace consolidation most pronounced   in the  right lower lobe. Appearance is suggestive of multifocal pneumonia.  2. Small layering right pleural effusion.  3. Small layering stones or sludge in the gallbladder fundus.  4. Punctate nonobstructive right nephrolithiasis.  5. large rectal fecal retention with evidence of stercoral proctitis.  < end of copied text >    Advanced directives addressed: full resuscitation

## 2022-03-22 NOTE — PROGRESS NOTE ADULT - ASSESSMENT
89 y/o Male with h/o BPH, CAD, HTN, DM type II, GERD, HLD, SDH s/p craniotomy, prior UTI with VREF was admitted on 3/12 from [facility] for vomiting. As per facility, pt had large amount coffee ground emesis and persistent loose stool, dark in appearance, skin color pale and cold to the touch. Pt is on 2L O2 via nasal canula. He is a poor historian, In ED, pt found hypoxic, placed on non-rebreather. Urine reported cloudy.  He received vancomycin IV x 1 and zosyn.    1. Febrile syndrome resolving. Probable sepsis. Pyuria. UTI with PSAE. ARF. Probable aspiration pneumonia.   -leukocytosis is improving  -encephalopathy improving  -alert and confused  -BC x 2, urine c/s noted  -on zosyn 3.375 gm IV q8h # 9; will stop zosyn after dose of 3/23  -tolerating abx well so far; no side effects noted  -f/u culture  -aspiration precautions  -continue abx coverage   -monitor temps  -f/u CBC  -supportive care  2. Other issues:   -care per medicine    d/w medical team

## 2022-03-22 NOTE — SWALLOW BEDSIDE ASSESSMENT ADULT - ORAL PREPARATORY PHASE
Pt was variably confused but willingly accepted PO. Oral aperture was reduced but labial grading on utensils was grossly functional.
Pt was variably confused but willingly accepted PO. Oral aperture was reduced but labial grading on utensils was grossly functional.

## 2022-03-22 NOTE — PHYSICAL THERAPY INITIAL EVALUATION ADULT - ACTIVE RANGE OF MOTION EXAMINATION, REHAB EVAL
Except BUE Shoulder Flexion and BLE Ankle DF, Hip & Knee Flex/Ext all limited due to severe muscle atrophy and weakness/bilateral upper extremity Active ROM was WFL (within functional limits)/bilateral  lower extremity Active ROM was WFL (within functional limits)

## 2022-03-23 NOTE — PROGRESS NOTE ADULT - ASSESSMENT
Impression:  1. acute hypoxemic respiratory failure  2. PNA likely aspiration  3. septic shock  4. atrial fibrillation with RVR  5. severe protein malnutrition  6. hypernatremia  7. UTI  8. hypoglycemia    Plan:  Neuro - Sedation with propofol to facilitate vent synchrony in addition to fentanyl PRN, if cont to have ongoing dysynchrony will need to paralyze    CV -  actively titrating pressors to keep MAP>65           completing full 30cc/kg IVF challenge           if remains with escalating pressors will add vasopressin as adjunct           addition of stress steroids for CIRCI           lactate elevated will trend           Echo prior with nml LVF           currently in AF with CVR, goal to maintain <110           TSH normal           full AC with tx dose lovenox    Pulm -  full vent support with LTV 6-8cc/kg IDW, keeping plts<30             actively titrating FiO2 for sats>90%             adding utilization of PEEP for alveolar recruitment, titrated to +10 currently             CXR with dense bilateral UL infiltrates             SCx pending             not on continuous sedation and with significant vent dysynchrony, sedation added, may require paralytics if does not improve with sedatives             full vent bundle added    GI -  PPI,  Enteric feeds as tolerated, further recs as per RD    Renal - Cr stable, Na remains elevated, cont free water, ongoing IV hydration with balanced fluids, strict I/Os, repeat BMP in am    Heme -  Full AC with tx dose lovenox, no signs of active bleeding, SCDs    ID - afebrile, BCx NGTD, prior PSA UTI sp completed abx therapy, repeat u/a still with pyuria, repeat BCx/UCx/Scx pending, Empiric IV abx broad          spectrum, giving 1 dose Vanco now given his length of time hospitalized, f/u with ID, Abx adjustments/discontinuation based on discussion with          ID in conjunction with clinical features, culture data, and judicious procalcitonin monitoring.      Endo -  BS improved with tube feeds, accuchecks to continue to monitor.

## 2022-03-23 NOTE — CONSULT NOTE ADULT - CONSULT REQUESTED DATE/TIME
21-Mar-2022 17:12
14-Mar-2022 15:18
21-Mar-2022 18:18
13-Mar-2022 15:14
23-Mar-2022 11:30
21-Mar-2022 08:14

## 2022-03-23 NOTE — PROCEDURE NOTE - NSCOMPLICATION_GEN_A_CORE
Post-Anesthesia Evaluation and Assessment    Patient: Cholo Lima MRN: [id number]  SSN: [id number]    YOB: 1960  Age: 61 y.o. Sex: male      I have evaluated the patient and they are stable and ready for discharge from the PACU. Cardiovascular Function/Vital Signs  Visit Vitals  /76   Pulse 64   Temp 36.5 °C (97.7 °F)   Resp 20   Ht 6' (1.829 m)   Wt 107 kg (236 lb)   SpO2 98%   BMI 32.01 kg/m²       Patient is status post MAC anesthesia for Procedure(s):  COLONOSCOPY    :-.    Nausea/Vomiting: None    Postoperative hydration reviewed and adequate. Pain:  Pain Scale 1: Numeric (0 - 10) (08/19/20 1115)  Pain Intensity 1: 0 (08/19/20 1115)   Managed    Neurological Status: At baseline    Mental Status, Level of Consciousness: Alert and  oriented to person, place, and time    Pulmonary Status:   O2 Device: Room air (08/19/20 1115)   Adequate oxygenation and airway patent    Complications related to anesthesia: None    Post-anesthesia assessment completed. No concerns    Signed By: Rachel Langston MD     August 19, 2020              Procedure(s):  COLONOSCOPY    :-.    MAC    <BSHSIANPOST>    INITIAL Post-op Vital signs:   Vitals Value Taken Time   /76 8/19/2020 11:15 AM   Temp 36.5 °C (97.7 °F) 8/19/2020 11:05 AM   Pulse 64 8/19/2020 11:23 AM   Resp 10 8/19/2020 11:23 AM   SpO2 97 % 8/19/2020 11:23 AM   Vitals shown include unvalidated device data.
no complications
no complications

## 2022-03-23 NOTE — PROGRESS NOTE ADULT - SUBJECTIVE AND OBJECTIVE BOX
HPI:    3/23  pt on nrb, appears uncomfortable sob   case d/w team.   hcp is coming in today to meet primary team   reccomend Morphine 2mg IV M0bynhd prn w/ hold parameters    PAIN: ( )Yes   (x )No  DYSPNEA: (x ) Yes  ( ) No  Level: moderate        Review of Systems:  Unable to obtain/Limited due to:confusion        PHYSICAL EXAM:    Vital Signs Last 24 Hrs  T(C): 36.6 (23 Mar 2022 08:23), Max: 36.6 (23 Mar 2022 08:23)  T(F): 97.8 (23 Mar 2022 08:23), Max: 97.8 (23 Mar 2022 08:23)  HR: 90 (23 Mar 2022 08:23) (75 - 120)  BP: 129/98 (23 Mar 2022 08:23) (110/58 - 130/59)  BP(mean): --  RR: 20 (23 Mar 2022 08:23) (20 - 37)  SpO2: 93% (23 Mar 2022 08:23) (80% - 100%)  Daily     Daily       PPSV2:  30 %  FAST:    General: uncomfortable fatigued weak frail, dyspnea   Mental Status: A+Ox1  HEENT: EOMI   Lungs: decreased b/s tachypnea rr 32  Cardiac: s1s2  GI: soft nontedner normal bs  : voids  Ext: moves all extremities  Neuro: no gross findings      LABS:                        9.1    17.77 )-----------( 403      ( 23 Mar 2022 07:39 )             28.0     03-23    146<H>  |  116<H>  |  38<H>  ----------------------------<  234<H>  4.4   |  26  |  1.04    Ca    8.2<L>      23 Mar 2022 07:39  Mg     2.2     03-23    TPro  5.2<L>  /  Alb  1.6<L>  /  TBili  1.2  /  DBili  x   /  AST  29  /  ALT  32  /  AlkPhos  130<H>  03-22      Albumin: Albumin, Serum: 1.6 g/dL (03-22 @ 08:48)      Allergies    No Known Allergies    Intolerances      MEDICATIONS  (STANDING):  ascorbic acid 500 milliGRAM(s) Oral two times a day  bisacodyl Suppository 10 milliGRAM(s) Rectal at bedtime  dextrose 40% Gel 15 Gram(s) Oral once  dextrose 5%. 1000 milliLiter(s) (50 mL/Hr) IV Continuous <Continuous>  dextrose 5%. 1000 milliLiter(s) (100 mL/Hr) IV Continuous <Continuous>  dextrose 50% Injectable 25 Gram(s) IV Push once  dextrose 50% Injectable 12.5 Gram(s) IV Push once  dextrose 50% Injectable 25 Gram(s) IV Push once  diltiazem Infusion 5 mG/Hr (5 mL/Hr) IV Continuous <Continuous>  doxazosin 1 milliGRAM(s) Oral at bedtime  enoxaparin Injectable 50 milliGRAM(s) SubCutaneous every 12 hours  finasteride 5 milliGRAM(s) Oral daily  glucagon  Injectable 1 milliGRAM(s) IntraMuscular once  insulin lispro (ADMELOG) corrective regimen sliding scale   SubCutaneous three times a day before meals  insulin lispro (ADMELOG) corrective regimen sliding scale   SubCutaneous at bedtime  multivitamin/minerals 1 Tablet(s) Oral daily  piperacillin/tazobactam IVPB.. 3.375 Gram(s) IV Intermittent every 8 hours  thiamine 100 milliGRAM(s) Oral daily  zinc sulfate 220 milliGRAM(s) Oral daily    MEDICATIONS  (PRN):  acetaminophen     Tablet .. 650 milliGRAM(s) Oral every 6 hours PRN Temp greater or equal to 38.5C (101.3F), Mild Pain (1 - 3)  metoprolol tartrate Injectable 2.5 milliGRAM(s) IV Push every 6 hours PRN HR >110  morphine  - Injectable 2 milliGRAM(s) IV Push every 12 hours PRN Dyspnea      RADIOLOGY:

## 2022-03-23 NOTE — PROCEDURE NOTE - NSINFORMCONSENT_GEN_A_CORE
Benefits, risks, and possible complications of procedure explained to patient/caregiver who verbalized understanding and gave written consent.
from Pt and HCP, son/Benefits, risks, and possible complications of procedure explained to patient/caregiver who verbalized understanding and gave verbal consent.

## 2022-03-23 NOTE — PROCEDURE NOTE - ADDITIONAL PROCEDURE DETAILS
Dx: acute hypoxemic respiratory failure, PNA, septic shock.  Procedural time noninclusive of other E/M time.

## 2022-03-23 NOTE — PROGRESS NOTE ADULT - ASSESSMENT
89M PMH HTN, DM2 not on insulin, HLD, SDH, cachexia/severe malnutrition    Initially admitted for septic shock from multifocal pneumonia + Pseudomonas UTI and stercoral colitis    Course complicated by new onset A.fib     Weaned off pressors and transferred to floor     Persistent dysphagia with frequent aspiration, now in acute hypoxic resp failure due to multifocal aspiration pneumonia   Severe sepsis     Intubated 3/23      Plan:    Continue vent support   Settings changed to 16/400/7/75%  Send sputum cx, BCx, UA, UCx   Start empiric cefepime (recently finished a course of zosyn)    Hypoglycemia - start TF  Hypoglycemia protocol     Hypotension - due to sepsis  D/c metoprolol   IVF resuscitation   May need pressor     Continue therapeutic lovenox for A.fib     Further w/u and mx based on clinical course     Prognosis poor, patient critically ill       DVT ppx: lovenox   Nutrition: TF  Central line: no  Arterial line: no  [place]: yes, keep   Restraints: yes, to prevent pulling on medical devices   Activity: bedrest     Code status: full     Disposition: CCU     Updated: son Caleb via text [phone] (he is deaf)

## 2022-03-23 NOTE — CONSULT NOTE ADULT - CONSULT REASON
89 male who presents from a NH with coffee ground emesis, aspiration pneumonia, and a  pseudomonal UTI
New onset Afib
VRE
anticoagulation following SDH in 2020
Hypoxia  Severe sepsis
Hypoxia, WOB, resp distress

## 2022-03-23 NOTE — CONSULT NOTE ADULT - ASSESSMENT
A:    89yMale  HD #    Here for:    1.    This patient requires critical care for support of one or more vital organ systems with a high probability of imminent or life threatening deterioration in his/her condition    P:    Neuro: GCS 15. Monitor for delirium.  Continue to optimize pain control. Serial Neurologic assessments.    HEENT: No issues.    CV: Continue hemodynamic monitoring    Pulm: Pulmonary toilet.  Continue incentive spirometer.  Chest PT.  Encourage OOB to chair and ambulation. Nebs. f/u ABG, CXR.    GI/Nutrition: Cont diet, bowel regimen.    /Renal: Monitor UOP. Monitor BMP.  Replete Lytes as needed.    HEME- Chemical and mechanical DVT ppx. f/u CBC. f/u coags as needed.    ID:  Cont abx. f/u Cx's.    Lines/Tubes:     Endo: Maintain euglycemia.    Skin:  Cont skin care, pressure ulcer prevention.    Dispo: Cont critical care.    TOTAL CRITICAL CARE TIME:   (EXCLUSIVE of any non bundled procedures)    Note: This time spent INCLUDES time spent directly as this patient's bedside with evaluation, review of chart including review of laboratory and imaging studies, interpretation of vital signs and cardiac output measurements, any necessary ventilator management, and time spent discussing plan of care with patient and family, including goals of care discussion.   A:    89M  HD # 11  FULL CODE    Here for:    1. Acute hypoxic resp failure 2/2  2. Aspiration PNA  3. Severe sepsis 2/2 above  4. Chronic malnutrition  5. TY, new onset    This patient requires critical care for support of one or more vital organ systems with a high probability of imminent or life threatening deterioration in his/her condition    P:    Pt in impending resp arrest.  Pt and Pt son agree with intubation, mechanical ventilation.     Analgosedation, goal RASS 0 to -2. Daily sedation vacations.  HD monitoring.  Intubated; GL, 1st pass, see procedure note. Initial settings VAC 14/400/5/.40, f/u ABG, CXR. Titrate to optimize barodynamics, ventilation and oxygenation.  Broad spectrum abx zosyn, source lungs.  Obtain sputum Cx.  NPO, NGT to suction. Start TF's.  VTE ppx.  No s/s active bleeding.  PIV x 3, may require CVC, maddie.  Trend labs, replete lytes PRN.  Place dawson for I/O's.  IVF.    Dispo: Accept to critical care.    Pt son HCP texted (deaf) and made aware of change in condition and plan.  Pt asked verbally at bedside, agreed to intubation; this was collaborated with multiple staff members.       TOTAL CRITICAL CARE TIME:   (EXCLUSIVE of any non bundled procedures)    Note: This time spent INCLUDES time spent directly as this patient's bedside with evaluation, review of chart including review of laboratory and imaging studies, interpretation of vital signs and cardiac output measurements, any necessary ventilator management, and time spent discussing plan of care with patient and family, including goals of care discussion.   A:    89M  HD # 11  FULL CODE    Here for:    1. Acute hypoxic resp failure 2/2  2. Aspiration PNA  3. Severe sepsis 2/2 above  4. Chronic malnutrition  5. TY, new onset    This patient requires critical care for support of one or more vital organ systems with a high probability of imminent or life threatening deterioration in his/her condition    P:    Pt in impending resp arrest.  Pt and Pt son agree with intubation, mechanical ventilation.     Analgosedation, goal RASS 0 to -2. Daily sedation vacations.  HD monitoring.  Intubated; GL, 1st pass, see procedure note. Initial settings VAC 14/400/5/.40, f/u ABG, CXR. Titrate to optimize barodynamics, ventilation and oxygenation.  Broad spectrum abx zosyn, source lungs.  Obtain sputum Cx.  NPO, NGT to suction. Start TF's.  VTE ppx.  No s/s active bleeding.  PIV x 3, may require CVC, maddie.  Trend labs, replete lytes PRN.  Place dawson for I/O's.  IVF.    Dispo: Accept to critical care.    Pt son HCP texted (deaf) and made aware of change in condition and plan.  Pt asked verbally at bedside, agreed to intubation; this was collaborated with multiple staff members.       TOTAL CRITICAL CARE TIME: 50 minutes  (EXCLUSIVE of any non bundled procedures)    Note: This time spent INCLUDES time spent directly as this patient's bedside with evaluation, review of chart including review of laboratory and imaging studies, interpretation of vital signs and cardiac output measurements, any necessary ventilator management, and time spent discussing plan of care with patient and family, including goals of care discussion.

## 2022-03-23 NOTE — PROGRESS NOTE ADULT - ASSESSMENT
Assessment:   89 yo M w PMH of BPH, CAD, HTN, DMII, GERD, HLD, SDH s/p craniotomy and surgical removal is brought into HH from [facility] for vomiting on 3/13, he was admitted to the ICU with septic shock, hypoxic respiratory failure, cystitis, GI bleed, and elevated troponins.   Process of Care  --Reviewed dx/treatment problems and alignment with Goals of Care    Physical Aspects of Care  --Pain  patient denies at this time  c/w current managment    --Bowel Regimen  denies constipation  risk for constipation d/t immobility  daily dulcolax    --Dyspnea  reccomend  IV Morphine 2mg B2azprx PRN  w/ holding parameters as IV will last at best 3 hours.   pt symptoms not well managed, but concern d/t clinical frailty and not being comfortable.  would try to safely maximize patients comfort while still receiving aggressive disease oriented treatment.       --Nausea Vomiting  denies    --Weakness  PT as tolerated     Psychological and Psychiatric Aspects of Care:   --Greif/ Bereavement emotional support provided  --Hx of psychiatric dx: none  -Pastoral Care Available PRN     Social Aspects of Care  -SW involved     Cultural Aspects  -Primary Language: English    Goals of Care:     We discussed Palliative Care team being a supportive team when a patient has ongoing illnesses.  We also discussed that it is not an end of life care service, but can help navigate symptoms and emotional support throughout their hospital stay here.     3/23  no changes in GoC  Morphine 2mg IV Q0arkpy w/ parameters discussed w/ team  c/w current management  pt family coming to bedside to meet primary team.       Prognosis: Death can occur at anytime, but if disease continues to progress naturally patient likely has days to weeks.    Ethical and Legal Aspects:   NA    Capacity-pt defers decisions to son, at this time very weak and unable to hold full discussion  HCP/Surrogate: Caleb Haywood Status- FULLL CODE  MOLST- na  Dispo Plan- dc back to snf once stable    Discussed With:    Case coordinated with attending and SW and RN     Time Spent: 45 minutes including the care, coordination and counseling of this patient, 50% of which was spent coordinating and counseling.

## 2022-03-23 NOTE — PROGRESS NOTE ADULT - SUBJECTIVE AND OBJECTIVE BOX
The patient was seen and examined.  No acute events overnight.  HR stable on cardizem ggt.    Initial Consult HPI  ________________________________  KEITH LEVY is a 89y year old Male with a past medical history of CAD status post PCI, hypertension, diabetes, GERD, history of subdural hematoma status post craniotomy, who [facility] for [facility] vomiting.    He has been admitted for septic shock due to aspiration pneumonia and UTI.  Cardiology consulted for new onset A. fib with RVR.    Currently requiring nonrebreather.  Heart rate into the 130s.  History difficult to obtain due to encephalopathy.  ________________________________  Review of systems: Cannot obtain  PAST MEDICAL & SURGICAL HISTORY:  CAD (coronary artery disease)    HTN (hypertension)    HLD (hyperlipidemia)    DM (diabetes mellitus)    BPH (benign prostatic hyperplasia)    GERD (gastroesophageal reflux disease)    SDH (subdural hematoma)      FAMILY HISTORY:  Cannot obtain    SOCIAL HISTORY: Cannot obtain    ALLERGIES:  No Known Allergies    Home Medications:  acetaminophen 325 mg oral tablet: 2 tab(s) orally every 6 hours, As needed (13 Mar 2022 15:14)  ascorbic acid 500 mg oral tablet: 1 tab(s) orally once a day (13 Mar 2022 15:14)  bethanechol 50 mg oral tablet: 1 tab(s) orally 2 times a day (13 Mar 2022 15:14)  bisacodyl 10 mg rectal suppository: 1 suppository(ies) rectal once a day, As Needed if no BM from MOM at 5am next day (13 Mar 2022 15:14)  finasteride 5 mg oral tablet: 1 tab(s) orally once a day (13 Mar 2022 15:14)  gabapentin 100 mg oral capsule: 1 cap(s) orally 2 times a day (13 Mar 2022 15:43)  metoprolol succinate 25 mg oral tablet, extended release: 1 tab(s) orally once a day (13 Mar 2022 15:43)  Milk of Magnesia 8% oral suspension: 30 milliliter(s) orally once a day (at bedtime) as neded if no BM in 3 days (13 Mar 2022 15:14)  Multiple Vitamins oral tablet: 1 tab(s) orally once a day (13 Mar 2022 15:14)  tamsulosin 0.4 mg oral capsule: 1 cap(s) orally once a day (at bedtime) (13 Mar 2022 15:14)      ________________________________  GENERAL APPEARANCE:  No acute distress  HEAD: normocephalic, atraumatic  NECK: supple, no jugular venous distention, no carotid bruit  HEART: Irregularly irregular, S1, S2 normal, 1/6 murmur  CHEST:  No anterior chest wall tenderness  LUNGS: Coarse  ABDOMEN: soft, nontender, nondistended, with positive bowel sounds appreciated  EXTREMITIES: no edema.   NEURO: Lethargic  PSYC:  Normal affect  SKIN:  Dry  ________________________________   TELEMETRY: A. fib with RVR     ECG: Initial EKG showed sinus rhythm at 74 bpm    LABS:                                     9.8    14.77 )-----------( 372      ( 22 Mar 2022 08:48 )             30.4          03-22    151<H>  |  118<H>  |  34<H>  ----------------------------<  132<H>  3.4<L>   |  30  |  0.99    Ca    8.5      22 Mar 2022 08:48  Phos  3.2     03-21  Mg     2.2     03-22    TPro  5.2<L>  /  Alb  1.6<L>  /  TBili  1.2  /  DBili  x   /  AST  29  /  ALT  32  /  AlkPhos  130<H>  03-22    ________________________________    RADIOLOGY & ADDITIONAL STUDIES:  IMPRESSION:  Moderate bilateral interstitial infiltrates, increased in the right lower   lobe.    NG tube removed.    Left pleural effusion/adjacent atelectasis, grossly unchanged    Echocardiogram 3/14/2022     Fibrocalcific changes noted to the mitral valve leaflets with preserved   leaflet excursion.   Mild mitral annular calcification is present.   Mild (1+) mitral regurgitation is present.   EA reversal of the mitral inflow consistent with reduced compliance of   the   left ventricle.   Normal appearing tricuspid valve structure.   Moderate (2+) tricuspid valve regurgitation is present.   The left atrium is mildly dilated.   Estimated left ventricular ejection fraction is 55-60 %.   The left ventricle has normal wall motion and contractility as seen in   limited views.  Mild concentric left ventricular hypertrophy is present.   IVC was not seen within the subcostal view.   Aortic root is within the upper limits of normal (4.0 cm).    ________________________________    ASSESSMENT:  Atrial fibrillation with RVR - CHADSVASc 5  History of CAD  Hypertension  Diabetes  Subdural hematoma status post craniotomy in 2020  Hypoxic respiratory failure    PLAN:  In summary, this is a 89y Male with a past medical history of craniotomy following subdural hematoma admitted for septic shock.    Cardiology consulted for A. fib with RVR.    His heart rate appears improved.  Continue with Cardizem drip until able to take pills.  Back on non rebreather this AM.    _________  Then intubated for aspiration.  Cont cardizem  Cont AC  Poor overall prognosis.      __________________________________________________________________________  Thank you for allowing me to participate in the care of your patient. Please contact me should any questions arise.    RICHIE Cleary DO, FACC  Office: [phone]

## 2022-03-23 NOTE — PROGRESS NOTE ADULT - SUBJECTIVE AND OBJECTIVE BOX
Patient is a 89y old  Male who presents with a chief complaint of Coffee ground emesis (23 Mar 2022 15:46)    24 hour events: persistent aspiration, severe resp distress, transferred to CCU and intubated     PAST MEDICAL & SURGICAL HISTORY:  CAD (coronary artery disease)    HTN (hypertension)    HLD (hyperlipidemia)    DM (diabetes mellitus)    BPH (benign prostatic hyperplasia)    GERD (gastroesophageal reflux disease)    SDH (subdural hematoma)        Allergies    No Known Allergies    Intolerances      REVIEW OF SYSTEMS: SEE BELOW       ICU Vital Signs Last 24 Hrs  T(C): 36.2 (23 Mar 2022 13:50), Max: 36.6 (23 Mar 2022 08:23)  T(F): 97.1 (23 Mar 2022 13:50), Max: 97.8 (23 Mar 2022 08:23)  HR: 95 (23 Mar 2022 16:00) (75 - 98)  BP: 89/41 (23 Mar 2022 16:00) (89/41 - 129/98)  BP(mean): 53 (23 Mar 2022 16:00) (53 - 84)  ABP: --  ABP(mean): --  RR: 37 (23 Mar 2022 16:00) (20 - 41)  SpO2: 96% (23 Mar 2022 16:00) (80% - 100%)      CAPILLARY BLOOD GLUCOSE      POCT Blood Glucose.: 57 mg/dL (23 Mar 2022 16:37)  POCT Blood Glucose.: 53 mg/dL (23 Mar 2022 16:35)  POCT Blood Glucose.: 154 mg/dL (23 Mar 2022 11:51)  POCT Blood Glucose.: 237 mg/dL (23 Mar 2022 07:49)  POCT Blood Glucose.: 131 mg/dL (22 Mar 2022 21:19)      I&O's Summary    22 Mar 2022 07:01  -  23 Mar 2022 07:00  --------------------------------------------------------  IN: 1060 mL / OUT: 0 mL / NET: 1060 mL    23 Mar 2022 07:01  -  23 Mar 2022 16:49  --------------------------------------------------------  IN: 667.5 mL / OUT: 0 mL / NET: 667.5 mL        Mode: AC/ CMV (Assist Control/ Continuous Mandatory Ventilation)  RR (machine): 16  TV (machine): 400  FiO2: 80  PEEP: 7  ITime: 1  PIP: 20      MEDICATIONS  (STANDING):  ascorbic acid 500 milliGRAM(s) Oral two times a day  bisacodyl Suppository 10 milliGRAM(s) Rectal at bedtime  chlorhexidine 0.12% Liquid 15 milliLiter(s) Oral Mucosa every 12 hours  dextrose 40% Gel 15 Gram(s) Oral once  dextrose 5% + lactated ringers. 1000 milliLiter(s) (100 mL/Hr) IV Continuous <Continuous>  dextrose 5%. 1000 milliLiter(s) (50 mL/Hr) IV Continuous <Continuous>  dextrose 5%. 1000 milliLiter(s) (100 mL/Hr) IV Continuous <Continuous>  dextrose 50% Injectable 25 Gram(s) IV Push once  dextrose 50% Injectable 12.5 Gram(s) IV Push once  dextrose 50% Injectable 25 Gram(s) IV Push once  dextrose 50% Injectable 50 milliLiter(s) IV Push once  doxazosin 1 milliGRAM(s) Oral at bedtime  enoxaparin Injectable 50 milliGRAM(s) SubCutaneous every 12 hours  finasteride 5 milliGRAM(s) Oral daily  glucagon  Injectable 1 milliGRAM(s) IntraMuscular once  insulin lispro (ADMELOG) corrective regimen sliding scale   SubCutaneous every 6 hours  multivitamin/minerals 1 Tablet(s) Oral daily  thiamine 100 milliGRAM(s) Oral daily  zinc sulfate 220 milliGRAM(s) Oral daily      MEDICATIONS  (PRN):  acetaminophen     Tablet .. 650 milliGRAM(s) Oral every 6 hours PRN Temp greater or equal to 38.5C (101.3F), Mild Pain (1 - 3)  midazolam Injectable 1 milliGRAM(s) IV Push every 1 hour PRN agitation  morphine  - Injectable 2 milliGRAM(s) IV Push every 12 hours PRN Dyspnea      PHYSICAL EXAM: SEE BELOW                          9.1    17.77 )-----------( 403      ( 23 Mar 2022 07:39 )             28.0       03-23    146<H>  |  116<H>  |  38<H>  ----------------------------<  234<H>  4.4   |  26  |  1.04    Ca    8.2<L>      23 Mar 2022 07:39  Mg     2.2         TPro  5.2<L>  /  Alb  1.6<L>  /  TBili  1.2  /  DBili  x   /  AST  29  /  ALT  32  /  AlkPhos  130<H>  03            Urinalysis Basic - ( 23 Mar 2022 15:20 )    Color: Michelle / Appearance: very cloudy / S.020 / pH: x  Gluc: x / Ketone: Negative  / Bili: Negative / Urobili: Negative   Blood: x / Protein: 15 / Nitrite: Negative   Leuk Esterase: Moderate / RBC: 3-5 /HPF / WBC 26-50   Sq Epi: x / Non Sq Epi: Occasional / Bacteria: Moderate

## 2022-03-23 NOTE — PROGRESS NOTE ADULT - SUBJECTIVE AND OBJECTIVE BOX
Chief Complaint: coffee ground emesis    Hpi: 89 yo M w PMH of BPH, CAD, HTN, DMII, GERD, HLD, SDH s/p craniotomy and surgical removal is brought into HH from [facility] for vomiting. As per facility, pt had large amount coffee ground emesis and persistent loose stool, dark in appearance, skin color pale and cold to the touch. Pt is on 2L O2 via nasal canula. Unable to give hx. At bedside, pt states he feels cold, denies abd pain, N/V. In ED, pt fount to be hypoxic, placed on non-rebreather. Leukocytosis of 14, Cr 1.6, Trop 202, lactate 8.5, Ph 5.4. U/A is very cloudy, w mod LE, blood & bacteria. CXR demonstrates lower lobe infiltrates. Given Vanc & Zosyn in ED, given 2L NS (30 cc/kg), dawson was placed, blood & urine cx were obtained. Awaiting Head, Abd CT.   (13 Mar 2022 15:18)    3/21/22: Patient seen and examined this AM. Worsening tachypnea and hypoxia, ill appearing cachectic male. Placed on NRB. Repeat CXR. ABG. Lasix x1. Noted with Afib RVR up to 130s, IV Cardizem and monitor BPs. D/w pall team this AM, no limitations of care, will place GI consult for PEG tube eval. Also on AM labs with hypernatremia, started on D5 given the fact the Corpack has been removed by patient x2. Prognosis guarded. Limited ROS given baseline cognitive impairment/current clinical condition. From observation very deconditioned.    3/22/22: Patient seen and examined this AM. still w/ persistent tachypnea and hypoxia, ill appearing cachectic male. speech re-eval this Am. restricted diet started. d/w son risks/benefits of AC with afib.    3/23/22: ICU contacted for worsening respiratory hypoxia/distress. Patient aspirated on pudding, nasal suction provided. now on NRB stats low 90s. Likely also micro aspirating on secretions. Unable to get in touch with son this AM. Plan to transfer to CCU. Ill and frail appearing. poor prognosis.       Physical Exam:  VS reviewed. NRB. SBP stable.    Constitutional: Awake, ill appearing, cachectic, one/two word responses  HEENT: dry MM  Neck: Soft and supple   Respiratory: Breath sounds are rhonchi /crackles b/l  Cardiovascular: S1 and S2, IR IR  Gastrointestinal: Bowel Sounds present, soft  Extremities: No peripheral edema  Vascular: 2+ peripheral pulses  Neurological: Unable to assess, not responding/cooperating with commands. awake. one/two word responses  Musculoskeletal: unable to assess  Skin: No rashes    Labs:                      9.8    14.77 )-----------( 372      ( 22 Mar 2022 08:48 )             30.4     03-22    151<H>  |  118<H>  |  34<H>  ----------------------------<  132<H>  3.4<L>   |  30  |  0.99    Ca    8.5      22 Mar 2022 08:48  Phos  3.2     03-21  Mg     2.2     03-22    TPro  5.2<L>  /  Alb  1.6<L>  /  TBili  1.2  /  DBili  x   /  AST  29  /  ALT  32  /  AlkPhos  130<H>  03-22    Lactate 8.5 -- 8.0 --3.4 --3.2 -- 2.4 -- 1.1    Lipase 20    A1c 5.9    CK 2000 -- 1058 -- 260    Micro:  3/13 (+) UA     3/13 Urine Culture: >100,000 CFUs Pseudomonas aeruginosa     3/13 Blood Culture NGTD x2    COVID-19 PCR: NotDetec (20 Mar 2022 10:20)    SARS-CoV-2: NotDetec (13 Mar 2022 13:14)    Radiology:    3/15/22:  Xray Chest 1 View: Patchy perihilar infiltrates, right greater than left, grossly unchanged. Mild pulmonary venous congestion, new    3/14/22: Xray Chest 1 View: RIGHT lung perihilar/basilar and LEFT medial basilar multifocal and diffuse ill-defined airspace opacities.    3/13/22: CT Abdomen and Pelvis No Cont: 1. Multifocal areas of bilateral airspace consolidation most pronounced in the right lower lobe. Appearance is suggestive of multifocal pneumonia.  2. Small layering right pleural effusion. 3. Small layering stones or sludge in the gallbladder fundus. 4. Punctate nonobstructive right nephrolithiasis. 5. Large rectal fecal retention with evidence of stercoral proctitis.    3/13/22: CT Head No Cont: Mild anterior periventricular white matter ischemia with old lacunar infarction in the RIGHT corona radiata. Moderate to severe atrophy in the BILATERAL temporal lobes.    3/13/22: Xray Chest 1 View: Recommend correlation for lower lobe interstitial infiltrates    Cardiac Testing:    Trops 202.16 -- 232.63 -- 1021.39 -- 907.26 -- 463.86    3/14/22: ECHO: Fibrocalcific changes noted to the mitral valve leaflets with preserved leaflet excursion. Mild mitral annular calcification is present. Mild (1+) mitral regurgitation is present. EA reversal of the mitral inflow consistent with reduced compliance of the left ventricle. Normal appearing tricuspid valve structure. Moderate (2+) tricuspid valve regurgitation is present. The left atrium is mildly dilated. Estimated left ventricular ejection fraction is 55-60 %. The left ventricle has normal wall motion and contractility as seen in limited views. Mild concentric left ventricular hypertrophy is present. IVC was not seen within the subcostal view. Aortic root is within the upper limits of normal (4.0 cm).    3/14/22: ECG: Diagnosis Line Sinus rhythm with 1st degree A-V block Left axis deviation Right bundle branch block Abnormal ECG    3/13/22: ECG: Diagnosis Line Atrial fibrillation with premature ventricular or aberrantly conducted complexes Right bundle branch block Anterior infarct , age undetermined Abnormal ECG    Medications:  ascorbic acid 500 milliGRAM(s) Oral two times a day  bisacodyl Suppository 10 milliGRAM(s) Rectal at bedtime  dextrose 40% Gel 15 Gram(s) Oral once  dextrose 5% with potassium chloride 20 mEq/L 1000 milliLiter(s) (70 mL/Hr) IV Continuous <Continuous>  dextrose 5%. 1000 milliLiter(s) (50 mL/Hr) IV Continuous <Continuous>  dextrose 5%. 1000 milliLiter(s) (100 mL/Hr) IV Continuous <Continuous>  dextrose 50% Injectable 25 Gram(s) IV Push once  dextrose 50% Injectable 12.5 Gram(s) IV Push once  dextrose 50% Injectable 25 Gram(s) IV Push once  diltiazem Infusion 5 mG/Hr (5 mL/Hr) IV Continuous <Continuous>  doxazosin 1 milliGRAM(s) Oral at bedtime  finasteride 5 milliGRAM(s) Oral daily  glucagon  Injectable 1 milliGRAM(s) IntraMuscular once  heparin   Injectable 5000 Unit(s) SubCutaneous every 12 hours  insulin lispro (ADMELOG) corrective regimen sliding scale   SubCutaneous every 6 hours  multivitamin/minerals 1 Tablet(s) Oral daily  piperacillin/tazobactam IVPB.. 3.375 Gram(s) IV Intermittent every 8 hours  thiamine 100 milliGRAM(s) Oral daily  zinc sulfate 220 milliGRAM(s) Oral daily    MEDICATIONS  (PRN):  acetaminophen     Tablet .. 650 milliGRAM(s) Oral every 6 hours PRN Temp greater or equal to 38.5C (101.3F), Mild Pain (1 - 3)  metoprolol tartrate Injectable 2.5 milliGRAM(s) IV Push every 6 hours PRN HR >110  morphine  - Injectable 2 milliGRAM(s) IV Push every 6 hours PRN Dyspnea    Home Medications:  acetaminophen 325 mg oral tablet: 2 tab(s) orally every 6 hours, As needed (13 Mar 2022 15:14)  ascorbic acid 500 mg oral tablet: 1 tab(s) orally once a day (13 Mar 2022 15:14)  bethanechol 50 mg oral tablet: 1 tab(s) orally 2 times a day (13 Mar 2022 15:14)  bisacodyl 10 mg rectal suppository: 1 suppository(ies) rectal once a day, As Needed if no BM from MOM at 5am next day (13 Mar 2022 15:14)  finasteride 5 mg oral tablet: 1 tab(s) orally once a day (13 Mar 2022 15:14)  gabapentin 100 mg oral capsule: 1 cap(s) orally 2 times a day (13 Mar 2022 15:43)  metoprolol succinate 25 mg oral tablet, extended release: 1 tab(s) orally once a day (13 Mar 2022 15:43)  Milk of Magnesia 8% oral suspension: 30 milliliter(s) orally once a day (at bedtime) as needed if no BM in 3 days (13 Mar 2022 15:14)  Multiple Vitamins oral tablet: 1 tab(s) orally once a day (13 Mar 2022 15:14)  tamsulosin 0.4 mg oral capsule: 1 cap(s) orally once a day (at bedtime) (13 Mar 2022 15:14)   Chief Complaint: coffee ground emesis    Hpi: 89 yo M w PMH of BPH, CAD, HTN, DMII, GERD, HLD, SDH s/p craniotomy and surgical removal is brought into HH from [facility] for vomiting. As per facility, pt had large amount coffee ground emesis and persistent loose stool, dark in appearance, skin color pale and cold to the touch. Pt is on 2L O2 via nasal canula. Unable to give hx. At bedside, pt states he feels cold, denies abd pain, N/V. In ED, pt fount to be hypoxic, placed on non-rebreather. Leukocytosis of 14, Cr 1.6, Trop 202, lactate 8.5, Ph 5.4. U/A is very cloudy, w mod LE, blood & bacteria. CXR demonstrates lower lobe infiltrates. Given Vanc & Zosyn in ED, given 2L NS (30 cc/kg), dawson was placed, blood & urine cx were obtained. Awaiting Head, Abd CT.   (13 Mar 2022 15:18)    3/21/22: Patient seen and examined this AM. Worsening tachypnea and hypoxia, ill appearing cachectic male. Placed on NRB. Repeat CXR. ABG. Lasix x1. Noted with Afib RVR up to 130s, IV Cardizem and monitor BPs. D/w pall team this AM, no limitations of care, will place GI consult for PEG tube eval. Also on AM labs with hypernatremia, started on D5 given the fact the Corpack has been removed by patient x2. Prognosis guarded. Limited ROS given baseline cognitive impairment/current clinical condition. From observation very deconditioned.    3/22/22: Patient seen and examined this AM. still w/ persistent tachypnea and hypoxia, ill appearing cachectic male. speech re-eval this Am. restricted diet started. d/w son risks/benefits of AC with afib.    3/23/22: ICU contacted for worsening respiratory hypoxia/distress. Patient aspirated on pudding, nasal suction provided. now on NRB stats low 90s. Likely also micro aspirating on secretions. Unable to get in touch with son this AM. Plan to transfer to CCU. Ill and frail appearing. poor prognosis.       Physical Exam:  T(C): 36.6 (23 Mar 2022 08:23), Max: 36.6 (23 Mar 2022 08:23)  T(F): 97.8 (23 Mar 2022 08:23), Max: 97.8 (23 Mar 2022 08:23)  HR: 90 (23 Mar 2022 08:23) (75 - 120)  BP: 129/98 (23 Mar 2022 08:23) (110/58 - 130/59)  RR: 20 (23 Mar 2022 08:23) (20 - 37)  SpO2: 93% (23 Mar 2022 08:23) (80% - 100%)    Constitutional: Awake, ill appearing, cachectic, one/two word responses  HEENT: dry MM  Neck: Soft and supple   Respiratory: Breath sounds are rhonchi /crackles b/l  Cardiovascular: S1 and S2, IR IR  Gastrointestinal: Bowel Sounds present, soft  Extremities: No peripheral edema  Vascular: 2+ peripheral pulses  Neurological: Unable to assess, not responding/cooperating with commands. awake. one/two word responses  Musculoskeletal: unable to assess  Skin: No rashes    Labs:                                 9.1    17.77 )-----------( 403      ( 23 Mar 2022 07:39 )             28.0     03-23    146<H>  |  116<H>  |  38<H>  ----------------------------<  234<H>  4.4   |  26  |  1.04    Ca    8.2<L>      23 Mar 2022 07:39  Mg     2.2     03-23    TPro  5.2<L>  /  Alb  1.6<L>  /  TBili  1.2  /  DBili  x   /  AST  29  /  ALT  32  /  AlkPhos  130<H>  03-22    Lactate 8.5 -- 8.0 --3.4 --3.2 -- 2.4 -- 1.1    Lipase 20    A1c 5.9    CK 2000 -- 1058 -- 260    Micro:  3/13 (+) UA     3/13 Urine Culture: >100,000 CFUs Pseudomonas aeruginosa     3/13 Blood Culture NGTD x2    COVID-19 PCR: NotDetec (20 Mar 2022 10:20)    SARS-CoV-2: NotDetec (13 Mar 2022 13:14)    Radiology:    3/15/22:  Xray Chest 1 View: Patchy perihilar infiltrates, right greater than left, grossly unchanged. Mild pulmonary venous congestion, new    3/14/22: Xray Chest 1 View: RIGHT lung perihilar/basilar and LEFT medial basilar multifocal and diffuse ill-defined airspace opacities.    3/13/22: CT Abdomen and Pelvis No Cont: 1. Multifocal areas of bilateral airspace consolidation most pronounced in the right lower lobe. Appearance is suggestive of multifocal pneumonia.  2. Small layering right pleural effusion. 3. Small layering stones or sludge in the gallbladder fundus. 4. Punctate nonobstructive right nephrolithiasis. 5. Large rectal fecal retention with evidence of stercoral proctitis.    3/13/22: CT Head No Cont: Mild anterior periventricular white matter ischemia with old lacunar infarction in the RIGHT corona radiata. Moderate to severe atrophy in the BILATERAL temporal lobes.    3/13/22: Xray Chest 1 View: Recommend correlation for lower lobe interstitial infiltrates    Cardiac Testing:    Trops 202.16 -- 232.63 -- 1021.39 -- 907.26 -- 463.86    3/14/22: ECHO: Fibrocalcific changes noted to the mitral valve leaflets with preserved leaflet excursion. Mild mitral annular calcification is present. Mild (1+) mitral regurgitation is present. EA reversal of the mitral inflow consistent with reduced compliance of the left ventricle. Normal appearing tricuspid valve structure. Moderate (2+) tricuspid valve regurgitation is present. The left atrium is mildly dilated. Estimated left ventricular ejection fraction is 55-60 %. The left ventricle has normal wall motion and contractility as seen in limited views. Mild concentric left ventricular hypertrophy is present. IVC was not seen within the subcostal view. Aortic root is within the upper limits of normal (4.0 cm).    3/14/22: ECG: Diagnosis Line Sinus rhythm with 1st degree A-V block Left axis deviation Right bundle branch block Abnormal ECG    3/13/22: ECG: Diagnosis Line Atrial fibrillation with premature ventricular or aberrantly conducted complexes Right bundle branch block Anterior infarct , age undetermined Abnormal ECG    Medications:                      9.1    17.77 )-----------( 403      ( 23 Mar 2022 07:39 )             28.0     03-23    146<H>  |  116<H>  |  38<H>  ----------------------------<  234<H>  4.4   |  26  |  1.04    Ca    8.2<L>      23 Mar 2022 07:39  Mg     2.2     03-23    TPro  5.2<L>  /  Alb  1.6<L>  /  TBili  1.2  /  DBili  x   /  AST  29  /  ALT  32  /  AlkPhos  130<H>  03-22    Home Medications:  acetaminophen 325 mg oral tablet: 2 tab(s) orally every 6 hours, As needed (13 Mar 2022 15:14)  ascorbic acid 500 mg oral tablet: 1 tab(s) orally once a day (13 Mar 2022 15:14)  bethanechol 50 mg oral tablet: 1 tab(s) orally 2 times a day (13 Mar 2022 15:14)  bisacodyl 10 mg rectal suppository: 1 suppository(ies) rectal once a day, As Needed if no BM from MOM at 5am next day (13 Mar 2022 15:14)  finasteride 5 mg oral tablet: 1 tab(s) orally once a day (13 Mar 2022 15:14)  gabapentin 100 mg oral capsule: 1 cap(s) orally 2 times a day (13 Mar 2022 15:43)  metoprolol succinate 25 mg oral tablet, extended release: 1 tab(s) orally once a day (13 Mar 2022 15:43)  Milk of Magnesia 8% oral suspension: 30 milliliter(s) orally once a day (at bedtime) as needed if no BM in 3 days (13 Mar 2022 15:14)  Multiple Vitamins oral tablet: 1 tab(s) orally once a day (13 Mar 2022 15:14)  tamsulosin 0.4 mg oral capsule: 1 cap(s) orally once a day (at bedtime) (13 Mar 2022 15:14)                                                                                                   Hpi: 89 yo M w PMH of BPH, CAD, HTN, DMII, GERD, HLD, SDH s/p craniotomy and surgical removal is brought into HH from [facility] for vomiting. As per facility, pt had large amount coffee ground emesis and persistent loose stool, dark in appearance, skin color pale and cold to the touch. Pt is on 2L O2 via nasal canula. Unable to give hx. At bedside, pt states he feels cold, denies abd pain, N/V. In ED, pt fount to be hypoxic, placed on non-rebreather. Leukocytosis of 14, Cr 1.6, Trop 202, lactate 8.5, Ph 5.4. U/A is very cloudy, w mod LE, blood & bacteria. CXR demonstrates lower lobe infiltrates. Given Vanc & Zosyn in ED, given 2L NS (30 cc/kg), dawson was placed, blood & urine cx were obtained. Awaiting Head, Abd CT.   (13 Mar 2022 15:18)    3/21/22: Patient seen and examined this AM. Worsening tachypnea and hypoxia, ill appearing cachectic male. Placed on NRB. Repeat CXR. ABG. Lasix x1. Noted with Afib RVR up to 130s, IV Cardizem and monitor BPs. D/w pall team this AM, no limitations of care, will place GI consult for PEG tube eval. Also on AM labs with hypernatremia, started on D5 given the fact the Corpack has been removed by patient x2. Prognosis guarded. Limited ROS given baseline cognitive impairment/current clinical condition. From observation very deconditioned.    3/22/22: Patient seen and examined this AM. still w/ persistent tachypnea and hypoxia, ill appearing cachectic male. speech re-eval this Am. restricted diet started. d/w son risks/benefits of AC with afib.    3/23/22: ICU contacted for worsening respiratory hypoxia/distress. Patient aspirated on pudding, nasal suction provided. now on NRB stats low 90s. Likely also micro aspirating on secretions. Unable to get in touch with son this AM. Plan to transfer to CCU. Ill and frail appearing. poor prognosis.       Physical Exam:  T(C): 36.6 (23 Mar 2022 08:23), Max: 36.6 (23 Mar 2022 08:23)  T(F): 97.8 (23 Mar 2022 08:23), Max: 97.8 (23 Mar 2022 08:23)  HR: 90 (23 Mar 2022 08:23) (75 - 120)  BP: 129/98 (23 Mar 2022 08:23) (110/58 - 130/59)  RR: 20 (23 Mar 2022 08:23) (20 - 37)  SpO2: 93% (23 Mar 2022 08:23) (80% - 100%)    Constitutional: Awake, ill appearing, cachectic, one/two word responses  HEENT: dry MM  Neck: Soft and supple   Respiratory: Breath sounds are rhonchi /crackles b/l  Cardiovascular: S1 and S2, IR IR  Gastrointestinal: Bowel Sounds present, soft  Extremities: No peripheral edema  Vascular: 2+ peripheral pulses  Neurological: Unable to assess, not responding/cooperating with commands. awake. one/two word responses  Musculoskeletal: unable to assess  Skin: No rashes    Labs:                                 9.1    17.77 )-----------( 403      ( 23 Mar 2022 07:39 )             28.0     03-23    146<H>  |  116<H>  |  38<H>  ----------------------------<  234<H>  4.4   |  26  |  1.04    Ca    8.2<L>      23 Mar 2022 07:39  Mg     2.2     03-23    TPro  5.2<L>  /  Alb  1.6<L>  /  TBili  1.2  /  DBili  x   /  AST  29  /  ALT  32  /  AlkPhos  130<H>  03-22    Lactate 8.5 -- 8.0 --3.4 --3.2 -- 2.4 -- 1.1    Lipase 20    A1c 5.9    CK 2000 -- 1058 -- 260    Micro:  3/13 (+) UA     3/13 Urine Culture: >100,000 CFUs Pseudomonas aeruginosa     3/13 Blood Culture NGTD x2    COVID-19 PCR: NotDetec (20 Mar 2022 10:20)    SARS-CoV-2: NotDetec (13 Mar 2022 13:14)    Radiology:    3/15/22:  Xray Chest 1 View: Patchy perihilar infiltrates, right greater than left, grossly unchanged. Mild pulmonary venous congestion, new    3/14/22: Xray Chest 1 View: RIGHT lung perihilar/basilar and LEFT medial basilar multifocal and diffuse ill-defined airspace opacities.    3/13/22: CT Abdomen and Pelvis No Cont: 1. Multifocal areas of bilateral airspace consolidation most pronounced in the right lower lobe. Appearance is suggestive of multifocal pneumonia.  2. Small layering right pleural effusion. 3. Small layering stones or sludge in the gallbladder fundus. 4. Punctate nonobstructive right nephrolithiasis. 5. Large rectal fecal retention with evidence of stercoral proctitis.    3/13/22: CT Head No Cont: Mild anterior periventricular white matter ischemia with old lacunar infarction in the RIGHT corona radiata. Moderate to severe atrophy in the BILATERAL temporal lobes.    3/13/22: Xray Chest 1 View: Recommend correlation for lower lobe interstitial infiltrates    Cardiac Testing:    Trops 202.16 -- 232.63 -- 1021.39 -- 907.26 -- 463.86    3/14/22: ECHO: Fibrocalcific changes noted to the mitral valve leaflets with preserved leaflet excursion. Mild mitral annular calcification is present. Mild (1+) mitral regurgitation is present. EA reversal of the mitral inflow consistent with reduced compliance of the left ventricle. Normal appearing tricuspid valve structure. Moderate (2+) tricuspid valve regurgitation is present. The left atrium is mildly dilated. Estimated left ventricular ejection fraction is 55-60 %. The left ventricle has normal wall motion and contractility as seen in limited views. Mild concentric left ventricular hypertrophy is present. IVC was not seen within the subcostal view. Aortic root is within the upper limits of normal (4.0 cm).    3/14/22: ECG: Diagnosis Line Sinus rhythm with 1st degree A-V block Left axis deviation Right bundle branch block Abnormal ECG    3/13/22: ECG: Diagnosis Line Atrial fibrillation with premature ventricular or aberrantly conducted complexes Right bundle branch block Anterior infarct , age undetermined Abnormal ECG    Medications:                      9.1    17.77 )-----------( 403      ( 23 Mar 2022 07:39 )             28.0     03-23    146<H>  |  116<H>  |  38<H>  ----------------------------<  234<H>  4.4   |  26  |  1.04    Ca    8.2<L>      23 Mar 2022 07:39  Mg     2.2     03-23    TPro  5.2<L>  /  Alb  1.6<L>  /  TBili  1.2  /  DBili  x   /  AST  29  /  ALT  32  /  AlkPhos  130<H>  03-22    Home Medications:  acetaminophen 325 mg oral tablet: 2 tab(s) orally every 6 hours, As needed (13 Mar 2022 15:14)  ascorbic acid 500 mg oral tablet: 1 tab(s) orally once a day (13 Mar 2022 15:14)  bethanechol 50 mg oral tablet: 1 tab(s) orally 2 times a day (13 Mar 2022 15:14)  bisacodyl 10 mg rectal suppository: 1 suppository(ies) rectal once a day, As Needed if no BM from MOM at 5am next day (13 Mar 2022 15:14)  finasteride 5 mg oral tablet: 1 tab(s) orally once a day (13 Mar 2022 15:14)  gabapentin 100 mg oral capsule: 1 cap(s) orally 2 times a day (13 Mar 2022 15:43)  metoprolol succinate 25 mg oral tablet, extended release: 1 tab(s) orally once a day (13 Mar 2022 15:43)  Milk of Magnesia 8% oral suspension: 30 milliliter(s) orally once a day (at bedtime) as needed if no BM in 3 days (13 Mar 2022 15:14)  Multiple Vitamins oral tablet: 1 tab(s) orally once a day (13 Mar 2022 15:14)  tamsulosin 0.4 mg oral capsule: 1 cap(s) orally once a day (at bedtime) (13 Mar 2022 15:14)

## 2022-03-23 NOTE — PROGRESS NOTE ADULT - ASSESSMENT
89 y/o M w PMH of BPH, CAD, HTN, DMII, GERD, HLD, SDH s/p craniotomy and surgical removal is brought into HH from [facility] for coffee ground emesis, hypoxia, fever. Patient admitted on 3/13 to ICU team for Septic shock due to aspiration pneumonia and pseudomonal UTI. Now transferred to the care of the hospital medicine team.    Septic Shock   Due to aspiration PNA and pseudomonal UTI   Resolved. Currently weaned off pressors and midodrine. Lactic acidosis resolved. Blood culture NGTD x2. UCx w/ Pseudomonas aeruginosa. Transferred from ICU to  for further management    Acute hypoxic respiratory failure due to aspiration pneumonia  Repeat CXR with worsening congestive findings. Continue with ABX treatment. Lasix given x1 on 3/21. Unable to get ABG after mult. attempts. Continue supplemental 02 - on 4L NC  - Continue IV Zosyn - last dose tomorrow per ID.  - PRN morphine for dyspnea.  - Resume restricted diet td. dysphagia puree w/ mod thickened liquids     Pseudomonas aeruginosa UTI  - Continue with ABX (as above)     New Onset Atrial Fibrillation  On tele with RVR improved rates up to 110s  - Continue Cardizem gtt.    - Continue with Lopressor Q6 IV w/ parameters  - CHADsVasc =4, no AC given due to hx of SDH s/p craniotomy. NSGY eval appreciated. Risks/ benefits d/w son. Start Lovenox  - Echo EF 55-60%, no significant valvulopathy   - Condom cath for I&O monitoring    Hypernatremia  Likely due to poor PO intake  - C/w D5W. encourage free water  w/ diet    Hyperglycemia. DM Type 2  - Continue ISS ACHS      MÓNICA on CKD Stage 2  Resolved. s/p IVF. Monitor.  - Treat UTI w/ ABX    Stercoral proctitis  Resolved. CT imaging as above. (+) Large rectal fecal retention.  - Continue dulcolax suppository HS    BPH  - Continue doxazosin, finasteride     Severe Protein Calorie Malnutrition   - Resume diet today and monitor    GOC/ Advanced Directives  - No limitations. Pall f/u appreciated     DVT ppx  - SQ Heparin BID    Dispo: C/w medical plan as outlined above. Prognosis guarded.  Updated son this AM.   89 y/o M w PMH of BPH, CAD, HTN, DMII, GERD, HLD, SDH s/p craniotomy and surgical removal is brought into HH from [facility] for coffee ground emesis, hypoxia, fever. Patient admitted on 3/13 to ICU team for Septic shock due to aspiration pneumonia and pseudomonal UTI. Now transferred to the care of the hospital medicine team.    Acute hypoxic respiratory failure due to aspiration pneumonia  Suspect continuous microaspiration on secretions. s/p course of IV Zosyn.  Unable to get ABG after mult. attempts. Continue supplemental 0.  - PRN morphine for dyspnea.  - Resume restricted diet td. dysphagia puree w/ mod thickened liquids (unable to tolerate)    Septic Shock   Due to aspiration PNA and pseudomonal UTI   Resolved. Currently weaned off pressors and midodrine. Lactic acidosis resolved. Blood culture NGTD x2. UCx w/ Pseudomonas aeruginosa. Transferred from ICU to  for further management    Pseudomonas aeruginosa UTI  - Continue with ABX (as above), course to be completed today    New Onset Atrial Fibrillation  On tele with RVR improved rates up to 110s  - Continue Cardizem gtt.    - Continue with Lopressor Q6 IV w/ parameters  - CHADsVasc =4, no AC given due to hx of SDH s/p craniotomy. NSGY eval appreciated. Risks/ benefits d/w son. Started  Lovenox  - Echo EF 55-60%, no significant valvulopathy   - Condom cath for I&O monitoring    Hypernatremia  Likely due to poor PO intake  - C/w D5W. encourage free water w/ diet    Hyperglycemia. DM Type 2  - Continue ISS ACHS      MÓNICA on CKD Stage 2  Resolved. s/p IVF. Monitor.  - Treat UTI w/ ABX    Stercoral proctitis  Resolved. CT imaging as above. (+) Large rectal fecal retention.  - Continue dulcolax suppository HS    BPH  - Continue doxazosin, finasteride     Severe Protein Calorie Malnutrition   GOC/ Advanced Directives  - No limitations. Pall f/u appreciated   - Psych eval for capacity    DVT ppx  - SQ Lovenox    Dispo: C/w medical plan as outlined above. Prognosis guarded.  Updated son 3/22   89 y/o M w PMH of BPH, CAD, HTN, DMII, GERD, HLD, SDH s/p craniotomy and surgical removal is brought into HH from [facility] for coffee ground emesis, hypoxia, fever. Patient admitted on 3/13 to ICU team for Septic shock due to aspiration pneumonia and pseudomonal UTI. Now transferred to the care of the hospital medicine team.    Acute hypoxic respiratory failure due to aspiration pneumonia  Suspect continuous microaspiration on secretions. s/p course of IV Zosyn.  Unable to get ABG after mult. attempts. Continue supplemental 02.  - PRN morphine for dyspnea.  - Resume restricted diet td. dysphagia puree w/ mod thickened liquids (unable to tolerate)    Septic Shock   Resolved. Currently weaned off pressors and midodrine. Lactic acidosis resolved. Blood culture NGTD x2. UCx w/ Pseudomonas aeruginosa. Transferred from ICU.    Pseudomonas aeruginosa UTI  Continue with ABX (as above), course to be completed today    New Onset Atrial Fibrillation  On tele with RVR improved. On Cardizem gtt.    - Continue with Lopressor Q6 IV w/ parameters  - CHADsVasc =4, no AC given due to hx of SDH s/p craniotomy. NSGY eval appreciated. Risks/ benefits d/w son. Started  Lovenox  - Echo EF 55-60%, no significant valvulopathy   - Condom cath for I&O monitoring    Hypernatremia  Likely due to poor PO intake  - C/w D5W. encourage free water w/ diet    Hyperglycemia. DM Type 2  - Continue ISS ACHS      MÓNICA on CKD Stage 2  Resolved. s/p IVF. Monitor.  - Treat UTI w/ ABX    Stercoral proctitis  Resolved. CT imaging as above. (+) Large rectal fecal retention.  - Continue dulcolax suppository HS    BPH  - Continue doxazosin, finasteride     Severe Protein Calorie Malnutrition   GOC/ Advanced Directives  - No limitations. Pall f/u appreciated   - Psych eval for capacity/medical decisions.     DVT ppx  - SQ Lovenox    Dispo: C/w medical plan as outlined above. Prognosis guarded.  Updated son 3/22    D/w ICU team -- transfer to CCU

## 2022-03-23 NOTE — PROGRESS NOTE ADULT - SUBJECTIVE AND OBJECTIVE BOX
Patient is a 89y old  Male who presents with a chief complaint of Coffee ground emesis (23 Mar 2022 16:49)      BRIEF HOSPITAL COURSE:   89M with PMHx HTN, DM, HLD, SDH, cachexia/severe malnutrition initially admitted for septic shock from multifocal pneumonia + Pseudomonas UTI and stercoral colitis requiring pressors which were weaned and pt transferred to floor. Course complicated by new onset A.fib with RVR, ongoing oropharyngeal dysphagia with frequent aspiration with development of acute hypoxic resp failure due to multifocal aspiration pneumonia and septic shock returned to ICU.      Events last 24 hours: called to bedside by nursing for pts dysynchrony with vent and severe tachypnea, afebrile, not on sedation, escalating pressors, tolerating tube feeds, intubated earlier today and now with escalating O2 needs, received on 90% with subsequent escalation to 100%.     PAST MEDICAL & SURGICAL HISTORY:  CAD (coronary artery disease)    HTN (hypertension)    HLD (hyperlipidemia)    DM (diabetes mellitus)    BPH (benign prostatic hyperplasia)    GERD (gastroesophageal reflux disease)    SDH (subdural hematoma)        Review of Systems:  unable to perform at this time, pt intubated       Medications:  cefepime  Injectable.      vancomycin  IVPB 1000 milliGRAM(s) IV Intermittent once    doxazosin 1 milliGRAM(s) Oral at bedtime  norepinephrine Infusion 0.05 MICROgram(s)/kG/Min IV Continuous <Continuous>      acetaminophen     Tablet .. 650 milliGRAM(s) Oral every 6 hours PRN  fentaNYL    Injectable 50 MICROGram(s) IV Push every 4 hours PRN  fentaNYL    Injectable 50 MICROGram(s) IV Push once  midazolam Injectable 1 milliGRAM(s) IV Push every 1 hour PRN  propofol Infusion 10 MICROgram(s)/kG/Min IV Continuous <Continuous>      enoxaparin Injectable 50 milliGRAM(s) SubCutaneous every 12 hours    bisacodyl Suppository 10 milliGRAM(s) Rectal at bedtime  pantoprazole  Injectable 40 milliGRAM(s) IV Push daily      dextrose 40% Gel 15 Gram(s) Oral once  dextrose 50% Injectable 25 Gram(s) IV Push once  dextrose 50% Injectable 12.5 Gram(s) IV Push once  dextrose 50% Injectable 25 Gram(s) IV Push once  finasteride 5 milliGRAM(s) Oral daily  glucagon  Injectable 1 milliGRAM(s) IntraMuscular once  insulin lispro (ADMELOG) corrective regimen sliding scale   SubCutaneous every 6 hours    ascorbic acid 500 milliGRAM(s) Oral two times a day  dextrose 5% + lactated ringers. 1000 milliLiter(s) IV Continuous <Continuous>  dextrose 5%. 1000 milliLiter(s) IV Continuous <Continuous>  dextrose 5%. 1000 milliLiter(s) IV Continuous <Continuous>  lactated ringers Bolus 500 milliLiter(s) IV Bolus once  multivitamin/minerals 1 Tablet(s) Oral daily  sodium chloride 0.9% lock flush 10 milliLiter(s) IV Push every 1 hour PRN  thiamine 100 milliGRAM(s) Oral daily  zinc sulfate 220 milliGRAM(s) Oral daily      chlorhexidine 0.12% Liquid 15 milliLiter(s) Oral Mucosa every 12 hours  chlorhexidine 4% Liquid 1 Application(s) Topical <User Schedule>        Mode: AC/ CMV (Assist Control/ Continuous Mandatory Ventilation)  RR (machine): 16  TV (machine): 400  FiO2: 90  PEEP: 7  ITime: 1  PIP: 19      ICU Vital Signs Last 24 Hrs  T(C): 37.2 (23 Mar 2022 19:26), Max: 37.2 (23 Mar 2022 19:26)  T(F): 99 (23 Mar 2022 19:26), Max: 99 (23 Mar 2022 19:26)  HR: 113 (23 Mar 2022 21:11) (75 - 113)  BP: 95/41 (23 Mar 2022 21:11) (74/41 - 129/98)  BP(mean): 53 (23 Mar 2022 21:11) (47 - 84)  ABP: --  ABP(mean): --  RR: 28 (23 Mar 2022 21:11) (20 - 42)  SpO2: 93% (23 Mar 2022 21:11) (80% - 100%)      ABG - ( 23 Mar 2022 20:22 )  pH, Arterial: 7.32  pH, Blood: x     /  pCO2: 46    /  pO2: 61    / HCO3: 24    / Base Excess: -2.6  /  SaO2: 92        I&O's Summary    22 Mar 2022 07:01  -  23 Mar 2022 07:00  --------------------------------------------------------  IN: 1060 mL / OUT: 0 mL / NET: 1060 mL    23 Mar 2022 07:01  -  23 Mar 2022 22:01  --------------------------------------------------------  IN: 2112.5 mL / OUT: 325 mL / NET: 1787.5 mL      LABS:                        9.1    17.77 )-----------( 403      ( 23 Mar 2022 07:39 )             28.0         149<H>  |  117<H>  |  38<H>  ----------------------------<  125<H>  4.4   |  27  |  1.22    Ca    8.0<L>      23 Mar 2022 19:50  Mg     2.2         TPro  5.2<L>  /  Alb  1.6<L>  /  TBili  1.2  /  DBili  x   /  AST  29  /  ALT  32  /  AlkPhos  130<H>            CAPILLARY BLOOD GLUCOSE      POCT Blood Glucose.: 96 mg/dL (23 Mar 2022 19:19)      Urinalysis Basic - ( 23 Mar 2022 15:20 )    Color: Michelle / Appearance: very cloudy / S.020 / pH: x  Gluc: x / Ketone: Negative  / Bili: Negative / Urobili: Negative   Blood: x / Protein: 15 / Nitrite: Negative   Leuk Esterase: Moderate / RBC: 3-5 /HPF / WBC 26-50   Sq Epi: x / Non Sq Epi: Occasional / Bacteria: Moderate      CULTURES: pending    Physical Examination:    General: intubated, tachypneic with vent dysynchrony, cachetic    HEENT: Pupils equal, reactive to light.  Symmetric.    PULM: Course BS bilaterally, no wheezing    CVS: irregular rate and rhythm    ABD: Soft, nondistended, normoactive bowel sounds    EXT: No edema,     SKIN: Warm and well perfused, no rashes noted.    RADIOLOGY:   ACC: [id number] EXAM:  XR CHEST PORTABLE IMMED 1V                          PROCEDURE DATE:  2022          INTERPRETATION:  AP erect chest on 2022 at 1:49 PM. 2 images.   Patient was intubated.    Heart magnified by technique.    There are dense upper lobe interstitial infiltrates which have increased   from .    NG tube is noted. It is a different style from .    Present film also shows an endotracheal tube in good position.    IMPRESSION: Intubation. Increasing mainly upper lobe infiltrates.    --- End of Report ---            ANGELICA GUZMAN MD; Attending Radiologist  This document has been electronically signed. Mar 23 2022  3:05PM    EXAM:  ECHO TTE WO CON COMP W DOPP         PROCEDURE DATE:  2022        INTERPRETATION:  Transthoracic Echocardiography Report (TTE)     Demographics     Patient name          CAM VILLAREAL       Age           89 year(s)     Med Rec #             [id number]              Gender        Male     Account #             [id number]           Date of Birth 1931     Interpreting          Odalys Clemente,   Room Number   0075   Physician             MD     Referring Physician   zoila sanchez            Sonographer   Elke Thorne     Date of study         2022 09:15 AM     Height                68.9 in                Weight        125.66 pounds    Type of Study:     TTE procedure: ECHO TTE WO CON COMP W DOP     BP: 138/63 mmHg     Technical Quality: Technically Difficullt    Indications   1) R57.9 - Shock    M-Mode Measurements (cm)     LVEDd: 3.42 cm              LVESd: 2.37 cm   IVSEd: 1.29 cm   LVPWd: 1.29 cm              AO Root Dimension: 4 cm                               ACS: 2.1 cm                               LA: 3 cm    Doppler Measurements:     AV Velocity:104 cm/s               MV Peak E-Wave: 78.5 cm/s   AV Peak Gradient: 4.33 mmHg        MV Peak A-Wave: 101 cm/s                                      MV E/A Ratio: 0.78 %   TR Velocity:252 cm/s               MV Peak Gradient: 2.46 mmHg   TR Gradient:25.4016 mmHg   Estimated RAP:5 mmHg   RVSP:30 mmHg     Findings     Mitral Valve   Fibrocalcific changes noted to the mitral valve leaflets with preserved   leaflet excursion.   Mild mitral annular calcification is present.   Mild (1+) mitral regurgitation is present.   EA reversal of the mitral inflow consistent with reduced compliance of   the   left ventricle.     Aortic Valve   Fibrocalcific changes noted to the Aortic valve leaflets with preserved   leaflet excursion.     Tricuspid Valve   Normal appearing tricuspid valve structure.   Moderate (2+) tricuspid valve regurgitation is present.     Pulmonic Valve   Pulmonic valve not well seen.     Left Atrium   The left atrium is mildly dilated.     Left Ventricle   Estimated left ventricular ejection fraction is 55-60 %.   The left ventricle has normal wall motion and contractility as seen in   limited views.   Mild concentric left ventricular hypertrophy is present.     Right Atrium   Normal appearing right atrium.     Right Ventricle   Normal appearing right ventricle structure and function.     Pericardial Effusion   No evidence of pericardial effusion.     Pleural Effusion   No evidence of pleural effusion.     Miscellaneous   IVC was not seen within the subcostal view.   Aortic root is within the upper limits of normal (4.0 cm).     Impression     Summary     Fibrocalcific changes noted to the mitral valve leaflets with preserved   leaflet excursion.   Mild mitral annular calcification is present.   Mild (1+) mitral regurgitation is present.   EA reversal of the mitral inflow consistent with reduced compliance of   the   left ventricle.   Normal appearing tricuspid valve structure.   Moderate (2+) tricuspid valve regurgitation is present.   The left atrium is mildly dilated.   Estimated left ventricular ejection fraction is 55-60 %.   The left ventricle has normal wall motion and contractility as seen in   limited views.   Mild concentric left ventricular hypertrophy is present.   IVC was not seen within the subcostal view.   Aortic root is within the upper limits of normal (4.0 cm).     Signature     ----------------------------------------------------------------   Electronically signed by Odalys Clemente MD(Interpreting   physician) on 2022 08:09 PM   ----------------------------------------------------------------    Valves     Mitral Valve     Peak E-Wave: 78.5 cm/s   Peak A-Wave: 101 cm/s   Peak Gradient: 2.46 mmHg                                                 E/A Ratio: 0.78     Aortic Valve     Peak Velocity: 104 cm/s   Peak Gradient: 4.33 mmHg     Cusp Separation: 2.1 cm     Tricuspid Valve     TR Velocity: 252 cm/s                  Estimated RAP: 5 mmHg   TR Gradient: 25.4016 mmHg              Estimated RVSP: 30 mmHg     Pulmonic Valve              Estimated PASP: 30.4 mmHg     LVOT     Peak Velocity: 107 cm/s   Peak Gradient: 5 mmHg    Structures     Left Atrium     LA Dimension: 3 cm         LA Area: 21.3 cm^2   LA/Aorta: 0.75             LA Volume/Index: 51 ml /30m^2     Left Ventricle     Diastolic Dimension: 3.42 cm          Systolic Dimension: 2.37 cm   Septum Diastolic: 1.29 cm   PW Diastolic: 1.29 cm     FS: 30.7 %     Right Atrium     RA Systolic Pressure: 5 mmHg     Right Ventricle              RV Systolic Pressure: 30.4 mmHg     Miscellaneous     Aorta     Aortic Root: 4 cm                ODALYS CLEMENTE MD; Attending Cardiologist  This document has been electronically signed. Mar 14 2022  8:09PM      CRITICAL CARE TIME SPENT: 40 mins of additional time spent assessing presenting problems of acute illness that poses high probability of life threatening deterioration or end organ damage/dysfunction.  Medical decision making including Initiating plan of care, reviewing data, reviewing radiology, direct patient bedside evaluation and interpretation of vital signs, any necessary ventilator management , discussion with multidisciplinary team, all non inclusive of procedures.

## 2022-03-23 NOTE — CONSULT NOTE ADULT - REASON FOR ADMISSION
Coffee ground emesis

## 2022-03-23 NOTE — CONSULT NOTE ADULT - SUBJECTIVE AND OBJECTIVE BOX
ICU Consult Note    S:    Pt seen and examined  HD # 11  ? DNI (no code status in chart from [facility])  PMHx extensive including BPH, CAD, DMII, GERD, HLD, SDH s/p craniotomy and surgical removal, urine rentention with Jett catheter, HTN  Lives at [facility]  Pt here for vomiting. As per facility, pt had large amount coffee ground emesis and persistent loose stool, dark in appearance, skin color pale and cold to the touch. Pt is on 2L O2 via nasal canula. Unable to give hx. Dr. 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 Pending workup.  3/23:     3/23: ICU xc    ROS: Unable to obtain 2/2 mental status      Allergies    No Known Allergies    Intolerances        MEDICATIONS  (STANDING):  ascorbic acid 500 milliGRAM(s) Oral two times a day  bisacodyl Suppository 10 milliGRAM(s) Rectal at bedtime  dextrose 40% Gel 15 Gram(s) Oral once  dextrose 5%. 1000 milliLiter(s) (50 mL/Hr) IV Continuous <Continuous>  dextrose 5%. 1000 milliLiter(s) (100 mL/Hr) IV Continuous <Continuous>  dextrose 50% Injectable 25 Gram(s) IV Push once  dextrose 50% Injectable 12.5 Gram(s) IV Push once  dextrose 50% Injectable 25 Gram(s) IV Push once  diltiazem Infusion 5 mG/Hr (5 mL/Hr) IV Continuous <Continuous>  doxazosin 1 milliGRAM(s) Oral at bedtime  enoxaparin Injectable 50 milliGRAM(s) SubCutaneous every 12 hours  finasteride 5 milliGRAM(s) Oral daily  glucagon  Injectable 1 milliGRAM(s) IntraMuscular once  insulin lispro (ADMELOG) corrective regimen sliding scale   SubCutaneous three times a day before meals  insulin lispro (ADMELOG) corrective regimen sliding scale   SubCutaneous at bedtime  multivitamin/minerals 1 Tablet(s) Oral daily  piperacillin/tazobactam IVPB.. 3.375 Gram(s) IV Intermittent every 8 hours  thiamine 100 milliGRAM(s) Oral daily  zinc sulfate 220 milliGRAM(s) Oral daily    MEDICATIONS  (PRN):  acetaminophen     Tablet .. 650 milliGRAM(s) Oral every 6 hours PRN Temp greater or equal to 38.5C (101.3F), Mild Pain (1 - 3)  metoprolol tartrate Injectable 2.5 milliGRAM(s) IV Push every 6 hours PRN HR >110  morphine  - Injectable 2 milliGRAM(s) IV Push every 12 hours PRN Dyspnea      Drug Dosing Weight  Height (cm): 175.3 (13 Mar 2022 13:24)  Weight (kg): 56.7 (13 Mar 2022 13:24)  BMI (kg/m2): 18.5 (13 Mar 2022 13:24)  BSA (m2): 1.69 (13 Mar 2022 13:24)    CENTRAL LINE: [ ] YES [ ] NO  LOCATION:   DATE INSERTED:  REMOVE: [ ] YES [ ] NO  EXPLAIN:    JETT: [ ] YES [ ] NO    DATE INSERTED:  REMOVE: [ ] YES [ ] NO  EXPLAIN:    A-LINE: [ ] YES [ ] NO  LOCATION:   DATE INSERTED:  REMOVE: [ ] YES [ ] NO  EXPLAIN:    PAST MEDICAL & SURGICAL HISTORY:  CAD (coronary artery disease)    HTN (hypertension)    HLD (hyperlipidemia)    DM (diabetes mellitus)    BPH (benign prostatic hyperplasia)    GERD (gastroesophageal reflux disease)    SDH (subdural hematoma)        FAMILY HISTORY:  No pertinent family history in first degree relatives            ROS: See HPI; otherwise, all systems reviewed and negative.    O:    ICU Vital Signs Last 24 Hrs  T(C): 36.6 (23 Mar 2022 08:23), Max: 36.6 (23 Mar 2022 08:23)  T(F): 97.8 (23 Mar 2022 08:23), Max: 97.8 (23 Mar 2022 08:23)  HR: 90 (23 Mar 2022 08:23) (75 - 120)  BP: 129/98 (23 Mar 2022 08:23) (110/58 - 130/59)  BP(mean): --  ABP: --  ABP(mean): --  RR: 20 (23 Mar 2022 08:23) (20 - 37)  SpO2: 93% (23 Mar 2022 08:23) (80% - 100%)          I&O's Detail    22 Mar 2022 07:01  -  23 Mar 2022 07:00  --------------------------------------------------------  IN:    dextrose 5% with potassium chloride 20 mEq/L: 560 mL    Diltiazem: 100 mL    IV PiggyBack: 400 mL  Total IN: 1060 mL    OUT:  Total OUT: 0 mL    Total NET: 1060 mL              PE:    Constitutional: Healthy M lying in bed in NAD.   Neck: No JVD, trachea midline.   Respiratory: CTA B/L good BS B/L no W/R/R.  Cardiovascular: S1S2+ RRR no M/R/G.  Gastrointestinal: Soft, NTND.  Extremities: No peripheral edema, No cyanosis, clubbing.  Neurological: Awake, conversive, alert, no gross deficits.  Skin: No rashes, warm, moist.    LABS:    CBC Full  -  ( 23 Mar 2022 07:39 )  WBC Count : 17.77 K/uL  RBC Count : 2.93 M/uL  Hemoglobin : 9.1 g/dL  Hematocrit : 28.0 %  Platelet Count - Automated : 403 K/uL  Mean Cell Volume : 95.6 fl  Mean Cell Hemoglobin : 31.1 pg  Mean Cell Hemoglobin Concentration : 32.5 gm/dL  Auto Neutrophil # : x  Auto Lymphocyte # : x  Auto Monocyte # : x  Auto Eosinophil # : x  Auto Basophil # : x  Auto Neutrophil % : x  Auto Lymphocyte % : x  Auto Monocyte % : x  Auto Eosinophil % : x  Auto Basophil % : x    03-23    146<H>  |  116<H>  |  38<H>  ----------------------------<  234<H>  4.4   |  26  |  1.04    Ca    8.2<L>      23 Mar 2022 07:39  Mg     2.2     03-23    TPro  5.2<L>  /  Alb  1.6<L>  /  TBili  1.2  /  DBili  x   /  AST  29  /  ALT  32  /  AlkPhos  130<H>  03-22        CAPILLARY BLOOD GLUCOSE      POCT Blood Glucose.: 154 mg/dL (23 Mar 2022 11:51)  POCT Blood Glucose.: 237 mg/dL (23 Mar 2022 07:49)  POCT Blood Glucose.: 131 mg/dL (22 Mar 2022 21:19)  POCT Blood Glucose.: 205 mg/dL (22 Mar 2022 16:47)  POCT Blood Glucose.: 198 mg/dL (22 Mar 2022 12:19)        LIVER FUNCTIONS - ( 22 Mar 2022 08:48 )  Alb: 1.6 g/dL / Pro: 5.2 gm/dL / ALK PHOS: 130 U/L / ALT: 32 U/L / AST: 29 U/L / GGT: x                ICU Consult Note    S:    Pt seen and examined  HD # 11  ? DNI (no code status in chart from [facility])  PMHx extensive including BPH, CAD, DMII, GERD, HLD, SDH s/p craniotomy and surgical removal, urine rentention with Whitmore catheter, HTN  Lives at [facility]  Pt here for vomiting. As per facility, pt had large amount coffee ground emesis and persistent loose stool, dark in appearance, skin color pale and cold to the touch. Pt is on 2L O2 via nasal canula. Unable to give hx. Dr. 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 Pending workup.  3/23: Resp distress w/ impending resp failure. Pt agrees to intubation when asked. HCP Pt son made aware via text.     ROS: Unable to obtain 2/2 mental status    Allergies    No Known Allergies    Intolerances    MEDICATIONS  (STANDING):    ascorbic acid 500 milliGRAM(s) Oral two times a day  bisacodyl Suppository 10 milliGRAM(s) Rectal at bedtime  dextrose 40% Gel 15 Gram(s) Oral once  dextrose 5%. 1000 milliLiter(s) (50 mL/Hr) IV Continuous <Continuous>  dextrose 5%. 1000 milliLiter(s) (100 mL/Hr) IV Continuous <Continuous>  dextrose 50% Injectable 25 Gram(s) IV Push once  dextrose 50% Injectable 12.5 Gram(s) IV Push once  dextrose 50% Injectable 25 Gram(s) IV Push once  diltiazem Infusion 5 mG/Hr (5 mL/Hr) IV Continuous <Continuous>  doxazosin 1 milliGRAM(s) Oral at bedtime  enoxaparin Injectable 50 milliGRAM(s) SubCutaneous every 12 hours  finasteride 5 milliGRAM(s) Oral daily  glucagon  Injectable 1 milliGRAM(s) IntraMuscular once  insulin lispro (ADMELOG) corrective regimen sliding scale   SubCutaneous three times a day before meals  insulin lispro (ADMELOG) corrective regimen sliding scale   SubCutaneous at bedtime  multivitamin/minerals 1 Tablet(s) Oral daily  piperacillin/tazobactam IVPB.. 3.375 Gram(s) IV Intermittent every 8 hours  thiamine 100 milliGRAM(s) Oral daily  zinc sulfate 220 milliGRAM(s) Oral daily    MEDICATIONS  (PRN):    acetaminophen     Tablet .. 650 milliGRAM(s) Oral every 6 hours PRN Temp greater or equal to 38.5C (101.3F), Mild Pain (1 - 3)  metoprolol tartrate Injectable 2.5 milliGRAM(s) IV Push every 6 hours PRN HR >110  morphine  - Injectable 2 milliGRAM(s) IV Push every 12 hours PRN Dyspnea    Drug Dosing Weight    Height (cm): 175.3 (13 Mar 2022 13:24)  Weight (kg): 56.7 (13 Mar 2022 13:24)  BMI (kg/m2): 18.5 (13 Mar 2022 13:24)  BSA (m2): 1.69 (13 Mar 2022 13:24)    PAST MEDICAL & SURGICAL HISTORY:    CAD (coronary artery disease)    HTN (hypertension)    HLD (hyperlipidemia)    DM (diabetes mellitus)    BPH (benign prostatic hyperplasia)    GERD (gastroesophageal reflux disease)    SDH (subdural hematoma)    FAMILY HISTORY:    No pertinent family history in first degree relatives    ROS: See HPI; otherwise, all systems reviewed and negative.    O:    ICU Vital Signs Last 24 Hrs  T(C): 36.6 (23 Mar 2022 08:23), Max: 36.6 (23 Mar 2022 08:23)  T(F): 97.8 (23 Mar 2022 08:23), Max: 97.8 (23 Mar 2022 08:23)  HR: 90 (23 Mar 2022 08:23) (75 - 120)  BP: 129/98 (23 Mar 2022 08:23) (110/58 - 130/59)  BP(mean): --  ABP: --  ABP(mean): --  RR: 20 (23 Mar 2022 08:23) (20 - 37)  SpO2: 93% (23 Mar 2022 08:23) (80% - 100%)    I&O's Detail    22 Mar 2022 07:01  -  23 Mar 2022 07:00  --------------------------------------------------------  IN:    dextrose 5% with potassium chloride 20 mEq/L: 560 mL    Diltiazem: 100 mL    IV PiggyBack: 400 mL  Total IN: 1060 mL    OUT:  Total OUT: 0 mL    Total NET: 1060 mL    PE:    Elderly, chronically ill M lying in bed  No JVD trachea midline  + tachypnea obvious respiratory distress, noisy respirations   S1S2+  Abd soft NTND  No leg swelling/edema noted  Pt chronically ill, emaciated  Skin pink, well perfused    LABS:    CBC Full  -  ( 23 Mar 2022 07:39 )  WBC Count : 17.77 K/uL  RBC Count : 2.93 M/uL  Hemoglobin : 9.1 g/dL  Hematocrit : 28.0 %  Platelet Count - Automated : 403 K/uL  Mean Cell Volume : 95.6 fl  Mean Cell Hemoglobin : 31.1 pg  Mean Cell Hemoglobin Concentration : 32.5 gm/dL  Auto Neutrophil # : x  Auto Lymphocyte # : x  Auto Monocyte # : x  Auto Eosinophil # : x  Auto Basophil # : x  Auto Neutrophil % : x  Auto Lymphocyte % : x  Auto Monocyte % : x  Auto Eosinophil % : x  Auto Basophil % : x    03-23    146<H>  |  116<H>  |  38<H>  ----------------------------<  234<H>  4.4   |  26  |  1.04    Ca    8.2<L>      23 Mar 2022 07:39  Mg     2.2     03-23    TPro  5.2<L>  /  Alb  1.6<L>  /  TBili  1.2  /  DBili  x   /  AST  29  /  ALT  32  /  AlkPhos  130<H>  03-22        CAPILLARY BLOOD GLUCOSE      POCT Blood Glucose.: 154 mg/dL (23 Mar 2022 11:51)  POCT Blood Glucose.: 237 mg/dL (23 Mar 2022 07:49)  POCT Blood Glucose.: 131 mg/dL (22 Mar 2022 21:19)  POCT Blood Glucose.: 205 mg/dL (22 Mar 2022 16:47)  POCT Blood Glucose.: 198 mg/dL (22 Mar 2022 12:19)        LIVER FUNCTIONS - ( 22 Mar 2022 08:48 )  Alb: 1.6 g/dL / Pro: 5.2 gm/dL / ALK PHOS: 130 U/L / ALT: 32 U/L / AST: 29 U/L / GGT: x

## 2022-03-24 NOTE — PROGRESS NOTE ADULT - ASSESSMENT
Assessment:   89 yo M w PMH of BPH, CAD, HTN, DMII, GERD, HLD, SDH s/p craniotomy and surgical removal is brought into HH from [facility] for vomiting on 3/13, he was admitted to the ICU with septic shock, hypoxic respiratory failure, cystitis, GI bleed, and elevated troponin       Process of Care  --Reviewed dx/treatment problems and alignment with Goals of Care    Physical Aspects of Care  --Pain  patient denies at this time  c/w current management    --Bowel Regimen  denies constipation  risk for constipation d/t immobility  daily dulcolax    --Dyspnea  as per ICU      --Nausea Vomiting  denies    --Weakness  PT as tolerated     Psychological and Psychiatric Aspects of Care:   --Greif/ Bereavement emotional support provided  --Hx of psychiatric dx: none  -Pastoral Care Available PRN     Social Aspects of Care  -SW involved     Cultural Aspects  -Primary Language: English       Prognosis: Death can occur at anytime, but if disease continues to progress naturally patient likely has days to weeks.    Ethical and Legal Aspects:   NA    No changes in GoC     Capacity-none  HCP/Surrogate: Caleb Haywood Status- FULL CODE  MOLST- na  Dispo Plan- dc back to snf once stable    Discussed With:    Case coordinated with attending and SW and RN     Time Spent: 40 minutes including the care, coordination and counseling of this patient, 50% of which was spent coordinating and counseling.

## 2022-03-24 NOTE — PROGRESS NOTE ADULT - ASSESSMENT
89M PMH HTN, DM2 not on insulin, HLD, SDH, cachexia/severe malnutrition    Initially admitted for septic shock from multifocal pneumonia + Pseudomonas UTI and stercoral colitis    Course complicated by new onset A.fib     Weaned off pressors and transferred to floor     Persistent dysphagia with frequent aspiration, now in acute hypoxic resp failure due to multifocal aspiration pneumonia   Severe sepsis     Intubated 3/23    Patient in severe refractory shock with high pressor requirement   Anuric, MÓNICA due to ATN   Acute encephalopathy       Plan:    Continue supportive care with pressors   Will not add another pressor as it will not change outcome   Wean levo until 1 mcg/kg/min, continue vaso and stress dose steroids   Sputum cx with GPC in pairs - got a dose of vanc overnight   Continue cefepime  F/u BC v    Continue vent support, not a candidate for weaning    Will do trickle feeds in view of severe shock state and hypoactive BS     MÓNICA, hyperkalemia - d/c D5LR, change IVF to D5 0.45 NaCl   Not a candidate for dialysis given advance age, severe malnutrition, comorbidities and severe shock state     D/c therapeutic lovenox due to renal failure    Patient critically ill       Prognosis is grave with high liklihood of death - if patient goes into cardiac arrest doing CPR will not benefit or change overall outcome and should not be performed       DVT ppx: sc heparin   Nutrition: TF  Central line: yes, RIJ 3/23, keep   Arterial line: no  Whitmore: yes, keep   Restraints: no  Activity: bedrest     Code status: full     Disposition: CCU     Updated: son Caleb via text [phone] (he is deaf) - asked him to come in to see his father as he is actively dying, he said he will try

## 2022-03-24 NOTE — PROVIDER CONTACT NOTE (OTHER) - SITUATION
spoke with neuro pa they will see the pt
notified office; spoke to Radha
Pt admitted HH-ICU for vomiting
Sepsis

## 2022-03-24 NOTE — PROGRESS NOTE ADULT - SUBJECTIVE AND OBJECTIVE BOX
Patient is a 89y old  Male who presents with a chief complaint of Coffee ground emesis (24 Mar 2022 02:14)      BRIEF HOSPITAL COURSE:   89M with PMHx HTN, DM, HLD, SDH, cachexia/severe malnutrition initially admitted for septic shock from multifocal pneumonia + Pseudomonas UTI and stercoral colitis requiring pressors which were weaned and pt transferred to floor. Course complicated by new onset A.fib with RVR, ongoing oropharyngeal dysphagia with frequent aspiration with development of acute hypoxic resp failure due to multifocal aspiration pneumonia and septic shock returned to ICU.      Events last 24 hours: afebrile, on pressors with escalating dose, full vent support, sedated now, tolerating tube feeds, oliguric, now in NSR    PAST MEDICAL & SURGICAL HISTORY:  CAD (coronary artery disease)    HTN (hypertension)    HLD (hyperlipidemia)    DM (diabetes mellitus)    BPH (benign prostatic hyperplasia)    GERD (gastroesophageal reflux disease)    SDH (subdural hematoma)        Review of Systems:  unable to perform at this time, pt intubated and sedated    Medications:  cefepime  Injectable.      cefepime  Injectable. 1000 milliGRAM(s) IV Push every 12 hours    doxazosin 1 milliGRAM(s) Oral at bedtime  norepinephrine Infusion 0.05 MICROgram(s)/kG/Min IV Continuous <Continuous>      acetaminophen     Tablet .. 650 milliGRAM(s) Oral every 6 hours PRN  fentaNYL    Injectable 50 MICROGram(s) IV Push every 4 hours PRN  midazolam Injectable 1 milliGRAM(s) IV Push every 1 hour PRN  propofol Infusion 10 MICROgram(s)/kG/Min IV Continuous <Continuous>      enoxaparin Injectable 50 milliGRAM(s) SubCutaneous every 12 hours    bisacodyl Suppository 10 milliGRAM(s) Rectal at bedtime  pantoprazole  Injectable 40 milliGRAM(s) IV Push daily      dextrose 40% Gel 15 Gram(s) Oral once  dextrose 50% Injectable 25 Gram(s) IV Push once  dextrose 50% Injectable 12.5 Gram(s) IV Push once  dextrose 50% Injectable 25 Gram(s) IV Push once  finasteride 5 milliGRAM(s) Oral daily  glucagon  Injectable 1 milliGRAM(s) IntraMuscular once  hydrocortisone sodium succinate Injectable 50 milliGRAM(s) IV Push every 6 hours  insulin lispro (ADMELOG) corrective regimen sliding scale   SubCutaneous every 6 hours  vasopressin Infusion 0.04 Unit(s)/Min IV Continuous <Continuous>    ascorbic acid 500 milliGRAM(s) Oral two times a day  dextrose 5% + lactated ringers. 1000 milliLiter(s) IV Continuous <Continuous>  dextrose 5%. 1000 milliLiter(s) IV Continuous <Continuous>  dextrose 5%. 1000 milliLiter(s) IV Continuous <Continuous>  lactated ringers Bolus 1000 milliLiter(s) IV Bolus once  multivitamin/minerals 1 Tablet(s) Oral daily  sodium chloride 0.9% lock flush 10 milliLiter(s) IV Push every 1 hour PRN  thiamine 100 milliGRAM(s) Oral daily  zinc sulfate 220 milliGRAM(s) Oral daily      chlorhexidine 0.12% Liquid 15 milliLiter(s) Oral Mucosa every 12 hours  chlorhexidine 4% Liquid 1 Application(s) Topical <User Schedule>        Mode: AC/ CMV (Assist Control/ Continuous Mandatory Ventilation)  RR (machine): 16  TV (machine): 400  FiO2: 90  PEEP: 10  ITime: 1  PIP: 24      ICU Vital Signs Last 24 Hrs  T(C): 37.6 (24 Mar 2022 00:36), Max: 37.6 (24 Mar 2022 00:36)  T(F): 99.6 (24 Mar 2022 00:36), Max: 99.6 (24 Mar 2022 00:36)  HR: 94 (24 Mar 2022 02:00) (75 - 113)  BP: 104/47 (24 Mar 2022 02:00) (74/41 - 132/53)  BP(mean): 60 (24 Mar 2022 02:00) (47 - 84)  ABP: --  ABP(mean): --  RR: 32 (24 Mar 2022 02:00) (20 - 42)  SpO2: 97% (24 Mar 2022 02:00) (86% - 100%)      ABG - ( 23 Mar 2022 20:22 )  pH, Arterial: 7.32  pH, Blood: x     /  pCO2: 46    /  pO2: 61    / HCO3: 24    / Base Excess: -2.6  /  SaO2: 92          I&O's Summary    22 Mar 2022 07:01  -  23 Mar 2022 07:00  --------------------------------------------------------  IN: 1060 mL / OUT: 0 mL / NET: 1060 mL    23 Mar 2022 07:01  -  24 Mar 2022 02:21  --------------------------------------------------------  IN: 2112.5 mL / OUT: 325 mL / NET: 1787.5 mL          LABS:                        9.1    17.77 )-----------( 403      ( 23 Mar 2022 07:39 )             28.0         149<H>  |  117<H>  |  38<H>  ----------------------------<  125<H>  4.4   |  27  |  1.22    Ca    8.0<L>      23 Mar 2022 19:50  Mg     2.2         TPro  5.2<L>  /  Alb  1.6<L>  /  TBili  1.2  /  DBili  x   /  AST  29  /  ALT  32  /  AlkPhos  130<H>            CAPILLARY BLOOD GLUCOSE      POCT Blood Glucose.: 268 mg/dL (23 Mar 2022 23:48)      Urinalysis Basic - ( 23 Mar 2022 15:20 )    Color: Michelle / Appearance: very cloudy / S.020 / pH: x  Gluc: x / Ketone: Negative  / Bili: Negative / Urobili: Negative   Blood: x / Protein: 15 / Nitrite: Negative   Leuk Esterase: Moderate / RBC: 3-5 /HPF / WBC 26-50   Sq Epi: x / Non Sq Epi: Occasional / Bacteria: Moderate      CULTURES: SCx mod PMNs, GPC in pairs      Physical Examination:    General: intubated and sedated    HEENT: Pupils equal, reactive to light.  Symmetric.    PULM: Course BS bilaterally, no wheezing    CVS: irregular rate and rhythm    ABD: Soft, nondistended, normoactive bowel sounds    EXT: No edema,     SKIN: Warm and well perfused, no rashes noted.    RADIOLOGY:   ACC: [id number] EXAM:  XR CHEST PORTABLE IMMED 1V                          PROCEDURE DATE:  2022          INTERPRETATION:  AP erect chest on 2022 at 1:49 PM. 2 images.   Patient was intubated.    Heart magnified by technique.    There are dense upper lobe interstitial infiltrates which have increased   from .    NG tube is noted. It is a different style from .    Present film also shows an endotracheal tube in good position.    IMPRESSION: Intubation. Increasing mainly upper lobe infiltrates.    --- End of Report ---            ANGELICA GUZMAN MD; Attending Radiologist  This document has been electronically signed. Mar 23 2022  3:05PM    EXAM:  ECHO TTE WO CON COMP W DOPP         PROCEDURE DATE:  2022        INTERPRETATION:  Transthoracic Echocardiography Report (TTE)     Demographics     Patient name          CAM VILLAREAL       Age           89 year(s)     Med Rec #             [id number]              Gender        Male     Account #             [id number]           Date of Birth 1931     Interpreting          Odalys Clemente,   Room Number   0075   Physician             MD     Referring Physician   zoila sanchez            Sonographer   Elke Thorne     Date of study         2022 09:15 AM     Height                68.9 in                Weight        125.66 pounds    Type of Study:     TTE procedure: ECHO TTE WO CON COMP W DOP     BP: 138/63 mmHg     Technical Quality: Technically Difficullt    Indications   1) R57.9 - Shock    M-Mode Measurements (cm)     LVEDd: 3.42 cm              LVESd: 2.37 cm   IVSEd: 1.29 cm   LVPWd: 1.29 cm              AO Root Dimension: 4 cm                               ACS: 2.1 cm                               LA: 3 cm    Doppler Measurements:     AV Velocity:104 cm/s               MV Peak E-Wave: 78.5 cm/s   AV Peak Gradient: 4.33 mmHg        MV Peak A-Wave: 101 cm/s                                      MV E/A Ratio: 0.78 %   TR Velocity:252 cm/s               MV Peak Gradient: 2.46 mmHg   TR Gradient:25.4016 mmHg   Estimated RAP:5 mmHg   RVSP:30 mmHg     Findings     Mitral Valve   Fibrocalcific changes noted to the mitral valve leaflets with preserved   leaflet excursion.   Mild mitral annular calcification is present.   Mild (1+) mitral regurgitation is present.   EA reversal of the mitral inflow consistent with reduced compliance of   the   left ventricle.     Aortic Valve   Fibrocalcific changes noted to the Aortic valve leaflets with preserved   leaflet excursion.     Tricuspid Valve   Normal appearing tricuspid valve structure.   Moderate (2+) tricuspid valve regurgitation is present.     Pulmonic Valve   Pulmonic valve not well seen.     Left Atrium   The left atrium is mildly dilated.     Left Ventricle   Estimated left ventricular ejection fraction is 55-60 %.   The left ventricle has normal wall motion and contractility as seen in   limited views.   Mild concentric left ventricular hypertrophy is present.     Right Atrium   Normal appearing right atrium.     Right Ventricle   Normal appearing right ventricle structure and function.     Pericardial Effusion   No evidence of pericardial effusion.     Pleural Effusion   No evidence of pleural effusion.     Miscellaneous   IVC was not seen within the subcostal view.   Aortic root is within the upper limits of normal (4.0 cm).     Impression     Summary     Fibrocalcific changes noted to the mitral valve leaflets with preserved   leaflet excursion.   Mild mitral annular calcification is present.   Mild (1+) mitral regurgitation is present.   EA reversal of the mitral inflow consistent with reduced compliance of   the   left ventricle.   Normal appearing tricuspid valve structure.   Moderate (2+) tricuspid valve regurgitation is present.   The left atrium is mildly dilated.   Estimated left ventricular ejection fraction is 55-60 %.   The left ventricle has normal wall motion and contractility as seen in   limited views.   Mild concentric left ventricular hypertrophy is present.   IVC was not seen within the subcostal view.   Aortic root is within the upper limits of normal (4.0 cm).     Signature     ----------------------------------------------------------------   Electronically signed by Odalys Clemente MD(Interpreting   physician) on 2022 08:09 PM   ----------------------------------------------------------------    Valves     Mitral Valve     Peak E-Wave: 78.5 cm/s   Peak A-Wave: 101 cm/s   Peak Gradient: 2.46 mmHg                                                 E/A Ratio: 0.78     Aortic Valve     Peak Velocity: 104 cm/s   Peak Gradient: 4.33 mmHg     Cusp Separation: 2.1 cm     Tricuspid Valve     TR Velocity: 252 cm/s                  Estimated RAP: 5 mmHg   TR Gradient: 25.4016 mmHg              Estimated RVSP: 30 mmHg     Pulmonic Valve              Estimated PASP: 30.4 mmHg     LVOT     Peak Velocity: 107 cm/s   Peak Gradient: 5 mmHg    Structures     Left Atrium     LA Dimension: 3 cm         LA Area: 21.3 cm^2   LA/Aorta: 0.75             LA Volume/Index: 51 ml /30m^2     Left Ventricle     Diastolic Dimension: 3.42 cm          Systolic Dimension: 2.37 cm   Septum Diastolic: 1.29 cm   PW Diastolic: 1.29 cm     FS: 30.7 %     Right Atrium     RA Systolic Pressure: 5 mmHg     Right Ventricle              RV Systolic Pressure: 30.4 mmHg     Miscellaneous     Aorta     Aortic Root: 4 cm                ODALYS CLEMENTE MD; Attending Cardiologist  This document has been electronically signed. Mar 14 2022  8:09PM      CRITICAL CARE TIME SPENT: 35 mins of time spent assessing presenting problems of acute illness that poses high probability of life threatening deterioration or end organ damage/dysfunction.  Medical decision making including Initiating plan of care, reviewing data, reviewing radiology, direct patient bedside evaluation and interpretation of vital signs, any necessary ventilator management , discussion with multidisciplinary team, all non inclusive of procedures.

## 2022-03-24 NOTE — PROGRESS NOTE ADULT - SUBJECTIVE AND OBJECTIVE BOX
HPI:      PAIN: ( )Yes   ( )No  Level:  Location:  Intensity:    /10  Quality:  Aggravating Factors:  Alleviating Factors:  Radiation:  Duration/Timing:  Impact on ADLs:    DYSPNEA: ( ) Yes  ( ) No  Level:        Review of Systems:    Anxiety-  Depression-  Physical Discomfort-  Dyspnea-  Constipation-  Diarrhea-  Nausea-  Vomiting-  Anorexia-  Weight Loss-   Cough-  Secretions-  Fatigue-  Weakness-  Delirium-    All other systems reviewed and negative  Unable to obtain/Limited due to:        PHYSICAL EXAM:    Vital Signs Last 24 Hrs  T(C): 37.7 (24 Mar 2022 05:00), Max: 37.7 (24 Mar 2022 05:00)  T(F): 99.9 (24 Mar 2022 05:00), Max: 99.9 (24 Mar 2022 05:00)  HR: 96 (24 Mar 2022 11:00) (75 - 121)  BP: 109/58 (24 Mar 2022 11:00) (74/41 - 132/53)  BP(mean): 68 (24 Mar 2022 11:00) (47 - 84)  RR: 31 (24 Mar 2022 11:00) (16 - 42)  SpO2: 97% (24 Mar 2022 11:00) (86% - 100%)  Daily     Daily Weight in k.6 (24 Mar 2022 05:00)    PPSV2:   %  FAST:    General:  HEENT:  Lungs:  Cardiac:  GI:  :  Ext:  Neuro:      LABS:                        9.7    27.81 )-----------( 457      ( 24 Mar 2022 07:03 )             31.4     -    143  |  114<H>  |  41<H>  ----------------------------<  237<H>  5.3   |  22  |  1.47<H>    Ca    7.8<L>      24 Mar 2022 07:03  Phos  4.1     -24  Mg     1.9     24    TPro  4.6<L>  /  Alb  1.2<L>  /  TBili  1.2  /  DBili  x   /  AST  14<L>  /  ALT  20  /  AlkPhos  79  -24      Albumin: Albumin, Serum: 1.2 g/dL ( @ 07:03)      Allergies    No Known Allergies    Intolerances      MEDICATIONS  (STANDING):  ascorbic acid 500 milliGRAM(s) Oral two times a day  bisacodyl Suppository 10 milliGRAM(s) Rectal at bedtime  cefepime  Injectable.      cefepime  Injectable. 1000 milliGRAM(s) IV Push every 12 hours  chlorhexidine 0.12% Liquid 15 milliLiter(s) Oral Mucosa every 12 hours  chlorhexidine 4% Liquid 1 Application(s) Topical <User Schedule>  dextrose 40% Gel 15 Gram(s) Oral once  dextrose 5% + sodium chloride 0.45%. 1000 milliLiter(s) (50 mL/Hr) IV Continuous <Continuous>  dextrose 5%. 1000 milliLiter(s) (50 mL/Hr) IV Continuous <Continuous>  dextrose 5%. 1000 milliLiter(s) (100 mL/Hr) IV Continuous <Continuous>  dextrose 50% Injectable 25 Gram(s) IV Push once  dextrose 50% Injectable 12.5 Gram(s) IV Push once  dextrose 50% Injectable 25 Gram(s) IV Push once  doxazosin 1 milliGRAM(s) Oral at bedtime  finasteride 5 milliGRAM(s) Oral daily  glucagon  Injectable 1 milliGRAM(s) IntraMuscular once  heparin   Injectable 5000 Unit(s) SubCutaneous every 8 hours  hydrocortisone sodium succinate Injectable 50 milliGRAM(s) IV Push every 6 hours  insulin lispro (ADMELOG) corrective regimen sliding scale   SubCutaneous every 6 hours  multivitamin/minerals 1 Tablet(s) Oral daily  norepinephrine Infusion 0.7 MICROgram(s)/kG/Min (37.2 mL/Hr) IV Continuous <Continuous>  pantoprazole  Injectable 40 milliGRAM(s) IV Push daily  propofol Infusion 10 MICROgram(s)/kG/Min (3.4 mL/Hr) IV Continuous <Continuous>  thiamine 100 milliGRAM(s) Oral daily  vasopressin Infusion 0.04 Unit(s)/Min (2.4 mL/Hr) IV Continuous <Continuous>    MEDICATIONS  (PRN):  acetaminophen     Tablet .. 650 milliGRAM(s) Oral every 6 hours PRN Temp greater or equal to 38.5C (101.3F), Mild Pain (1 - 3)  fentaNYL    Injectable 50 MICROGram(s) IV Push every 4 hours PRN agitation  midazolam Injectable 1 milliGRAM(s) IV Push every 1 hour PRN agitation  sodium chloride 0.9% lock flush 10 milliLiter(s) IV Push every 1 hour PRN Pre/post blood products, medications, blood draw, and to maintain line patency      RADIOLOGY:   HPI:    89 yo M w PMH of BPH, CAD, HTN, DMII, GERD, HLD, SDH s/p craniotomy and surgical removal is brought into  from [facility] for vomiting on 3/13, he was admitted to the ICU with septic shock, hypoxic respiratory failure, cystitis, GI bleed, and elevated troponins. He was stabilized in the ICU and today he was transferred to the floor for further medical management. He was noted to be in new-onset Atrial fibrillation with a rapid ventricular rate. Cardiology was consulted, he was started on a Cardizem drip and metoprolol for rate control. Cardiology would like to anticoagulate the patient but wanted neurosurgery "clearance"  Pt seen and examined in 3E- he is frail, cachetic and ill appearing, unable to fully examine as pt does not participate in exam.   Last CT head reviewed.       3/21  pt seen and examined  tachypnea uncomfortable   limited ros no pain or nausea      3/23  pt on nrb, appears uncomfortable sob   case d/w team.   hcp is coming in today to meet primary team   reccomend Morphine 2mg IV [id number] prn w/ hold parameters    3/24  pt intubated in NAD  sedated  unable to participate in ROS / GoC         PAIN: ( )Yes   (x )No  DYSPNEA: (x ) Yes  ( ) No  Level: intubated        Review of Systems:  Unable to obtain/Limited due to: intubated          PHYSICAL EXAM:    Vital Signs Last 24 Hrs  T(C): 37.7 (24 Mar 2022 05:00), Max: 37.7 (24 Mar 2022 05:00)  T(F): 99.9 (24 Mar 2022 05:00), Max: 99.9 (24 Mar 2022 05:00)  HR: 96 (24 Mar 2022 11:00) (75 - 121)  BP: 109/58 (24 Mar 2022 11:00) (74/41 - 132/53)  BP(mean): 68 (24 Mar 2022 11:00) (47 - 84)  RR: 31 (24 Mar 2022 11:00) (16 - 42)  SpO2: 97% (24 Mar 2022 11:00) (86% - 100%)  Daily     Daily Weight in k.6 (24 Mar 2022 05:00)    PPSV2:  10 %  FAST:    General: intubated sedated frail  Mental Status: intubated sedated  HEENT: EOMI   Lungs: decreased b/s tachypnea rr 32  Cardiac: s1s2  GI: soft non-tedner normal bs  : voids  Ext: mno edema  Neuro: intubated jovita        LABS:                        9.7    27.81 )-----------( 457      ( 24 Mar 2022 07:03 )             31.4     -    143  |  114<H>  |  41<H>  ----------------------------<  237<H>  5.3   |  22  |  1.47<H>    Ca    7.8<L>      24 Mar 2022 07:03  Phos  4.1       Mg     1.9         TPro  4.6<L>  /  Alb  1.2<L>  /  TBili  1.2  /  DBili  x   /  AST  14<L>  /  ALT  20  /  AlkPhos  79        Albumin: Albumin, Serum: 1.2 g/dL ( @ 07:03)      Allergies    No Known Allergies    Intolerances      MEDICATIONS  (STANDING):  ascorbic acid 500 milliGRAM(s) Oral two times a day  bisacodyl Suppository 10 milliGRAM(s) Rectal at bedtime  cefepime  Injectable.      cefepime  Injectable. 1000 milliGRAM(s) IV Push every 12 hours  chlorhexidine 0.12% Liquid 15 milliLiter(s) Oral Mucosa every 12 hours  chlorhexidine 4% Liquid 1 Application(s) Topical <User Schedule>  dextrose 40% Gel 15 Gram(s) Oral once  dextrose 5% + sodium chloride 0.45%. 1000 milliLiter(s) (50 mL/Hr) IV Continuous <Continuous>  dextrose 5%. 1000 milliLiter(s) (50 mL/Hr) IV Continuous <Continuous>  dextrose 5%. 1000 milliLiter(s) (100 mL/Hr) IV Continuous <Continuous>  dextrose 50% Injectable 25 Gram(s) IV Push once  dextrose 50% Injectable 12.5 Gram(s) IV Push once  dextrose 50% Injectable 25 Gram(s) IV Push once  doxazosin 1 milliGRAM(s) Oral at bedtime  finasteride 5 milliGRAM(s) Oral daily  glucagon  Injectable 1 milliGRAM(s) IntraMuscular once  heparin   Injectable 5000 Unit(s) SubCutaneous every 8 hours  hydrocortisone sodium succinate Injectable 50 milliGRAM(s) IV Push every 6 hours  insulin lispro (ADMELOG) corrective regimen sliding scale   SubCutaneous every 6 hours  multivitamin/minerals 1 Tablet(s) Oral daily  norepinephrine Infusion 0.7 MICROgram(s)/kG/Min (37.2 mL/Hr) IV Continuous <Continuous>  pantoprazole  Injectable 40 milliGRAM(s) IV Push daily  propofol Infusion 10 MICROgram(s)/kG/Min (3.4 mL/Hr) IV Continuous <Continuous>  thiamine 100 milliGRAM(s) Oral daily  vasopressin Infusion 0.04 Unit(s)/Min (2.4 mL/Hr) IV Continuous <Continuous>    MEDICATIONS  (PRN):  acetaminophen     Tablet .. 650 milliGRAM(s) Oral every 6 hours PRN Temp greater or equal to 38.5C (101.3F), Mild Pain (1 - 3)  fentaNYL    Injectable 50 MICROGram(s) IV Push every 4 hours PRN agitation  midazolam Injectable 1 milliGRAM(s) IV Push every 1 hour PRN agitation  sodium chloride 0.9% lock flush 10 milliLiter(s) IV Push every 1 hour PRN Pre/post blood products, medications, blood draw, and to maintain line patency      RADIOLOGY:

## 2022-03-24 NOTE — PROGRESS NOTE ADULT - SUBJECTIVE AND OBJECTIVE BOX
Patient is a 89y old  Male who presents with a chief complaint of Coffee ground emesis (24 Mar 2022 12:02)    24 hour events: decompensated overnight requiring 2 pressors, high vent support   almost anuric   not doing well     PAST MEDICAL & SURGICAL HISTORY:  CAD (coronary artery disease)    HTN (hypertension)    HLD (hyperlipidemia)    DM (diabetes mellitus)    BPH (benign prostatic hyperplasia)    GERD (gastroesophageal reflux disease)    SDH (subdural hematoma)        Allergies    No Known Allergies    Intolerances      REVIEW OF SYSTEMS: SEE BELOW       ICU Vital Signs Last 24 Hrs  T(C): 37.1 (24 Mar 2022 16:00), Max: 37.7 (24 Mar 2022 05:00)  T(F): 98.8 (24 Mar 2022 16:00), Max: 99.9 (24 Mar 2022 05:00)  HR: 93 (24 Mar 2022 15:00) (86 - 121)  BP: 117/52 (24 Mar 2022 15:00) (74/41 - 132/53)  BP(mean): 66 (24 Mar 2022 15:00) (47 - 77)  ABP: --  ABP(mean): --  RR: 26 (24 Mar 2022 15:00) (16 - 42)  SpO2: 100% (24 Mar 2022 15:00) (89% - 100%)      CAPILLARY BLOOD GLUCOSE      POCT Blood Glucose.: 268 mg/dL (24 Mar 2022 11:04)  POCT Blood Glucose.: 211 mg/dL (24 Mar 2022 05:03)  POCT Blood Glucose.: 268 mg/dL (23 Mar 2022 23:48)  POCT Blood Glucose.: 96 mg/dL (23 Mar 2022 19:19)  POCT Blood Glucose.: 92 mg/dL (23 Mar 2022 17:19)  POCT Blood Glucose.: 57 mg/dL (23 Mar 2022 16:37)  POCT Blood Glucose.: 53 mg/dL (23 Mar 2022 16:35)      I&O's Summary    23 Mar 2022 07:01  -  24 Mar 2022 07:00  --------------------------------------------------------  IN: 6056.5 mL / OUT: 360 mL / NET: 5696.5 mL    24 Mar 2022 07:01  -  24 Mar 2022 15:57  --------------------------------------------------------  IN: 562.3 mL / OUT: 40 mL / NET: 522.3 mL        Mode: AC/ CMV (Assist Control/ Continuous Mandatory Ventilation)  RR (machine): 16  TV (machine): 400  FiO2: 60  PEEP: 10  ITime: 1  PIP: 25      MEDICATIONS  (STANDING):  ascorbic acid 500 milliGRAM(s) Oral two times a day  bisacodyl Suppository 10 milliGRAM(s) Rectal at bedtime  cefepime  Injectable.      cefepime  Injectable. 1000 milliGRAM(s) IV Push every 12 hours  chlorhexidine 0.12% Liquid 15 milliLiter(s) Oral Mucosa every 12 hours  chlorhexidine 4% Liquid 1 Application(s) Topical <User Schedule>  dextrose 40% Gel 15 Gram(s) Oral once  dextrose 5% + sodium chloride 0.45%. 1000 milliLiter(s) (50 mL/Hr) IV Continuous <Continuous>  dextrose 5%. 1000 milliLiter(s) (50 mL/Hr) IV Continuous <Continuous>  dextrose 5%. 1000 milliLiter(s) (100 mL/Hr) IV Continuous <Continuous>  dextrose 50% Injectable 25 Gram(s) IV Push once  dextrose 50% Injectable 12.5 Gram(s) IV Push once  dextrose 50% Injectable 25 Gram(s) IV Push once  doxazosin 1 milliGRAM(s) Oral at bedtime  finasteride 5 milliGRAM(s) Oral daily  glucagon  Injectable 1 milliGRAM(s) IntraMuscular once  heparin   Injectable 5000 Unit(s) SubCutaneous every 8 hours  hydrocortisone sodium succinate Injectable 50 milliGRAM(s) IV Push every 6 hours  insulin lispro (ADMELOG) corrective regimen sliding scale   SubCutaneous every 6 hours  multivitamin/minerals 1 Tablet(s) Oral daily  norepinephrine Infusion 0.7 MICROgram(s)/kG/Min (37.2 mL/Hr) IV Continuous <Continuous>  pantoprazole  Injectable 40 milliGRAM(s) IV Push daily  propofol Infusion 10 MICROgram(s)/kG/Min (3.4 mL/Hr) IV Continuous <Continuous>  thiamine 100 milliGRAM(s) Oral daily  vasopressin Infusion 0.04 Unit(s)/Min (2.4 mL/Hr) IV Continuous <Continuous>      MEDICATIONS  (PRN):  acetaminophen     Tablet .. 650 milliGRAM(s) Oral every 6 hours PRN Temp greater or equal to 38.5C (101.3F), Mild Pain (1 - 3)  fentaNYL    Injectable 50 MICROGram(s) IV Push every 4 hours PRN agitation  midazolam Injectable 1 milliGRAM(s) IV Push every 1 hour PRN agitation  sodium chloride 0.9% lock flush 10 milliLiter(s) IV Push every 1 hour PRN Pre/post blood products, medications, blood draw, and to maintain line patency      PHYSICAL EXAM: SEE BELOW                          9.7    27.81 )-----------( 457      ( 24 Mar 2022 07:03 )             31.4     Bands 14.0    03-24    143  |  114<H>  |  41<H>  ----------------------------<  237<H>  5.3   |  22  |  1.47<H>    Ca    7.8<L>      24 Mar 2022 07:03  Phos  4.1     03-24  Mg     1.9     -24    TPro  4.6<L>  /  Alb  1.2<L>  /  TBili  1.2  /  DBili  x   /  AST  14<L>  /  ALT  20  /  AlkPhos  79  03-24    Lactate 3.9           03-24 @ 07:03    Lactate 3.8           03-23 @ 19:50            Urinalysis Basic - ( 23 Mar 2022 15:20 )    Color: Michelle / Appearance: very cloudy / S.020 / pH: x  Gluc: x / Ketone: Negative  / Bili: Negative / Urobili: Negative   Blood: x / Protein: 15 / Nitrite: Negative   Leuk Esterase: Moderate / RBC: 3-5 /HPF / WBC 26-50   Sq Epi: x / Non Sq Epi: Occasional / Bacteria: Moderate      .Sputum Sputum --   Moderate polymorphonuclear leukocytes per low power field  Rare Squamous epithelial cells per low power field  Few Yeast like cells per oil power field  Rare Gram positive cocci in pairs per oil power field - @ 15:15

## 2022-03-24 NOTE — CHART NOTE - NSCHARTNOTEFT_GEN_A_CORE
Clinical Nutrition Follow Up Note:    * 89 year old male admitted for Sepsis. Presented to ED from SNF w/ coffee ground emesis, loose stool, pale skin, and cold to the touch. PMH includes GERD, BPH, T2DM, HLD, HTN, CAD, SDH s/p craniotomy and surgical removal. Corpak placed 3/14 TF initiated - was receiving Jevity 1.5 starting @ 10 ml/hr titrating up by 10 ml/hr q 1 hr until goal TF rate 40 ml/hr was met. RD changed TF to Glucerna 1.5 @ 10 ml/hr titrating up 10 ml/hr q 12 hrs until goal TF rate 50 ml/hr was met. TF was d/c, pt removed corpak x 2. SLP re-eval 3/22 - determined pt can tolerate puree diet w/ moderately thick liquids. Pt w/ consistent aspiration, now intubated.     * f/u 3/24: Upon admission, pt presented with severe muscle and fat wasting. Nutrition status appears unchanged from initial assessment. Currently receiving Jevity 1.5 @ 10 mL/hr with propofol @ 3.1 mL/hr, which provides 360 kcal, 15 g protein, total volume 720 ml with 20 ml/hr free water flushes (provides 480 ml free water).     *Labs Reviewed:  03-24    143  |  114<H>  |  41<H>  ----------------------------<  237<H>  5.3   |  22  |  1.47<H>    Ca    7.8<L>      24 Mar 2022 07:03  Phos  4.1     03-24  Mg     1.9     03-24    TPro  4.6<L>  /  Alb  1.2<L>  /  TBili  1.2  /  DBili  x   /  AST  14<L>  /  ALT  20  /  AlkPhos  79  03-24      BMI: BMI (kg/m2): 18.5 (03-13-22 @ 13:24)  HbA1c: A1C with Estimated Average Glucose Result: 5.9 % (03-14-22 @ 06:49)    Glucose: POCT Blood Glucose.: 268 mg/dL (03-24-22 @ 11:04)    BP: 113/65 (03-24-22 @ 10:00) (74/41 - 151/60)  Lipid Panel:       CAPILLARY BLOOD GLUCOSE  POCT Blood Glucose.: 268 mg/dL (24 Mar 2022 11:04)  POCT Blood Glucose.: 211 mg/dL (24 Mar 2022 05:03)  POCT Blood Glucose.: 268 mg/dL (23 Mar 2022 23:48)  POCT Blood Glucose.: 96 mg/dL (23 Mar 2022 19:19)  POCT Blood Glucose.: 92 mg/dL (23 Mar 2022 17:19)  POCT Blood Glucose.: 57 mg/dL (23 Mar 2022 16:37)  POCT Blood Glucose.: 53 mg/dL (23 Mar 2022 16:35)  POCT Blood Glucose.: 154 mg/dL (23 Mar 2022 11:51)      *pertinent meds:      *I and O's:    03-23-22 @ 07:01  -  03-24-22 @ 07:00  --------------------------------------------------------  IN:    dextrose 5% + lactated ringers: 1716 mL    dextrose 5% with potassium chloride 20 mEq/L: 280 mL    Diltiazem: 37.5 mL    Enteral Tube Flush: 161 mL    IV PiggyBack: 100 mL    Jevity 1.5: 133 mL    Lactated Ringers: 250 mL    Lactated Ringers Bolus: 2750 mL    Norepinephrine: 555 mL    Propofol: 60 mL    Vasopressin: 14 mL  Total IN: 6056.5 mL    OUT:    Indwelling Catheter - Urethral (mL): 360 mL  Total OUT: 360 mL    Total NET: 5696.5 mL      03-24-22 @ 07:01  -  03-24-22 @ 11:09  --------------------------------------------------------  IN:    dextrose 5% + lactated ringers: 200 mL    dextrose 5% + sodium chloride 0.45%: 50 mL    Jevity 1.5: 10 mL    Norepinephrine: 67.8 mL    Norepinephrine: 66 mL    Propofol: 34 mL    Vasopressin: 7.2 mL  Total IN: 435 mL    OUT:    Indwelling Catheter - Urethral (mL): 10 mL  Total OUT: 10 mL    Total NET: 425 mL          *(+) BM on   ; pt on bowel regimen.    *benson score of  : PU documented.   edema documented.    *PO/TF intake, meeting ~ % of estimated nutr needs.    *Malnutrition dx:        Diet, NPO with Tube Feed:   Tube Feeding Modality: Nasogastric  Jevity 1.5 Rafa (JEVITY1.5)  Total Volume for 24 Hours (mL): 240  Continuous  Starting Tube Feed Rate {mL per Hour}: 10  Until Goal Tube Feed Rate (mL per Hour): 10  Tube Feed Duration (in Hours): 24  Tube Feed Start Time: 08:15 (03-24-22 @ 08:14) [Active]            Estimated Needs: Based on Kg   Calories:  Kcal ( Kcal/Kg)  Protein:  g ( g/Kg)  Fluids:   mL ( mL/Kg)    *Wt Hx:    Height (cm): 175.3 (03-13-22 @ 13:24)  Weight (kg): 56.7 (03-13-22 @ 13:24)  BMI (kg/m2): 18.5 (03-13-22 @ 13:24)  BSA (m2): 1.69 (03-13-22 @ 13:24)    Recommendations:          *will continue to monitor and follow up with adjustments to treatment plan prn*    RD contact: [phone] Clinical Nutrition Follow Up Note:    * 89 year old male admitted for Sepsis. Presented to ED from SNF w/ coffee ground emesis, loose stool, pale skin, and cold to the touch. PMH includes GERD, BPH, T2DM, HLD, HTN, CAD, SDH s/p craniotomy and surgical removal. Corpak placed 3/14 TF initiated - was receiving Jevity 1.5 starting @ 10 ml/hr titrating up by 10 ml/hr q 1 hr until goal TF rate 40 ml/hr was met. RD changed TF to Glucerna 1.5 @ 10 ml/hr titrating up 10 ml/hr q 12 hrs until goal TF rate 50 ml/hr was met. TF was d/c, pt removed corpak x 2. SLP re-eval 3/22 - determined pt can tolerate puree diet w/ moderately thick liquids. Pt w/ consistent aspiration, now intubated.     * f/u 3/24: Upon admission, pt presented with severe muscle and fat wasting. Nutrition status appears unchanged from initial assessment. Currently receiving Jevity 1.5 @ 10 mL/hr with propofol @ 3.4 ml/hr (provides 90 kcal), which provides 450 kcal, 15 g protein, total volume 720 ml with 20 ml/hr free water flushes (provides 480 ml free water). D/c Jevity 2/2 elevated BG levels and increased protein needs. Change to Glucerna 1.5 @ 10 ml/hr with propofol @ 3.1 ml/hr (provides 90 kcal) and 2 pkts prosoucre, which will provide 470 kcal, 39 g protein, total volume 600 ml w/ free water flushes @ 20 ml/hr (provides 400 ml extra fluid).      *Labs Reviewed: glucose high, cl high,   03-24    143  |  114<H>  |  41<H>  ----------------------------<  237<H>  5.3   |  22  |  1.47<H>    Ca    7.8<L>      24 Mar 2022 07:03  Phos  4.1     03-24  Mg     1.9     03-24    TPro  4.6<L>  /  Alb  1.2<L>  /  TBili  1.2  /  DBili  x   /  AST  14<L>  /  ALT  20  /  AlkPhos  79  03-24      BMI: BMI (kg/m2): 18.5 (03-13-22 @ 13:24)  HbA1c: A1C with Estimated Average Glucose Result: 5.9 % (03-14-22 @ 06:49)    Glucose: POCT Blood Glucose.: 268 mg/dL (03-24-22 @ 11:04)    BP: 113/65 (03-24-22 @ 10:00) (74/41 - 151/60)  Lipid Panel: triglycerides pending    CAPILLARY BLOOD GLUCOSE  POCT Blood Glucose.: 268 mg/dL (24 Mar 2022 11:04)  POCT Blood Glucose.: 211 mg/dL (24 Mar 2022 05:03)  POCT Blood Glucose.: 268 mg/dL (23 Mar 2022 23:48)  POCT Blood Glucose.: 96 mg/dL (23 Mar 2022 19:19)  POCT Blood Glucose.: 92 mg/dL (23 Mar 2022 17:19)  POCT Blood Glucose.: 57 mg/dL (23 Mar 2022 16:37)  POCT Blood Glucose.: 53 mg/dL (23 Mar 2022 16:35)  POCT Blood Glucose.: 154 mg/dL (23 Mar 2022 11:51)      *pertinent meds: doxazosin, enoxaparin, lispro, bisacodyl, thiamine, zinc sulfate, ascorbic acid, finasteride, MVI, metoprolol, midazolam, levophed, propofol    *I and O's:    03-23-22 @ 07:01  -  03-24-22 @ 07:00  --------------------------------------------------------  IN:    dextrose 5% + lactated ringers: 1716 mL    dextrose 5% with potassium chloride 20 mEq/L: 280 mL    Diltiazem: 37.5 mL    Enteral Tube Flush: 161 mL    IV PiggyBack: 100 mL    Jevity 1.5: 133 mL    Lactated Ringers: 250 mL    Lactated Ringers Bolus: 2750 mL    Norepinephrine: 555 mL    Propofol: 60 mL    Vasopressin: 14 mL  Total IN: 6056.5 mL    OUT:    Indwelling Catheter - Urethral (mL): 360 mL  Total OUT: 360 mL    Total NET: 5696.5 mL      03-24-22 @ 07:01  -  03-24-22 @ 11:09  --------------------------------------------------------  IN:    dextrose 5% + lactated ringers: 200 mL    dextrose 5% + sodium chloride 0.45%: 50 mL    Jevity 1.5: 10 mL    Norepinephrine: 67.8 mL    Norepinephrine: 66 mL    Propofol: 34 mL    Vasopressin: 7.2 mL  Total IN: 435 mL    OUT:    Indwelling Catheter - Urethral (mL): 10 mL  Total OUT: 10 mL    Total NET: 425 mL    * No documented BM ; pt not on bowel regimen.    *benson score of 6: Stage I left and right heel, thoracic spine, right medial foot; left hip DTI PU documented. (1+) generalized, (2+) left and right ankle, (3+) left arm edema documented.    *TF intake, meeting ~25 % of estimated nutr needs.    *Malnutrition dx: Pt meets criteria for severe malnutrition in context of acute on chronic illness r/t decreased ability to meet increased nutrient needs 2/2 sepsis, GERD, dysphagia AEB severe muscle/fat wasting, PO intake <50% x 1 mo.    Diet, NPO with Tube Feed:   Tube Feeding Modality: Nasogastric  Jevity 1.5 Rafa (JEVITY1.5)  Total Volume for 24 Hours (mL): 240  Continuous  Starting Tube Feed Rate {mL per Hour}: 10  Until Goal Tube Feed Rate (mL per Hour): 10  Tube Feed Duration (in Hours): 24  Tube Feed Start Time: 08:15 (03-24-22 @ 08:14) [Active]    Estimated Needs: Based on 72.5 Kg (IBW)  Calories: 8359-7304 Kcal (25-30 Kcal/Kg)  Protein: 108-145 g (1.5-2.0 g/Kg)  Fluids: 3066-6032 mL (25-30 mL/Kg)    *Wt Hx:    Height (cm): 175.3 (03-13-22 @ 13:24)  Weight (kg): 56.7 (03-13-22 @ 13:24)  BMI (kg/m2): 18.5 (03-13-22 @ 13:24)  BSA (m2): 1.69 (03-13-22 @ 13:24)    Recommendations:  1. Change to Glucerna 1.5 @ 10 ml/hr with propofol @ 3.4 ml/hr. With 2 pkts prosource, this will provide 470 kcal, 39 g protein, 600 ml total volume (meets ~26% energy needs, ~35% protein needs)  2. Monitor hydration status; adjust free water flushes prn, consider free water flushes of 20 ml/hr (which provides ~400 ml extra fluid)  3. Maintain aspiration precautions, keep back of bed >35 degrees.   4. Monitor bowel movements, if no BM for >3 days, add bowel regimen prn  5. Daily wt to track/trend changes    *will continue to monitor and follow up with adjustments to treatment plan prn*  Jodi Amaya, Dietetic Intern   RD contact: [phone]

## 2022-03-24 NOTE — PROGRESS NOTE ADULT - ASSESSMENT
Impression:  1. acute hypoxemic respiratory failure  2. multifocal PNA likely aspiration  3. septic shock  4. atrial fibrillation with RVR  5. severe protein malnutrition  6. hypernatremia  7. UTI    Plan:  Neuro - Sedation with propofol to facilitate vent synchrony in addition to fentanyl PRN, currently without needs for paralytics    CV -  actively titrating pressors to keep MAP>65           addition of vasopressin as adjunct           stress steroids for CIRCI           lactate elevated, trending           Echo prior with nml LVF           POCUS IVC not plethoric with >50% caval variation, will cont IV bolus, additional 1L crystalloid           currently now in NSR           keep K~4 and Mg>2 for optimal arrhythmia suppression           TSH normal           full AC with tx dose lovenox    Pulm -  full vent support with LTV 6-8cc/kg IDW, keeping plts<30             actively titrating FiO2 for sats>90%, weaned from 100% to 70%             utilization of elevated PEEP for alveolar recruitment             CXR with dense bilateral UL infiltrates             SCx GPC and PMNs             vent bundle in place and reviewed    GI -  PPI,  Enteric feeds as tolerated, further recs as per RD    Renal - Cr elevated from baseline, now oliguric, avoid MAP<65, renally adjust all meds, avoid nephrotoxins, cont free water, ongoing IV hydration              with balanced fluids, strict I/Os, repeat BMP in am    Heme -  Full AC with tx dose lovenox, no signs of active bleeding, SCDs, if Cr rising will need to monitor cautiously use of lovenox and potential                transition to IV heparin    ID - afebrile, BCx NGTD, prior PSA UTI sp completed abx therapy, repeat u/a still with pyuria, repeat BCx/UCx pending, Scx GPC/PMNs sp Vanco x 1,          Empiric IV abx broad spectrum wtih Cefepime, check vanco level, f/u with ID, Abx adjustments/discontinuation based on discussion with          ID in conjunction with clinical features and culture data    Endo -  no further hypoglycemia, now with hyperglycemia, known DM, cont to monitor with ISS coverage as need to maintain euglycemia               120-180mg/dl, A1c 5.6.

## 2022-03-25 NOTE — PROGRESS NOTE ADULT - MENTAL STATUS
awake, alert, responds to simple commands, says a few words
slightly somnolent today but arousable and follows simple commands
sedated, does not respond to voice or pain
sedated, does not respond to voice or pain
opens eyes to voice, does not follow commands, contracted extremities
awake, follows simple commands

## 2022-03-25 NOTE — PROGRESS NOTE ADULT - NEUROLOGICAL DETAILS
alert and oriented x 3/responds to pain/responds to verbal commands/no spontaneous movement
responds to pain/responds to verbal commands/no spontaneous movement
no spontaneous movement
responds to pain/no spontaneous movement
responds to pain/responds to verbal commands/no spontaneous movement
responds to pain/responds to verbal commands/no spontaneous movement

## 2022-03-25 NOTE — PROGRESS NOTE ADULT - CRITICAL CARE SERVICES PROVIDED
Critical care services provided
Patient is critically ill, requiring critical care services.
Patient is critically ill, requiring critical care services.
Critical care services provided
Patient is critically ill, requiring critical care services.

## 2022-03-25 NOTE — CHART NOTE - NSCHARTNOTEFT_GEN_A_CORE
: Julio Cesar Harris MD    INDICATION: resp failure, septic shock     PROCEDURE:  [X] LIMITED ECHO  [X] LIMITED CHEST  [ ] LIMITED RETROPERITONEAL  [ ] LIMITED ABDOMINAL  [ ] LIMITED DVT  [ ] NEEDLE GUIDANCE VASCULAR  [ ] NEEDLE GUIDANCE THORACENTESIS  [ ] NEEDLE GUIDANCE PARACENTESIS  [ ] NEEDLE GUIDANCE PERICARDIOCENTESIS  [ ] OTHER    FINDINGS: dense R sided consolidation with inflamed pleura, moderate to large R pleural effusion, focal b-lines on L, large L pleural effusion with diaphragmatic flattening, normal LV sys fn, normal RV size and fn, MR, TR, IVC 1.9 cm w/o resp variation       INTERPRETATION: R sided consolidation, b/l pleural effusion (L>R), normal LV sys fn, normal RV size and fn, MR, TR

## 2022-03-25 NOTE — PROGRESS NOTE ADULT - SUBJECTIVE AND OBJECTIVE BOX
Patient is a 89y old  Male who presents with a chief complaint of Coffee ground emesis (24 Mar 2022 15:57)    24 hour events: remains on levo, vaso   on vent, prop   UO poor     PAST MEDICAL & SURGICAL HISTORY:  CAD (coronary artery disease)    HTN (hypertension)    HLD (hyperlipidemia)    DM (diabetes mellitus)    BPH (benign prostatic hyperplasia)    GERD (gastroesophageal reflux disease)    SDH (subdural hematoma)        Allergies    No Known Allergies    Intolerances      REVIEW OF SYSTEMS: SEE BELOW       ICU Vital Signs Last 24 Hrs  T(C): 35.7 (25 Mar 2022 13:00), Max: 37.9 (24 Mar 2022 17:29)  T(F): 96.3 (25 Mar 2022 13:00), Max: 100.2 (24 Mar 2022 17:29)  HR: 104 (25 Mar 2022 13:00) (82 - 117)  BP: 129/55 (25 Mar 2022 13:00) (110/56 - 142/55)  BP(mean): 70 (25 Mar 2022 13:00) (64 - 79)  ABP: --  ABP(mean): --  RR: 22 (25 Mar 2022 13:00) (18 - 27)  SpO2: 97% (25 Mar 2022 13:00) (96% - 100%)      CAPILLARY BLOOD GLUCOSE      POCT Blood Glucose.: 180 mg/dL (25 Mar 2022 06:01)  POCT Blood Glucose.: 228 mg/dL (24 Mar 2022 23:37)  POCT Blood Glucose.: 273 mg/dL (24 Mar 2022 17:00)      I&O's Summary    24 Mar 2022 07:01  -  25 Mar 2022 07:00  --------------------------------------------------------  IN: 2759.5 mL / OUT: 90 mL / NET: 2669.5 mL        Mode: AC/ CMV (Assist Control/ Continuous Mandatory Ventilation)  RR (machine): 16  TV (machine): 400  FiO2: 40  PEEP: 6  ITime: 1  MAP: 9.6  PIP: 17      MEDICATIONS  (STANDING):  morphine  Infusion 1 mG/Hr (1 mL/Hr) IV Continuous <Continuous>  norepinephrine Infusion 0.7 MICROgram(s)/kG/Min (37.2 mL/Hr) IV Continuous <Continuous>  vasopressin Infusion 0.04 Unit(s)/Min (2.4 mL/Hr) IV Continuous <Continuous>      MEDICATIONS  (PRN):  glycopyrrolate Injectable 0.2 milliGRAM(s) IV Push every 8 hours PRN secretions  LORazepam   Injectable 2 milliGRAM(s) IV Push every 4 hours PRN distress  morphine  - Injectable 2 milliGRAM(s) IV Push every 1 hour PRN Severe Pain (7 - 10)      PHYSICAL EXAM: SEE BELOW                          9.0    25.12 )-----------( 463      ( 25 Mar 2022 06:59 )             29.5       03-    141  |  112<H>  |  56<H>  ----------------------------<  198<H>  5.4<H>   |  23  |  2.02<H>    Ca    8.0<L>      25 Mar 2022 06:59  Phos  4.1       Mg     1.9         TPro  4.6<L>  /  Alb  1.2<L>  /  TBili  1.2  /  DBili  x   /  AST  14<L>  /  ALT  20  /  AlkPhos  79      Lactate 2.6            @ 06:59            Urinalysis Basic - ( 23 Mar 2022 15:20 )    Color: Michelle / Appearance: very cloudy / S.020 / pH: x  Gluc: x / Ketone: Negative  / Bili: Negative / Urobili: Negative   Blood: x / Protein: 15 / Nitrite: Negative   Leuk Esterase: Moderate / RBC: 3-5 /HPF / WBC 26-50   Sq Epi: x / Non Sq Epi: Occasional / Bacteria: Moderate      .Blood None   No growth to date. --  @ 15:47  Catheterized Catheterized   No growth --  @ 15:20  .Sputum Sputum   Normal Respiratory Debbie present   Moderate polymorphonuclear leukocytes per low power field  Rare Squamous epithelial cells per low power field  Few Yeast like cells per oil power field  Rare Gram positive cocci in pairs per oil power field  @ 15:15

## 2022-03-25 NOTE — PROGRESS NOTE ADULT - PROVIDER SPECIALTY LIST ADULT
Critical Care
Palliative Care
Critical Care
Infectious Disease
Palliative Care
Cardiology
Hospitalist
Infectious Disease
Psychiatry
Cardiology
Critical Care
Critical Care
Infectious Disease
Critical Care
Family Medicine
Critical Care

## 2022-03-25 NOTE — PROGRESS NOTE ADULT - NUTRITIONAL ASSESSMENT
This patient has been assessed with a concern for Malnutrition and has been determined to have a diagnosis/diagnoses of Severe protein-calorie malnutrition and Underweight (BMI < 19).      
This patient has been assessed with a concern for Malnutrition and has been determined to have a diagnosis/diagnoses of Severe protein-calorie malnutrition and Underweight (BMI < 19).    This patient is being managed with:   Diet NPO with Tube Feed-  Tube Feeding Modality: Nasogastric  Glucerna 1.5 Rafa (GLUCERNA1.5)  Total Volume for 24 Hours (mL): 1200  Continuous  Starting Tube Feed Rate {mL per Hour}: 10  Increase Tube Feed Rate by (mL): 10     Every 12 hours  Until Goal Tube Feed Rate (mL per Hour): 50  Tube Feed Duration (in Hours): 24  Tube Feed Start Time: 00:00  Free Water Flush  Free Water Flush Instructions:  10 mL/hr q1hr  No Carb Prosource TF     Qty per Day:  3  Entered: Mar 15 2022  2:07PM    
This patient has been assessed with a concern for Malnutrition and has been determined to have a diagnosis/diagnoses of Severe protein-calorie malnutrition and Underweight (BMI < 19).    This patient is being managed with:   Diet NPO with Tube Feed-  Tube Feeding Modality: Nasogastric  Glucerna 1.5 Rafa (GLUCERNA1.5)  Total Volume for 24 Hours (mL): 1200  Continuous  Starting Tube Feed Rate {mL per Hour}: 10  Increase Tube Feed Rate by (mL): 10     Every 12 hours  Until Goal Tube Feed Rate (mL per Hour): 50  Tube Feed Duration (in Hours): 24  Tube Feed Start Time: 00:00  Free Water Flush  Free Water Flush Instructions:  10 mL/hr q1hr  No Carb Prosource TF     Qty per Day:  3  Entered: Mar 15 2022  2:07PM    
This patient has been assessed with a concern for Malnutrition and has been determined to have a diagnosis/diagnoses of Severe protein-calorie malnutrition and Underweight (BMI < 19).    This patient is being managed with:   Diet Pureed-  Moderately Thick Liquids (MODTHICKLIQS)  Entered: Mar 22 2022  1:13PM    
This patient has been assessed with a concern for Malnutrition and has been determined to have a diagnosis/diagnoses of Severe protein-calorie malnutrition and Underweight (BMI < 19).    This patient is being managed with:   Diet NPO with Tube Feed-  Tube Feeding Modality: Nasogastric  Glucerna 1.5 Rafa (GLUCERNA1.5)  Total Volume for 24 Hours (mL): 1200  Continuous  Starting Tube Feed Rate {mL per Hour}: 10  Increase Tube Feed Rate by (mL): 10     Every 12 hours  Until Goal Tube Feed Rate (mL per Hour): 50  Tube Feed Duration (in Hours): 24  Tube Feed Start Time: 00:00  Free Water Flush  Free Water Flush Instructions:  10 mL/hr q1hr  No Carb Prosource TF     Qty per Day:  3  Entered: Mar 15 2022  2:07PM    
This patient has been assessed with a concern for Malnutrition and has been determined to have a diagnosis/diagnoses of Severe protein-calorie malnutrition and Underweight (BMI < 19).    This patient is being managed with:   Diet NPO with Tube Feed-  Tube Feeding Modality: Orogastric  Jevity 1.5 Rafa (JEVITY1.5)  Total Volume for 24 Hours (mL): 960  Continuous  Starting Tube Feed Rate {mL per Hour}: 10  Increase Tube Feed Rate by (mL): 10     Every 6 hours  Until Goal Tube Feed Rate (mL per Hour): 40  Tube Feed Duration (in Hours): 24  Tube Feed Start Time: 17:00  Free Water Flush  Pump   Rate (mL per Hour): 20   Frequency: Every Hour    Duration (Hours): 24  Entered: Mar 23 2022  4:48PM    
This patient has been assessed with a concern for Malnutrition and has been determined to have a diagnosis/diagnoses of Severe protein-calorie malnutrition and Underweight (BMI < 19).    This patient is being managed with:   Diet Pureed-  Moderately Thick Liquids (MODTHICKLIQS)  Entered: Mar 22 2022  1:13PM    
This patient has been assessed with a concern for Malnutrition and has been determined to have a diagnosis/diagnoses of Severe protein-calorie malnutrition and Underweight (BMI < 19).    This patient is being managed with:   Diet NPO with Tube Feed-  Tube Feeding Modality: Nasogastric  Glucerna 1.5 Rafa (GLUCERNA1.5)  Total Volume for 24 Hours (mL): 1200  Continuous  Starting Tube Feed Rate {mL per Hour}: 10  Increase Tube Feed Rate by (mL): 10     Every 12 hours  Until Goal Tube Feed Rate (mL per Hour): 50  Tube Feed Duration (in Hours): 24  Tube Feed Start Time: 00:00  Free Water Flush  Free Water Flush Instructions:  10 mL/hr q1hr  No Carb Prosource TF     Qty per Day:  3  Entered: Mar 15 2022  2:07PM    
This patient has been assessed with a concern for Malnutrition and has been determined to have a diagnosis/diagnoses of Severe protein-calorie malnutrition and Underweight (BMI < 19).    This patient is being managed with:   Diet NPO with Tube Feed-  Tube Feeding Modality: Nasogastric  Glucerna 1.5 Rafa (GLUCERNA1.5)  Total Volume for 24 Hours (mL): 240  Continuous  Until Goal Tube Feed Rate (mL per Hour): 10  Tube Feed Duration (in Hours): 24  Tube Feed Start Time: 00:00  No Carb Prosource TF     Qty per Day:  2  Entered: Mar 24 2022  2:21PM    
This patient has been assessed with a concern for Malnutrition and has been determined to have a diagnosis/diagnoses of Severe protein-calorie malnutrition and Underweight (BMI < 19).    This patient is being managed with:   Diet NPO with Tube Feed-  Tube Feeding Modality: Nasogastric  Jevity 1.5 Rafa (JEVITY1.5)  Total Volume for 24 Hours (mL): 960  Continuous  Starting Tube Feed Rate {mL per Hour}: 10  Increase Tube Feed Rate by (mL): 10     Every 8 hours  Until Goal Tube Feed Rate (mL per Hour): 40  Tube Feed Duration (in Hours): 24  Tube Feed Start Time: 17:04  Free Water Flush  Pump   Rate (mL per Hour): 20   Frequency: Every Hour    Duration (Hours): 24  Entered: Mar 14 2022  5:03PM      This patient has been assessed with a concern for Malnutrition and has been determined to have a diagnosis/diagnoses of Severe protein-calorie malnutrition and Underweight (BMI < 19).    This patient is being managed with:   Diet NPO with Tube Feed-  Tube Feeding Modality: Nasogastric  Jevity 1.5 Rafa (JEVITY1.5)  Total Volume for 24 Hours (mL): 960  Continuous  Starting Tube Feed Rate {mL per Hour}: 10  Increase Tube Feed Rate by (mL): 10     Every 8 hours  Until Goal Tube Feed Rate (mL per Hour): 40  Tube Feed Duration (in Hours): 24  Tube Feed Start Time: 17:04  Free Water Flush  Pump   Rate (mL per Hour): 20   Frequency: Every Hour    Duration (Hours): 24  Entered: Mar 14 2022  5:03PM    
This patient has been assessed with a concern for Malnutrition and has been determined to have a diagnosis/diagnoses of Severe protein-calorie malnutrition and Underweight (BMI < 19).    This patient is being managed with:   Diet NPO with Tube Feed-  Tube Feeding Modality: Nasogastric  Glucerna 1.5 Rafa (GLUCERNA1.5)  Total Volume for 24 Hours (mL): 1200  Continuous  Starting Tube Feed Rate {mL per Hour}: 10  Increase Tube Feed Rate by (mL): 10     Every 12 hours  Until Goal Tube Feed Rate (mL per Hour): 50  Tube Feed Duration (in Hours): 24  Tube Feed Start Time: 00:00  Free Water Flush  Free Water Flush Instructions:  10 mL/hr q1hr  No Carb Prosource TF     Qty per Day:  3  Entered: Mar 15 2022  2:07PM    
This patient has been assessed with a concern for Malnutrition and has been determined to have a diagnosis/diagnoses of Severe protein-calorie malnutrition and Underweight (BMI < 19).    This patient is being managed with:   Diet NPO with Tube Feed-  Tube Feeding Modality: Orogastric  Jevity 1.5 Rafa (JEVITY1.5)  Total Volume for 24 Hours (mL): 960  Continuous  Starting Tube Feed Rate {mL per Hour}: 10  Increase Tube Feed Rate by (mL): 10     Every 6 hours  Until Goal Tube Feed Rate (mL per Hour): 40  Tube Feed Duration (in Hours): 24  Tube Feed Start Time: 17:00  Free Water Flush  Pump   Rate (mL per Hour): 20   Frequency: Every Hour    Duration (Hours): 24  Entered: Mar 23 2022  4:48PM    
This patient has been assessed with a concern for Malnutrition and has been determined to have a diagnosis/diagnoses of Severe protein-calorie malnutrition and Underweight (BMI < 19).      
This patient has been assessed with a concern for Malnutrition and has been determined to have a diagnosis/diagnoses of Severe protein-calorie malnutrition and Underweight (BMI < 19).    This patient is being managed with:   Diet NPO-  Except Medications     Special Instructions for Nursing:  Except Medications  Entered: Mar 13 2022  3:06PM      This patient has been assessed with a concern for Malnutrition and has been determined to have a diagnosis/diagnoses of Severe protein-calorie malnutrition and Underweight (BMI < 19).    This patient is being managed with:   Diet NPO-  Except Medications     Special Instructions for Nursing:  Except Medications  Entered: Mar 13 2022  3:06PM

## 2022-03-25 NOTE — PROGRESS NOTE ADULT - REASON FOR ADMISSION
Coffee ground emesis

## 2022-03-25 NOTE — PROGRESS NOTE ADULT - CONSTITUTIONAL DETAILS
respiratory distress/cachectic
cachectic
no distress/cachectic
cachectic
no distress/cachectic
no distress/cachectic
cachectic

## 2022-03-25 NOTE — PROGRESS NOTE ADULT - GASTROINTESTINAL DETAILS
soft/nontender/no distention
soft/nontender/bowel sounds normal
soft/nontender
soft/nontender/no distention
soft/nontender/bowel sounds normal
soft/no distention
soft/no distention
soft/nontender/bowel sounds normal
bowel sounds normal/no rebound tenderness/no guarding
soft/bowel sounds normal/no rebound tenderness/no guarding/no rigidity

## 2022-03-25 NOTE — GOALS OF CARE CONVERSATION - ADVANCED CARE PLANNING - CONVERSATION DETAILS
Tried communicating via sign language interpreters ID# [id number] and ID# [id number] - didn't work as the screen kept on freezing    Communication held by writing on paper which the son was satisfied with     Discussed current status and ongoing care, Caleb understands his prognosis is poor and he does not want to prolong his life anymore, he is requesting to withdraw all life support measures and make him comfortable
Patient is in extremis, tachypneic ~ mid 40s   Texted son Caleb about need for intubation - he said to go ahead if he needs it     Patient has a prior will that says he would not like to be kept artificially alive so I asked him a few questions including if he is ok with a breathing tube placed in his throat to which he said yes. When I asked him if he is ready to die he said no.     Patient's capacity is hard to assess due to his distress but I asked him the same questions in several different ways and his answers were consistent    Also, given prior and current discussions with the son it seems they are both on the same page

## 2022-03-25 NOTE — PROGRESS NOTE ADULT - CARDIOVASCULAR DETAILS
irregular rate and rhythm/positive S1/positive S2
positive S1/positive S2
positive S1/positive S2
irregular rate and rhythm/positive S1/positive S2
irregular rate and rhythm/tachycardia/positive S1/positive S2
irregular rate and rhythm/positive S1/positive S2
positive S1/positive S2
irregular rate and rhythm/positive S1/positive S2

## 2022-03-25 NOTE — PROGRESS NOTE ADULT - ASSESSMENT
89M PMH HTN, DM2 not on insulin, HLD, SDH, cachexia/severe malnutrition    Initially admitted for septic shock from multifocal pneumonia + Pseudomonas UTI and stercoral colitis    Course complicated by new onset A.fib     Weaned off pressors and transferred to floor     Persistent dysphagia with frequent aspiration, now in acute hypoxic resp failure due to multifocal aspiration pneumonia   Severe sepsis     Intubated 3/23    Patient in severe refractory shock with high pressor requirement   Anuric, MÓNICA due to ATN   Acute encephalopathy       Plan:    Continue supportive care with pressors   Will not add another pressor as it will not change outcome   Wean levo, continue vaso and stress dose steroids   Sputum cx with normal patel, BCx NGTD   Continue cefepime, received a dose of vanc 3/23     Continue vent support  Decreased PEEP to 6, FiO2 to 40%  Not a candidate for SBT     Large L pleural effusion   May consider L pleural pigtail placement    Unresponsive - wean prop     Increase TF rate, tolerating so far     MÓNICA, hyperkalemia   Not a candidate for dialysis given advance age, severe malnutrition, comorbidities and severe shock state     Patient critically ill with extremely poor prognosis, will discuss GOC with son today when he arrives       DVT ppx: sc heparin   Nutrition: TF  Central line: yes, RIJ 3/23, keep   Arterial line: no  [place]: yes, keep   Restraints: no  Activity: bedrest     Code status: full     Disposition: CCU     Updated: devonte Ellis via text [phone] (he is deaf)

## 2022-03-25 NOTE — PROGRESS NOTE ADULT - RS GEN PE MLT RESP DETAILS PC
breath sounds equal/good air movement/respirations non-labored/clear to auscultation bilaterally
breath sounds equal/no rales/no rhonchi/no wheezes
breath sounds equal/no rales/no rhonchi/no wheezes
breath sounds equal/good air movement/clear to auscultation bilaterally/rales
good air movement/no intercostal retractions/respirations labored/rhonchi
intercostal retractions/respirations labored/rhonchi
breath sounds equal/no rales/no rhonchi/no wheezes
breath sounds equal/good air movement/respirations non-labored/no wheezes/rhonchi
breath sounds equal/good air movement/clear to auscultation bilaterally
good air movement/no intercostal retractions/no wheezes/rhonchi

## 2022-03-25 NOTE — PROGRESS NOTE ADULT - MS EXT PE MLT D E PC
no pedal edema
no cyanosis/pedal edema
no pedal edema
pedal edema
no cyanosis/no pedal edema
no clubbing/no cyanosis/no pedal edema
no cyanosis/no pedal edema
no cyanosis/no pedal edema

## 2022-03-25 NOTE — CHART NOTE - NSCHARTNOTESELECT_GEN_ALL_CORE
CCM attending/Event Note
Dietitian brief note
Event Note
Event Note
CCM attending/Event Note
Dietitian Follow-Up
Event Note
POCUS/Event Note

## 2022-03-28 NOTE — PHYSICAL THERAPY INITIAL EVALUATION ADULT - LEVEL OF INDEPENDENCE: BED TO CHAIR, REHAB EVAL
Please see wound care instructions which have been provided separately.     
minimum assist (75% patients effort)

## 2022-03-31 DIAGNOSIS — E11.22 TYPE 2 DIABETES MELLITUS WITH DIABETIC CHRONIC KIDNEY DISEASE: ICD-10-CM

## 2022-03-31 DIAGNOSIS — R45.1 RESTLESSNESS AND AGITATION: ICD-10-CM

## 2022-03-31 DIAGNOSIS — G93.40 ENCEPHALOPATHY, UNSPECIFIED: ICD-10-CM

## 2022-03-31 DIAGNOSIS — J96.01 ACUTE RESPIRATORY FAILURE WITH HYPOXIA: ICD-10-CM

## 2022-03-31 DIAGNOSIS — N18.2 CHRONIC KIDNEY DISEASE, STAGE 2 (MILD): ICD-10-CM

## 2022-03-31 DIAGNOSIS — K62.89 OTHER SPECIFIED DISEASES OF ANUS AND RECTUM: ICD-10-CM

## 2022-03-31 DIAGNOSIS — J69.0 PNEUMONITIS DUE TO INHALATION OF FOOD AND VOMIT: ICD-10-CM

## 2022-03-31 DIAGNOSIS — R65.21 SEVERE SEPSIS WITH SEPTIC SHOCK: ICD-10-CM

## 2022-03-31 DIAGNOSIS — E87.4 MIXED DISORDER OF ACID-BASE BALANCE: ICD-10-CM

## 2022-03-31 DIAGNOSIS — E43 UNSPECIFIED SEVERE PROTEIN-CALORIE MALNUTRITION: ICD-10-CM

## 2022-03-31 DIAGNOSIS — E78.5 HYPERLIPIDEMIA, UNSPECIFIED: ICD-10-CM

## 2022-03-31 DIAGNOSIS — E87.5 HYPERKALEMIA: ICD-10-CM

## 2022-03-31 DIAGNOSIS — K21.9 GASTRO-ESOPHAGEAL REFLUX DISEASE WITHOUT ESOPHAGITIS: ICD-10-CM

## 2022-03-31 DIAGNOSIS — J18.9 PNEUMONIA, UNSPECIFIED ORGANISM: ICD-10-CM

## 2022-03-31 DIAGNOSIS — F01.50 VASCULAR DEMENTIA WITHOUT BEHAVIORAL DISTURBANCE: ICD-10-CM

## 2022-03-31 DIAGNOSIS — I48.91 UNSPECIFIED ATRIAL FIBRILLATION: ICD-10-CM

## 2022-03-31 DIAGNOSIS — I12.9 HYPERTENSIVE CHRONIC KIDNEY DISEASE WITH STAGE 1 THROUGH STAGE 4 CHRONIC KIDNEY DISEASE, OR UNSPECIFIED CHRONIC KIDNEY DISEASE: ICD-10-CM

## 2022-03-31 DIAGNOSIS — N40.0 BENIGN PROSTATIC HYPERPLASIA WITHOUT LOWER URINARY TRACT SYMPTOMS: ICD-10-CM

## 2022-03-31 DIAGNOSIS — B96.5 PSEUDOMONAS (AERUGINOSA) (MALLEI) (PSEUDOMALLEI) AS THE CAUSE OF DISEASES CLASSIFIED ELSEWHERE: ICD-10-CM

## 2022-03-31 DIAGNOSIS — A41.52 SEPSIS DUE TO PSEUDOMONAS: ICD-10-CM

## 2022-03-31 DIAGNOSIS — N30.90 CYSTITIS, UNSPECIFIED WITHOUT HEMATURIA: ICD-10-CM

## 2022-03-31 DIAGNOSIS — E11.65 TYPE 2 DIABETES MELLITUS WITH HYPERGLYCEMIA: ICD-10-CM

## 2022-03-31 DIAGNOSIS — K92.2 GASTROINTESTINAL HEMORRHAGE, UNSPECIFIED: ICD-10-CM

## 2022-03-31 DIAGNOSIS — Z51.5 ENCOUNTER FOR PALLIATIVE CARE: ICD-10-CM

## 2022-03-31 DIAGNOSIS — A41.9 SEPSIS, UNSPECIFIED ORGANISM: ICD-10-CM

## 2022-03-31 DIAGNOSIS — I21.A1 MYOCARDIAL INFARCTION TYPE 2: ICD-10-CM

## 2022-03-31 DIAGNOSIS — H91.90 UNSPECIFIED HEARING LOSS, UNSPECIFIED EAR: ICD-10-CM

## 2022-03-31 DIAGNOSIS — Z79.82 LONG TERM (CURRENT) USE OF ASPIRIN: ICD-10-CM

## 2022-03-31 DIAGNOSIS — Z66 DO NOT RESUSCITATE: ICD-10-CM

## 2022-03-31 DIAGNOSIS — K52.9 NONINFECTIVE GASTROENTERITIS AND COLITIS, UNSPECIFIED: ICD-10-CM

## 2022-03-31 DIAGNOSIS — Z79.4 LONG TERM (CURRENT) USE OF INSULIN: ICD-10-CM

## 2022-03-31 DIAGNOSIS — A41.89 OTHER SPECIFIED SEPSIS: ICD-10-CM

## 2022-03-31 DIAGNOSIS — E87.0 HYPEROSMOLALITY AND HYPERNATREMIA: ICD-10-CM

## 2022-04-14 NOTE — CDI QUERY NOTE - NSCDIOTHERTXTBX3_GEN_ALL_CORE_FT
Documentation on chart of Notably thin.    BMI 17.6    Albumin 3.0    Please clarify a diagnosis:  A) Severe protein Calorie Malnutrition  B) Moderate protein Calorie Malnutrition  C) Unable to determine  D) Other ( Please specify condition) n/a

## 2022-05-03 NOTE — PROGRESS NOTE ADULT - ASSESSMENT
A/P:  Fever workup  Likely aspiration pneumonia  S/P emergent craniotomy with SDH  Neurosurgery  Urology consult pending  ID on consult   IV antibiotics  F/U labs, radiologic studies  Hospitalist on consult for medical management  Urinary retention,  consult pending  Monitor neurological status  Cont current care and meds  Sppech/swallow on consult/evaluation  Aspiration risk precautions  Fall risk precautions GH

## 2022-11-21 NOTE — SWALLOW BEDSIDE ASSESSMENT ADULT - MODE OF PRESENTATION
fed by clinician Melolabial Transposition Flap Text: The defect edges were debeveled with a #15 scalpel blade.  Given the location of the defect and the proximity to free margins a melolabial flap was deemed most appropriate.  Using a sterile surgical marker, an appropriate melolabial transposition flap was drawn incorporating the defect.    The area thus outlined was incised deep to adipose tissue with a #15 scalpel blade.  The skin margins were undermined to an appropriate distance in all directions utilizing iris scissors.

## 2023-03-03 NOTE — ED ADULT NURSE NOTE - NS ED NURSE IV DC DT
Quality 431: Preventive Care And Screening: Unhealthy Alcohol Use - Screening: Patient not identified as an unhealthy alcohol user when screened for unhealthy alcohol use using a systematic screening method Detail Level: Detailed Quality 110: Preventive Care And Screening: Influenza Immunization: Influenza Immunization Ordered or Recommended, but not Administered due to system reason Quality 130: Documentation Of Current Medications In The Medical Record: Current Medications Documented Quality 226: Preventive Care And Screening: Tobacco Use: Screening And Cessation Intervention: Patient screened for tobacco use and is an ex/non-smoker 02-May-2020 09:49

## 2023-12-14 NOTE — ED PROVIDER NOTE - CLINICAL SUMMARY MEDICAL DECISION MAKING FREE TEXT BOX
CARDIOLOGY PROGRESS NOTE    Date:  12/14/2023      Chief Complaint:  new afib    History of Present Illness:  Patient is a 76 year old male who was admitted on 12/10/2023 with acute hypoxic respiratory failure.  He was admitted with progressively worsening SOB, cough. In ED, was hypoxic. CXR negative. Admitted for further rx.  Las night was transferred to ICU due to worsening hypoxic, hypercapnia and AMS. He was started on bipap.  Today noted to have new afib, cardiology consulted for management.  Patient seen and examined. Denies CP, SOB. Reports leg swelling and cough. He says he was not really SOB    Patient has h/o HTN, HLD, DM, morbid obesity, chronic lymphedema, ANGLE. Patient reports his home CPAP has been broken for awhile and has not been replaced.     Subjective:  12/12/2023: no CP, breathing stable. On 2L O2  12/13/2023: no CP, breathing stable. On O2 2L.   12/14/2023: no CP, breathing remains stable. On 2L O2. Refused bipap overnight.    Previous cardiac imaging:  Echo 2020  Mild LVH. Normal LV systolic function.  Grade I/IV LV diastolic dysfunction.  No significant valve abnormalities.        ALLERGIES:   Allergen Reactions    Morphine [Morphine Sulfate-Nacl] GI UPSET     Prior to Admission medications    Medication Sig Start Date End Date Taking? Authorizing Provider   ciprofloxacin (Cipro) 500 MG tablet Take 1 tablet by mouth every 12 hours. 11/24/23   Mk Dong MD   venlafaxine XR (EFFEXOR XR) 37.5 MG 24 hr capsule Take 1 capsule daily for 5 days, then stop 11/24/23   Carisa Coley APNP   pregabalin (LYRICA) 25 MG capsule Take 1 capsule twice daily for two week, then three times daily thereafter 11/24/23   Carisa Coley APNP   bumetanide (BUMEX) 2 MG tablet TAKE 1 TABLET BY MOUTH 2 TIMES DAILY. INDICATIONS: EDEMA 10/25/23 11/24/23  Mk Dong MD   oxybutynin (DITROPAN) 5 MG tablet Take 1 tablet by mouth 3 times daily as needed (bladder spasm). 9/20/23   Leonides Huffman MD   Hyoscyamine  Sulfate SL (Levsin/SL) 0.125 MG SL Tab Place 0.125 mg under the tongue every 4 hours for 3 days. 9/14/23 9/17/23  Phil Sams MD   traMADol (ULTRAM) 50 MG tablet Take 50 mg by mouth every 4 hours as needed. Indications: Pain pain greater than 5 6/22/23   Provider, Outside   oxygen (O2) gas Inhale 3 L/min into the lungs nightly. Indications: oxygen 8/14/23   System, Provider Not In   potassium chloride (K-TAB) 20 MEQ ER tablet Take 20 mEq by mouth daily. Indications: High Blood Pressure Disorder 8/11/23   Reji Daigle MD   metFORMIN (GLUCOPHAGE-XR) 500 MG 24 hr tablet TAKE 1 TABLET BY MOUTH IN THE MORNING AND 1 TABLET IN THE EVENING. INDICATIONS: TYPE 2 DIABETES. 7/18/23   Mk Dong MD   ibuprofen (MOTRIN) 600 MG tablet Take 1 tablet by mouth 2 times daily as needed for Pain. 7/17/23   Leatha Taylor PA-C   amLODIPine (NORVASC) 5 MG tablet TAKE 1 TABLET BY MOUTH DAILY FOR HIGH BLOOD PRESSURE 6/14/23   Mk Dong MD   nystatin (MYCOSTATIN) 432083 UNIT/GM powder Apply 1 application. topically as needed (yeast). Indications: Skin Infection due to Candida Yeast 5/31/23   Mk Dong MD   allopurinol (ZYLOPRIM) 300 MG tablet Take 1 tablet by mouth daily. Indications: Disorder of Excessive Uric Acid in the Blood 3/13/23   Mk Dong MD   atorvastatin (LIPITOR) 10 MG tablet Take 1 tablet by mouth daily. Indications: High Amount of Fats in the Blood 3/13/23   Mk Dong MD   losartan (COZAAR) 100 MG tablet Take 1 tablet by mouth daily. Indications: High Blood Pressure Disorder 3/13/23   Mk Dong MD   Bismuth Tribromoph-Petrolatum (Xeroform Petrolatum Dres 5\"x9\") 3 % Pads Apply 1 each topically daily. 1/16/23   Mk Dong MD   gentamicin (GARAMYCIN) 0.1 % ointment Apply topically daily. Do not start before December 27, 2022. 12/27/22   Momo Conroy MD   acetaminophen (TYLENOL) 500 MG tablet Take 1,000 mg by mouth every 6 hours as needed for Pain. Indications: Fever, Pain  Do not start before December 3, 2023. Pain less than 5 12/3/23   Provider, Outside   finasteride (PROSCAR) 5 MG tablet Take 1 tablet by mouth daily. 1/17/22   Odalis Pappas APNP   cholecalciferol (VITAMIN D) 25 mcg (1,000 units) tablet Take 1,000 Units by mouth daily. Indications: Osteoporosis    Provider, Outside   Ascorbic Acid (VITAMIN C) 500 MG tablet Take 500 mg by mouth 2 times daily. Indications: Inadequate Vitamin C    Provider, Outside   MULTIVITAMINS PO TABS Take 1 tablet by mouth daily.  11/5/08 12/4/23  Emmanuel Rahman MD     Current Facility-Administered Medications   Medication Dose Route Frequency Provider Last Rate Last Admin    lactulose (CHRONULAC) 10 GM/15ML solution 20 g  20 g Oral Once Cindy Mackay MD        enoxaparin (LOVENOX) injection 60 mg  60 mg Subcutaneous 2 times per day Aaron Esquivel MD   60 mg at 12/13/23 2017    allopurinol (ZYLOPRIM) tablet 300 mg  300 mg Oral Daily Aaron Esquivel MD   300 mg at 12/13/23 0800    amLODIPine (NORVASC) tablet 5 mg  5 mg Oral Daily Aaron Esquivel MD   5 mg at 12/13/23 0800    atorvastatin (LIPITOR) tablet 10 mg  10 mg Oral Daily Aaron Esquivel MD   10 mg at 12/13/23 0800    finasteride (PROSCAR) tablet 5 mg  5 mg Oral Daily Aaron Esquivel MD   5 mg at 12/13/23 0800    losartan (COZAAR) tablet 100 mg  100 mg Oral Daily Aaron Esquivel MD   100 mg at 12/13/23 0800    [Held by provider] pregabalin (LYRICA) capsule 25 mg  25 mg Oral TID Aaron Esquivel MD        insulin lispro (ADMELOG,HumaLOG) - Correction Dose   Subcutaneous TID WC Aaron Esquivel MD   1 Units at 12/12/23 1707    insulin lispro (ADMELOG,HumaLOG) - Correction Dose   Subcutaneous Nightly Aaron Esquivel MD        sodium chloride 0.9 % injection 2 mL  2 mL Intracatheter 2 times per day Jack Lala MD   2 mL at 12/13/23 2016    nystatin (MYCOSTATIN) powder   Topical 2 times per day Jack Lala MD   Given at 12/13/23  2017              Review of Systems:  10 systems reviewed and otherwise negative except for what is stated in past medical history and HPI (history of present illness).    Physical Exam:   EXAM:   Vitals:    12/13/23 2300   BP:    Pulse: 81   Resp: (!) 22   Temp:      General Appearance: NAD.   Neck: No JVD (jugular venous distension), no thyroid enlargement.  Respiratory: Lungs clear bilaterally, with good airway and normal expansion, no accessory muscle usage.  Cardiovascular: Regular rate and rhythm, normal S1 and S2, no S3 or murmur; No carotid bruits, 3+edema extending up dependent thighs/buttocks/hips/and abdominal wall. Gastrointestinal: Abdomen is morbidly obese,nontender, nondistended, positive bowel sounds   Skin: Warm and dry; no rashes or erythema.   Neuro:  AA+Ox3. Moves all 4 extremities equally, follows directions and answers questions appropriately  Psych:  Normal mood and affect      Data Review:    Lab Results   Component Value Date    SODIUM 145 12/13/2023    POTASSIUM 3.8 12/13/2023    BUN 30 (H) 12/13/2023    CREATININE 0.96 12/13/2023    WBC 10.4 12/10/2023    HCT 39.0 12/10/2023    HGB 11.9 (L) 12/10/2023     12/10/2023    INR 1.1 12/19/2021    CPK 90 03/25/2016    PTT 23 12/19/2021    GLUCOSE 141 (H) 12/13/2023    TSH 2.384 05/17/2022    BNP 68 11/08/2017    NTPROB 587 (H) 12/11/2023    CHOLESTEROL 170 05/01/2023    HDL 64 05/01/2023    CALCLDL 84 05/01/2023    TRIGLYCERIDE 109 05/01/2023    MG 2.2 08/09/2023       Ejection Fraction   Date Value Ref Range Status   12/11/2023 55 % Final         Electrocardiogram:    Reviewed,  PACs    Imaging:  Chest x-ray:     No acute cardiopulmonary findings.    Echo 12/11/2023  inal Impressions    * Normal right ventricular chamber size.    * Decreased left ventricular chamber size.    * Normal left ventricular systolic function with ejection fraction of 55 %.  beat to beat variability in LVEF due to underlying rhythm.    * Mildly increased left  ventricular wall thickness.    * Normal right ventricular systolic function.    * Moderately elevated right ventricular systolic pressure 52 mmHg.    * Aortic valve not well visualized.    * Aortic valve area 1.3 cm2.    * Aortic valve mean gradient 14 mmHg.    * No significant change since the prior study.    * Mildly dilated ascending aorta, 4.0 cm.    * Mild tricuspid valve regurgitation    Assessment and Plan: per Dr Nowak   In ICU in critical condition   Concern for new afib, EKG and tele shows sinus PACs this am. Yesterday EKG poor tracing, difficult to interpret. At this time does not appear to be afib. Continue to monitor.   Acute hypoxic, hypercapnic respiratory failure on 2 L O2.  Suspected viral bronchitis, mgmt per critical care  Acute on chronic HFpEF, +severe LE edema. proBNP normal. CXR no acute findings. Continue lasix gtt. -16 L, weight down. Salt/fluid restriction. Check BMP today and in am.   -echo with normal LV, RV function, RVSP 52 mmhg likely due to ANGLE  HTN, continue home amlodipine and losartan  HLD, continue home statin  DM, mgmt per critical care  Morbid obesity Body mass index is 63.34 kg/m².  Obesity hypoventilation syndrome  ANGLE, home CPAP has been broken. Follows OP with .      Vanessa Monge NP/Dr. Nowak  Cardiology  12/14/2023      I interviewed and examined the patient today separately from the cardiology JOSE, whose note is above   I attest that I preformed the medical decision making including ordering/approving CV-related testing , treatment    , and medication changes as required for the substantive portion of this visit   Complex decision making today (in addition to the above )   Other problems well summarized above after rounds and discussion the change that I recommend and rational for them are detailed below. I have personally reviewed his echo EKG labs tele CXR Images, US duplex  , and agree with interpretation  and notes from other services and personally   dicussed case with Dr Davis  Vitals:    12/14/23 1635   BP: 129/58   Pulse: 75   Resp: (!) 40   Temp: 98.1 °F (36.7 °C)         Awake alert oriented x3   No pallor no skin lesions   PERRLA , EOMI , dry oral mucosa    No JVD no LNs enlargement in neck or groin   No carotid bruits   Normal S1 S2 no murmurs   Soft and lax abdomen   LE +4 edema , discolored skin     Assessment and plan are as follows   In ICU in critical condition   Concern for new afib, EKG and tele shows sinus PACs this am. Yesterday EKG poor tracing, difficult to interpret. At this time does not appear to be afib. Continue to monitor.   Acute hypoxic, hypercapnic respiratory failure on 2 L O2.  Suspected viral bronchitis, mgmt per critical care  Acute on chronic HFpEF, +severe LE edema. proBNP normal. CXR no acute findings. Continue lasix gtt. -16 L, weight down. Salt/fluid restriction. Check BMP today and in am.   -echo with normal LV, RV function, RVSP 52 mmhg likely due to ANGLE  HTN, continue home amlodipine and losartan  HLD, continue home statin  DM, mgmt per critical care  Morbid obesity Body mass index is 63.34 kg/m².  Obesity hypoventilation syndrome  ANGLE, home CPAP has been broken. Follows OP with .  Please let me know if you have any questions     Thank you     Nati Nowak MD [facility], FACC           Labs, imaging, meds have been ordered. Spoke to GI Dr. Spencer and will come and see pt. Labs, imaging, meds have been ordered. I have reached out to family but have not had a call back as of yet, per NH, pt is a full code, he is ill-appearing, started on protonix, he is hypotensive, PRBC's ordered, Labs, imaging, meds have been ordered. I have reached out to family but have not had a call back as of yet, per NH, pt is a full code, he is ill-appearing, started on protonix, he is hypotensive, initially PRBC's ordered however hb came back at 14, I ordered abx, Labs, imaging, meds have been ordered. I have reached out to family but have not had a call back as of yet, per NH, pt is a full code, he is ill-appearing, started on protonix, he is hypotensive, initially PRBC's ordered however hb came back at 14, I ordered abx, I /dw the icu for evaluation. Labs, imaging, meds have been ordered. I have reached out to family but have not had a call back as of yet, per NH, pt is a full code, he is ill-appearing, started on protonix, he is hypotensive, initially PRBC's ordered however hb came back at 14, I ordered abx, I /dw the icu for evaluation.    PA: Patient presented with hypoxia, subjective coffee ground emesis. H&H is stable, Guaiac NEG. +Sepsis, on Vanco/Zosyn. +Elevated troponin. Admitted to ICU. ~Miguel Ángel Gómez PA-C

## 2024-01-04 NOTE — ED PROVIDER NOTE - PATIENT PORTAL LINK FT
oral You can access the FollowMyHealth Patient Portal offered by [facility] by registering at the following website: [email or web address]. By joining [facility]’s FollowMyHealth portal, you will also be able to view your health information using other applications (apps) compatible with our system.

## 2024-05-23 NOTE — CONSULT NOTE ADULT - PROVIDER SPECIALTY LIST ADULT
Critical Care
Neurosurgery
Critical Care
Palliative Care
Cardiology
Infectious Disease
healed/ dry surgical scar from old sx

## 2024-07-15 NOTE — ED ADULT NURSE NOTE - CHPI ED NUR SYMPTOMS POS
Physical Therapy Daily Treatment Note  [facility]  [address] [place] [address]     Patient: Campbell Alex   : 1938   Referring practitioner: Rocky Lozoya DO  Date of initial visit: Type: THERAPY  Noted: 2024   Today's date: 7/15/2024   Patient seen for 15 visits    Visit Diagnoses:    ICD-10-CM ICD-9-CM   1. Low back pain, unspecified back pain laterality, unspecified chronicity, unspecified whether sciatica present  M54.50 724.2   2. History of lumbar laminectomy  Z98.890 V45.89   3. Bilateral leg weakness  R29.898 729.89       Subjective   Pt reports: Feeling stronger. No new complaints. HEP going well.      Objective     See Exercise, Manual, and Modality Logs for complete treatment.     Patient Education: HEP, wx use    Assessment/Plan Fatigued post visit, tolerating activity well.      Progress per Plan of Care            Timed:         Manual Therapy:         mins  43311;     Therapeutic Exercise:    15     mins  45958;     Neuromuscular Agustina:    15    mins  19111;    Therapeutic Activity:          mins  11853;     Gait Training:           mins  86968;     Ultrasound:          mins  77180;    Ionto                                   mins   52528  Self Care                            mins   66754    Un-Timed:  Electrical Stimulation:         mins  25590 ( );  Traction          mins 43273  Low Eval          Mins  74465  Mod Eval          Mins  67347  High Eval                            Mins  83108  Re-Eval                               mins  82095    Timed Treatment:   30   mins   Total Treatment:     30   mins      Daniel Joyner, PT, DPT, OCS  [place] license: [id number]  Physical Therapist   chest/PAIN

## 2025-01-31 NOTE — H&P ADULT - ATTENDING SUPERVISION STATEMENT
[address] call about the patient losing his Dex Co G7 and need a need scirpt for patient to get a new one. Please assist the aptient with getting a new script to get a new Dex co G7 . Thank you    Resident

## 2025-02-17 NOTE — ED ADULT NURSE NOTE - PRO INTERPRETER NEED 2
Topical Recommendations     Generalized ruptured bullae: Cleanse with NS, pat dry. Apply Liquid barrier film to periwound skin (allow to dry). Apply silicone contact layer (adaptic touch) to base of wound. Secure with silicone foam with borders. Change daily with topical ointment application and PRN if soiled.    Sacrum to BL buttocks: Cleanse with skin cleanser, pat dry. Apply Willem moisture barrier cream twice a day and PRN with incontinent episodes.     While inpatient, continue low air loss bed therapy, continue heel elevation, continue to turn & reposition per protocol, soft pillow between bony prominences, continue moisture management with barrier creams & single breathable pad, continue measures to decrease friction/shear/pressure. Continue with nutritional support as per dietary/orders.     Plan of care discussed with    Please contact Wound Care Service Line if we can be of further assistance (ext [phone]).  Topical Recommendations     Abdominal pannus, BL under beasts: Cleanse with NS, pat dry. Apply Liquid barrier film to periwound skin (allow to dry). Apply Interdry textile sheeting, under intertriginous folds leaving 2 inches exposed at ends to wick, remove to wash & dry affected area, then replace. Individual sheeting may be used for up to 5 days unless soiled.     BL Knees, L elbow, anterior abdomen: Cleanse with NS, pat dry. Apply Liquid barrier film to periwound skin (allow to dry). Apply silicone contact layer (adaptic touch) to base of wound. Secure with silicone foam with borders. Change daily with topical ointment application and PRN if soiled.    R forearm: Cleanse with NS, pat dry. Apply Liquid barrier film to periwound skin (allow to dry). Apply silicone contact layer (adaptic touch) to base of wound. Cover with abdominal pad, secure with kerlix and paper tape. Change daily with topical ointment application and PRN if soiled.    Posterior back: Cleanse with NS, pat dry. Apply Liquid barrier film to periwound skin (allow to dry). Apply silicone contact layer (adaptic touch) to base of wound. Cover with mepilex transfer. Change daily with topical ointment application and PRN if soiled.     Sacrum to BL buttocks: Cleanse with skin cleanser, pat dry. Apply Willem moisture barrier cream twice a day and PRN with incontinent episodes.     While inpatient, continue low air loss bed therapy, continue heel elevation, continue to turn & reposition per protocol, soft pillow between bony prominences, continue moisture management with barrier creams & single breathable pad, continue measures to decrease friction/shear/pressure. Continue with nutritional support as per dietary/orders.     Plan of care discussed with primary team.     Please contact Wound Care Service Line if we can be of further assistance (ext [phone]).  Topical Recommendations     Abdominal pannus, BL under beasts: Cleanse with NS, pat dry. Apply Liquid barrier film to periwound skin (allow to dry). Apply Interdry textile sheeting, under intertriginous folds leaving 2 inches exposed at ends to wick, remove to wash & dry affected area, then replace. Individual sheeting may be used for up to 5 days unless soiled.     BL Knees, L elbow, anterior abdomen: Cleanse with NS, pat dry. Apply Liquid barrier film to periwound skin (allow to dry). Apply silicone contact layer (adaptic touch) to base of wound. Secure with silicone foam with borders. Change daily with topical ointment application and PRN if soiled.    R forearm: Cleanse with NS, pat dry. Apply Liquid barrier film to periwound skin (allow to dry). Apply silicone contact layer (adaptic touch) to base of wound. Cover with abdominal pad, secure with kerlix and paper tape. Change daily with topical ointment application and PRN if soiled.    Posterior back: Cleanse with NS, pat dry. Apply Liquid barrier film to periwound skin (allow to dry). Apply silicone contact layer (adaptic touch) to base of wound. Cover with mepilex transfer. Change daily with topical ointment application and PRN if soiled.     Sacrum to BL buttocks: Cleanse with skin cleanser, pat dry. Apply Willem moisture barrier cream twice a day and PRN with incontinent episodes.     Generalized stable scabs 2/2 de-roofed blisters: Cleanse with NS, pat dry. Apply topical ointments Bactroban & kenalog) as prescribed by dermatology.    While inpatient, continue low air loss bed therapy, continue heel elevation, continue to turn & reposition per protocol, soft pillow between bony prominences, continue moisture management with barrier creams & single breathable pad, continue measures to decrease friction/shear/pressure. Continue with nutritional support as per dietary/orders.     Plan of care discussed with primary team.     Please contact Wound Care Service Line if we can be of further assistance (ext [phone]).  English